# Patient Record
Sex: FEMALE | Race: WHITE | Employment: OTHER | URBAN - METROPOLITAN AREA
[De-identification: names, ages, dates, MRNs, and addresses within clinical notes are randomized per-mention and may not be internally consistent; named-entity substitution may affect disease eponyms.]

---

## 2018-03-12 ENCOUNTER — TELEPHONE (OUTPATIENT)
Dept: BARIATRICS/WEIGHT MGMT | Age: 48
End: 2018-03-12

## 2018-03-12 NOTE — TELEPHONE ENCOUNTER
Called patient to see if labs were done for appt with Dr Brandy Torres on 3/16/18 for vit d check. Explained that we would reschedule appt if she could not get them done by Thurs 3/15/18.

## 2018-03-13 ENCOUNTER — HOSPITAL ENCOUNTER (OUTPATIENT)
Age: 48
Discharge: HOME | End: 2018-03-13
Payer: MEDICARE

## 2018-03-13 ENCOUNTER — INITIAL CONSULT (OUTPATIENT)
Dept: BARIATRICS/WEIGHT MGMT | Age: 48
End: 2018-03-13

## 2018-03-13 ENCOUNTER — TELEPHONE (OUTPATIENT)
Dept: BARIATRICS/WEIGHT MGMT | Age: 48
End: 2018-03-13

## 2018-03-13 VITALS — WEIGHT: 293 LBS | BODY MASS INDEX: 48.82 KG/M2 | HEIGHT: 65 IN

## 2018-03-13 DIAGNOSIS — Z71.3 DIETARY COUNSELING: Primary | ICD-10-CM

## 2018-03-13 LAB — VITAMIN D 25-HYDROXY: 18 NG/ML (ref 30–100)

## 2018-03-13 PROCEDURE — 82306 VITAMIN D 25 HYDROXY: CPT

## 2018-03-13 PROCEDURE — 36415 COLL VENOUS BLD VENIPUNCTURE: CPT

## 2018-03-13 PROCEDURE — 99999 PR OFFICE/OUTPT VISIT,PROCEDURE ONLY: CPT | Performed by: SURGERY

## 2018-03-13 NOTE — TELEPHONE ENCOUNTER
Last Dietary Appointment Notes: 3/13/18    Caitlyn VILLALPANDO [address]: Medicare    Surgery Requested by Patient: As of 3/13/18 - RYGB Sx    Date: 2 Hour Nutrition Class: Wed. March 28th 2018 10:00 - 12:00    Migue / Miguel reviewed the following with the patient:    Tati Charles / Miguel at the [facility] reviewed with the patient that She has not completed the following in order to proceed with bariatric surgery:   -Pap Smear - Pt had it completed and will call and have the results sent - Completed 2/26/18 - Dr. Feliberto Mcgill / Delta Iraheta at the [facility] reviewed that She is not at Her pre-surgery goal weight of 302 lbs. Patient currently weighs 303.6 lbs and must return to the [facility] for a weight check on 3/28/18 before the patient can be scheduled for surgery. This has been reviewed with the patient and the patient is in agreement. Migue / Miguel reviewed with the patient that She must purchase a 3 month supply of supplements before Her surgery or at the time of Her H&P appointment and the patient states She is going to purchase the 3 month supply of supplements on H&P. Patient is aware that failure to purchase the supplements at this appointment will cancel the patients surgery date. Patient states at this time She does not see a Counselor and or Psychologist and or Psychiatrist at this time. Patient states She is currently seeing the following person Dr. Nico Gutierrez for the  following amount of times since 1995 for the following reasons Bi-Polar. Patient states She is  taking the following psychological medication Seroquel, Prozac . Prescribed by: Dr. Solomon Corcoran since 1995. Patient states at this time from the time of Her initial consult here at the [facility] there has been no changes in Her medical history. Patient is aware failure to disclose information can lead to Her surgery being cancelled. Patient received a copy of this at the time of Her final dietary consult.

## 2018-03-16 ENCOUNTER — OFFICE VISIT (OUTPATIENT)
Dept: BARIATRICS/WEIGHT MGMT | Age: 48
End: 2018-03-16
Payer: MEDICARE

## 2018-03-16 VITALS — WEIGHT: 293 LBS | TEMPERATURE: 98 F | BODY MASS INDEX: 48.82 KG/M2 | HEIGHT: 65 IN | RESPIRATION RATE: 18 BRPM

## 2018-03-16 DIAGNOSIS — E55.9 VITAMIN D DEFICIENCY: Primary | ICD-10-CM

## 2018-03-16 PROCEDURE — G8427 DOCREV CUR MEDS BY ELIG CLIN: HCPCS | Performed by: SURGERY

## 2018-03-16 PROCEDURE — G8482 FLU IMMUNIZE ORDER/ADMIN: HCPCS | Performed by: SURGERY

## 2018-03-16 PROCEDURE — G8417 CALC BMI ABV UP PARAM F/U: HCPCS | Performed by: SURGERY

## 2018-03-16 PROCEDURE — 1036F TOBACCO NON-USER: CPT | Performed by: SURGERY

## 2018-03-16 PROCEDURE — 99213 OFFICE O/P EST LOW 20 MIN: CPT | Performed by: SURGERY

## 2018-03-16 PROCEDURE — 99211 OFF/OP EST MAY X REQ PHY/QHP: CPT

## 2018-03-16 NOTE — PROGRESS NOTES
Bariatric Follow Up Note  [facility] or Ha Sky MD, [place]    Patient's Name/Date of Birth: Etta Counter / 1970    Date: March 16, 2018     Surgeon: Tish Gitelman, MD    Chief Complaint: Vitamin Deficiency, GERD    Patient Active Problem List   Diagnosis    Bilateral primary osteoarthritis of knee    Bipolar 1 disorder ([facility] [address]    Osteoarthritis    Obesity    Gastroesophageal reflux disease with esophagitis       Subjective: No new complaints, taking vit replacement daily. Doing well counting calories and protein    Objective:  Temp 98 °F (36.7 °C) (Temporal)   Resp 18   Ht 5' 5\" (1.651 m)   Wt (!) 304 lb (137.9 kg)   LMP 02/12/2018   BMI 50.59 kg/m²   Labs:  No results for input(s): WBC, HGB, HCT in the last 72 hours. Invalid input(s): PLR  Lab Results   Component Value Date    CREATININE 0.8 12/21/2017    BUN 11 12/21/2017     12/21/2017    K 4.9 12/21/2017     12/21/2017    CO2 29 12/21/2017     No results for input(s): LIPASE, AMYLASE in the last 72 hours.     Vit D 18 from 12    Review of Systems -  General ROS: negative for - chills, fatigue or malaise  ENT ROS: negative for - hearing change, nasal congestion or nasal discharge  Allergy and Immunology ROS: negative for - hives, itchy/watery eyes or nasal congestion  Hematological and Lymphatic ROS: negative for - blood clots, blood transfusions, bruising or fatigue  Endocrine ROS: negative for - malaise/lethargy, mood swings, palpitations or polydipsia/polyuria  Respiratory ROS: negative for - sputum changes, stridor, tachypnea or wheezing  Cardiovascular ROS: negative for - irregular heartbeat, loss of consciousness, murmur or orthopnea  Gastrointestinal ROS: negative for - constipation, diarrhea, gas/bloating, heartburn or hematemesis  Genito-Urinary ROS: negative for -  genital discharge, genital ulcers or hematuria  Musculoskeletal ROS: negative for - gait disturbance, muscle pain or muscular

## 2018-03-16 NOTE — PATIENT INSTRUCTIONS
What is the next step to proceed with weight loss surgery? Please be aware that any co-pays or deductibles may be requested prior to testing and / or procedures. You will need to schedule a psychological evaluation for weight loss surgery. Patients will be required to complete all psychological testing as required by the psychologist. Patients must follow all of the psychologist's recommendations before weight loss surgery can be scheduled. The evaluation must be done a standard way for weight loss surgery. We strongly recommend that you contact one of our preferred providers listed below to arrange this:    Dr. Debbi Maynard, PhD [address] , [address]        [phone]      Dr. Jluis Georges, PhD   [facility]. [place], [facility] [email or web address]         [phone]    You will also need to plan on attending a 2 hour nutrition class at the [facility] prior to your surgery. We will schedule this for you when we schedule your surgery. Please remember to have your labs drawn prior to your scheduled appointment to review the results of your testing. Please remember, that while we will submit your case to insurance for surgery authorization, it is your responsibility to know if your plan covers weight loss surgery and keep up-to-date with changes to your insurance coverage. We will do everything possible to help you get approved for weight loss surgery, but cannot guarantee an approval.     Please note that you will not be submitted to your insurance company until all   pre-operative testing requirements are met.

## 2018-03-26 ENCOUNTER — TELEPHONE (OUTPATIENT)
Dept: BARIATRICS/WEIGHT MGMT | Age: 48
End: 2018-03-26

## 2018-03-27 ENCOUNTER — TELEPHONE (OUTPATIENT)
Dept: BARIATRICS/WEIGHT MGMT | Age: 48
End: 2018-03-27

## 2018-03-27 NOTE — LETTER
Medical Clearance/Medical Necessity for Georgette-en-Y Gastric Bypass    Name: Daniel Whitfield                                     YOB: 1970    I have been caring for the above patient for _____ years. He/she suffers from the following medical conditions:  __________________________________________________________________________________________________________________________________________________________________________________________________________________    The above medical conditions are either caused by or worsened by the patients morbid obesity. Yes_________ No__________    The above medical conditions are currently stable:      Yes_________ No__________    The following medical conditions are difficult to manage/require multiple medications to achieve control due to the patients weight: ______________________________________________________________________  ______________________________________________________________________                    The above patient has participated in the following medical weight loss efforts without long-term weight reduction:  ____________________________________________________________________________________________________________________________________________    I am in support of my patient undergoing a weight loss surgical procedure with [facility] and the patient understands the risks and benefits of weight loss surgery. The patient has reasonable expectations and I believe the patient will be compliant with all post-surgical requirements. I understand the program is comprehensive with dedicated and specially trained staff.  In the event that  my patient is over the age of 61, I would like to state that the patients physiological age and co-morbid conditions result in a positive risk to benefit ratio.        ________  In my medical opinion, there are no medical contraindications to

## 2018-03-28 ENCOUNTER — INITIAL CONSULT (OUTPATIENT)
Dept: BARIATRICS/WEIGHT MGMT | Age: 48
End: 2018-03-28
Payer: MEDICARE

## 2018-03-28 DIAGNOSIS — Z71.3 DIETARY COUNSELING: Primary | ICD-10-CM

## 2018-03-28 PROCEDURE — 99999 PR OFFICE/OUTPT VISIT,PROCEDURE ONLY: CPT | Performed by: SURGERY

## 2018-04-07 ENCOUNTER — HOSPITAL ENCOUNTER (EMERGENCY)
Age: 48
Discharge: HOME | End: 2018-04-07
Attending: EMERGENCY MEDICINE
Payer: MEDICARE

## 2018-04-07 ENCOUNTER — HOSPITAL ENCOUNTER (EMERGENCY)
Age: 48
Discharge: OTHER INSTITUTION | End: 2018-04-07
Payer: MEDICARE

## 2018-04-07 ENCOUNTER — APPOINTMENT (OUTPATIENT)
Dept: CT IMAGING | Age: 48
End: 2018-04-07
Payer: MEDICARE

## 2018-04-07 VITALS
TEMPERATURE: 98.1 F | BODY MASS INDEX: 48.82 KG/M2 | OXYGEN SATURATION: 96 % | HEIGHT: 65 IN | WEIGHT: 293 LBS | DIASTOLIC BLOOD PRESSURE: 64 MMHG | SYSTOLIC BLOOD PRESSURE: 156 MMHG | HEART RATE: 67 BPM | RESPIRATION RATE: 16 BRPM

## 2018-04-07 VITALS
HEART RATE: 83 BPM | OXYGEN SATURATION: 97 % | RESPIRATION RATE: 18 BRPM | DIASTOLIC BLOOD PRESSURE: 69 MMHG | TEMPERATURE: 98.6 F | BODY MASS INDEX: 49.09 KG/M2 | WEIGHT: 293 LBS | SYSTOLIC BLOOD PRESSURE: 132 MMHG

## 2018-04-07 DIAGNOSIS — K57.32 DIVERTICULITIS OF COLON: ICD-10-CM

## 2018-04-07 DIAGNOSIS — R10.32 ABDOMINAL PAIN, LEFT LOWER QUADRANT: Primary | ICD-10-CM

## 2018-04-07 DIAGNOSIS — R10.32 LEFT LOWER QUADRANT PAIN: Primary | ICD-10-CM

## 2018-04-07 LAB
ALBUMIN SERPL-MCNC: 3.8 G/DL (ref 3.5–5.2)
ALP BLD-CCNC: 64 U/L (ref 35–104)
ALT SERPL-CCNC: 29 U/L (ref 0–32)
ANION GAP SERPL CALCULATED.3IONS-SCNC: 14 MMOL/L (ref 7–16)
AST SERPL-CCNC: 30 U/L (ref 0–31)
BASOPHILS ABSOLUTE: 0.05 E9/L (ref 0–0.2)
BASOPHILS RELATIVE PERCENT: 0.5 % (ref 0–2)
BILIRUB SERPL-MCNC: 0.5 MG/DL (ref 0–1.2)
BILIRUBIN URINE: NEGATIVE
BLOOD, URINE: NEGATIVE
BUN BLDV-MCNC: 8 MG/DL (ref 6–20)
CALCIUM SERPL-MCNC: 9 MG/DL (ref 8.6–10.2)
CHLORIDE BLD-SCNC: 100 MMOL/L (ref 98–107)
CHP ED QC CHECK: NORMAL
CLARITY: CLEAR
CO2: 26 MMOL/L (ref 22–29)
COLOR: YELLOW
CREAT SERPL-MCNC: 0.7 MG/DL (ref 0.5–1)
EOSINOPHILS ABSOLUTE: 0.11 E9/L (ref 0.05–0.5)
EOSINOPHILS RELATIVE PERCENT: 1 % (ref 0–6)
GFR AFRICAN AMERICAN: >60
GFR NON-AFRICAN AMERICAN: >60 ML/MIN/1.73
GLUCOSE BLD-MCNC: 107 MG/DL
GLUCOSE BLD-MCNC: 112 MG/DL (ref 74–109)
GLUCOSE URINE: NEGATIVE MG/DL
HCG(URINE) PREGNANCY TEST: NEGATIVE
HCT VFR BLD CALC: 40.2 % (ref 34–48)
HEMOGLOBIN: 13 G/DL (ref 11.5–15.5)
IMMATURE GRANULOCYTES #: 0.09 E9/L
IMMATURE GRANULOCYTES %: 0.8 % (ref 0–5)
KETONES, URINE: NEGATIVE MG/DL
LACTIC ACID: 0.8 MMOL/L (ref 0.5–2.2)
LEUKOCYTE ESTERASE, URINE: NEGATIVE
LIPASE: 13 U/L (ref 13–60)
LYMPHOCYTES ABSOLUTE: 1.96 E9/L (ref 1.5–4)
LYMPHOCYTES RELATIVE PERCENT: 18.2 % (ref 20–42)
MCH RBC QN AUTO: 27.8 PG (ref 26–35)
MCHC RBC AUTO-ENTMCNC: 32.3 % (ref 32–34.5)
MCV RBC AUTO: 86.1 FL (ref 80–99.9)
MONOCYTES ABSOLUTE: 0.86 E9/L (ref 0.1–0.95)
MONOCYTES RELATIVE PERCENT: 8 % (ref 2–12)
NEUTROPHILS ABSOLUTE: 7.72 E9/L (ref 1.8–7.3)
NEUTROPHILS RELATIVE PERCENT: 71.5 % (ref 43–80)
NITRITE, URINE: NEGATIVE
PDW BLD-RTO: 13 FL (ref 11.5–15)
PH UA: 7.5 (ref 5–9)
PLATELET # BLD: 291 E9/L (ref 130–450)
PMV BLD AUTO: 10.9 FL (ref 7–12)
POC ANION GAP: 13
POC BUN: 7
POC CHLORIDE: 102
POC CO2: 29
POC CREATININE: 0.8
POC POTASSIUM: NORMAL
POC SODIUM: 139
POTASSIUM SERPL-SCNC: 4.6 MMOL/L (ref 3.5–5)
PROTEIN UA: NEGATIVE MG/DL
RBC # BLD: 4.67 E12/L (ref 3.5–5.5)
SODIUM BLD-SCNC: 140 MMOL/L (ref 132–146)
SPECIFIC GRAVITY UA: 1.02 (ref 1–1.03)
TOTAL PROTEIN: 7.7 G/DL (ref 6.4–8.3)
UROBILINOGEN, URINE: 0.2 E.U./DL
WBC # BLD: 10.8 E9/L (ref 4.5–11.5)

## 2018-04-07 PROCEDURE — 81025 URINE PREGNANCY TEST: CPT

## 2018-04-07 PROCEDURE — 6360000002 HC RX W HCPCS: Performed by: EMERGENCY MEDICINE

## 2018-04-07 PROCEDURE — 99284 EMERGENCY DEPT VISIT MOD MDM: CPT

## 2018-04-07 PROCEDURE — 36415 COLL VENOUS BLD VENIPUNCTURE: CPT

## 2018-04-07 PROCEDURE — 83605 ASSAY OF LACTIC ACID: CPT

## 2018-04-07 PROCEDURE — 83690 ASSAY OF LIPASE: CPT

## 2018-04-07 PROCEDURE — 96374 THER/PROPH/DIAG INJ IV PUSH: CPT

## 2018-04-07 PROCEDURE — 81003 URINALYSIS AUTO W/O SCOPE: CPT

## 2018-04-07 PROCEDURE — 99212 OFFICE O/P EST SF 10 MIN: CPT

## 2018-04-07 PROCEDURE — 85025 COMPLETE CBC W/AUTO DIFF WBC: CPT

## 2018-04-07 PROCEDURE — 74176 CT ABD & PELVIS W/O CONTRAST: CPT

## 2018-04-07 PROCEDURE — 80053 COMPREHEN METABOLIC PANEL: CPT

## 2018-04-07 RX ORDER — METRONIDAZOLE 500 MG/1
500 TABLET ORAL 2 TIMES DAILY
Qty: 20 TABLET | Refills: 0 | Status: SHIPPED | OUTPATIENT
Start: 2018-04-07 | End: 2018-04-07 | Stop reason: ALTCHOICE

## 2018-04-07 RX ORDER — KETOROLAC TROMETHAMINE 30 MG/ML
30 INJECTION, SOLUTION INTRAMUSCULAR; INTRAVENOUS ONCE
Status: COMPLETED | OUTPATIENT
Start: 2018-04-07 | End: 2018-04-07

## 2018-04-07 RX ORDER — CEFDINIR 300 MG/1
300 CAPSULE ORAL 2 TIMES DAILY
Qty: 20 CAPSULE | Refills: 0 | Status: SHIPPED | OUTPATIENT
Start: 2018-04-07 | End: 2018-04-17

## 2018-04-07 RX ORDER — CIPROFLOXACIN 500 MG/1
500 TABLET, FILM COATED ORAL 2 TIMES DAILY
Qty: 20 TABLET | Refills: 0 | Status: SHIPPED | OUTPATIENT
Start: 2018-04-07 | End: 2018-04-07 | Stop reason: ALTCHOICE

## 2018-04-07 RX ORDER — ONDANSETRON 4 MG/1
4 TABLET, FILM COATED ORAL EVERY 8 HOURS PRN
Qty: 12 TABLET | Refills: 0 | Status: SHIPPED | OUTPATIENT
Start: 2018-04-07 | End: 2018-04-12

## 2018-04-07 RX ADMIN — KETOROLAC TROMETHAMINE 30 MG: 30 INJECTION, SOLUTION INTRAMUSCULAR at 11:28

## 2018-04-07 ASSESSMENT — ENCOUNTER SYMPTOMS
EYE REDNESS: 0
ABDOMINAL PAIN: 1
VOMITING: 0
SHORTNESS OF BREATH: 0
NAUSEA: 0

## 2018-04-07 ASSESSMENT — PAIN DESCRIPTION - DESCRIPTORS
DESCRIPTORS: ACHING;CRAMPING;DISCOMFORT
DESCRIPTORS: CRAMPING

## 2018-04-07 ASSESSMENT — PAIN DESCRIPTION - PROGRESSION: CLINICAL_PROGRESSION: GRADUALLY WORSENING

## 2018-04-07 ASSESSMENT — PAIN DESCRIPTION - FREQUENCY: FREQUENCY: CONTINUOUS

## 2018-04-07 ASSESSMENT — PAIN DESCRIPTION - PAIN TYPE
TYPE: ACUTE PAIN
TYPE: ACUTE PAIN

## 2018-04-07 ASSESSMENT — PAIN DESCRIPTION - ONSET: ONSET: GRADUAL

## 2018-04-07 ASSESSMENT — PAIN DESCRIPTION - ORIENTATION
ORIENTATION: LEFT
ORIENTATION: LEFT

## 2018-04-07 ASSESSMENT — PAIN DESCRIPTION - LOCATION
LOCATION: ABDOMEN
LOCATION: ABDOMEN;BACK

## 2018-04-07 ASSESSMENT — PAIN SCALES - GENERAL
PAINLEVEL_OUTOF10: 4
PAINLEVEL_OUTOF10: 5
PAINLEVEL_OUTOF10: 4

## 2018-04-09 ENCOUNTER — TELEPHONE (OUTPATIENT)
Dept: BARIATRICS/WEIGHT MGMT | Age: 48
End: 2018-04-09

## 2018-04-13 ENCOUNTER — OFFICE VISIT (OUTPATIENT)
Dept: FAMILY MEDICINE CLINIC | Age: 48
End: 2018-04-13
Payer: MEDICARE

## 2018-04-13 VITALS
OXYGEN SATURATION: 97 % | SYSTOLIC BLOOD PRESSURE: 120 MMHG | HEIGHT: 65 IN | BODY MASS INDEX: 48.82 KG/M2 | DIASTOLIC BLOOD PRESSURE: 70 MMHG | HEART RATE: 62 BPM | WEIGHT: 293 LBS

## 2018-04-13 PROCEDURE — 1036F TOBACCO NON-USER: CPT | Performed by: FAMILY MEDICINE

## 2018-04-13 PROCEDURE — G8417 CALC BMI ABV UP PARAM F/U: HCPCS | Performed by: FAMILY MEDICINE

## 2018-04-13 PROCEDURE — 99214 OFFICE O/P EST MOD 30 MIN: CPT | Performed by: FAMILY MEDICINE

## 2018-04-13 PROCEDURE — G8427 DOCREV CUR MEDS BY ELIG CLIN: HCPCS | Performed by: FAMILY MEDICINE

## 2018-04-13 ASSESSMENT — ENCOUNTER SYMPTOMS
BLURRED VISION: 0
SHORTNESS OF BREATH: 0
BACK PAIN: 0
VOMITING: 0
SPUTUM PRODUCTION: 0
NAUSEA: 0
ORTHOPNEA: 0
CONSTIPATION: 0
SORE THROAT: 0
WHEEZING: 0
DOUBLE VISION: 0
HEARTBURN: 0
COUGH: 0
BLOOD IN STOOL: 0
ABDOMINAL PAIN: 0
DIARRHEA: 0

## 2018-04-19 VITALS — BODY MASS INDEX: 49.51 KG/M2 | WEIGHT: 293 LBS

## 2018-04-20 ENCOUNTER — INITIAL CONSULT (OUTPATIENT)
Dept: BARIATRICS/WEIGHT MGMT | Age: 48
End: 2018-04-20
Payer: MEDICARE

## 2018-04-20 ENCOUNTER — OFFICE VISIT (OUTPATIENT)
Dept: BARIATRICS/WEIGHT MGMT | Age: 48
End: 2018-04-20
Payer: MEDICARE

## 2018-04-20 VITALS
HEART RATE: 74 BPM | TEMPERATURE: 97.4 F | DIASTOLIC BLOOD PRESSURE: 70 MMHG | SYSTOLIC BLOOD PRESSURE: 130 MMHG | HEIGHT: 65 IN | BODY MASS INDEX: 48.82 KG/M2 | RESPIRATION RATE: 20 BRPM | WEIGHT: 293 LBS

## 2018-04-20 VITALS — BODY MASS INDEX: 48.82 KG/M2 | HEIGHT: 65 IN | WEIGHT: 293 LBS

## 2018-04-20 DIAGNOSIS — K21.9 GASTROESOPHAGEAL REFLUX DISEASE WITHOUT ESOPHAGITIS: ICD-10-CM

## 2018-04-20 DIAGNOSIS — Z98.890 PONV (POSTOPERATIVE NAUSEA AND VOMITING): Primary | ICD-10-CM

## 2018-04-20 DIAGNOSIS — G89.18 POST-OP PAIN: ICD-10-CM

## 2018-04-20 DIAGNOSIS — K91.2 MALNUTRITION FOLLOWING GASTROINTESTINAL SURGERY: ICD-10-CM

## 2018-04-20 DIAGNOSIS — Z71.3 DIETARY COUNSELING: Primary | ICD-10-CM

## 2018-04-20 DIAGNOSIS — R11.2 PONV (POSTOPERATIVE NAUSEA AND VOMITING): Primary | ICD-10-CM

## 2018-04-20 PROCEDURE — 99213 OFFICE O/P EST LOW 20 MIN: CPT

## 2018-04-20 PROCEDURE — 1036F TOBACCO NON-USER: CPT | Performed by: SURGERY

## 2018-04-20 PROCEDURE — G8427 DOCREV CUR MEDS BY ELIG CLIN: HCPCS | Performed by: SURGERY

## 2018-04-20 PROCEDURE — 99214 OFFICE O/P EST MOD 30 MIN: CPT | Performed by: SURGERY

## 2018-04-20 PROCEDURE — G8417 CALC BMI ABV UP PARAM F/U: HCPCS | Performed by: SURGERY

## 2018-04-20 PROCEDURE — 99999 PR OFFICE/OUTPT VISIT,PROCEDURE ONLY: CPT | Performed by: SURGERY

## 2018-04-20 RX ORDER — ONDANSETRON 4 MG/1
4 TABLET, ORALLY DISINTEGRATING ORAL EVERY 8 HOURS PRN
Qty: 15 TABLET | Refills: 0 | Status: ON HOLD | OUTPATIENT
Start: 2018-04-20 | End: 2018-10-21 | Stop reason: ALTCHOICE

## 2018-04-20 RX ORDER — OMEPRAZOLE 20 MG/1
20 CAPSULE, DELAYED RELEASE ORAL DAILY
Qty: 30 CAPSULE | Refills: 12 | Status: CANCELLED | OUTPATIENT
Start: 2018-04-20 | End: 2019-04-20

## 2018-04-23 ENCOUNTER — HOSPITAL ENCOUNTER (OUTPATIENT)
Dept: PREADMISSION TESTING | Age: 48
Discharge: HOME | End: 2018-04-23
Payer: MEDICARE

## 2018-04-23 VITALS
BODY MASS INDEX: 48.6 KG/M2 | RESPIRATION RATE: 18 BRPM | HEART RATE: 58 BPM | SYSTOLIC BLOOD PRESSURE: 137 MMHG | DIASTOLIC BLOOD PRESSURE: 84 MMHG | HEIGHT: 65 IN | WEIGHT: 291.7 LBS | TEMPERATURE: 98.2 F

## 2018-04-23 DIAGNOSIS — K91.2 MALNUTRITION FOLLOWING GASTROINTESTINAL SURGERY: ICD-10-CM

## 2018-04-23 LAB
ALBUMIN SERPL-MCNC: 4 G/DL (ref 3.5–5.2)
ALP BLD-CCNC: 48 U/L (ref 35–104)
ALT SERPL-CCNC: 23 U/L (ref 0–32)
ANION GAP SERPL CALCULATED.3IONS-SCNC: 11 MMOL/L (ref 7–16)
AST SERPL-CCNC: 22 U/L (ref 0–31)
BILIRUB SERPL-MCNC: 0.4 MG/DL (ref 0–1.2)
BUN BLDV-MCNC: 13 MG/DL (ref 6–20)
CALCIUM SERPL-MCNC: 9.3 MG/DL (ref 8.6–10.2)
CHLORIDE BLD-SCNC: 99 MMOL/L (ref 98–107)
CO2: 28 MMOL/L (ref 22–29)
CREAT SERPL-MCNC: 0.7 MG/DL (ref 0.5–1)
GFR AFRICAN AMERICAN: >60
GFR NON-AFRICAN AMERICAN: >60 ML/MIN/1.73
GLUCOSE BLD-MCNC: 95 MG/DL (ref 74–109)
HCT VFR BLD CALC: 39.9 % (ref 34–48)
HEMOGLOBIN: 13.1 G/DL (ref 11.5–15.5)
MCH RBC QN AUTO: 27.9 PG (ref 26–35)
MCHC RBC AUTO-ENTMCNC: 32.8 % (ref 32–34.5)
MCV RBC AUTO: 85.1 FL (ref 80–99.9)
PDW BLD-RTO: 13.4 FL (ref 11.5–15)
PLATELET # BLD: 359 E9/L (ref 130–450)
PMV BLD AUTO: 11 FL (ref 7–12)
POTASSIUM SERPL-SCNC: 4.1 MMOL/L (ref 3.5–5)
RBC # BLD: 4.69 E12/L (ref 3.5–5.5)
SODIUM BLD-SCNC: 138 MMOL/L (ref 132–146)
TOTAL PROTEIN: 7.6 G/DL (ref 6.4–8.3)
WBC # BLD: 6.6 E9/L (ref 4.5–11.5)

## 2018-04-23 PROCEDURE — 80053 COMPREHEN METABOLIC PANEL: CPT

## 2018-04-23 PROCEDURE — 36415 COLL VENOUS BLD VENIPUNCTURE: CPT

## 2018-04-23 PROCEDURE — 85027 COMPLETE CBC AUTOMATED: CPT

## 2018-04-27 ENCOUNTER — ANESTHESIA EVENT (OUTPATIENT)
Dept: OPERATING ROOM | Age: 48
DRG: 621 | End: 2018-04-27
Payer: MEDICARE

## 2018-04-30 ENCOUNTER — ANESTHESIA (OUTPATIENT)
Dept: OPERATING ROOM | Age: 48
DRG: 621 | End: 2018-04-30
Payer: MEDICARE

## 2018-04-30 ENCOUNTER — HOSPITAL ENCOUNTER (INPATIENT)
Age: 48
LOS: 1 days | Discharge: HOME | DRG: 621 | End: 2018-05-01
Attending: SURGERY | Admitting: SURGERY
Payer: MEDICARE

## 2018-04-30 VITALS
DIASTOLIC BLOOD PRESSURE: 66 MMHG | SYSTOLIC BLOOD PRESSURE: 77 MMHG | OXYGEN SATURATION: 94 % | RESPIRATION RATE: 2 BRPM | TEMPERATURE: 96.6 F

## 2018-04-30 PROBLEM — Z98.84 S/P BARIATRIC SURGERY: Status: ACTIVE | Noted: 2018-04-30

## 2018-04-30 PROBLEM — I10 HYPERTENSION: Status: ACTIVE | Noted: 2018-04-30

## 2018-04-30 LAB — HCG(URINE) PREGNANCY TEST: NEGATIVE

## 2018-04-30 PROCEDURE — 7100000000 HC PACU RECOVERY - FIRST 15 MIN: Performed by: SURGERY

## 2018-04-30 PROCEDURE — 2060000000 HC ICU INTERMEDIATE R&B

## 2018-04-30 PROCEDURE — 2500000003 HC RX 250 WO HCPCS

## 2018-04-30 PROCEDURE — 0D164ZA BYPASS STOMACH TO JEJUNUM, PERCUTANEOUS ENDOSCOPIC APPROACH: ICD-10-PCS | Performed by: SURGERY

## 2018-04-30 PROCEDURE — 6370000000 HC RX 637 (ALT 250 FOR IP): Performed by: FAMILY MEDICINE

## 2018-04-30 PROCEDURE — 6360000002 HC RX W HCPCS: Performed by: ANESTHESIOLOGY

## 2018-04-30 PROCEDURE — 2780000010 HC IMPLANT OTHER: Performed by: SURGERY

## 2018-04-30 PROCEDURE — 2500000003 HC RX 250 WO HCPCS: Performed by: SURGERY

## 2018-04-30 PROCEDURE — 6360000002 HC RX W HCPCS: Performed by: SURGERY

## 2018-04-30 PROCEDURE — 43644 LAP GASTRIC BYPASS/ROUX-EN-Y: CPT | Performed by: SURGERY

## 2018-04-30 PROCEDURE — C9113 INJ PANTOPRAZOLE SODIUM, VIA: HCPCS | Performed by: SURGERY

## 2018-04-30 PROCEDURE — 2580000003 HC RX 258: Performed by: SURGERY

## 2018-04-30 PROCEDURE — 3600000005 HC SURGERY LEVEL 5 BASE: Performed by: SURGERY

## 2018-04-30 PROCEDURE — 88307 TISSUE EXAM BY PATHOLOGIST: CPT

## 2018-04-30 PROCEDURE — 2720000010 HC SURG SUPPLY STERILE: Performed by: SURGERY

## 2018-04-30 PROCEDURE — 2580000003 HC RX 258

## 2018-04-30 PROCEDURE — 7100000001 HC PACU RECOVERY - ADDTL 15 MIN: Performed by: SURGERY

## 2018-04-30 PROCEDURE — 3700000000 HC ANESTHESIA ATTENDED CARE: Performed by: SURGERY

## 2018-04-30 PROCEDURE — 0DJ08ZZ INSPECTION OF UPPER INTESTINAL TRACT, VIA NATURAL OR ARTIFICIAL OPENING ENDOSCOPIC: ICD-10-PCS | Performed by: SURGERY

## 2018-04-30 PROCEDURE — 6370000000 HC RX 637 (ALT 250 FOR IP): Performed by: SURGERY

## 2018-04-30 PROCEDURE — 81025 URINE PREGNANCY TEST: CPT

## 2018-04-30 PROCEDURE — C1760 CLOSURE DEV, VASC: HCPCS | Performed by: SURGERY

## 2018-04-30 PROCEDURE — 6360000002 HC RX W HCPCS

## 2018-04-30 PROCEDURE — 3600000015 HC SURGERY LEVEL 5 ADDTL 15MIN: Performed by: SURGERY

## 2018-04-30 PROCEDURE — 3700000001 HC ADD 15 MINUTES (ANESTHESIA): Performed by: SURGERY

## 2018-04-30 DEVICE — RELOAD STPL L60MM H1-2.6MM MESENTERY THN TISS WHT 6 ROW: Type: IMPLANTABLE DEVICE | Site: ILEUM | Status: FUNCTIONAL

## 2018-04-30 DEVICE — RELOAD STPL L60MM H1.5-3.6MM REG TISS BLU GRIPPING SURF B: Type: IMPLANTABLE DEVICE | Site: ILEUM | Status: FUNCTIONAL

## 2018-04-30 RX ORDER — NEOSTIGMINE METHYLSULFATE 1 MG/ML
INJECTION, SOLUTION INTRAVENOUS PRN
Status: DISCONTINUED | OUTPATIENT
Start: 2018-04-30 | End: 2018-04-30 | Stop reason: SDUPTHER

## 2018-04-30 RX ORDER — ROCURONIUM BROMIDE 10 MG/ML
INJECTION, SOLUTION INTRAVENOUS PRN
Status: DISCONTINUED | OUTPATIENT
Start: 2018-04-30 | End: 2018-04-30 | Stop reason: SDUPTHER

## 2018-04-30 RX ORDER — PROMETHAZINE HYDROCHLORIDE 25 MG/1
25 SUPPOSITORY RECTAL EVERY 6 HOURS PRN
Status: DISCONTINUED | OUTPATIENT
Start: 2018-04-30 | End: 2018-05-01 | Stop reason: HOSPADM

## 2018-04-30 RX ORDER — BUPIVACAINE HYDROCHLORIDE AND EPINEPHRINE 2.5; 5 MG/ML; UG/ML
INJECTION, SOLUTION EPIDURAL; INFILTRATION; INTRACAUDAL; PERINEURAL PRN
Status: DISCONTINUED | OUTPATIENT
Start: 2018-04-30 | End: 2018-04-30 | Stop reason: HOSPADM

## 2018-04-30 RX ORDER — MORPHINE SULFATE 2 MG/ML
2 INJECTION, SOLUTION INTRAMUSCULAR; INTRAVENOUS
Status: DISCONTINUED | OUTPATIENT
Start: 2018-04-30 | End: 2018-05-01 | Stop reason: HOSPADM

## 2018-04-30 RX ORDER — SODIUM CHLORIDE 9 MG/ML
INJECTION, SOLUTION INTRAVENOUS CONTINUOUS
Status: DISCONTINUED | OUTPATIENT
Start: 2018-04-30 | End: 2018-04-30

## 2018-04-30 RX ORDER — LITHIUM CARBONATE 300 MG/1
300 CAPSULE ORAL 2 TIMES DAILY WITH MEALS
Status: DISCONTINUED | OUTPATIENT
Start: 2018-04-30 | End: 2018-05-01 | Stop reason: HOSPADM

## 2018-04-30 RX ORDER — ONDANSETRON 2 MG/ML
INJECTION INTRAMUSCULAR; INTRAVENOUS PRN
Status: DISCONTINUED | OUTPATIENT
Start: 2018-04-30 | End: 2018-04-30 | Stop reason: SDUPTHER

## 2018-04-30 RX ORDER — SODIUM CHLORIDE 9 MG/ML
INJECTION, SOLUTION INTRAVENOUS CONTINUOUS PRN
Status: DISCONTINUED | OUTPATIENT
Start: 2018-04-30 | End: 2018-04-30 | Stop reason: SDUPTHER

## 2018-04-30 RX ORDER — MEPERIDINE HYDROCHLORIDE 25 MG/ML
12.5 INJECTION INTRAMUSCULAR; INTRAVENOUS; SUBCUTANEOUS EVERY 5 MIN PRN
Status: COMPLETED | OUTPATIENT
Start: 2018-04-30 | End: 2018-04-30

## 2018-04-30 RX ORDER — SODIUM CHLORIDE, SODIUM LACTATE, POTASSIUM CHLORIDE, CALCIUM CHLORIDE 600; 310; 30; 20 MG/100ML; MG/100ML; MG/100ML; MG/100ML
INJECTION, SOLUTION INTRAVENOUS CONTINUOUS
Status: DISCONTINUED | OUTPATIENT
Start: 2018-04-30 | End: 2018-04-30

## 2018-04-30 RX ORDER — ACETAMINOPHEN 160 MG/5ML
650 SUSPENSION, ORAL (FINAL DOSE FORM) ORAL EVERY 4 HOURS PRN
Status: DISCONTINUED | OUTPATIENT
Start: 2018-04-30 | End: 2018-05-01 | Stop reason: HOSPADM

## 2018-04-30 RX ORDER — PROPOFOL 10 MG/ML
INJECTION, EMULSION INTRAVENOUS PRN
Status: DISCONTINUED | OUTPATIENT
Start: 2018-04-30 | End: 2018-04-30 | Stop reason: SDUPTHER

## 2018-04-30 RX ORDER — GLYCOPYRROLATE 1 MG/5 ML
SYRINGE (ML) INTRAVENOUS PRN
Status: DISCONTINUED | OUTPATIENT
Start: 2018-04-30 | End: 2018-04-30 | Stop reason: SDUPTHER

## 2018-04-30 RX ORDER — FLUOXETINE 10 MG/1
30 CAPSULE ORAL DAILY
Status: DISCONTINUED | OUTPATIENT
Start: 2018-04-30 | End: 2018-05-01 | Stop reason: HOSPADM

## 2018-04-30 RX ORDER — MORPHINE SULFATE 4 MG/ML
4 INJECTION, SOLUTION INTRAMUSCULAR; INTRAVENOUS
Status: DISCONTINUED | OUTPATIENT
Start: 2018-04-30 | End: 2018-05-01 | Stop reason: HOSPADM

## 2018-04-30 RX ORDER — SCOLOPAMINE TRANSDERMAL SYSTEM 1 MG/1
1 PATCH, EXTENDED RELEASE TRANSDERMAL
Status: DISCONTINUED | OUTPATIENT
Start: 2018-04-30 | End: 2018-05-01 | Stop reason: HOSPADM

## 2018-04-30 RX ORDER — QUETIAPINE FUMARATE 100 MG/1
100 TABLET, FILM COATED ORAL NIGHTLY
Status: DISCONTINUED | OUTPATIENT
Start: 2018-04-30 | End: 2018-05-01 | Stop reason: HOSPADM

## 2018-04-30 RX ORDER — MEPERIDINE HYDROCHLORIDE 25 MG/ML
INJECTION INTRAMUSCULAR; INTRAVENOUS; SUBCUTANEOUS
Status: DISPENSED
Start: 2018-04-30 | End: 2018-04-30

## 2018-04-30 RX ORDER — ONDANSETRON 2 MG/ML
4 INJECTION INTRAMUSCULAR; INTRAVENOUS EVERY 6 HOURS PRN
Status: DISCONTINUED | OUTPATIENT
Start: 2018-04-30 | End: 2018-05-01 | Stop reason: HOSPADM

## 2018-04-30 RX ORDER — LIDOCAINE HYDROCHLORIDE 20 MG/ML
INJECTION, SOLUTION INFILTRATION; PERINEURAL PRN
Status: DISCONTINUED | OUTPATIENT
Start: 2018-04-30 | End: 2018-04-30 | Stop reason: SDUPTHER

## 2018-04-30 RX ORDER — FENTANYL CITRATE 50 UG/ML
INJECTION, SOLUTION INTRAMUSCULAR; INTRAVENOUS PRN
Status: DISCONTINUED | OUTPATIENT
Start: 2018-04-30 | End: 2018-04-30 | Stop reason: SDUPTHER

## 2018-04-30 RX ORDER — SODIUM CHLORIDE 0.9 % (FLUSH) 0.9 %
10 SYRINGE (ML) INJECTION EVERY 12 HOURS SCHEDULED
Status: DISCONTINUED | OUTPATIENT
Start: 2018-04-30 | End: 2018-05-01 | Stop reason: HOSPADM

## 2018-04-30 RX ORDER — SODIUM CHLORIDE 0.9 % (FLUSH) 0.9 %
10 SYRINGE (ML) INJECTION PRN
Status: DISCONTINUED | OUTPATIENT
Start: 2018-04-30 | End: 2018-04-30

## 2018-04-30 RX ORDER — 0.9 % SODIUM CHLORIDE 0.9 %
10 VIAL (ML) INJECTION DAILY
Status: DISCONTINUED | OUTPATIENT
Start: 2018-04-30 | End: 2018-05-01 | Stop reason: HOSPADM

## 2018-04-30 RX ORDER — KETOROLAC TROMETHAMINE 15 MG/ML
15 INJECTION, SOLUTION INTRAMUSCULAR; INTRAVENOUS EVERY 6 HOURS
Status: DISCONTINUED | OUTPATIENT
Start: 2018-04-30 | End: 2018-05-01 | Stop reason: HOSPADM

## 2018-04-30 RX ORDER — PANTOPRAZOLE SODIUM 40 MG/10ML
40 INJECTION, POWDER, LYOPHILIZED, FOR SOLUTION INTRAVENOUS DAILY
Status: DISCONTINUED | OUTPATIENT
Start: 2018-04-30 | End: 2018-05-01 | Stop reason: HOSPADM

## 2018-04-30 RX ORDER — SODIUM CHLORIDE 0.9 % (FLUSH) 0.9 %
10 SYRINGE (ML) INJECTION PRN
Status: DISCONTINUED | OUTPATIENT
Start: 2018-04-30 | End: 2018-05-01 | Stop reason: HOSPADM

## 2018-04-30 RX ORDER — DEXAMETHASONE SODIUM PHOSPHATE 10 MG/ML
INJECTION, SOLUTION INTRAMUSCULAR; INTRAVENOUS PRN
Status: DISCONTINUED | OUTPATIENT
Start: 2018-04-30 | End: 2018-04-30 | Stop reason: SDUPTHER

## 2018-04-30 RX ORDER — SODIUM CHLORIDE 0.9 % (FLUSH) 0.9 %
10 SYRINGE (ML) INJECTION EVERY 12 HOURS SCHEDULED
Status: DISCONTINUED | OUTPATIENT
Start: 2018-04-30 | End: 2018-04-30

## 2018-04-30 RX ORDER — KETOROLAC TROMETHAMINE 30 MG/ML
INJECTION, SOLUTION INTRAMUSCULAR; INTRAVENOUS PRN
Status: DISCONTINUED | OUTPATIENT
Start: 2018-04-30 | End: 2018-04-30 | Stop reason: SDUPTHER

## 2018-04-30 RX ORDER — ONDANSETRON 2 MG/ML
4 INJECTION INTRAMUSCULAR; INTRAVENOUS ONCE
Status: COMPLETED | OUTPATIENT
Start: 2018-04-30 | End: 2018-04-30

## 2018-04-30 RX ORDER — MIDAZOLAM HYDROCHLORIDE 1 MG/ML
INJECTION INTRAMUSCULAR; INTRAVENOUS PRN
Status: DISCONTINUED | OUTPATIENT
Start: 2018-04-30 | End: 2018-04-30 | Stop reason: SDUPTHER

## 2018-04-30 RX ADMIN — LIDOCAINE HYDROCHLORIDE 100 MG: 20 INJECTION, SOLUTION INFILTRATION; PERINEURAL at 08:01

## 2018-04-30 RX ADMIN — Medication 0.4 MG: at 09:06

## 2018-04-30 RX ADMIN — PROPOFOL 200 MG: 10 INJECTION, EMULSION INTRAVENOUS at 08:01

## 2018-04-30 RX ADMIN — PHENYLEPHRINE HYDROCHLORIDE 100 MCG: 10 INJECTION INTRAMUSCULAR; INTRAVENOUS; SUBCUTANEOUS at 08:49

## 2018-04-30 RX ADMIN — FENTANYL CITRATE 100 MCG: 50 INJECTION, SOLUTION INTRAMUSCULAR; INTRAVENOUS at 08:01

## 2018-04-30 RX ADMIN — CEFAZOLIN SODIUM 3 G: 10 INJECTION, POWDER, FOR SOLUTION INTRAVENOUS at 15:17

## 2018-04-30 RX ADMIN — MEPERIDINE HYDROCHLORIDE 12.5 MG: 25 INJECTION INTRAMUSCULAR; INTRAVENOUS; SUBCUTANEOUS at 09:55

## 2018-04-30 RX ADMIN — PHENYLEPHRINE HYDROCHLORIDE 100 MCG: 10 INJECTION INTRAMUSCULAR; INTRAVENOUS; SUBCUTANEOUS at 08:23

## 2018-04-30 RX ADMIN — ONDANSETRON 4 MG: 2 INJECTION INTRAMUSCULAR; INTRAVENOUS at 23:48

## 2018-04-30 RX ADMIN — Medication 10 ML: at 12:25

## 2018-04-30 RX ADMIN — QUETIAPINE FUMARATE 100 MG: 100 TABLET ORAL at 20:25

## 2018-04-30 RX ADMIN — FENTANYL CITRATE 50 MCG: 50 INJECTION, SOLUTION INTRAMUSCULAR; INTRAVENOUS at 09:00

## 2018-04-30 RX ADMIN — KETOROLAC TROMETHAMINE 30 MG: 30 INJECTION, SOLUTION INTRAMUSCULAR at 09:02

## 2018-04-30 RX ADMIN — MEPERIDINE HYDROCHLORIDE 12.5 MG: 25 INJECTION INTRAMUSCULAR; INTRAVENOUS; SUBCUTANEOUS at 09:40

## 2018-04-30 RX ADMIN — MEPERIDINE HYDROCHLORIDE 12.5 MG: 25 INJECTION INTRAMUSCULAR; INTRAVENOUS; SUBCUTANEOUS at 09:50

## 2018-04-30 RX ADMIN — CEFAZOLIN SODIUM 3 G: 2 SOLUTION INTRAVENOUS at 07:56

## 2018-04-30 RX ADMIN — POTASSIUM CHLORIDE: 149 INJECTION, SOLUTION, CONCENTRATE INTRAVENOUS at 12:25

## 2018-04-30 RX ADMIN — POTASSIUM CHLORIDE: 149 INJECTION, SOLUTION, CONCENTRATE INTRAVENOUS at 23:49

## 2018-04-30 RX ADMIN — ONDANSETRON 4 MG: 2 INJECTION, SOLUTION INTRAMUSCULAR; INTRAVENOUS at 08:59

## 2018-04-30 RX ADMIN — ROCURONIUM BROMIDE 40 MG: 10 INJECTION, SOLUTION INTRAVENOUS at 08:02

## 2018-04-30 RX ADMIN — PANTOPRAZOLE SODIUM 40 MG: 40 INJECTION, POWDER, FOR SOLUTION INTRAVENOUS at 12:25

## 2018-04-30 RX ADMIN — HYDROMORPHONE HYDROCHLORIDE 0.5 MG: 1 INJECTION, SOLUTION INTRAMUSCULAR; INTRAVENOUS; SUBCUTANEOUS at 10:00

## 2018-04-30 RX ADMIN — KETOROLAC TROMETHAMINE 15 MG: 15 INJECTION, SOLUTION INTRAMUSCULAR; INTRAVENOUS at 15:17

## 2018-04-30 RX ADMIN — ONDANSETRON 4 MG: 2 INJECTION INTRAMUSCULAR; INTRAVENOUS at 06:51

## 2018-04-30 RX ADMIN — PHENYLEPHRINE HYDROCHLORIDE 100 MCG: 10 INJECTION INTRAMUSCULAR; INTRAVENOUS; SUBCUTANEOUS at 08:15

## 2018-04-30 RX ADMIN — MIDAZOLAM HYDROCHLORIDE 2 MG: 1 INJECTION INTRAMUSCULAR; INTRAVENOUS at 07:56

## 2018-04-30 RX ADMIN — SODIUM CHLORIDE: 9 INJECTION, SOLUTION INTRAVENOUS at 07:56

## 2018-04-30 RX ADMIN — ENOXAPARIN SODIUM 40 MG: 40 INJECTION SUBCUTANEOUS at 17:56

## 2018-04-30 RX ADMIN — ROCURONIUM BROMIDE 10 MG: 10 INJECTION, SOLUTION INTRAVENOUS at 08:29

## 2018-04-30 RX ADMIN — MEPERIDINE HYDROCHLORIDE 12.5 MG: 25 INJECTION INTRAMUSCULAR; INTRAVENOUS; SUBCUTANEOUS at 09:45

## 2018-04-30 RX ADMIN — DEXAMETHASONE SODIUM PHOSPHATE 10 MG: 10 INJECTION, SOLUTION INTRAMUSCULAR; INTRAVENOUS at 08:01

## 2018-04-30 RX ADMIN — KETOROLAC TROMETHAMINE 15 MG: 15 INJECTION, SOLUTION INTRAMUSCULAR; INTRAVENOUS at 20:24

## 2018-04-30 RX ADMIN — Medication 3 MG: at 09:06

## 2018-04-30 RX ADMIN — Medication 10 ML: at 20:27

## 2018-04-30 RX ADMIN — CEFAZOLIN SODIUM 3 G: 10 INJECTION, POWDER, FOR SOLUTION INTRAVENOUS at 23:48

## 2018-04-30 RX ADMIN — SODIUM CHLORIDE: 9 INJECTION, SOLUTION INTRAVENOUS at 06:49

## 2018-04-30 ASSESSMENT — PULMONARY FUNCTION TESTS
PIF_VALUE: 27
PIF_VALUE: 28
PIF_VALUE: 1
PIF_VALUE: 26
PIF_VALUE: 33
PIF_VALUE: 11
PIF_VALUE: 3
PIF_VALUE: 21
PIF_VALUE: 39
PIF_VALUE: 29
PIF_VALUE: 23
PIF_VALUE: 2
PIF_VALUE: 31
PIF_VALUE: 31
PIF_VALUE: 32
PIF_VALUE: 3
PIF_VALUE: 31
PIF_VALUE: 32
PIF_VALUE: 30
PIF_VALUE: 1
PIF_VALUE: 30
PIF_VALUE: 30
PIF_VALUE: 32
PIF_VALUE: 30
PIF_VALUE: 32
PIF_VALUE: 0
PIF_VALUE: 31
PIF_VALUE: 25
PIF_VALUE: 31
PIF_VALUE: 32
PIF_VALUE: 26
PIF_VALUE: 32
PIF_VALUE: 25
PIF_VALUE: 23
PIF_VALUE: 26
PIF_VALUE: 31
PIF_VALUE: 31
PIF_VALUE: 29
PIF_VALUE: 32
PIF_VALUE: 32
PIF_VALUE: 33
PIF_VALUE: 29
PIF_VALUE: 30
PIF_VALUE: 23
PIF_VALUE: 35
PIF_VALUE: 32
PIF_VALUE: 27
PIF_VALUE: 1
PIF_VALUE: 31
PIF_VALUE: 29
PIF_VALUE: 30
PIF_VALUE: 0
PIF_VALUE: 2
PIF_VALUE: 31
PIF_VALUE: 28
PIF_VALUE: 32
PIF_VALUE: 32
PIF_VALUE: 30
PIF_VALUE: 4
PIF_VALUE: 38
PIF_VALUE: 2
PIF_VALUE: 30
PIF_VALUE: 3
PIF_VALUE: 21
PIF_VALUE: 33
PIF_VALUE: 25
PIF_VALUE: 26
PIF_VALUE: 29
PIF_VALUE: 21
PIF_VALUE: 2
PIF_VALUE: 30
PIF_VALUE: 32
PIF_VALUE: 27
PIF_VALUE: 0
PIF_VALUE: 26

## 2018-04-30 ASSESSMENT — PAIN SCALES - GENERAL
PAINLEVEL_OUTOF10: 0
PAINLEVEL_OUTOF10: 3
PAINLEVEL_OUTOF10: 5
PAINLEVEL_OUTOF10: 0
PAINLEVEL_OUTOF10: 0
PAINLEVEL_OUTOF10: 1
PAINLEVEL_OUTOF10: 5

## 2018-04-30 ASSESSMENT — PAIN DESCRIPTION - LOCATION
LOCATION: ABDOMEN
LOCATION: ABDOMEN

## 2018-04-30 ASSESSMENT — PAIN DESCRIPTION - DESCRIPTORS: DESCRIPTORS: CRAMPING;DISCOMFORT;DULL

## 2018-04-30 ASSESSMENT — PAIN - FUNCTIONAL ASSESSMENT: PAIN_FUNCTIONAL_ASSESSMENT: 0-10

## 2018-04-30 ASSESSMENT — PAIN DESCRIPTION - PAIN TYPE
TYPE: ACUTE PAIN;SURGICAL PAIN
TYPE: ACUTE PAIN;SURGICAL PAIN

## 2018-04-30 ASSESSMENT — PAIN DESCRIPTION - FREQUENCY: FREQUENCY: CONTINUOUS

## 2018-04-30 ASSESSMENT — PAIN DESCRIPTION - PROGRESSION: CLINICAL_PROGRESSION: GRADUALLY IMPROVING

## 2018-04-30 ASSESSMENT — PAIN DESCRIPTION - ORIENTATION
ORIENTATION: MID
ORIENTATION: MID

## 2018-04-30 ASSESSMENT — PAIN DESCRIPTION - ONSET: ONSET: ON-GOING

## 2018-05-01 VITALS
SYSTOLIC BLOOD PRESSURE: 99 MMHG | HEIGHT: 65 IN | DIASTOLIC BLOOD PRESSURE: 53 MMHG | TEMPERATURE: 97.9 F | RESPIRATION RATE: 16 BRPM | OXYGEN SATURATION: 97 % | WEIGHT: 287.6 LBS | HEART RATE: 58 BPM | BODY MASS INDEX: 47.92 KG/M2

## 2018-05-01 LAB
ANION GAP SERPL CALCULATED.3IONS-SCNC: 11 MMOL/L (ref 7–16)
BASOPHILS ABSOLUTE: 0.01 E9/L (ref 0–0.2)
BASOPHILS RELATIVE PERCENT: 0.1 % (ref 0–2)
BUN BLDV-MCNC: 12 MG/DL (ref 6–20)
CALCIUM SERPL-MCNC: 8.8 MG/DL (ref 8.6–10.2)
CHLORIDE BLD-SCNC: 104 MMOL/L (ref 98–107)
CO2: 25 MMOL/L (ref 22–29)
CREAT SERPL-MCNC: 0.8 MG/DL (ref 0.5–1)
EOSINOPHILS ABSOLUTE: 0 E9/L (ref 0.05–0.5)
EOSINOPHILS RELATIVE PERCENT: 0 % (ref 0–6)
GFR AFRICAN AMERICAN: >60
GFR NON-AFRICAN AMERICAN: >60 ML/MIN/1.73
GLUCOSE BLD-MCNC: 125 MG/DL (ref 74–109)
HCT VFR BLD CALC: 34.2 % (ref 34–48)
HEMOGLOBIN: 11.1 G/DL (ref 11.5–15.5)
IMMATURE GRANULOCYTES #: 0.05 E9/L
IMMATURE GRANULOCYTES %: 0.6 % (ref 0–5)
LYMPHOCYTES ABSOLUTE: 1.39 E9/L (ref 1.5–4)
LYMPHOCYTES RELATIVE PERCENT: 15.4 % (ref 20–42)
MCH RBC QN AUTO: 27.9 PG (ref 26–35)
MCHC RBC AUTO-ENTMCNC: 32.5 % (ref 32–34.5)
MCV RBC AUTO: 85.9 FL (ref 80–99.9)
MONOCYTES ABSOLUTE: 0.79 E9/L (ref 0.1–0.95)
MONOCYTES RELATIVE PERCENT: 8.7 % (ref 2–12)
NEUTROPHILS ABSOLUTE: 6.81 E9/L (ref 1.8–7.3)
NEUTROPHILS RELATIVE PERCENT: 75.2 % (ref 43–80)
PDW BLD-RTO: 13.3 FL (ref 11.5–15)
PLATELET # BLD: 259 E9/L (ref 130–450)
PMV BLD AUTO: 11.1 FL (ref 7–12)
POTASSIUM REFLEX MAGNESIUM: 4.2 MMOL/L (ref 3.5–5)
RBC # BLD: 3.98 E12/L (ref 3.5–5.5)
SODIUM BLD-SCNC: 140 MMOL/L (ref 132–146)
WBC # BLD: 9.1 E9/L (ref 4.5–11.5)

## 2018-05-01 PROCEDURE — 6360000002 HC RX W HCPCS: Performed by: SURGERY

## 2018-05-01 PROCEDURE — C9113 INJ PANTOPRAZOLE SODIUM, VIA: HCPCS | Performed by: SURGERY

## 2018-05-01 PROCEDURE — 6370000000 HC RX 637 (ALT 250 FOR IP): Performed by: FAMILY MEDICINE

## 2018-05-01 PROCEDURE — 94150 VITAL CAPACITY TEST: CPT

## 2018-05-01 PROCEDURE — 36415 COLL VENOUS BLD VENIPUNCTURE: CPT

## 2018-05-01 PROCEDURE — 85025 COMPLETE CBC W/AUTO DIFF WBC: CPT

## 2018-05-01 PROCEDURE — 80048 BASIC METABOLIC PNL TOTAL CA: CPT

## 2018-05-01 PROCEDURE — 2580000003 HC RX 258: Performed by: SURGERY

## 2018-05-01 RX ADMIN — KETOROLAC TROMETHAMINE 15 MG: 15 INJECTION, SOLUTION INTRAMUSCULAR; INTRAVENOUS at 02:37

## 2018-05-01 RX ADMIN — FLUOXETINE HYDROCHLORIDE 30 MG: 10 CAPSULE ORAL at 08:13

## 2018-05-01 RX ADMIN — PANTOPRAZOLE SODIUM 40 MG: 40 INJECTION, POWDER, FOR SOLUTION INTRAVENOUS at 08:13

## 2018-05-01 RX ADMIN — Medication 10 ML: at 08:13

## 2018-05-01 RX ADMIN — LITHIUM CARBONATE 300 MG: 300 CAPSULE, GELATIN COATED ORAL at 08:13

## 2018-05-01 RX ADMIN — KETOROLAC TROMETHAMINE 15 MG: 15 INJECTION, SOLUTION INTRAMUSCULAR; INTRAVENOUS at 08:13

## 2018-05-01 RX ADMIN — ENOXAPARIN SODIUM 40 MG: 40 INJECTION SUBCUTANEOUS at 08:13

## 2018-05-01 ASSESSMENT — PAIN SCALES - GENERAL
PAINLEVEL_OUTOF10: 0

## 2018-05-02 ENCOUNTER — CARE COORDINATION (OUTPATIENT)
Dept: CASE MANAGEMENT | Age: 48
End: 2018-05-02

## 2018-05-02 DIAGNOSIS — Z98.84 S/P BARIATRIC SURGERY: Primary | ICD-10-CM

## 2018-05-02 PROCEDURE — 1111F DSCHRG MED/CURRENT MED MERGE: CPT | Performed by: FAMILY MEDICINE

## 2018-05-08 ENCOUNTER — HOSPITAL ENCOUNTER (OUTPATIENT)
Age: 48
Discharge: HOME | End: 2018-05-08
Payer: MEDICARE

## 2018-05-08 ENCOUNTER — OFFICE VISIT (OUTPATIENT)
Dept: FAMILY MEDICINE CLINIC | Age: 48
End: 2018-05-08
Payer: MEDICARE

## 2018-05-08 VITALS
SYSTOLIC BLOOD PRESSURE: 124 MMHG | RESPIRATION RATE: 18 BRPM | BODY MASS INDEX: 45.82 KG/M2 | HEART RATE: 95 BPM | HEIGHT: 65 IN | TEMPERATURE: 98.3 F | DIASTOLIC BLOOD PRESSURE: 80 MMHG | OXYGEN SATURATION: 97 % | WEIGHT: 275 LBS

## 2018-05-08 LAB
ALBUMIN SERPL-MCNC: 4 G/DL (ref 3.5–5.2)
ALP BLD-CCNC: 49 U/L (ref 35–104)
ALT SERPL-CCNC: 33 U/L (ref 0–32)
ANION GAP SERPL CALCULATED.3IONS-SCNC: 18 MMOL/L (ref 7–16)
AST SERPL-CCNC: 27 U/L (ref 0–31)
BILIRUB SERPL-MCNC: 0.6 MG/DL (ref 0–1.2)
BUN BLDV-MCNC: 17 MG/DL (ref 6–20)
CALCIUM SERPL-MCNC: 9.7 MG/DL (ref 8.6–10.2)
CHLORIDE BLD-SCNC: 98 MMOL/L (ref 98–107)
CO2: 23 MMOL/L (ref 22–29)
CREAT SERPL-MCNC: 0.8 MG/DL (ref 0.5–1)
GFR AFRICAN AMERICAN: >60
GFR NON-AFRICAN AMERICAN: >60 ML/MIN/1.73
GLUCOSE BLD-MCNC: 89 MG/DL (ref 74–109)
HCT VFR BLD CALC: 42 % (ref 34–48)
HEMOGLOBIN: 13.8 G/DL (ref 11.5–15.5)
LITHIUM DOSE AMOUNT: NORMAL
LITHIUM LEVEL: 0.5 MMOL/L (ref 0.5–1.5)
MCH RBC QN AUTO: 27.9 PG (ref 26–35)
MCHC RBC AUTO-ENTMCNC: 32.9 % (ref 32–34.5)
MCV RBC AUTO: 84.8 FL (ref 80–99.9)
PDW BLD-RTO: 13.2 FL (ref 11.5–15)
PLATELET # BLD: 328 E9/L (ref 130–450)
PMV BLD AUTO: 10.7 FL (ref 7–12)
POTASSIUM SERPL-SCNC: 3.8 MMOL/L (ref 3.5–5)
RBC # BLD: 4.95 E12/L (ref 3.5–5.5)
SODIUM BLD-SCNC: 139 MMOL/L (ref 132–146)
TOTAL PROTEIN: 8.2 G/DL (ref 6.4–8.3)
TSH SERPL DL<=0.05 MIU/L-ACNC: 1.64 UIU/ML (ref 0.27–4.2)
WBC # BLD: 8.6 E9/L (ref 4.5–11.5)

## 2018-05-08 PROCEDURE — 36415 COLL VENOUS BLD VENIPUNCTURE: CPT

## 2018-05-08 PROCEDURE — 99496 TRANSJ CARE MGMT HIGH F2F 7D: CPT | Performed by: FAMILY MEDICINE

## 2018-05-08 PROCEDURE — 1111F DSCHRG MED/CURRENT MED MERGE: CPT | Performed by: FAMILY MEDICINE

## 2018-05-08 PROCEDURE — 84443 ASSAY THYROID STIM HORMONE: CPT

## 2018-05-08 PROCEDURE — 80053 COMPREHEN METABOLIC PANEL: CPT

## 2018-05-08 PROCEDURE — 80178 ASSAY OF LITHIUM: CPT

## 2018-05-08 PROCEDURE — 85027 COMPLETE CBC AUTOMATED: CPT

## 2018-05-08 RX ORDER — M-VIT,TX,IRON,MINS/CALC/FOLIC 27MG-0.4MG
1 TABLET ORAL 2 TIMES DAILY
Status: ON HOLD | COMMUNITY
End: 2021-09-10 | Stop reason: ALTCHOICE

## 2018-05-08 RX ORDER — GLUCOSAMINE/D3/BOSWELLIA SERRA 1500MG-400
1 TABLET ORAL DAILY
COMMUNITY
End: 2021-03-12 | Stop reason: ALTCHOICE

## 2018-05-08 ASSESSMENT — ENCOUNTER SYMPTOMS
RESPIRATORY NEGATIVE: 1
GASTROINTESTINAL NEGATIVE: 1

## 2018-05-11 ENCOUNTER — OFFICE VISIT (OUTPATIENT)
Dept: BARIATRICS/WEIGHT MGMT | Age: 48
End: 2018-05-11
Payer: MEDICARE

## 2018-05-11 ENCOUNTER — CARE COORDINATION (OUTPATIENT)
Dept: CASE MANAGEMENT | Age: 48
End: 2018-05-11

## 2018-05-11 ENCOUNTER — INITIAL CONSULT (OUTPATIENT)
Dept: BARIATRICS/WEIGHT MGMT | Age: 48
End: 2018-05-11
Payer: MEDICARE

## 2018-05-11 VITALS
WEIGHT: 273 LBS | HEIGHT: 65 IN | DIASTOLIC BLOOD PRESSURE: 88 MMHG | TEMPERATURE: 97.7 F | BODY MASS INDEX: 45.48 KG/M2 | SYSTOLIC BLOOD PRESSURE: 139 MMHG | HEART RATE: 68 BPM

## 2018-05-11 VITALS — HEIGHT: 65 IN | WEIGHT: 273 LBS | BODY MASS INDEX: 45.48 KG/M2

## 2018-05-11 DIAGNOSIS — K91.2 POSTSURGICAL NONABSORPTION: Primary | ICD-10-CM

## 2018-05-11 DIAGNOSIS — Z71.3 DIETARY COUNSELING: Primary | ICD-10-CM

## 2018-05-11 PROCEDURE — 99024 POSTOP FOLLOW-UP VISIT: CPT | Performed by: SURGERY

## 2018-05-11 PROCEDURE — 99212 OFFICE O/P EST SF 10 MIN: CPT

## 2018-05-11 PROCEDURE — 99999 PR OFFICE/OUTPT VISIT,PROCEDURE ONLY: CPT | Performed by: SURGERY

## 2018-06-04 ENCOUNTER — TELEPHONE (OUTPATIENT)
Dept: BARIATRICS/WEIGHT MGMT | Age: 48
End: 2018-06-04

## 2018-06-07 ENCOUNTER — HOSPITAL ENCOUNTER (OUTPATIENT)
Age: 48
Discharge: HOME | End: 2018-06-07
Payer: MEDICARE

## 2018-06-07 DIAGNOSIS — K91.2 POSTSURGICAL NONABSORPTION: ICD-10-CM

## 2018-06-07 LAB
ALBUMIN SERPL-MCNC: 3.9 G/DL (ref 3.5–5.2)
ALP BLD-CCNC: 54 U/L (ref 35–104)
ALT SERPL-CCNC: 47 U/L (ref 0–32)
ANION GAP SERPL CALCULATED.3IONS-SCNC: 14 MMOL/L (ref 7–16)
AST SERPL-CCNC: 39 U/L (ref 0–31)
BILIRUB SERPL-MCNC: 0.5 MG/DL (ref 0–1.2)
BUN BLDV-MCNC: 10 MG/DL (ref 6–20)
CALCIUM SERPL-MCNC: 9.3 MG/DL (ref 8.6–10.2)
CHLORIDE BLD-SCNC: 101 MMOL/L (ref 98–107)
CHOLESTEROL, TOTAL: 173 MG/DL (ref 0–199)
CO2: 27 MMOL/L (ref 22–29)
CREAT SERPL-MCNC: 0.7 MG/DL (ref 0.5–1)
FERRITIN: 420 NG/ML
FOLATE: >20 NG/ML (ref 4.8–24.2)
GFR AFRICAN AMERICAN: >60
GFR NON-AFRICAN AMERICAN: >60 ML/MIN/1.73
GLUCOSE BLD-MCNC: 91 MG/DL (ref 74–109)
HCT VFR BLD CALC: 39.9 % (ref 34–48)
HEMOGLOBIN: 12.8 G/DL (ref 11.5–15.5)
MCH RBC QN AUTO: 27.4 PG (ref 26–35)
MCHC RBC AUTO-ENTMCNC: 32.1 % (ref 32–34.5)
MCV RBC AUTO: 85.4 FL (ref 80–99.9)
PDW BLD-RTO: 14 FL (ref 11.5–15)
PLATELET # BLD: 275 E9/L (ref 130–450)
PMV BLD AUTO: 11.5 FL (ref 7–12)
POTASSIUM SERPL-SCNC: 3.5 MMOL/L (ref 3.5–5)
PREALBUMIN: 14 MG/DL (ref 20–40)
RBC # BLD: 4.67 E12/L (ref 3.5–5.5)
SODIUM BLD-SCNC: 142 MMOL/L (ref 132–146)
TOTAL PROTEIN: 7.5 G/DL (ref 6.4–8.3)
TRIGL SERPL-MCNC: 88 MG/DL (ref 0–149)
VITAMIN B-12: 584 PG/ML (ref 211–946)
VITAMIN D 25-HYDROXY: 28 NG/ML (ref 30–100)
WBC # BLD: 4.6 E9/L (ref 4.5–11.5)

## 2018-06-07 PROCEDURE — 84425 ASSAY OF VITAMIN B-1: CPT

## 2018-06-07 PROCEDURE — 82728 ASSAY OF FERRITIN: CPT

## 2018-06-07 PROCEDURE — 85027 COMPLETE CBC AUTOMATED: CPT

## 2018-06-07 PROCEDURE — 84478 ASSAY OF TRIGLYCERIDES: CPT

## 2018-06-07 PROCEDURE — 82465 ASSAY BLD/SERUM CHOLESTEROL: CPT

## 2018-06-07 PROCEDURE — 82746 ASSAY OF FOLIC ACID SERUM: CPT

## 2018-06-07 PROCEDURE — 84630 ASSAY OF ZINC: CPT

## 2018-06-07 PROCEDURE — 36415 COLL VENOUS BLD VENIPUNCTURE: CPT

## 2018-06-07 PROCEDURE — 80053 COMPREHEN METABOLIC PANEL: CPT

## 2018-06-07 PROCEDURE — 82306 VITAMIN D 25 HYDROXY: CPT

## 2018-06-07 PROCEDURE — 82607 VITAMIN B-12: CPT

## 2018-06-07 PROCEDURE — 84134 ASSAY OF PREALBUMIN: CPT

## 2018-06-09 LAB — ZINC: 83 UG/DL (ref 60–120)

## 2018-06-13 LAB — VITAMIN B1 WHOLE BLOOD: 77 NMOL/L (ref 70–180)

## 2018-07-13 ENCOUNTER — INITIAL CONSULT (OUTPATIENT)
Dept: BARIATRICS/WEIGHT MGMT | Age: 48
End: 2018-07-13
Payer: MEDICARE

## 2018-07-13 ENCOUNTER — OFFICE VISIT (OUTPATIENT)
Dept: BARIATRICS/WEIGHT MGMT | Age: 48
End: 2018-07-13
Payer: MEDICARE

## 2018-07-13 VITALS
BODY MASS INDEX: 40.82 KG/M2 | WEIGHT: 245 LBS | RESPIRATION RATE: 20 BRPM | HEIGHT: 65 IN | DIASTOLIC BLOOD PRESSURE: 70 MMHG | TEMPERATURE: 98 F | SYSTOLIC BLOOD PRESSURE: 128 MMHG | HEART RATE: 70 BPM

## 2018-07-13 VITALS — HEIGHT: 65 IN | BODY MASS INDEX: 40.82 KG/M2 | WEIGHT: 245 LBS

## 2018-07-13 DIAGNOSIS — Z71.3 DIETARY COUNSELING: Primary | ICD-10-CM

## 2018-07-13 DIAGNOSIS — K91.2 MALNUTRITION FOLLOWING GASTROINTESTINAL SURGERY: Primary | ICD-10-CM

## 2018-07-13 PROCEDURE — 99024 POSTOP FOLLOW-UP VISIT: CPT | Performed by: SURGERY

## 2018-07-13 PROCEDURE — 99212 OFFICE O/P EST SF 10 MIN: CPT

## 2018-07-13 PROCEDURE — 99999 PR OFFICE/OUTPT VISIT,PROCEDURE ONLY: CPT | Performed by: SURGERY

## 2018-07-13 NOTE — PROGRESS NOTES
Graham Padilla MD   FLUoxetine (PROZAC) 20 MG capsule Take 1 capsule by mouth daily 2/2/17  Yes Graham Padilla MD   ondansetron (ZOFRAN ODT) 4 MG disintegrating tablet Take 1 tablet by mouth every 8 hours as needed for Nausea or Vomiting 4/20/18   Sandra Chand MD          Physical exam:   /70 (Site: Left Arm, Position: Sitting, Cuff Size: Medium Adult)   Pulse 70   Temp 98 °F (36.7 °C) (Temporal)   Resp 20   Ht 5' 5\" (1.651 m)   Wt 245 lb (111.1 kg)   LMP 07/08/2018   BMI 40.77 kg/m²   General appearance: alert, appears stated age and cooperative  Head: Normocephalic, without obvious abnormality, atraumatic  Neck: no adenopathy, supple, symmetrical, trachea midline and thyroid not enlarged, symmetric, no tenderness/mass/nodules  Lungs: clear to auscultation bilaterally  Heart: regular rate and rhythm  Abdomen: soft, non-tender; bowel sounds normal; no masses,  no organomegaly  Extremities: extremities normal, atraumatic, no cyanosis or edema    Assessment: She is 3 months post Laparoscopic Georgette-en- Y Gastric Bypass. Plan:  DOign well. Continue eating a soft, high protein, low calorie diet. Eat small portions very slowly and chew well before swallowing. Drink plenty of water,  maintain adequate variety and balance. Try to exercise more, at least 30 minutes per day, 7 days per week. Follow up in 3 months and contact me if questions arise in the meantime. Start using fiber therapy such as Metamucil twice a day to prevent constipation. Bowels must move every day.       Physician Signature: Electronically signed by Dr. Sandra Chand MD

## 2018-07-13 NOTE — PROGRESS NOTES
values, prevent complications, and tolerance to diet and texture of foods. Pt is able to identify proper food choices and needed changes and is able to explain proper food preparation. RD / LD reviewed compliance with assigned diet stages, volume of food and drink consumed, timing of meals, amount of protein and energy intake, daily vitamin and mineral supplementation, identify food cravings and any adverse reactions associated with intake of food and drink. RD / LD uses behavioral tools such as goal setting, MI, and self-monitoring, to reinforce the anatomic and physiologic effects of the surgery, so that the described behaviors become more of a habit not simply a reaction to the altered anatomy. Care Plan:   · Rd/Ld Addressed Food Records or 24-Hour Recall  · Rd / Ld encouraged patient to exercise at least 30 minutes daily  · Rd / Ld instructed patient on how to increase oral protein intake within the diet. Pt. can verbalize he / she will need to consume 60 - 80 grams. · Rd / Ld instructed the patient on how to increase the use of protein supplements within the diet. · Pt. was instructed on how to increase water intake. Patient will need to consume 48 - 64 oz. of just plain water in addition to 30 oz. of non-caloric beverages. · Handouts given to patient  · RD / LD encouraged oral intake    · RD / LD encouraged pt. to keep food records.       · Pt. is able to verbalize diet concepts      No - Constipation Handout Given and Reviewed    No - High Fiber Handout Given and Reviewed      No - Ulcer Handout Given and Reviewed          No - Pt. was instructed to continue current MNT   No - Pt. diet was advanced to the following stage ( See above)   Yes - Supplements: initiated (See list below  - Pt. given instruction)     No - Supplemental foods:______________________  No - Pt. was placed on a altered meal schedule    Good - Dietary Compliance

## 2018-08-14 ENCOUNTER — OFFICE VISIT (OUTPATIENT)
Dept: FAMILY MEDICINE CLINIC | Age: 48
End: 2018-08-14
Payer: MEDICARE

## 2018-08-14 VITALS — WEIGHT: 232 LBS | BODY MASS INDEX: 38.65 KG/M2 | HEIGHT: 65 IN

## 2018-08-14 DIAGNOSIS — Z00.00 ROUTINE GENERAL MEDICAL EXAMINATION AT A HEALTH CARE FACILITY: Primary | ICD-10-CM

## 2018-08-14 DIAGNOSIS — R73.03 PREDIABETES: ICD-10-CM

## 2018-08-14 DIAGNOSIS — Z13.1 SCREENING FOR DIABETES MELLITUS (DM): ICD-10-CM

## 2018-08-14 LAB — HBA1C MFR BLD: 5.2 %

## 2018-08-14 PROCEDURE — 83036 HEMOGLOBIN GLYCOSYLATED A1C: CPT | Performed by: FAMILY MEDICINE

## 2018-08-14 PROCEDURE — G0439 PPPS, SUBSEQ VISIT: HCPCS | Performed by: FAMILY MEDICINE

## 2018-08-14 ASSESSMENT — LIFESTYLE VARIABLES: HOW OFTEN DO YOU HAVE A DRINK CONTAINING ALCOHOL: 0

## 2018-08-14 ASSESSMENT — ANXIETY QUESTIONNAIRES: GAD7 TOTAL SCORE: 2

## 2018-08-14 ASSESSMENT — PATIENT HEALTH QUESTIONNAIRE - PHQ9
SUM OF ALL RESPONSES TO PHQ QUESTIONS 1-9: 2
SUM OF ALL RESPONSES TO PHQ QUESTIONS 1-9: 2

## 2018-08-14 NOTE — PROGRESS NOTES
Stress  Do you get the social and emotional support that you need?: Yes  Do you have a Living Will?: Yes  General Health Risk Interventions:  · Stress: regular exercise recommended- 3-5 times per week, 30-45 minutes per session, relaxation techniques discussed, already under the care of Psychiatry/Counseling    Health Habits/Nutrition:  Health Habits/Nutrition  Do you exercise for at least 20 minutes 2-3 times per week?: Yes  Have you lost any weight without trying in the past 3 months?: No  Do you eat fewer than 2 meals per day?: No  Have you seen a dentist within the past year?: (!) No  Body mass index is 38.61 kg/m². Health Habits/Nutrition Interventions:  · None indicated    Safety:  Safety  Do you have working smoke detectors?: Yes  Have all throw rugs been removed or fastened?: Yes  Do you have non-slip mats in all bathtubs?: (!) No  Do all of your stairways have a railing or banister?: Yes  Are your doorways, halls and stairs free of clutter?: Yes  Do you always fasten your seatbelt when you are in a car?: Yes  Safety Interventions:  · None indicated    Personalized Preventive Plan   Current Health Maintenance Status  Immunization History   Administered Date(s) Administered    Influenza, Intradermal, Quadrivalent, Preservative Free 12/19/2017        Health Maintenance   Topic Date Due    DTaP/Tdap/Td vaccine (1 - Tdap) 02/11/1988    A1C test (Diabetic or Prediabetic)  10/20/2012    Cervical cancer screen  10/10/2016    Flu vaccine (1) 09/01/2018    Breast cancer screen  10/27/2018    Lipid screen  06/07/2023    HIV screen  Completed     Results for POC orders placed in visit on 08/14/18   POCT glycosylated hemoglobin (Hb A1C)   Result Value Ref Range    Hemoglobin A1C 5.2 %       Recommendations for Preventive Services Due: see orders and patient instructions/AVS.  .   Recommended screening schedule for the next 5-10 years is provided to the patient in written form: see Patient

## 2018-08-14 NOTE — PATIENT INSTRUCTIONS
Patient Education            Current as of: December 7, 2017  Content Version: 11.7  © 1589-7191 [employer]. Care instructions adapted under license by [employer] [facility] ([facility]). If you have questions about a medical condition or this instruction, always ask your healthcare professional. [address] any warranty or liability for your use of this information. Patient Education            Current as of: December 7, 2017  Content Version: 11.7  © 4618-8677 [employer]. Care instructions adapted under license by [facility] ([facility]). If you have questions about a medical condition or this instruction, always ask your healthcare professional. [address] any warranty or liability for your use of this information. Patient Education            Current as of: October 6, 2017  Content Version: 11.7  © 0250-1649 [employer]. Care instructions adapted under license by [facility] ([facility]). If you have questions about a medical condition or this instruction, always ask your healthcare professional. [address] any warranty or liability for your use of this information. Patient Education            Current as of: December 7, 2017  Content Version: 11.7  © 5163-6270 [employer]. Care instructions adapted under license by [employer] ([facility]). If you have questions about a medical condition or this instruction, always ask your healthcare professional. [address] any warranty or liability for your use of this information. Patient Education            Current as of: December 7, 2017  Content Version: 11.7  © 3080-5182 [employer]. Care instructions adapted under license by [employer] [facility] ([facility]).  If you have questions about a medical condition or this instruction, always ask your healthcare professional. [address] any warranty or liability for your use of this review. Other Preventive Recommendations:    · A preventive eye exam performed by an eye specialist is recommended every 1-2 years to screen for glaucoma; cataracts, macular degeneration, and other eye disorders. · A preventive dental visit is recommended every 6 months. · Try to get at least 150 minutes of exercise per week or 10,000 steps per day on a pedometer . · Order or download the FREE \"Exercise & Physical Activity: Your Everyday Guide\" from [employer]. Call 5-880.119.1915 or search [employer] online. · You need 6227-9053 mg of calcium and 4362-6572 IU of vitamin D per day. It is possible to meet your calcium requirement with diet alone, but a vitamin D supplement is usually necessary to meet this goal.  · When exposed to the sun, use a sunscreen that protects against both UVA and UVB radiation with an SPF of 30 or greater. Reapply every 2 to 3 hours or after sweating, drying off with a towel, or swimming. · Always wear a seat belt when traveling in a car. Always wear a helmet when riding a bicycle or motorcycle.   · Multiple handouts and videos on stress management provided

## 2018-10-17 ENCOUNTER — HOSPITAL ENCOUNTER (OUTPATIENT)
Age: 48
Discharge: HOME | End: 2018-10-17
Payer: MEDICARE

## 2018-10-17 DIAGNOSIS — K91.2 MALNUTRITION FOLLOWING GASTROINTESTINAL SURGERY: ICD-10-CM

## 2018-10-17 LAB
ALBUMIN SERPL-MCNC: 3.3 G/DL (ref 3.5–5.2)
ALP BLD-CCNC: 60 U/L (ref 35–104)
ALT SERPL-CCNC: 7 U/L (ref 0–32)
ANION GAP SERPL CALCULATED.3IONS-SCNC: 10 MMOL/L (ref 7–16)
AST SERPL-CCNC: 15 U/L (ref 0–31)
BILIRUB SERPL-MCNC: 0.6 MG/DL (ref 0–1.2)
BUN BLDV-MCNC: 9 MG/DL (ref 6–20)
CALCIUM SERPL-MCNC: 8.8 MG/DL (ref 8.6–10.2)
CHLORIDE BLD-SCNC: 103 MMOL/L (ref 98–107)
CHOLESTEROL, TOTAL: 191 MG/DL (ref 0–199)
CO2: 31 MMOL/L (ref 22–29)
CREAT SERPL-MCNC: 0.7 MG/DL (ref 0.5–1)
FERRITIN: 352 NG/ML
FOLATE: 10.3 NG/ML (ref 4.8–24.2)
GFR AFRICAN AMERICAN: >60
GFR NON-AFRICAN AMERICAN: >60 ML/MIN/1.73
GLUCOSE BLD-MCNC: 117 MG/DL (ref 74–109)
HCT VFR BLD CALC: 37.3 % (ref 34–48)
HEMOGLOBIN: 11.9 G/DL (ref 11.5–15.5)
MCH RBC QN AUTO: 27.5 PG (ref 26–35)
MCHC RBC AUTO-ENTMCNC: 31.9 % (ref 32–34.5)
MCV RBC AUTO: 86.3 FL (ref 80–99.9)
PDW BLD-RTO: 13.2 FL (ref 11.5–15)
PLATELET # BLD: 295 E9/L (ref 130–450)
PMV BLD AUTO: 11.1 FL (ref 7–12)
POTASSIUM SERPL-SCNC: 3.5 MMOL/L (ref 3.5–5)
PREALBUMIN: 12 MG/DL (ref 20–40)
RBC # BLD: 4.32 E12/L (ref 3.5–5.5)
SODIUM BLD-SCNC: 144 MMOL/L (ref 132–146)
TOTAL PROTEIN: 6.7 G/DL (ref 6.4–8.3)
TRIGL SERPL-MCNC: 70 MG/DL (ref 0–149)
VITAMIN B-12: 303 PG/ML (ref 211–946)
VITAMIN D 25-HYDROXY: 27 NG/ML (ref 30–100)
WBC # BLD: 5.3 E9/L (ref 4.5–11.5)

## 2018-10-17 PROCEDURE — 82728 ASSAY OF FERRITIN: CPT

## 2018-10-17 PROCEDURE — 82746 ASSAY OF FOLIC ACID SERUM: CPT

## 2018-10-17 PROCEDURE — 82306 VITAMIN D 25 HYDROXY: CPT

## 2018-10-17 PROCEDURE — 36415 COLL VENOUS BLD VENIPUNCTURE: CPT

## 2018-10-17 PROCEDURE — 84134 ASSAY OF PREALBUMIN: CPT

## 2018-10-17 PROCEDURE — 80053 COMPREHEN METABOLIC PANEL: CPT

## 2018-10-17 PROCEDURE — 82465 ASSAY BLD/SERUM CHOLESTEROL: CPT

## 2018-10-17 PROCEDURE — 85027 COMPLETE CBC AUTOMATED: CPT

## 2018-10-17 PROCEDURE — 84425 ASSAY OF VITAMIN B-1: CPT

## 2018-10-17 PROCEDURE — 84478 ASSAY OF TRIGLYCERIDES: CPT

## 2018-10-17 PROCEDURE — 84630 ASSAY OF ZINC: CPT

## 2018-10-17 PROCEDURE — 82607 VITAMIN B-12: CPT

## 2018-10-20 LAB — ZINC: 62 UG/DL (ref 60–120)

## 2018-10-21 ENCOUNTER — APPOINTMENT (OUTPATIENT)
Dept: CT IMAGING | Age: 48
DRG: 382 | End: 2018-10-21
Payer: MEDICARE

## 2018-10-21 ENCOUNTER — HOSPITAL ENCOUNTER (INPATIENT)
Age: 48
LOS: 1 days | Discharge: HOME | DRG: 382 | End: 2018-10-23
Attending: EMERGENCY MEDICINE | Admitting: SURGERY
Payer: MEDICARE

## 2018-10-21 DIAGNOSIS — R11.2 NON-INTRACTABLE VOMITING WITH NAUSEA, UNSPECIFIED VOMITING TYPE: ICD-10-CM

## 2018-10-21 DIAGNOSIS — R10.13 ABDOMINAL PAIN, EPIGASTRIC: Primary | ICD-10-CM

## 2018-10-21 PROBLEM — R10.9 INTRACTABLE ABDOMINAL PAIN: Status: ACTIVE | Noted: 2018-10-21

## 2018-10-21 LAB
ALBUMIN SERPL-MCNC: 3.5 G/DL (ref 3.5–5.2)
ALP BLD-CCNC: 58 U/L (ref 35–104)
ALT SERPL-CCNC: 7 U/L (ref 0–32)
ANION GAP SERPL CALCULATED.3IONS-SCNC: 11 MMOL/L (ref 7–16)
AST SERPL-CCNC: 10 U/L (ref 0–31)
BACTERIA: ABNORMAL /HPF
BASOPHILS ABSOLUTE: 0.04 E9/L (ref 0–0.2)
BASOPHILS RELATIVE PERCENT: 0.5 % (ref 0–2)
BILIRUB SERPL-MCNC: 0.7 MG/DL (ref 0–1.2)
BILIRUBIN URINE: ABNORMAL
BLOOD, URINE: NEGATIVE
BUN BLDV-MCNC: 7 MG/DL (ref 6–20)
CALCIUM SERPL-MCNC: 9.1 MG/DL (ref 8.6–10.2)
CHLORIDE BLD-SCNC: 101 MMOL/L (ref 98–107)
CHP ED QC CHECK: NORMAL
CLARITY: ABNORMAL
CO2: 27 MMOL/L (ref 22–29)
COLOR: ABNORMAL
CREAT SERPL-MCNC: 0.6 MG/DL (ref 0.5–1)
CRYSTALS, UA: ABNORMAL
EOSINOPHILS ABSOLUTE: 0.04 E9/L (ref 0.05–0.5)
EOSINOPHILS RELATIVE PERCENT: 0.5 % (ref 0–6)
EPITHELIAL CELLS, UA: ABNORMAL /HPF
GFR AFRICAN AMERICAN: >60
GFR NON-AFRICAN AMERICAN: >60 ML/MIN/1.73
GLUCOSE BLD-MCNC: 104 MG/DL (ref 74–109)
GLUCOSE URINE: NEGATIVE MG/DL
HCT VFR BLD CALC: 37.9 % (ref 34–48)
HEMOGLOBIN: 12.2 G/DL (ref 11.5–15.5)
IMMATURE GRANULOCYTES #: 0.03 E9/L
IMMATURE GRANULOCYTES %: 0.4 % (ref 0–5)
KETONES, URINE: >=80 MG/DL
LACTIC ACID: 0.8 MMOL/L (ref 0.5–2.2)
LEUKOCYTE ESTERASE, URINE: NEGATIVE
LIPASE: 12 U/L (ref 13–60)
LITHIUM DOSE AMOUNT: ABNORMAL
LITHIUM LEVEL: <0.1 MMOL/L (ref 0.5–1.5)
LYMPHOCYTES ABSOLUTE: 1.69 E9/L (ref 1.5–4)
LYMPHOCYTES RELATIVE PERCENT: 20.9 % (ref 20–42)
MCH RBC QN AUTO: 27.5 PG (ref 26–35)
MCHC RBC AUTO-ENTMCNC: 32.2 % (ref 32–34.5)
MCV RBC AUTO: 85.6 FL (ref 80–99.9)
MONOCYTES ABSOLUTE: 0.79 E9/L (ref 0.1–0.95)
MONOCYTES RELATIVE PERCENT: 9.8 % (ref 2–12)
NEUTROPHILS ABSOLUTE: 5.5 E9/L (ref 1.8–7.3)
NEUTROPHILS RELATIVE PERCENT: 67.9 % (ref 43–80)
NITRITE, URINE: NEGATIVE
PDW BLD-RTO: 13.1 FL (ref 11.5–15)
PH UA: 5.5 (ref 5–9)
PLATELET # BLD: 335 E9/L (ref 130–450)
PMV BLD AUTO: 11.1 FL (ref 7–12)
POTASSIUM SERPL-SCNC: 3.3 MMOL/L (ref 3.5–5)
PREGNANCY TEST URINE, POC: NEGATIVE
PROTEIN UA: NEGATIVE MG/DL
RBC # BLD: 4.43 E12/L (ref 3.5–5.5)
RBC UA: ABNORMAL /HPF (ref 0–2)
SODIUM BLD-SCNC: 139 MMOL/L (ref 132–146)
SPECIFIC GRAVITY UA: >=1.03 (ref 1–1.03)
TOTAL PROTEIN: 7.2 G/DL (ref 6.4–8.3)
UROBILINOGEN, URINE: 1 E.U./DL
VITAMIN B1 WHOLE BLOOD: 62 NMOL/L (ref 70–180)
WBC # BLD: 8.1 E9/L (ref 4.5–11.5)
WBC UA: ABNORMAL /HPF (ref 0–5)

## 2018-10-21 PROCEDURE — 2580000003 HC RX 258: Performed by: NURSE PRACTITIONER

## 2018-10-21 PROCEDURE — 96366 THER/PROPH/DIAG IV INF ADDON: CPT

## 2018-10-21 PROCEDURE — 6360000004 HC RX CONTRAST MEDICATION: Performed by: EMERGENCY MEDICINE

## 2018-10-21 PROCEDURE — 94150 VITAL CAPACITY TEST: CPT

## 2018-10-21 PROCEDURE — 81001 URINALYSIS AUTO W/SCOPE: CPT

## 2018-10-21 PROCEDURE — G0378 HOSPITAL OBSERVATION PER HR: HCPCS

## 2018-10-21 PROCEDURE — 96375 TX/PRO/DX INJ NEW DRUG ADDON: CPT

## 2018-10-21 PROCEDURE — 99222 1ST HOSP IP/OBS MODERATE 55: CPT | Performed by: SURGERY

## 2018-10-21 PROCEDURE — S0028 INJECTION, FAMOTIDINE, 20 MG: HCPCS | Performed by: NURSE PRACTITIONER

## 2018-10-21 PROCEDURE — 99285 EMERGENCY DEPT VISIT HI MDM: CPT

## 2018-10-21 PROCEDURE — 2580000003 HC RX 258: Performed by: SURGERY

## 2018-10-21 PROCEDURE — 6370000000 HC RX 637 (ALT 250 FOR IP): Performed by: SURGERY

## 2018-10-21 PROCEDURE — 2500000003 HC RX 250 WO HCPCS: Performed by: SURGERY

## 2018-10-21 PROCEDURE — 85025 COMPLETE CBC W/AUTO DIFF WBC: CPT

## 2018-10-21 PROCEDURE — 6360000002 HC RX W HCPCS: Performed by: SURGERY

## 2018-10-21 PROCEDURE — 6360000002 HC RX W HCPCS: Performed by: NURSE PRACTITIONER

## 2018-10-21 PROCEDURE — 74177 CT ABD & PELVIS W/CONTRAST: CPT

## 2018-10-21 PROCEDURE — 96365 THER/PROPH/DIAG IV INF INIT: CPT

## 2018-10-21 PROCEDURE — 96367 TX/PROPH/DG ADDL SEQ IV INF: CPT

## 2018-10-21 PROCEDURE — C9113 INJ PANTOPRAZOLE SODIUM, VIA: HCPCS | Performed by: SURGERY

## 2018-10-21 PROCEDURE — 80178 ASSAY OF LITHIUM: CPT

## 2018-10-21 PROCEDURE — 96374 THER/PROPH/DIAG INJ IV PUSH: CPT

## 2018-10-21 PROCEDURE — 80053 COMPREHEN METABOLIC PANEL: CPT

## 2018-10-21 PROCEDURE — 83690 ASSAY OF LIPASE: CPT

## 2018-10-21 PROCEDURE — 36415 COLL VENOUS BLD VENIPUNCTURE: CPT

## 2018-10-21 PROCEDURE — 96376 TX/PRO/DX INJ SAME DRUG ADON: CPT

## 2018-10-21 PROCEDURE — 2500000003 HC RX 250 WO HCPCS: Performed by: NURSE PRACTITIONER

## 2018-10-21 PROCEDURE — 83605 ASSAY OF LACTIC ACID: CPT

## 2018-10-21 RX ORDER — 0.9 % SODIUM CHLORIDE 0.9 %
1000 INTRAVENOUS SOLUTION INTRAVENOUS ONCE
Status: COMPLETED | OUTPATIENT
Start: 2018-10-21 | End: 2018-10-21

## 2018-10-21 RX ORDER — FLUOXETINE 10 MG/1
10 CAPSULE ORAL DAILY
Status: DISCONTINUED | OUTPATIENT
Start: 2018-10-21 | End: 2018-10-23 | Stop reason: HOSPADM

## 2018-10-21 RX ORDER — LITHIUM CARBONATE 300 MG/1
900 CAPSULE ORAL NIGHTLY
Status: DISCONTINUED | OUTPATIENT
Start: 2018-10-21 | End: 2018-10-23 | Stop reason: HOSPADM

## 2018-10-21 RX ORDER — QUETIAPINE FUMARATE 100 MG/1
100 TABLET, FILM COATED ORAL NIGHTLY
Status: DISCONTINUED | OUTPATIENT
Start: 2018-10-21 | End: 2018-10-23 | Stop reason: HOSPADM

## 2018-10-21 RX ORDER — ACETAMINOPHEN 325 MG/1
650 TABLET ORAL EVERY 4 HOURS PRN
Status: DISCONTINUED | OUTPATIENT
Start: 2018-10-21 | End: 2018-10-23 | Stop reason: HOSPADM

## 2018-10-21 RX ORDER — SODIUM CHLORIDE 0.9 % (FLUSH) 0.9 %
10 SYRINGE (ML) INJECTION EVERY 12 HOURS SCHEDULED
Status: DISCONTINUED | OUTPATIENT
Start: 2018-10-21 | End: 2018-10-21 | Stop reason: SDUPTHER

## 2018-10-21 RX ORDER — ACETAMINOPHEN 325 MG/1
650 TABLET ORAL EVERY 4 HOURS PRN
Status: DISCONTINUED | OUTPATIENT
Start: 2018-10-21 | End: 2018-10-21 | Stop reason: SDUPTHER

## 2018-10-21 RX ORDER — CIPROFLOXACIN 2 MG/ML
400 INJECTION, SOLUTION INTRAVENOUS EVERY 12 HOURS
Status: DISCONTINUED | OUTPATIENT
Start: 2018-10-21 | End: 2018-10-23 | Stop reason: HOSPADM

## 2018-10-21 RX ORDER — ONDANSETRON 2 MG/ML
4 INJECTION INTRAMUSCULAR; INTRAVENOUS EVERY 6 HOURS PRN
Status: DISCONTINUED | OUTPATIENT
Start: 2018-10-21 | End: 2018-10-23 | Stop reason: HOSPADM

## 2018-10-21 RX ORDER — FLUOXETINE HYDROCHLORIDE 20 MG/1
20 CAPSULE ORAL DAILY
Status: DISCONTINUED | OUTPATIENT
Start: 2018-10-21 | End: 2018-10-23 | Stop reason: HOSPADM

## 2018-10-21 RX ORDER — SODIUM CHLORIDE 0.9 % (FLUSH) 0.9 %
10 SYRINGE (ML) INJECTION PRN
Status: DISCONTINUED | OUTPATIENT
Start: 2018-10-21 | End: 2018-10-21 | Stop reason: SDUPTHER

## 2018-10-21 RX ORDER — ONDANSETRON 2 MG/ML
4 INJECTION INTRAMUSCULAR; INTRAVENOUS ONCE
Status: COMPLETED | OUTPATIENT
Start: 2018-10-21 | End: 2018-10-21

## 2018-10-21 RX ORDER — SODIUM CHLORIDE 0.9 % (FLUSH) 0.9 %
10 SYRINGE (ML) INJECTION PRN
Status: DISCONTINUED | OUTPATIENT
Start: 2018-10-21 | End: 2018-10-23 | Stop reason: HOSPADM

## 2018-10-21 RX ORDER — LITHIUM CARBONATE 300 MG/1
300 CAPSULE ORAL EVERY MORNING
Status: DISCONTINUED | OUTPATIENT
Start: 2018-10-22 | End: 2018-10-23 | Stop reason: HOSPADM

## 2018-10-21 RX ORDER — MORPHINE SULFATE 4 MG/ML
4 INJECTION, SOLUTION INTRAMUSCULAR; INTRAVENOUS
Status: DISCONTINUED | OUTPATIENT
Start: 2018-10-21 | End: 2018-10-23 | Stop reason: HOSPADM

## 2018-10-21 RX ORDER — SODIUM CHLORIDE 0.9 % (FLUSH) 0.9 %
10 SYRINGE (ML) INJECTION EVERY 12 HOURS SCHEDULED
Status: DISCONTINUED | OUTPATIENT
Start: 2018-10-21 | End: 2018-10-23 | Stop reason: HOSPADM

## 2018-10-21 RX ORDER — SODIUM CHLORIDE, SODIUM LACTATE, POTASSIUM CHLORIDE, CALCIUM CHLORIDE 600; 310; 30; 20 MG/100ML; MG/100ML; MG/100ML; MG/100ML
INJECTION, SOLUTION INTRAVENOUS CONTINUOUS
Status: DISCONTINUED | OUTPATIENT
Start: 2018-10-21 | End: 2018-10-23 | Stop reason: HOSPADM

## 2018-10-21 RX ORDER — MORPHINE SULFATE 2 MG/ML
2 INJECTION, SOLUTION INTRAMUSCULAR; INTRAVENOUS
Status: DISCONTINUED | OUTPATIENT
Start: 2018-10-21 | End: 2018-10-23 | Stop reason: HOSPADM

## 2018-10-21 RX ADMIN — FAMOTIDINE 20 MG: 10 INJECTION, SOLUTION INTRAVENOUS at 01:39

## 2018-10-21 RX ADMIN — ONDANSETRON 4 MG: 2 INJECTION INTRAMUSCULAR; INTRAVENOUS at 01:39

## 2018-10-21 RX ADMIN — SODIUM CHLORIDE 8 MG/HR: 9 INJECTION, SOLUTION INTRAVENOUS at 09:45

## 2018-10-21 RX ADMIN — FLUOXETINE HYDROCHLORIDE 10 MG: 10 CAPSULE ORAL at 17:43

## 2018-10-21 RX ADMIN — ONDANSETRON 4 MG: 2 INJECTION INTRAMUSCULAR; INTRAVENOUS at 20:56

## 2018-10-21 RX ADMIN — IOPAMIDOL 80 ML: 755 INJECTION, SOLUTION INTRAVENOUS at 03:41

## 2018-10-21 RX ADMIN — CIPROFLOXACIN 400 MG: 2 INJECTION, SOLUTION INTRAVENOUS at 20:15

## 2018-10-21 RX ADMIN — METRONIDAZOLE 500 MG: 500 INJECTION, SOLUTION INTRAVENOUS at 23:24

## 2018-10-21 RX ADMIN — SODIUM CHLORIDE 1000 ML: 9 INJECTION, SOLUTION INTRAVENOUS at 01:38

## 2018-10-21 RX ADMIN — IOHEXOL 50 ML: 240 INJECTION, SOLUTION INTRATHECAL; INTRAVASCULAR; INTRAVENOUS; ORAL at 03:41

## 2018-10-21 RX ADMIN — LITHIUM CARBONATE 900 MG: 300 CAPSULE, GELATIN COATED ORAL at 20:15

## 2018-10-21 RX ADMIN — SODIUM CHLORIDE 8 MG/HR: 9 INJECTION, SOLUTION INTRAVENOUS at 20:56

## 2018-10-21 RX ADMIN — QUETIAPINE FUMARATE 100 MG: 100 TABLET ORAL at 20:18

## 2018-10-21 RX ADMIN — SODIUM CHLORIDE, POTASSIUM CHLORIDE, SODIUM LACTATE AND CALCIUM CHLORIDE: 600; 310; 30; 20 INJECTION, SOLUTION INTRAVENOUS at 08:23

## 2018-10-21 RX ADMIN — Medication 10 ML: at 23:24

## 2018-10-21 RX ADMIN — FLUOXETINE HYDROCHLORIDE 20 MG: 10 CAPSULE ORAL at 17:43

## 2018-10-21 RX ADMIN — CIPROFLOXACIN 400 MG: 2 INJECTION, SOLUTION INTRAVENOUS at 08:24

## 2018-10-21 RX ADMIN — METRONIDAZOLE 500 MG: 500 INJECTION, SOLUTION INTRAVENOUS at 09:43

## 2018-10-21 RX ADMIN — METRONIDAZOLE 500 MG: 500 INJECTION, SOLUTION INTRAVENOUS at 16:26

## 2018-10-21 ASSESSMENT — PAIN SCALES - GENERAL
PAINLEVEL_OUTOF10: 5
PAINLEVEL_OUTOF10: 0
PAINLEVEL_OUTOF10: 0

## 2018-10-21 ASSESSMENT — PAIN DESCRIPTION - PAIN TYPE: TYPE: ACUTE PAIN

## 2018-10-21 ASSESSMENT — PAIN DESCRIPTION - ORIENTATION: ORIENTATION: LEFT;LOWER;MID

## 2018-10-21 ASSESSMENT — PAIN DESCRIPTION - LOCATION: LOCATION: ABDOMEN

## 2018-10-21 NOTE — ED PROVIDER NOTES
and paternal grandmother; Parkinsonism in her father. Allergies: Patient has no known allergies. Physical Exam            ED Triage Vitals [10/21/18 0055]   BP Temp Temp src Pulse Resp SpO2 Height Weight   92/61 98.7 °F (37.1 °C) -- 89 16 97 % 5' 5\" (1.651 m) 210 lb (95.3 kg)      Oxygen Saturation Interpretation: Normal.    · General Appearance/Constitutional:  Alert, development consistent with age  · HEENT:  NC/NT. PERRLA. Airway patent. · Neck:  Supple. No lymphadenopathy. · Respiratory:  No retractions. Lungs Clear to auscultation and breath sounds equal.  · CV:  Regular rate and rhythm. · GI:  General Appearance: scars- Multiple healed laparoscopic. Bowel sounds: normal bowel sounds. Distension:  None. Tenderness: mild tenderness is present In the entire abdomen worse in the epigastric region. Liver: non-tender. Spleen:  non-palpable and non-tender. Pulsatile Mass: absent. Hernia:  no inguinal or femoral hernias noted. · Back: CVA Tenderness: No.  · Integument:  Normal turgor. Warm, dry, without visible rash, unless noted elsewhere. · Lymphatics: No edema, cap.refill <3sec. · Neurological:  Orientation age-appropriate. Motor functions intact. Lab / Imaging Results   (All laboratory and radiology results have been personally reviewed by myself)  Labs:  Results for orders placed or performed during the hospital encounter of 10/21/18   Lithium Level   Result Value Ref Range    Lithium Dose Amount Unknown      Imaging: All Radiology results interpreted by Radiologist unless otherwise noted. CT ABDOMEN PELVIS W IV CONTRAST Additional Contrast? Oral    (Results Pending)     CT abdomen/pelvis with IV and oral contrast (Kain Altamirano read): Edema associated with postoperative changes of the stomach and proximal small bowel, suggesting gastroenteritis in this area.  No free intraperitoneal air or drainable fluid collection are

## 2018-10-21 NOTE — PLAN OF CARE
Problem: Activity:  Goal: Risk for activity intolerance will decrease  Risk for activity intolerance will decrease   Outcome: Met This Shift      Problem:  Bowel/Gastric:  Goal: Bowel function will improve  Bowel function will improve   Outcome: Met This Shift    Goal: Occurrences of nausea will decrease  Occurrences of nausea will decrease   Outcome: Met This Shift      Problem: Fluid Volume:  Goal: Maintenance of adequate hydration will improve  Maintenance of adequate hydration will improve   Outcome: Met This Shift

## 2018-10-21 NOTE — PLAN OF CARE
Problem: Activity:  Goal: Risk for activity intolerance will decrease  Risk for activity intolerance will decrease  Outcome: Ongoing      Problem:  Bowel/Gastric:  Goal: Bowel function will improve  Bowel function will improve  Outcome: Ongoing

## 2018-10-22 ENCOUNTER — ANESTHESIA EVENT (OUTPATIENT)
Dept: ENDOSCOPY | Age: 48
DRG: 382 | End: 2018-10-22
Payer: MEDICARE

## 2018-10-22 ENCOUNTER — ANESTHESIA (OUTPATIENT)
Dept: ENDOSCOPY | Age: 48
DRG: 382 | End: 2018-10-22
Payer: MEDICARE

## 2018-10-22 VITALS
SYSTOLIC BLOOD PRESSURE: 126 MMHG | OXYGEN SATURATION: 95 % | TEMPERATURE: 98.8 F | DIASTOLIC BLOOD PRESSURE: 70 MMHG | RESPIRATION RATE: 20 BRPM

## 2018-10-22 PROBLEM — R10.13 EPIGASTRIC PAIN: Status: ACTIVE | Noted: 2018-10-22

## 2018-10-22 PROBLEM — K28.9 MARGINAL ULCER: Status: ACTIVE | Noted: 2018-10-22

## 2018-10-22 LAB
ANION GAP SERPL CALCULATED.3IONS-SCNC: 11 MMOL/L (ref 7–16)
BUN BLDV-MCNC: 5 MG/DL (ref 6–20)
CALCIUM SERPL-MCNC: 8.2 MG/DL (ref 8.6–10.2)
CHLORIDE BLD-SCNC: 106 MMOL/L (ref 98–107)
CO2: 25 MMOL/L (ref 22–29)
CREAT SERPL-MCNC: 0.6 MG/DL (ref 0.5–1)
GFR AFRICAN AMERICAN: >60
GFR NON-AFRICAN AMERICAN: >60 ML/MIN/1.73
GLUCOSE BLD-MCNC: 77 MG/DL (ref 74–109)
HCT VFR BLD CALC: 32.7 % (ref 34–48)
HEMOGLOBIN: 10.3 G/DL (ref 11.5–15.5)
MAGNESIUM: 2.1 MG/DL (ref 1.6–2.6)
MCH RBC QN AUTO: 27 PG (ref 26–35)
MCHC RBC AUTO-ENTMCNC: 31.5 % (ref 32–34.5)
MCV RBC AUTO: 85.8 FL (ref 80–99.9)
PDW BLD-RTO: 13.2 FL (ref 11.5–15)
PLATELET # BLD: 202 E9/L (ref 130–450)
PMV BLD AUTO: 12 FL (ref 7–12)
POTASSIUM REFLEX MAGNESIUM: 3.5 MMOL/L (ref 3.5–5)
RBC # BLD: 3.81 E12/L (ref 3.5–5.5)
SODIUM BLD-SCNC: 142 MMOL/L (ref 132–146)
WBC # BLD: 6.1 E9/L (ref 4.5–11.5)

## 2018-10-22 PROCEDURE — 2580000003 HC RX 258: Performed by: NURSE ANESTHETIST, CERTIFIED REGISTERED

## 2018-10-22 PROCEDURE — 2580000003 HC RX 258: Performed by: SURGERY

## 2018-10-22 PROCEDURE — 83735 ASSAY OF MAGNESIUM: CPT

## 2018-10-22 PROCEDURE — 1200000000 HC SEMI PRIVATE

## 2018-10-22 PROCEDURE — 2500000003 HC RX 250 WO HCPCS: Performed by: SURGERY

## 2018-10-22 PROCEDURE — 0DB68ZX EXCISION OF STOMACH, VIA NATURAL OR ARTIFICIAL OPENING ENDOSCOPIC, DIAGNOSTIC: ICD-10-PCS | Performed by: SURGERY

## 2018-10-22 PROCEDURE — C9113 INJ PANTOPRAZOLE SODIUM, VIA: HCPCS | Performed by: SURGERY

## 2018-10-22 PROCEDURE — 3700000000 HC ANESTHESIA ATTENDED CARE: Performed by: SURGERY

## 2018-10-22 PROCEDURE — 6360000002 HC RX W HCPCS: Performed by: SURGERY

## 2018-10-22 PROCEDURE — 96366 THER/PROPH/DIAG IV INF ADDON: CPT

## 2018-10-22 PROCEDURE — 36415 COLL VENOUS BLD VENIPUNCTURE: CPT

## 2018-10-22 PROCEDURE — 7100000011 HC PHASE II RECOVERY - ADDTL 15 MIN: Performed by: SURGERY

## 2018-10-22 PROCEDURE — 3700000001 HC ADD 15 MINUTES (ANESTHESIA): Performed by: SURGERY

## 2018-10-22 PROCEDURE — 88342 IMHCHEM/IMCYTCHM 1ST ANTB: CPT

## 2018-10-22 PROCEDURE — 88305 TISSUE EXAM BY PATHOLOGIST: CPT

## 2018-10-22 PROCEDURE — 2500000003 HC RX 250 WO HCPCS: Performed by: NURSE ANESTHETIST, CERTIFIED REGISTERED

## 2018-10-22 PROCEDURE — 96376 TX/PRO/DX INJ SAME DRUG ADON: CPT

## 2018-10-22 PROCEDURE — 80048 BASIC METABOLIC PNL TOTAL CA: CPT

## 2018-10-22 PROCEDURE — 2709999900 HC NON-CHARGEABLE SUPPLY: Performed by: SURGERY

## 2018-10-22 PROCEDURE — 7100000010 HC PHASE II RECOVERY - FIRST 15 MIN: Performed by: SURGERY

## 2018-10-22 PROCEDURE — 6370000000 HC RX 637 (ALT 250 FOR IP): Performed by: SURGERY

## 2018-10-22 PROCEDURE — 3609012400 HC EGD TRANSORAL BIOPSY SINGLE/MULTIPLE: Performed by: SURGERY

## 2018-10-22 PROCEDURE — 85027 COMPLETE CBC AUTOMATED: CPT

## 2018-10-22 PROCEDURE — 43239 EGD BIOPSY SINGLE/MULTIPLE: CPT | Performed by: SURGERY

## 2018-10-22 PROCEDURE — 6360000002 HC RX W HCPCS: Performed by: NURSE ANESTHETIST, CERTIFIED REGISTERED

## 2018-10-22 RX ORDER — SODIUM CHLORIDE, SODIUM LACTATE, POTASSIUM CHLORIDE, CALCIUM CHLORIDE 600; 310; 30; 20 MG/100ML; MG/100ML; MG/100ML; MG/100ML
INJECTION, SOLUTION INTRAVENOUS CONTINUOUS PRN
Status: DISCONTINUED | OUTPATIENT
Start: 2018-10-22 | End: 2018-10-22 | Stop reason: SDUPTHER

## 2018-10-22 RX ORDER — MIDAZOLAM HYDROCHLORIDE 1 MG/ML
INJECTION INTRAMUSCULAR; INTRAVENOUS PRN
Status: DISCONTINUED | OUTPATIENT
Start: 2018-10-22 | End: 2018-10-22 | Stop reason: SDUPTHER

## 2018-10-22 RX ORDER — PROPOFOL 10 MG/ML
INJECTION, EMULSION INTRAVENOUS PRN
Status: DISCONTINUED | OUTPATIENT
Start: 2018-10-22 | End: 2018-10-22 | Stop reason: SDUPTHER

## 2018-10-22 RX ORDER — LIDOCAINE HYDROCHLORIDE 20 MG/ML
INJECTION, SOLUTION INFILTRATION; PERINEURAL PRN
Status: DISCONTINUED | OUTPATIENT
Start: 2018-10-22 | End: 2018-10-22 | Stop reason: SDUPTHER

## 2018-10-22 RX ADMIN — SODIUM CHLORIDE, POTASSIUM CHLORIDE, SODIUM LACTATE AND CALCIUM CHLORIDE: 600; 310; 30; 20 INJECTION, SOLUTION INTRAVENOUS at 13:23

## 2018-10-22 RX ADMIN — METRONIDAZOLE 500 MG: 500 INJECTION, SOLUTION INTRAVENOUS at 16:19

## 2018-10-22 RX ADMIN — FLUOXETINE HYDROCHLORIDE 10 MG: 10 CAPSULE ORAL at 12:36

## 2018-10-22 RX ADMIN — Medication 10 ML: at 09:00

## 2018-10-22 RX ADMIN — FLUOXETINE HYDROCHLORIDE 10 MG: 10 CAPSULE ORAL at 12:35

## 2018-10-22 RX ADMIN — METRONIDAZOLE 500 MG: 500 INJECTION, SOLUTION INTRAVENOUS at 23:49

## 2018-10-22 RX ADMIN — CIPROFLOXACIN 400 MG: 2 INJECTION, SOLUTION INTRAVENOUS at 20:12

## 2018-10-22 RX ADMIN — SODIUM CHLORIDE, POTASSIUM CHLORIDE, SODIUM LACTATE AND CALCIUM CHLORIDE: 600; 310; 30; 20 INJECTION, SOLUTION INTRAVENOUS at 09:50

## 2018-10-22 RX ADMIN — PROPOFOL 30 MG: 10 INJECTION, EMULSION INTRAVENOUS at 10:20

## 2018-10-22 RX ADMIN — MIDAZOLAM 2 MG: 1 INJECTION INTRAMUSCULAR; INTRAVENOUS at 09:50

## 2018-10-22 RX ADMIN — PROPOFOL 100 MG: 10 INJECTION, EMULSION INTRAVENOUS at 10:19

## 2018-10-22 RX ADMIN — LITHIUM CARBONATE 300 MG: 300 CAPSULE, GELATIN COATED ORAL at 12:37

## 2018-10-22 RX ADMIN — QUETIAPINE FUMARATE 100 MG: 100 TABLET ORAL at 20:12

## 2018-10-22 RX ADMIN — LIDOCAINE HYDROCHLORIDE 20 MG: 20 INJECTION, SOLUTION INFILTRATION; PERINEURAL at 10:19

## 2018-10-22 RX ADMIN — SODIUM CHLORIDE 8 MG/HR: 9 INJECTION, SOLUTION INTRAVENOUS at 07:40

## 2018-10-22 RX ADMIN — SODIUM CHLORIDE 8 MG/HR: 9 INJECTION, SOLUTION INTRAVENOUS at 19:38

## 2018-10-22 RX ADMIN — METRONIDAZOLE 500 MG: 500 INJECTION, SOLUTION INTRAVENOUS at 11:00

## 2018-10-22 RX ADMIN — CIPROFLOXACIN 400 MG: 2 INJECTION, SOLUTION INTRAVENOUS at 08:30

## 2018-10-22 RX ADMIN — LITHIUM CARBONATE 900 MG: 300 CAPSULE, GELATIN COATED ORAL at 20:12

## 2018-10-22 ASSESSMENT — PAIN SCALES - GENERAL
PAINLEVEL_OUTOF10: 0

## 2018-10-22 NOTE — PLAN OF CARE
Problem: Activity:  Goal: Risk for activity intolerance will decrease  Risk for activity intolerance will decrease   Outcome: Met This Shift      Problem:  Bowel/Gastric:  Goal: Bowel function will improve  Bowel function will improve   Outcome: Met This Shift

## 2018-10-22 NOTE — PLAN OF CARE
Problem: Activity:  Goal: Risk for activity intolerance will decrease  Risk for activity intolerance will decrease   Outcome: Met This Shift      Problem:  Bowel/Gastric:  Goal: Bowel function will improve  Bowel function will improve   Outcome: Met This Shift    Goal: Diagnostic test results will improve  Diagnostic test results will improve   Outcome: Met This Shift    Goal: Occurrences of nausea will decrease  Occurrences of nausea will decrease   Outcome: Met This Shift    Goal: Occurrences of vomiting will decrease  Occurrences of vomiting will decrease   Outcome: Met This Shift      Problem: Fluid Volume:  Goal: Maintenance of adequate hydration will improve  Maintenance of adequate hydration will improve   Outcome: Met This Shift      Problem: Health Behavior:  Goal: Ability to state signs and symptoms to report to health care provider will improve  Ability to state signs and symptoms to report to health care provider will improve   Outcome: Met This Shift      Problem: Physical Regulation:  Goal: Complications related to the disease process, condition or treatment will be avoided or minimized  Complications related to the disease process, condition or treatment will be avoided or minimized   Outcome: Met This Shift    Goal: Ability to maintain clinical measurements within normal limits will improve  Ability to maintain clinical measurements within normal limits will improve   Outcome: Met This Shift

## 2018-10-22 NOTE — ANESTHESIA PRE PROCEDURE
K21.0    Hypertension I10    S/P bariatric surgery Z98.84    Intractable abdominal pain R10.9       Past Medical History:        Diagnosis Date    Bipolar 1 disorder ([address]     Depression     Obesity     Osteoarthritis     bilateral knees       Past Surgical History:        Procedure Laterality Date    CHOLECYSTECTOMY      ENDOSCOPY, COLON, DIAGNOSTIC      GALLBLADDER SURGERY  1994    TN LAP GASTRIC BYPASS/KIERAN-EN-Y N/A 4/30/2018    GASTRIC BYPASS KIERAN-EN-Y LAPAROSCOPIC performed by Dante Peacock MD at [address] History:    Social History   Substance Use Topics    Smoking status: Never Smoker    Smokeless tobacco: Never Used    Alcohol use No                                Counseling given: Not Answered      Vital Signs (Current):   Vitals:    10/21/18 0815 10/21/18 1847 10/22/18 0000 10/22/18 0845   BP: 122/71 (!) 108/56  110/68   Pulse: 64 62  60   Resp: 16 16  16   Temp: 98.3 °F (36.8 °C) 98.2 °F (36.8 °C)  98.1 °F (36.7 °C)   TempSrc: Oral Oral  Oral   SpO2: 95% 96% 94% 98%   Weight:       Height:                                                  BP Readings from Last 3 Encounters:   10/22/18 110/68   10/22/18 122/73   07/13/18 128/70       NPO Status:                                                                                 BMI:   Wt Readings from Last 3 Encounters:   10/21/18 209 lb 8 oz (95 kg)   08/14/18 232 lb (105.2 kg)   07/13/18 245 lb (111.1 kg)     Body mass index is 34.86 kg/m².     CBC:   Lab Results   Component Value Date    WBC 6.1 10/22/2018    RBC 3.81 10/22/2018    HGB 10.3 10/22/2018    HCT 32.7 10/22/2018    MCV 85.8 10/22/2018    RDW 13.2 10/22/2018     10/22/2018       CMP:   Lab Results   Component Value Date     10/22/2018    K 3.5 10/22/2018     10/22/2018    CO2 25 10/22/2018    BUN 5 10/22/2018    CREATININE 0.6 10/22/2018    GFRAA >60 10/22/2018    LABGLOM >60 10/22/2018    GLUCOSE 77 10/22/2018    GLUCOSE 95 10/20/2011    PROT 7.2

## 2018-10-23 VITALS
HEART RATE: 62 BPM | BODY MASS INDEX: 34.91 KG/M2 | SYSTOLIC BLOOD PRESSURE: 109 MMHG | OXYGEN SATURATION: 97 % | HEIGHT: 65 IN | DIASTOLIC BLOOD PRESSURE: 60 MMHG | RESPIRATION RATE: 16 BRPM | TEMPERATURE: 98 F | WEIGHT: 209.5 LBS

## 2018-10-23 PROCEDURE — 6360000002 HC RX W HCPCS: Performed by: SURGERY

## 2018-10-23 PROCEDURE — 2580000003 HC RX 258: Performed by: SURGERY

## 2018-10-23 PROCEDURE — 6370000000 HC RX 637 (ALT 250 FOR IP): Performed by: SURGERY

## 2018-10-23 PROCEDURE — 99239 HOSP IP/OBS DSCHRG MGMT >30: CPT | Performed by: SURGERY

## 2018-10-23 PROCEDURE — 2500000003 HC RX 250 WO HCPCS: Performed by: SURGERY

## 2018-10-23 PROCEDURE — C9113 INJ PANTOPRAZOLE SODIUM, VIA: HCPCS | Performed by: SURGERY

## 2018-10-23 RX ORDER — SUCRALFATE 1 G/1
0.5 TABLET ORAL 4 TIMES DAILY
Qty: 120 TABLET | Refills: 3 | Status: ON HOLD | OUTPATIENT
Start: 2018-10-23 | End: 2019-02-21

## 2018-10-23 RX ORDER — OMEPRAZOLE 20 MG/1
20 CAPSULE, DELAYED RELEASE ORAL 2 TIMES DAILY
Qty: 60 CAPSULE | Refills: 3 | Status: SHIPPED | OUTPATIENT
Start: 2018-10-23 | End: 2019-06-05 | Stop reason: DRUGHIGH

## 2018-10-23 RX ADMIN — ENOXAPARIN SODIUM 40 MG: 40 INJECTION SUBCUTANEOUS at 08:50

## 2018-10-23 RX ADMIN — FLUOXETINE HYDROCHLORIDE 10 MG: 10 CAPSULE ORAL at 08:11

## 2018-10-23 RX ADMIN — LITHIUM CARBONATE 300 MG: 300 CAPSULE, GELATIN COATED ORAL at 08:11

## 2018-10-23 RX ADMIN — FLUOXETINE HYDROCHLORIDE 20 MG: 10 CAPSULE ORAL at 08:11

## 2018-10-23 RX ADMIN — METRONIDAZOLE 500 MG: 500 INJECTION, SOLUTION INTRAVENOUS at 09:25

## 2018-10-23 RX ADMIN — CIPROFLOXACIN 400 MG: 2 INJECTION, SOLUTION INTRAVENOUS at 07:55

## 2018-10-23 RX ADMIN — SODIUM CHLORIDE 8 MG/HR: 9 INJECTION, SOLUTION INTRAVENOUS at 10:49

## 2018-10-23 NOTE — PLAN OF CARE
Problem: Activity:  Goal: Risk for activity intolerance will decrease  Risk for activity intolerance will decrease   Outcome: Ongoing      Problem:  Bowel/Gastric:  Goal: Bowel function will improve  Bowel function will improve   Outcome: Ongoing    Goal: Diagnostic test results will improve  Diagnostic test results will improve   Outcome: Ongoing    Goal: Occurrences of nausea will decrease  Occurrences of nausea will decrease   Outcome: Ongoing    Goal: Occurrences of vomiting will decrease  Occurrences of vomiting will decrease   Outcome: Ongoing

## 2018-10-23 NOTE — DISCHARGE SUMMARY
Physician Discharge Summary     Concord Heading  [id number]    Admit date: 10/21/2018    Discharge date and time: No discharge date for patient encounter. Admitting Physician: Bridger Gottlieb MD     Admission Diagnoses: Intractable abdominal pain [R10.9]  Intractable abdominal pain [R10.9]  Epigastric pain [R10.13]    Hospital Course: Had uncomplicated marginal ulcer found on EGD and uncomplicated post operative course and was discharged tolerating a liquid diet, having good bowel function, ambulating independently and with adequate analgesia. Discharge Exam: /60   Pulse 62   Temp 98 °F (36.7 °C) (Oral)   Resp 16   Ht 5' 5\" (1.651 m)   Wt 209 lb 8 oz (95 kg)   LMP 09/12/2018 (Approximate)   SpO2 97%   BMI 34.86 kg/m²     General appearance: alert, appears stated age and cooperative  Head: Normocephalic, without obvious abnormality, atraumatic  Neck: no adenopathy, no carotid bruit, no JVD, supple, symmetrical, trachea midline and thyroid not enlarged, symmetric, no tenderness/mass/nodules  Lungs: clear to auscultation bilaterally  Heart: regular rate and rhythm, S1, S2 normal, no murmur, click, rub or gallop  Abdomen: soft, non-tender; bowel sounds normal; no masses,  no organomegaly and incisions clean and dry  Extremities: extremities normal, atraumatic, no cyanosis or edema      Disposition: home    Patient Instructions: Activity: activity as tolerated  Diet: bariatric diet      Follow-up with Dr. Martha Navarrete.     Signed:  Bridger Gottlieb  10/23/2018  11:50 AM

## 2018-10-24 ENCOUNTER — CARE COORDINATION (OUTPATIENT)
Dept: CASE MANAGEMENT | Age: 48
End: 2018-10-24

## 2018-10-24 DIAGNOSIS — K28.9 MARGINAL ULCER: Primary | ICD-10-CM

## 2018-10-26 ENCOUNTER — INITIAL CONSULT (OUTPATIENT)
Dept: BARIATRICS/WEIGHT MGMT | Age: 48
End: 2018-10-26
Payer: MEDICARE

## 2018-10-26 ENCOUNTER — OFFICE VISIT (OUTPATIENT)
Dept: BARIATRICS/WEIGHT MGMT | Age: 48
End: 2018-10-26
Payer: MEDICARE

## 2018-10-26 VITALS
DIASTOLIC BLOOD PRESSURE: 77 MMHG | HEIGHT: 65 IN | WEIGHT: 204 LBS | TEMPERATURE: 97.2 F | RESPIRATION RATE: 20 BRPM | BODY MASS INDEX: 33.99 KG/M2 | SYSTOLIC BLOOD PRESSURE: 131 MMHG | HEART RATE: 64 BPM

## 2018-10-26 VITALS — BODY MASS INDEX: 33.99 KG/M2 | HEIGHT: 65 IN | WEIGHT: 204 LBS

## 2018-10-26 DIAGNOSIS — M89.9 DISORDER OF BONE: ICD-10-CM

## 2018-10-26 DIAGNOSIS — E55.9 VITAMIN D DEFICIENCY: Primary | ICD-10-CM

## 2018-10-26 DIAGNOSIS — E51.9 THIAMINE DEFICIENCY: ICD-10-CM

## 2018-10-26 DIAGNOSIS — Z71.3 DIETARY COUNSELING: Primary | ICD-10-CM

## 2018-10-26 DIAGNOSIS — K28.9 MARGINAL ULCER: ICD-10-CM

## 2018-10-26 DIAGNOSIS — Z13.820 SCREENING FOR OSTEOPOROSIS: ICD-10-CM

## 2018-10-26 DIAGNOSIS — K91.2 MALNUTRITION FOLLOWING GASTROINTESTINAL SURGERY: Primary | ICD-10-CM

## 2018-10-26 PROCEDURE — 99211 OFF/OP EST MAY X REQ PHY/QHP: CPT

## 2018-10-26 PROCEDURE — 99212 OFFICE O/P EST SF 10 MIN: CPT

## 2018-10-26 PROCEDURE — G8428 CUR MEDS NOT DOCUMENT: HCPCS | Performed by: INTERNAL MEDICINE

## 2018-10-26 PROCEDURE — 1111F DSCHRG MED/CURRENT MED MERGE: CPT | Performed by: INTERNAL MEDICINE

## 2018-10-26 PROCEDURE — 1036F TOBACCO NON-USER: CPT | Performed by: INTERNAL MEDICINE

## 2018-10-26 PROCEDURE — G8484 FLU IMMUNIZE NO ADMIN: HCPCS | Performed by: INTERNAL MEDICINE

## 2018-10-26 PROCEDURE — 99213 OFFICE O/P EST LOW 20 MIN: CPT | Performed by: SURGERY

## 2018-10-26 PROCEDURE — 1111F DSCHRG MED/CURRENT MED MERGE: CPT | Performed by: SURGERY

## 2018-10-26 PROCEDURE — G8417 CALC BMI ABV UP PARAM F/U: HCPCS | Performed by: INTERNAL MEDICINE

## 2018-10-26 PROCEDURE — G8427 DOCREV CUR MEDS BY ELIG CLIN: HCPCS | Performed by: SURGERY

## 2018-10-26 PROCEDURE — 1036F TOBACCO NON-USER: CPT | Performed by: SURGERY

## 2018-10-26 PROCEDURE — G8484 FLU IMMUNIZE NO ADMIN: HCPCS | Performed by: SURGERY

## 2018-10-26 PROCEDURE — 99999 PR OFFICE/OUTPT VISIT,PROCEDURE ONLY: CPT | Performed by: SURGERY

## 2018-10-26 PROCEDURE — G8417 CALC BMI ABV UP PARAM F/U: HCPCS | Performed by: SURGERY

## 2018-10-26 PROCEDURE — 99201 PR OFFICE OUTPATIENT NEW 10 MINUTES: CPT | Performed by: INTERNAL MEDICINE

## 2018-10-26 RX ORDER — LANOLIN ALCOHOL/MO/W.PET/CERES
100 CREAM (GRAM) TOPICAL DAILY
Qty: 30 TABLET | Refills: 3 | Status: SHIPPED | OUTPATIENT
Start: 2018-10-26 | End: 2019-04-15

## 2018-10-26 RX ORDER — CHOLECALCIFEROL (VITAMIN D3) 1250 MCG
CAPSULE ORAL
Qty: 12 CAPSULE | Refills: 0 | Status: SHIPPED | OUTPATIENT
Start: 2018-10-26 | End: 2018-12-07 | Stop reason: ALTCHOICE

## 2018-10-26 NOTE — PROGRESS NOTES
Medical Nutrition Therapy (MNT) Assessment     Pt. Name: Krys Shah   Date:10/26/2018  F/U Appt: Sx Type - 6 Month RYGB F/U Appt   Food Records Kept: No  24Hour Recall Completed at Office:No    Ht 5' 5\" (1.651 m)   Wt 204 lb (92.5 kg)   LMP 09/12/2018 (Approximate)   BMI 33.95 kg/m²  Height: 5' 5\" (1.651 m) Weight: 204 lb (92.5 kg)   IBW:ideal body weight   141 lbs % EBWL: 58%     Wt Readings from Last 3 Encounters:   10/26/18 204 lb (92.5 kg)   10/26/18 204 lb (92.5 kg)   10/21/18 209 lb 8 oz (95 kg)       Pt does openly admit to vomiting and not getting in protein nad water. Pt sttaes I ent back to alll my old habits nad did not care. Protein supplements: Pt. is currently using the following protein supplement Nectar and consuming the following grams of protein - 62 grams from her supplement plus protein foods. . Rd / Ld reviewed with patient based on 1.0 gram per kg of IBW patient needs 64 - 74 grams of protein total daily. Pt has a ulcer pt can use 1.2 grams per kg if IBW which would bring the protein requirements daily to 77 - 87 grams until the ulcer heals. Subjective: 1. Have you been consuming at least 48oz. of water everyday? Yes   2. If  you are not able to consume 48oz.  of water how much are you consuming and why? 1.5 bottles - 16.9 oz                  Current MNT:       Bariatric soft diet introducing raw fruit, raw vegetables, rice, protein bars, multivitamin, calcium, protein supplements     MNT Advanced to:     Bariatric soft diet introducing raw fruit, raw vegetables, rice, protein bars, multivitamin, calcium, protein supplements      Allergies and Food Allergies and Food Intolerances: None    Nutritional Data  No - Poor appetite more than 5 days     No - NPO or clear liquid more than 3 days     No - Problem Chewing      No - Problem Swallowing      No - Problem Mouth Pain      No - Problem Denture  No - Missing Teeth         No - Pressure Sore    No - Open how to increase oral protein intake within the diet. Pt. can verbalize he / she will need to consume 60 - 80 grams. · Rd / Ld instructed the patient on how to increase the use of protein supplements within the diet. · Pt. was instructed on how to increase water intake. Patient will need to consume 48 - 64 oz. of just plain water in addition to 30 oz. of non-caloric beverages. · Handouts given to patient  · RD / LD encouraged oral intake    · RD / LD encouraged pt. to keep food records.       · Pt. is able to verbalize diet concepts      No - Constipation Handout Given and Reviewed    No - High Fiber Handout Given and Reviewed      Yes - Ulcer Handout Given and Reviewed          Yes - Pt. was instructed to continue current MNT   No - Pt. diet was advanced to the following stage ( See above)   Yes - Supplements: initiated (See list below  - Pt. given instruction)     No - Supplemental foods:______________________  No - Pt. was placed on a altered meal schedule    Fair - Dietary Compliance

## 2018-10-26 NOTE — PROGRESS NOTES
Pt here for 6 month LRYGB Labs on chart. To see Darshana Piles. Water intake 40oz. Vitamins and proteins are good. Bowels moving good. Pt came in today stating she was dizzy. She thinks it is because of the med they put her on in the hosp. She was put on Sucralfate. Pt states she states everything is spinning and is very dizzy. Pt states she was never like that until she started taking that.
tablet TAKE 1 TO 2 TABLETS BY MOUTH EVERY evening. 2/2/17  Yes Lucero Hoff MD   FLUoxetine (PROZAC) 10 MG capsule Take 1 capsule by mouth daily 2/2/17  Yes Lucero Hoff MD   lithium 300 MG capsule Take 1 capsule by mouth 3 times daily (with meals)  Patient taking differently: Take 300 mg by mouth 2 times daily PT TAKES 1 IN am AND 3  hs 2/2/17  Yes Lucero Hoff MD   FLUoxetine (PROZAC) 20 MG capsule Take 1 capsule by mouth daily 2/2/17  Yes Lucero Hoff MD           Physical exam:   /77 (Site: Left Lower Arm, Position: Sitting, Cuff Size: Medium Adult)   Pulse 64   Temp 97.2 °F (36.2 °C) (Temporal)   Resp 20   Ht 5' 5\" (1.651 m)   Wt 204 lb (92.5 kg)   LMP 09/12/2018 (Approximate)   BMI 33.95 kg/m²   General appearance: alert, appears stated age and cooperative  Head: Normocephalic, without obvious abnormality, atraumatic  Neck: no adenopathy, supple, symmetrical, trachea midline and thyroid not enlarged, symmetric, no tenderness/mass/nodules  Lungs: clear to auscultation bilaterally  Heart: regular rate and rhythm  Abdomen: soft, non-tender; bowel sounds normal; no masses,  no organomegaly  Extremities: extremities normal, atraumatic, no cyanosis or edema    Assessment:  6 months post Laparoscopic Georgette-en- Y Gastric Bypass. She does not complain of GERD,  does not have sleep apnea,  does not have diabetes,  does not have hypertension off medical treatment. Cholesterol and triglycerides are not evaluated. Plan:  Doing well after EGD with large marginal ulcer. Cont Carafate and PPI for 3 months. Continue to eat small portions very slowly and chew well before swallowing. High protein, low calorie diet. Drink plenty of water. Try to exercise more, maintain adequate variety and balance. Always call the clinic if you have any medical problems. Stop the Omeprazole or other acid reducing medicine if possible.  If ulcer pain or acid reflux symptoms start,

## 2018-11-16 ENCOUNTER — HOSPITAL ENCOUNTER (EMERGENCY)
Age: 48
Discharge: HOME | End: 2018-11-16
Payer: MEDICARE

## 2018-11-16 ENCOUNTER — TELEPHONE (OUTPATIENT)
Dept: BARIATRICS/WEIGHT MGMT | Age: 48
End: 2018-11-16

## 2018-11-16 VITALS
HEIGHT: 65 IN | OXYGEN SATURATION: 97 % | DIASTOLIC BLOOD PRESSURE: 67 MMHG | WEIGHT: 198 LBS | HEART RATE: 61 BPM | RESPIRATION RATE: 16 BRPM | BODY MASS INDEX: 32.99 KG/M2 | TEMPERATURE: 98.1 F | SYSTOLIC BLOOD PRESSURE: 117 MMHG

## 2018-11-16 DIAGNOSIS — K59.1 FUNCTIONAL DIARRHEA: ICD-10-CM

## 2018-11-16 DIAGNOSIS — R11.2 NON-INTRACTABLE VOMITING WITH NAUSEA, UNSPECIFIED VOMITING TYPE: Primary | ICD-10-CM

## 2018-11-16 LAB
ALBUMIN SERPL-MCNC: 3.6 G/DL (ref 3.5–5.2)
ALP BLD-CCNC: 59 U/L (ref 35–104)
ALT SERPL-CCNC: 10 U/L (ref 0–32)
ANION GAP SERPL CALCULATED.3IONS-SCNC: 12 MMOL/L (ref 7–16)
AST SERPL-CCNC: 15 U/L (ref 0–31)
BACTERIA: ABNORMAL /HPF
BASOPHILS ABSOLUTE: 0.04 E9/L (ref 0–0.2)
BASOPHILS RELATIVE PERCENT: 0.7 % (ref 0–2)
BILIRUB SERPL-MCNC: 0.5 MG/DL (ref 0–1.2)
BILIRUBIN URINE: NEGATIVE
BLOOD, URINE: NEGATIVE
BUN BLDV-MCNC: 6 MG/DL (ref 6–20)
CALCIUM SERPL-MCNC: 9.3 MG/DL (ref 8.6–10.2)
CASTS: ABNORMAL /LPF
CHLORIDE BLD-SCNC: 100 MMOL/L (ref 98–107)
CLARITY: ABNORMAL
CO2: 31 MMOL/L (ref 22–29)
COLOR: ABNORMAL
CREAT SERPL-MCNC: 0.6 MG/DL (ref 0.5–1)
EOSINOPHILS ABSOLUTE: 0.03 E9/L (ref 0.05–0.5)
EOSINOPHILS RELATIVE PERCENT: 0.5 % (ref 0–6)
EPITHELIAL CELLS, UA: ABNORMAL /HPF
GFR AFRICAN AMERICAN: >60
GFR NON-AFRICAN AMERICAN: >60 ML/MIN/1.73
GLUCOSE BLD-MCNC: 110 MG/DL (ref 74–99)
GLUCOSE URINE: NEGATIVE MG/DL
HCT VFR BLD CALC: 38.1 % (ref 34–48)
HEMOGLOBIN: 12.5 G/DL (ref 11.5–15.5)
IMMATURE GRANULOCYTES #: 0.02 E9/L
IMMATURE GRANULOCYTES %: 0.3 % (ref 0–5)
KETONES, URINE: 15 MG/DL
LEUKOCYTE ESTERASE, URINE: NEGATIVE
LIPASE: 10 U/L (ref 13–60)
LYMPHOCYTES ABSOLUTE: 1.28 E9/L (ref 1.5–4)
LYMPHOCYTES RELATIVE PERCENT: 21.2 % (ref 20–42)
MCH RBC QN AUTO: 27.7 PG (ref 26–35)
MCHC RBC AUTO-ENTMCNC: 32.8 % (ref 32–34.5)
MCV RBC AUTO: 84.5 FL (ref 80–99.9)
MONOCYTES ABSOLUTE: 0.43 E9/L (ref 0.1–0.95)
MONOCYTES RELATIVE PERCENT: 7.1 % (ref 2–12)
NEUTROPHILS ABSOLUTE: 4.23 E9/L (ref 1.8–7.3)
NEUTROPHILS RELATIVE PERCENT: 70.2 % (ref 43–80)
NITRITE, URINE: NEGATIVE
PDW BLD-RTO: 13.5 FL (ref 11.5–15)
PH UA: 5.5 (ref 5–9)
PLATELET # BLD: 318 E9/L (ref 130–450)
PMV BLD AUTO: 11.5 FL (ref 7–12)
POTASSIUM SERPL-SCNC: 3.6 MMOL/L (ref 3.5–5)
PREGNANCY TEST URINE, POC: NEGATIVE
PROTEIN UA: ABNORMAL MG/DL
RBC # BLD: 4.51 E12/L (ref 3.5–5.5)
RBC UA: ABNORMAL /HPF (ref 0–2)
SODIUM BLD-SCNC: 143 MMOL/L (ref 132–146)
SPECIFIC GRAVITY UA: 1.02 (ref 1–1.03)
TOTAL PROTEIN: 7.5 G/DL (ref 6.4–8.3)
UROBILINOGEN, URINE: 0.2 E.U./DL
WBC # BLD: 6 E9/L (ref 4.5–11.5)
WBC UA: ABNORMAL /HPF (ref 0–5)

## 2018-11-16 PROCEDURE — 2580000003 HC RX 258: Performed by: NURSE PRACTITIONER

## 2018-11-16 PROCEDURE — 85025 COMPLETE CBC W/AUTO DIFF WBC: CPT

## 2018-11-16 PROCEDURE — 6370000000 HC RX 637 (ALT 250 FOR IP): Performed by: NURSE PRACTITIONER

## 2018-11-16 PROCEDURE — 36415 COLL VENOUS BLD VENIPUNCTURE: CPT

## 2018-11-16 PROCEDURE — 6360000002 HC RX W HCPCS: Performed by: NURSE PRACTITIONER

## 2018-11-16 PROCEDURE — 99284 EMERGENCY DEPT VISIT MOD MDM: CPT

## 2018-11-16 PROCEDURE — 80053 COMPREHEN METABOLIC PANEL: CPT

## 2018-11-16 PROCEDURE — 83690 ASSAY OF LIPASE: CPT

## 2018-11-16 PROCEDURE — 96374 THER/PROPH/DIAG INJ IV PUSH: CPT

## 2018-11-16 PROCEDURE — 96375 TX/PRO/DX INJ NEW DRUG ADDON: CPT

## 2018-11-16 PROCEDURE — 81001 URINALYSIS AUTO W/SCOPE: CPT

## 2018-11-16 PROCEDURE — C9113 INJ PANTOPRAZOLE SODIUM, VIA: HCPCS | Performed by: NURSE PRACTITIONER

## 2018-11-16 RX ORDER — 0.9 % SODIUM CHLORIDE 0.9 %
1000 INTRAVENOUS SOLUTION INTRAVENOUS ONCE
Status: COMPLETED | OUTPATIENT
Start: 2018-11-16 | End: 2018-11-16

## 2018-11-16 RX ORDER — PROMETHAZINE HYDROCHLORIDE 25 MG/1
25 SUPPOSITORY RECTAL EVERY 4 HOURS PRN
Qty: 10 SUPPOSITORY | Refills: 0 | Status: SHIPPED | OUTPATIENT
Start: 2018-11-16 | End: 2018-11-23

## 2018-11-16 RX ORDER — METOCLOPRAMIDE HYDROCHLORIDE 5 MG/ML
10 INJECTION INTRAMUSCULAR; INTRAVENOUS ONCE
Status: COMPLETED | OUTPATIENT
Start: 2018-11-16 | End: 2018-11-16

## 2018-11-16 RX ORDER — PANTOPRAZOLE SODIUM 40 MG/10ML
40 INJECTION, POWDER, LYOPHILIZED, FOR SOLUTION INTRAVENOUS ONCE
Status: COMPLETED | OUTPATIENT
Start: 2018-11-16 | End: 2018-11-16

## 2018-11-16 RX ORDER — ONDANSETRON 2 MG/ML
4 INJECTION INTRAMUSCULAR; INTRAVENOUS ONCE
Status: COMPLETED | OUTPATIENT
Start: 2018-11-16 | End: 2018-11-16

## 2018-11-16 RX ORDER — METOCLOPRAMIDE 10 MG/1
10 TABLET ORAL 4 TIMES DAILY
Qty: 40 TABLET | Refills: 0 | Status: SHIPPED | OUTPATIENT
Start: 2018-11-16 | End: 2018-12-07 | Stop reason: ALTCHOICE

## 2018-11-16 RX ORDER — KETOROLAC TROMETHAMINE 30 MG/ML
30 INJECTION, SOLUTION INTRAMUSCULAR; INTRAVENOUS ONCE
Status: DISCONTINUED | OUTPATIENT
Start: 2018-11-16 | End: 2018-11-16

## 2018-11-16 RX ORDER — SUCRALFATE 1 G/1
1 TABLET ORAL EVERY 6 HOURS SCHEDULED
Status: DISCONTINUED | OUTPATIENT
Start: 2018-11-16 | End: 2018-11-16 | Stop reason: HOSPADM

## 2018-11-16 RX ORDER — SUCRALFATE 1 G/1
1 TABLET ORAL 4 TIMES DAILY
Qty: 40 TABLET | Refills: 3 | Status: SHIPPED | OUTPATIENT
Start: 2018-11-16 | End: 2018-12-07 | Stop reason: ALTCHOICE

## 2018-11-16 RX ADMIN — METOCLOPRAMIDE 10 MG: 5 INJECTION, SOLUTION INTRAMUSCULAR; INTRAVENOUS at 15:01

## 2018-11-16 RX ADMIN — PANTOPRAZOLE SODIUM 40 MG: 40 INJECTION, POWDER, FOR SOLUTION INTRAVENOUS at 15:00

## 2018-11-16 RX ADMIN — SODIUM CHLORIDE 1000 ML: 9 INJECTION, SOLUTION INTRAVENOUS at 15:00

## 2018-11-16 RX ADMIN — SUCRALFATE 1 G: 1 TABLET ORAL at 15:00

## 2018-11-16 RX ADMIN — ONDANSETRON 4 MG: 2 INJECTION INTRAMUSCULAR; INTRAVENOUS at 15:00

## 2018-11-16 ASSESSMENT — PAIN SCALES - GENERAL: PAINLEVEL_OUTOF10: 5

## 2018-11-16 ASSESSMENT — PAIN DESCRIPTION - PAIN TYPE: TYPE: ACUTE PAIN

## 2018-11-16 ASSESSMENT — PAIN DESCRIPTION - DESCRIPTORS: DESCRIPTORS: DISCOMFORT

## 2018-11-16 ASSESSMENT — PAIN DESCRIPTION - FREQUENCY: FREQUENCY: CONTINUOUS

## 2018-11-16 ASSESSMENT — PAIN DESCRIPTION - ORIENTATION: ORIENTATION: UPPER

## 2018-11-16 ASSESSMENT — PAIN DESCRIPTION - LOCATION: LOCATION: ABDOMEN

## 2018-11-16 NOTE — TELEPHONE ENCOUNTER
Patient called in complaining of still having reflux. She threw up today a large amount of acid. She is being complaint with meds. We reviewed her diet and she is not eating the wrong food. She will try to constantly keep something in her stomach. She has some Zofran left and will take one. She will recall if needed.

## 2018-11-16 NOTE — ED NOTES
Pt is alert color is good and skin is warm and dry resp non labored medicated per order     Starla Simmons RN  11/16/18 0813

## 2018-11-16 NOTE — ED PROVIDER NOTES
318 130 - 450 E9/L    MPV 11.5 7.0 - 12.0 fL    Neutrophils % 70.2 43.0 - 80.0 %    Immature Granulocytes % 0.3 0.0 - 5.0 %    Lymphocytes % 21.2 20.0 - 42.0 %    Monocytes % 7.1 2.0 - 12.0 %    Eosinophils % 0.5 0.0 - 6.0 %    Basophils % 0.7 0.0 - 2.0 %    Neutrophils # 4.23 1.80 - 7.30 E9/L    Immature Granulocytes # 0.02 E9/L    Lymphocytes # 1.28 (L) 1.50 - 4.00 E9/L    Monocytes # 0.43 0.10 - 0.95 E9/L    Eosinophils # 0.03 (L) 0.05 - 0.50 E9/L    Basophils # 0.04 0.00 - 0.20 E9/L   Comprehensive Metabolic Panel   Result Value Ref Range    Sodium 143 132 - 146 mmol/L    Potassium 3.6 3.5 - 5.0 mmol/L    Chloride 100 98 - 107 mmol/L    CO2 31 (H) 22 - 29 mmol/L    Anion Gap 12 7 - 16 mmol/L    Glucose 110 (H) 74 - 99 mg/dL    BUN 6 6 - 20 mg/dL    CREATININE 0.6 0.5 - 1.0 mg/dL    GFR Non-African American >60 >=60 mL/min/1.73    GFR African American >60     Calcium 9.3 8.6 - 10.2 mg/dL    Total Protein 7.5 6.4 - 8.3 g/dL    Alb 3.6 3.5 - 5.2 g/dL    Total Bilirubin 0.5 0.0 - 1.2 mg/dL    Alkaline Phosphatase 59 35 - 104 U/L    ALT 10 0 - 32 U/L    AST 15 0 - 31 U/L   Lipase   Result Value Ref Range    Lipase 10 (L) 13 - 60 U/L   Urinalysis   Result Value Ref Range    Color, UA DKYELLOW Straw/Yellow    Clarity, UA SLCLOUDY Clear    Glucose, Ur Negative Negative mg/dL    Bilirubin Urine Negative Negative    Ketones, Urine 15 (A) Negative mg/dL    Specific Gravity, UA 1.025 1.005 - 1.030    Blood, Urine Negative Negative    pH, UA 5.5 5.0 - 9.0    Protein, UA TRACE Negative mg/dL    Urobilinogen, Urine 0.2 <2.0 E.U./dL    Nitrite, Urine Negative Negative    Leukocyte Esterase, Urine Negative Negative   Microscopic Urinalysis   Result Value Ref Range    Casts RARE /LPF    WBC, UA 1-3 0 - 5 /HPF    RBC, UA 0-1 0 - 2 /HPF    Epi Cells MODERATE /HPF    Bacteria, UA MODERATE (A) /HPF   POC Pregnancy Urine Qual   Result Value Ref Range    Preg Test, Ur negative      Imaging:   All Radiology results interpreted by Radiologist unless otherwise noted. No orders to display     ED Course / Medical Decision Making     Medications   sucralfate (CARAFATE) tablet 1 g (1 g Oral Given 11/16/18 1500)   0.9 % sodium chloride bolus (1,000 mLs Intravenous New Bag 11/16/18 1500)   ondansetron (ZOFRAN) injection 4 mg (4 mg Intravenous Given 11/16/18 1500)   metoclopramide (REGLAN) injection 10 mg (10 mg Intravenous Given 11/16/18 1501)   pantoprazole (PROTONIX) injection 40 mg (40 mg Intravenous Given 11/16/18 1500)        Re-examination:  11/16/18       Time: 3pm    Patients symptoms are improving. Consults:   None    Procedures:   none    MDM:   Patient was treated for her abdominal pain, nausea and vomiting. Labs were unremarkable. Imaging was not repeated since patient was seen in the ER for the same problem less than a month ago. Patient to follow up with her PCP. Counseling: The emergency provider has spoken with the patient and discussed todays results, in addition to providing specific details for the plan of care and counseling regarding the diagnosis and prognosis. Questions are answered at this time and they are agreeable with the plan. Assessment      1. Non-intractable vomiting with nausea, unspecified vomiting type    2. Functional diarrhea      Plan   Discharge to home  Patient condition is stable    New Medications     New Prescriptions    METOCLOPRAMIDE (REGLAN) 10 MG TABLET    Take 1 tablet by mouth 4 times daily for 10 days    PROMETHAZINE (PROMETHEGAN) 25 MG SUPPOSITORY    Place 1 suppository rectally every 4 hours as needed for Nausea    SUCRALFATE (CARAFATE) 1 GM TABLET    Take 1 tablet by mouth 4 times daily for 10 days     Electronically signed by MICH Rutledge CNP   DD: 11/16/18  **This report was transcribed using voice recognition software. Every effort was made to ensure accuracy; however, inadvertent computerized transcription errors may be present.   END OF ED PROVIDER NOTE      Ronald Alonso

## 2018-11-29 ENCOUNTER — HOSPITAL ENCOUNTER (EMERGENCY)
Age: 48
Discharge: HOME | End: 2018-11-29
Attending: EMERGENCY MEDICINE
Payer: MEDICARE

## 2018-11-29 ENCOUNTER — APPOINTMENT (OUTPATIENT)
Dept: CT IMAGING | Age: 48
End: 2018-11-29
Payer: MEDICARE

## 2018-11-29 VITALS
BODY MASS INDEX: 32.99 KG/M2 | WEIGHT: 198 LBS | RESPIRATION RATE: 16 BRPM | HEART RATE: 64 BPM | HEIGHT: 65 IN | DIASTOLIC BLOOD PRESSURE: 80 MMHG | SYSTOLIC BLOOD PRESSURE: 145 MMHG | TEMPERATURE: 98.2 F | OXYGEN SATURATION: 96 %

## 2018-11-29 DIAGNOSIS — R11.2 NON-INTRACTABLE VOMITING WITH NAUSEA, UNSPECIFIED VOMITING TYPE: ICD-10-CM

## 2018-11-29 DIAGNOSIS — R10.13 ABDOMINAL PAIN, EPIGASTRIC: Primary | ICD-10-CM

## 2018-11-29 LAB
ALBUMIN SERPL-MCNC: 3.4 G/DL (ref 3.5–5.2)
ALP BLD-CCNC: 56 U/L (ref 35–104)
ALT SERPL-CCNC: 11 U/L (ref 0–32)
ANION GAP SERPL CALCULATED.3IONS-SCNC: 12 MMOL/L (ref 7–16)
AST SERPL-CCNC: 14 U/L (ref 0–31)
BACTERIA: ABNORMAL /HPF
BILIRUB SERPL-MCNC: 0.5 MG/DL (ref 0–1.2)
BILIRUBIN URINE: NEGATIVE
BLOOD, URINE: NEGATIVE
BUN BLDV-MCNC: 10 MG/DL (ref 6–20)
CALCIUM SERPL-MCNC: 9 MG/DL (ref 8.6–10.2)
CHLORIDE BLD-SCNC: 102 MMOL/L (ref 98–107)
CLARITY: ABNORMAL
CO2: 27 MMOL/L (ref 22–29)
CO2: 28 MMOL/L (ref 22–29)
COLOR: YELLOW
CREAT SERPL-MCNC: 0.6 MG/DL (ref 0.5–1)
CRYSTALS, UA: ABNORMAL
EPITHELIAL CELLS, UA: ABNORMAL /HPF
GFR AFRICAN AMERICAN: >60
GFR AFRICAN AMERICAN: >60
GFR NON-AFRICAN AMERICAN: >60 ML/MIN/1.73
GFR NON-AFRICAN AMERICAN: >60 ML/MIN/1.73
GLUCOSE BLD-MCNC: 103 MG/DL (ref 74–99)
GLUCOSE BLD-MCNC: 103 MG/DL (ref 74–99)
GLUCOSE URINE: NEGATIVE MG/DL
HCT VFR BLD CALC: 38.4 % (ref 34–48)
HEMOGLOBIN: 12.1 G/DL (ref 11.5–15.5)
KETONES, URINE: 40 MG/DL
LACTIC ACID: 0.8 MMOL/L (ref 0.5–2.2)
LEUKOCYTE ESTERASE, URINE: NEGATIVE
LIPASE: 9 U/L (ref 13–60)
MCH RBC QN AUTO: 27.3 PG (ref 26–35)
MCHC RBC AUTO-ENTMCNC: 31.5 % (ref 32–34.5)
MCV RBC AUTO: 86.5 FL (ref 80–99.9)
MUCUS: PRESENT
NITRITE, URINE: NEGATIVE
PDW BLD-RTO: 13.8 FL (ref 11.5–15)
PH UA: 5.5 (ref 5–9)
PLATELET # BLD: 364 E9/L (ref 130–450)
PMV BLD AUTO: 11 FL (ref 7–12)
POC ANION GAP: 15 MMOL/L (ref 7–16)
POC BUN: 9 MG/DL (ref 8–23)
POC CHLORIDE: 99 MMOL/L (ref 100–108)
POC CREATININE: 0.6 MG/DL (ref 0.5–1)
POC POTASSIUM: 3.1 MMOL/L (ref 3.5–5)
POC SODIUM: 142 MMOL/L (ref 132–146)
POTASSIUM SERPL-SCNC: 3.5 MMOL/L (ref 3.5–5)
PROTEIN UA: NEGATIVE MG/DL
RBC # BLD: 4.44 E12/L (ref 3.5–5.5)
RBC UA: ABNORMAL /HPF (ref 0–2)
SODIUM BLD-SCNC: 141 MMOL/L (ref 132–146)
SPECIFIC GRAVITY UA: >=1.03 (ref 1–1.03)
TOTAL PROTEIN: 7.2 G/DL (ref 6.4–8.3)
UROBILINOGEN, URINE: 0.2 E.U./DL
WBC # BLD: 7.3 E9/L (ref 4.5–11.5)
WBC UA: ABNORMAL /HPF (ref 0–5)

## 2018-11-29 PROCEDURE — 36415 COLL VENOUS BLD VENIPUNCTURE: CPT

## 2018-11-29 PROCEDURE — 6360000004 HC RX CONTRAST MEDICATION: Performed by: RADIOLOGY

## 2018-11-29 PROCEDURE — 96372 THER/PROPH/DIAG INJ SC/IM: CPT

## 2018-11-29 PROCEDURE — 99284 EMERGENCY DEPT VISIT MOD MDM: CPT

## 2018-11-29 PROCEDURE — 80053 COMPREHEN METABOLIC PANEL: CPT

## 2018-11-29 PROCEDURE — 82565 ASSAY OF CREATININE: CPT

## 2018-11-29 PROCEDURE — 83605 ASSAY OF LACTIC ACID: CPT

## 2018-11-29 PROCEDURE — 96374 THER/PROPH/DIAG INJ IV PUSH: CPT

## 2018-11-29 PROCEDURE — 85027 COMPLETE CBC AUTOMATED: CPT

## 2018-11-29 PROCEDURE — 82947 ASSAY GLUCOSE BLOOD QUANT: CPT

## 2018-11-29 PROCEDURE — 84520 ASSAY OF UREA NITROGEN: CPT

## 2018-11-29 PROCEDURE — 80051 ELECTROLYTE PANEL: CPT

## 2018-11-29 PROCEDURE — 87088 URINE BACTERIA CULTURE: CPT

## 2018-11-29 PROCEDURE — 2580000003 HC RX 258: Performed by: EMERGENCY MEDICINE

## 2018-11-29 PROCEDURE — 96375 TX/PRO/DX INJ NEW DRUG ADDON: CPT

## 2018-11-29 PROCEDURE — 83690 ASSAY OF LIPASE: CPT

## 2018-11-29 PROCEDURE — 74177 CT ABD & PELVIS W/CONTRAST: CPT

## 2018-11-29 PROCEDURE — 81001 URINALYSIS AUTO W/SCOPE: CPT

## 2018-11-29 PROCEDURE — 6360000002 HC RX W HCPCS: Performed by: EMERGENCY MEDICINE

## 2018-11-29 RX ORDER — PROMETHAZINE HYDROCHLORIDE 25 MG/ML
25 INJECTION, SOLUTION INTRAMUSCULAR; INTRAVENOUS ONCE
Status: COMPLETED | OUTPATIENT
Start: 2018-11-29 | End: 2018-11-29

## 2018-11-29 RX ORDER — 0.9 % SODIUM CHLORIDE 0.9 %
1000 INTRAVENOUS SOLUTION INTRAVENOUS ONCE
Status: COMPLETED | OUTPATIENT
Start: 2018-11-29 | End: 2018-11-29

## 2018-11-29 RX ORDER — METOCLOPRAMIDE HYDROCHLORIDE 5 MG/ML
10 INJECTION INTRAMUSCULAR; INTRAVENOUS ONCE
Status: COMPLETED | OUTPATIENT
Start: 2018-11-29 | End: 2018-11-29

## 2018-11-29 RX ORDER — MORPHINE SULFATE 10 MG/ML
5 INJECTION, SOLUTION INTRAMUSCULAR; INTRAVENOUS ONCE
Status: COMPLETED | OUTPATIENT
Start: 2018-11-29 | End: 2018-11-29

## 2018-11-29 RX ADMIN — METOCLOPRAMIDE 10 MG: 5 INJECTION, SOLUTION INTRAMUSCULAR; INTRAVENOUS at 19:30

## 2018-11-29 RX ADMIN — IOHEXOL 50 ML: 240 INJECTION, SOLUTION INTRATHECAL; INTRAVASCULAR; INTRAVENOUS; ORAL at 21:42

## 2018-11-29 RX ADMIN — PROMETHAZINE HYDROCHLORIDE 25 MG: 25 INJECTION INTRAMUSCULAR; INTRAVENOUS at 19:30

## 2018-11-29 RX ADMIN — SODIUM CHLORIDE 1000 ML: 9 INJECTION, SOLUTION INTRAVENOUS at 19:35

## 2018-11-29 RX ADMIN — MORPHINE SULFATE 5 MG: 10 INJECTION INTRAVENOUS at 19:30

## 2018-11-29 RX ADMIN — IOPAMIDOL 80 ML: 755 INJECTION, SOLUTION INTRAVENOUS at 21:42

## 2018-11-29 ASSESSMENT — ENCOUNTER SYMPTOMS
VOMITING: 1
NAUSEA: 1
ABDOMINAL PAIN: 1

## 2018-11-29 ASSESSMENT — PAIN DESCRIPTION - ORIENTATION: ORIENTATION: MID;UPPER

## 2018-11-29 ASSESSMENT — PAIN DESCRIPTION - LOCATION: LOCATION: ABDOMEN

## 2018-11-29 ASSESSMENT — PAIN SCALES - GENERAL: PAINLEVEL_OUTOF10: 5

## 2018-11-29 ASSESSMENT — PAIN DESCRIPTION - DESCRIPTORS: DESCRIPTORS: BURNING

## 2018-11-30 NOTE — ED PROVIDER NOTES
53-year-old female presenting with abdominal discomfort for last couple of days. She had gastric bypass surgery earlier this year. She has not been able to tolerate any oral medication today because of the emesis. She does complain of epigastric discomfort. Patient has lost over 130 pounds so far according to her. She is awake alert and oriented ×4 nontoxic-appearing. Review of Systems   Constitutional: Negative for fever. Gastrointestinal: Positive for abdominal pain, nausea and vomiting. All other systems reviewed and are negative. Physical Exam   Constitutional: She is oriented to person, place, and time. She appears well-developed and well-nourished. No distress. HENT:   Head: Normocephalic and atraumatic. Eyes: Pupils are equal, round, and reactive to light. Conjunctivae are normal.   Neck: Normal range of motion. No thyromegaly present. Cardiovascular: Normal rate and regular rhythm. Pulmonary/Chest: Effort normal and breath sounds normal. No respiratory distress. Abdominal: Soft. She exhibits no distension. There is tenderness in the epigastric area. There is no rigidity, no rebound and no guarding. Musculoskeletal: Normal range of motion. She exhibits no edema or tenderness. Neurological: She is alert and oriented to person, place, and time. No cranial nerve deficit. Coordination normal.   Skin: Skin is warm and dry. No erythema. Psychiatric: She has a normal mood and affect. Procedures    MDM        2100. I have assumed care of this patient from the departing physician, Dr. Landy Patricia. I have discussed the initial history and exam, diagnostics and treatment plan. I have introduced myself to the patient and answered all questions to this point. Spoke with Dr. Merari Terrazas (Surgery). Discussed case. They state that the patient is okay to discharge home and will see this patient in follow-up. Time: 2255. Re-evaluation.   Patients symptoms are improving  Repeat mL/min/1.73    GFR African American >60     Calcium 9.0 8.6 - 10.2 mg/dL    Total Protein 7.2 6.4 - 8.3 g/dL    Alb 3.4 (L) 3.5 - 5.2 g/dL    Total Bilirubin 0.5 0.0 - 1.2 mg/dL    Alkaline Phosphatase 56 35 - 104 U/L    ALT 11 0 - 32 U/L    AST 14 0 - 31 U/L   Urinalysis   Result Value Ref Range    Color, UA Yellow Straw/Yellow    Clarity, UA SLCLOUDY Clear    Glucose, Ur Negative Negative mg/dL    Bilirubin Urine Negative Negative    Ketones, Urine 40 (A) Negative mg/dL    Specific Gravity, UA >=1.030 1.005 - 1.030    Blood, Urine Negative Negative    pH, UA 5.5 5.0 - 9.0    Protein, UA Negative Negative mg/dL    Urobilinogen, Urine 0.2 <2.0 E.U./dL    Nitrite, Urine Negative Negative    Leukocyte Esterase, Urine Negative Negative   Lactic Acid, Plasma   Result Value Ref Range    Lactic Acid 0.8 0.5 - 2.2 mmol/L   Lipase   Result Value Ref Range    Lipase 9 (L) 13 - 60 U/L   Microscopic Urinalysis   Result Value Ref Range    Mucus, UA Present     WBC, UA 0-1 0 - 5 /HPF    RBC, UA NONE 0 - 2 /HPF    Epi Cells MANY /HPF    Bacteria, UA MANY (A) /HPF    Crystals Many    POCT Venous   Result Value Ref Range    POC Sodium 142 132 - 146 mmol/L    POC Potassium 3.1 (L) 3.5 - 5.0 mmol/L    POC Chloride 99 (L) 100 - 108 mmol/L    CO2 28 22 - 29 mmol/L    POC Anion Gap 15 7 - 16 mmol/L    POC Glucose 103 (H) 74 - 99 mg/dl    POC BUN 9 8 - 23 mg/dL    POC Creatinine 0.6 0.5 - 1.0 mg/dL    GFR Non-African American >60 >=60 mL/min/1.73    GFR  >60        Radiology:  CT ABDOMEN PELVIS W IV CONTRAST Additional Contrast? Oral   Final Result   Decreased inflammatory changes surrounding the   gastrojejunostomy of the gastric bypass. Oral contrast identified   within the bypassed portion the stomach, suggesting a leak of through   the gastric staple line.           ------------------------- NURSING NOTES AND VITALS REVIEWED ---------------------------  Date / Time Roomed:  11/29/2018  6:34 PM  ED Bed Assignment:

## 2018-12-01 ENCOUNTER — APPOINTMENT (OUTPATIENT)
Dept: GENERAL RADIOLOGY | Age: 48
End: 2018-12-01
Payer: MEDICARE

## 2018-12-01 ENCOUNTER — HOSPITAL ENCOUNTER (OUTPATIENT)
Age: 48
Setting detail: OBSERVATION
Discharge: HOME | End: 2018-12-03
Attending: EMERGENCY MEDICINE | Admitting: SURGERY
Payer: MEDICARE

## 2018-12-01 ENCOUNTER — APPOINTMENT (OUTPATIENT)
Dept: CT IMAGING | Age: 48
End: 2018-12-01
Payer: MEDICARE

## 2018-12-01 DIAGNOSIS — Z98.84 H/O GASTRIC BYPASS: ICD-10-CM

## 2018-12-01 DIAGNOSIS — R10.9 INTRACTABLE ABDOMINAL PAIN: Primary | ICD-10-CM

## 2018-12-01 DIAGNOSIS — E87.6 HYPOKALEMIA: ICD-10-CM

## 2018-12-01 DIAGNOSIS — R11.2 NON-INTRACTABLE VOMITING WITH NAUSEA, UNSPECIFIED VOMITING TYPE: ICD-10-CM

## 2018-12-01 LAB
ALBUMIN SERPL-MCNC: 3.5 G/DL (ref 3.5–5.2)
ALP BLD-CCNC: 102 U/L (ref 35–104)
ALT SERPL-CCNC: 27 U/L (ref 0–32)
ANION GAP SERPL CALCULATED.3IONS-SCNC: 16 MMOL/L (ref 7–16)
AST SERPL-CCNC: 27 U/L (ref 0–31)
BASOPHILS ABSOLUTE: 0.02 E9/L (ref 0–0.2)
BASOPHILS RELATIVE PERCENT: 0.3 % (ref 0–2)
BILIRUB SERPL-MCNC: 0.5 MG/DL (ref 0–1.2)
BUN BLDV-MCNC: 8 MG/DL (ref 6–20)
CALCIUM SERPL-MCNC: 9 MG/DL (ref 8.6–10.2)
CHLORIDE BLD-SCNC: 99 MMOL/L (ref 98–107)
CO2: 24 MMOL/L (ref 22–29)
CREAT SERPL-MCNC: 0.6 MG/DL (ref 0.5–1)
EKG ATRIAL RATE: 59 BPM
EKG P AXIS: 47 DEGREES
EKG P-R INTERVAL: 150 MS
EKG Q-T INTERVAL: 442 MS
EKG QRS DURATION: 84 MS
EKG QTC CALCULATION (BAZETT): 437 MS
EKG R AXIS: 27 DEGREES
EKG T AXIS: 12 DEGREES
EKG VENTRICULAR RATE: 59 BPM
EOSINOPHILS ABSOLUTE: 0.01 E9/L (ref 0.05–0.5)
EOSINOPHILS RELATIVE PERCENT: 0.2 % (ref 0–6)
GFR AFRICAN AMERICAN: >60
GFR NON-AFRICAN AMERICAN: >60 ML/MIN/1.73
GLUCOSE BLD-MCNC: 111 MG/DL (ref 74–99)
HCT VFR BLD CALC: 37.7 % (ref 34–48)
HEMOGLOBIN: 12.2 G/DL (ref 11.5–15.5)
IMMATURE GRANULOCYTES #: 0.01 E9/L
IMMATURE GRANULOCYTES %: 0.2 % (ref 0–5)
LACTIC ACID: 1 MMOL/L (ref 0.5–2.2)
LIPASE: 10 U/L (ref 13–60)
LYMPHOCYTES ABSOLUTE: 1.02 E9/L (ref 1.5–4)
LYMPHOCYTES RELATIVE PERCENT: 15.7 % (ref 20–42)
MCH RBC QN AUTO: 27.4 PG (ref 26–35)
MCHC RBC AUTO-ENTMCNC: 32.4 % (ref 32–34.5)
MCV RBC AUTO: 84.5 FL (ref 80–99.9)
MONOCYTES ABSOLUTE: 0.48 E9/L (ref 0.1–0.95)
MONOCYTES RELATIVE PERCENT: 7.4 % (ref 2–12)
NEUTROPHILS ABSOLUTE: 4.94 E9/L (ref 1.8–7.3)
NEUTROPHILS RELATIVE PERCENT: 76.2 % (ref 43–80)
PDW BLD-RTO: 13.7 FL (ref 11.5–15)
PLATELET # BLD: 336 E9/L (ref 130–450)
PMV BLD AUTO: 10.8 FL (ref 7–12)
POTASSIUM SERPL-SCNC: 3.2 MMOL/L (ref 3.5–5)
RBC # BLD: 4.46 E12/L (ref 3.5–5.5)
SODIUM BLD-SCNC: 139 MMOL/L (ref 132–146)
TOTAL PROTEIN: 7.2 G/DL (ref 6.4–8.3)
TROPONIN: <0.01 NG/ML (ref 0–0.03)
WBC # BLD: 6.5 E9/L (ref 4.5–11.5)

## 2018-12-01 PROCEDURE — 80053 COMPREHEN METABOLIC PANEL: CPT

## 2018-12-01 PROCEDURE — 96375 TX/PRO/DX INJ NEW DRUG ADDON: CPT

## 2018-12-01 PROCEDURE — 84484 ASSAY OF TROPONIN QUANT: CPT

## 2018-12-01 PROCEDURE — 2580000003 HC RX 258: Performed by: INTERNAL MEDICINE

## 2018-12-01 PROCEDURE — 96368 THER/DIAG CONCURRENT INF: CPT

## 2018-12-01 PROCEDURE — 93005 ELECTROCARDIOGRAM TRACING: CPT | Performed by: EMERGENCY MEDICINE

## 2018-12-01 PROCEDURE — 74177 CT ABD & PELVIS W/CONTRAST: CPT

## 2018-12-01 PROCEDURE — C9113 INJ PANTOPRAZOLE SODIUM, VIA: HCPCS | Performed by: SURGERY

## 2018-12-01 PROCEDURE — 2580000003 HC RX 258: Performed by: SURGERY

## 2018-12-01 PROCEDURE — 96374 THER/PROPH/DIAG INJ IV PUSH: CPT

## 2018-12-01 PROCEDURE — G0378 HOSPITAL OBSERVATION PER HR: HCPCS

## 2018-12-01 PROCEDURE — 83605 ASSAY OF LACTIC ACID: CPT

## 2018-12-01 PROCEDURE — 2580000003 HC RX 258

## 2018-12-01 PROCEDURE — 85025 COMPLETE CBC W/AUTO DIFF WBC: CPT

## 2018-12-01 PROCEDURE — 96366 THER/PROPH/DIAG IV INF ADDON: CPT

## 2018-12-01 PROCEDURE — 2500000003 HC RX 250 WO HCPCS: Performed by: INTERNAL MEDICINE

## 2018-12-01 PROCEDURE — 99285 EMERGENCY DEPT VISIT HI MDM: CPT

## 2018-12-01 PROCEDURE — 2580000003 HC RX 258: Performed by: EMERGENCY MEDICINE

## 2018-12-01 PROCEDURE — C9113 INJ PANTOPRAZOLE SODIUM, VIA: HCPCS | Performed by: EMERGENCY MEDICINE

## 2018-12-01 PROCEDURE — 6360000002 HC RX W HCPCS: Performed by: SURGERY

## 2018-12-01 PROCEDURE — 83690 ASSAY OF LIPASE: CPT

## 2018-12-01 PROCEDURE — 6360000002 HC RX W HCPCS: Performed by: INTERNAL MEDICINE

## 2018-12-01 PROCEDURE — 96372 THER/PROPH/DIAG INJ SC/IM: CPT

## 2018-12-01 PROCEDURE — 94150 VITAL CAPACITY TEST: CPT

## 2018-12-01 PROCEDURE — 6360000002 HC RX W HCPCS: Performed by: EMERGENCY MEDICINE

## 2018-12-01 PROCEDURE — S0028 INJECTION, FAMOTIDINE, 20 MG: HCPCS | Performed by: EMERGENCY MEDICINE

## 2018-12-01 PROCEDURE — 36415 COLL VENOUS BLD VENIPUNCTURE: CPT

## 2018-12-01 PROCEDURE — 71045 X-RAY EXAM CHEST 1 VIEW: CPT

## 2018-12-01 PROCEDURE — 96365 THER/PROPH/DIAG IV INF INIT: CPT

## 2018-12-01 PROCEDURE — 6360000004 HC RX CONTRAST MEDICATION: Performed by: RADIOLOGY

## 2018-12-01 PROCEDURE — 96376 TX/PRO/DX INJ SAME DRUG ADON: CPT

## 2018-12-01 PROCEDURE — 6370000000 HC RX 637 (ALT 250 FOR IP): Performed by: SURGERY

## 2018-12-01 PROCEDURE — 2500000003 HC RX 250 WO HCPCS: Performed by: EMERGENCY MEDICINE

## 2018-12-01 PROCEDURE — 96367 TX/PROPH/DG ADDL SEQ IV INF: CPT

## 2018-12-01 RX ORDER — ONDANSETRON 2 MG/ML
4 INJECTION INTRAMUSCULAR; INTRAVENOUS EVERY 6 HOURS PRN
Status: DISCONTINUED | OUTPATIENT
Start: 2018-12-01 | End: 2018-12-03 | Stop reason: HOSPADM

## 2018-12-01 RX ORDER — DEXTROSE MONOHYDRATE 25 G/50ML
12.5 INJECTION, SOLUTION INTRAVENOUS PRN
Status: DISCONTINUED | OUTPATIENT
Start: 2018-12-01 | End: 2018-12-03 | Stop reason: HOSPADM

## 2018-12-01 RX ORDER — ONDANSETRON 2 MG/ML
4 INJECTION INTRAMUSCULAR; INTRAVENOUS ONCE
Status: COMPLETED | OUTPATIENT
Start: 2018-12-01 | End: 2018-12-01

## 2018-12-01 RX ORDER — POTASSIUM CHLORIDE 7.45 MG/ML
10 INJECTION INTRAVENOUS
Status: DISCONTINUED | OUTPATIENT
Start: 2018-12-01 | End: 2018-12-01

## 2018-12-01 RX ORDER — SODIUM CHLORIDE, SODIUM LACTATE, POTASSIUM CHLORIDE, CALCIUM CHLORIDE 600; 310; 30; 20 MG/100ML; MG/100ML; MG/100ML; MG/100ML
INJECTION, SOLUTION INTRAVENOUS CONTINUOUS
Status: DISCONTINUED | OUTPATIENT
Start: 2018-12-01 | End: 2018-12-03 | Stop reason: HOSPADM

## 2018-12-01 RX ORDER — 0.9 % SODIUM CHLORIDE 0.9 %
10 VIAL (ML) INJECTION DAILY
Status: DISCONTINUED | OUTPATIENT
Start: 2018-12-01 | End: 2018-12-03 | Stop reason: HOSPADM

## 2018-12-01 RX ORDER — LITHIUM CARBONATE 300 MG
900 TABLET ORAL NIGHTLY
COMMUNITY
End: 2019-04-15

## 2018-12-01 RX ORDER — MORPHINE SULFATE 2 MG/ML
2 INJECTION, SOLUTION INTRAMUSCULAR; INTRAVENOUS
Status: DISCONTINUED | OUTPATIENT
Start: 2018-12-01 | End: 2018-12-01

## 2018-12-01 RX ORDER — ACETAMINOPHEN 325 MG/1
650 TABLET ORAL EVERY 4 HOURS PRN
Status: DISCONTINUED | OUTPATIENT
Start: 2018-12-01 | End: 2018-12-03 | Stop reason: HOSPADM

## 2018-12-01 RX ORDER — CIPROFLOXACIN 2 MG/ML
400 INJECTION, SOLUTION INTRAVENOUS EVERY 12 HOURS
Status: DISCONTINUED | OUTPATIENT
Start: 2018-12-01 | End: 2018-12-03 | Stop reason: HOSPADM

## 2018-12-01 RX ORDER — MORPHINE SULFATE 4 MG/ML
4 INJECTION, SOLUTION INTRAMUSCULAR; INTRAVENOUS
Status: DISCONTINUED | OUTPATIENT
Start: 2018-12-01 | End: 2018-12-01

## 2018-12-01 RX ORDER — PANTOPRAZOLE SODIUM 40 MG/10ML
40 INJECTION, POWDER, LYOPHILIZED, FOR SOLUTION INTRAVENOUS ONCE
Status: COMPLETED | OUTPATIENT
Start: 2018-12-01 | End: 2018-12-01

## 2018-12-01 RX ORDER — SODIUM CHLORIDE 0.9 % (FLUSH) 0.9 %
10 SYRINGE (ML) INJECTION EVERY 12 HOURS SCHEDULED
Status: DISCONTINUED | OUTPATIENT
Start: 2018-12-01 | End: 2018-12-03 | Stop reason: HOSPADM

## 2018-12-01 RX ORDER — NICOTINE POLACRILEX 4 MG
15 LOZENGE BUCCAL PRN
Status: DISCONTINUED | OUTPATIENT
Start: 2018-12-01 | End: 2018-12-03 | Stop reason: HOSPADM

## 2018-12-01 RX ORDER — MORPHINE SULFATE 4 MG/ML
4 INJECTION, SOLUTION INTRAMUSCULAR; INTRAVENOUS ONCE
Status: COMPLETED | OUTPATIENT
Start: 2018-12-01 | End: 2018-12-01

## 2018-12-01 RX ORDER — POTASSIUM CHLORIDE 7.45 MG/ML
10 INJECTION INTRAVENOUS
Status: COMPLETED | OUTPATIENT
Start: 2018-12-01 | End: 2018-12-02

## 2018-12-01 RX ORDER — 0.9 % SODIUM CHLORIDE 0.9 %
1000 INTRAVENOUS SOLUTION INTRAVENOUS ONCE
Status: COMPLETED | OUTPATIENT
Start: 2018-12-01 | End: 2018-12-01

## 2018-12-01 RX ORDER — SODIUM CHLORIDE 0.9 % (FLUSH) 0.9 %
10 SYRINGE (ML) INJECTION PRN
Status: DISCONTINUED | OUTPATIENT
Start: 2018-12-01 | End: 2018-12-03 | Stop reason: HOSPADM

## 2018-12-01 RX ORDER — MORPHINE SULFATE 2 MG/ML
2 INJECTION, SOLUTION INTRAMUSCULAR; INTRAVENOUS EVERY 4 HOURS PRN
Status: DISCONTINUED | OUTPATIENT
Start: 2018-12-01 | End: 2018-12-03 | Stop reason: HOSPADM

## 2018-12-01 RX ORDER — SUCRALFATE 1 G/1
0.5 TABLET ORAL
Status: DISCONTINUED | OUTPATIENT
Start: 2018-12-01 | End: 2018-12-03 | Stop reason: HOSPADM

## 2018-12-01 RX ORDER — PANTOPRAZOLE SODIUM 40 MG/10ML
40 INJECTION, POWDER, LYOPHILIZED, FOR SOLUTION INTRAVENOUS DAILY
Status: DISCONTINUED | OUTPATIENT
Start: 2018-12-01 | End: 2018-12-03 | Stop reason: HOSPADM

## 2018-12-01 RX ORDER — DEXTROSE MONOHYDRATE 50 MG/ML
100 INJECTION, SOLUTION INTRAVENOUS PRN
Status: DISCONTINUED | OUTPATIENT
Start: 2018-12-01 | End: 2018-12-03 | Stop reason: HOSPADM

## 2018-12-01 RX ORDER — SODIUM CHLORIDE 0.9 % (FLUSH) 0.9 %
SYRINGE (ML) INJECTION
Status: COMPLETED
Start: 2018-12-01 | End: 2018-12-01

## 2018-12-01 RX ORDER — SENNA PLUS 8.6 MG/1
1 TABLET ORAL NIGHTLY
Status: DISCONTINUED | OUTPATIENT
Start: 2018-12-01 | End: 2018-12-03 | Stop reason: HOSPADM

## 2018-12-01 RX ORDER — MORPHINE SULFATE 4 MG/ML
4 INJECTION, SOLUTION INTRAMUSCULAR; INTRAVENOUS EVERY 4 HOURS PRN
Status: DISCONTINUED | OUTPATIENT
Start: 2018-12-01 | End: 2018-12-03 | Stop reason: HOSPADM

## 2018-12-01 RX ORDER — DOCUSATE SODIUM 100 MG/1
100 CAPSULE, LIQUID FILLED ORAL DAILY
Status: DISCONTINUED | OUTPATIENT
Start: 2018-12-01 | End: 2018-12-03 | Stop reason: HOSPADM

## 2018-12-01 RX ADMIN — PANTOPRAZOLE SODIUM 40 MG: 40 INJECTION, POWDER, FOR SOLUTION INTRAVENOUS at 10:58

## 2018-12-01 RX ADMIN — THIAMINE HYDROCHLORIDE 100 MG: 100 INJECTION, SOLUTION INTRAMUSCULAR; INTRAVENOUS at 21:02

## 2018-12-01 RX ADMIN — PANTOPRAZOLE SODIUM 40 MG: 40 INJECTION, POWDER, FOR SOLUTION INTRAVENOUS at 15:52

## 2018-12-01 RX ADMIN — METRONIDAZOLE 500 MG: 500 INJECTION, SOLUTION INTRAVENOUS at 17:24

## 2018-12-01 RX ADMIN — POTASSIUM CHLORIDE 10 MEQ: 7.46 INJECTION, SOLUTION INTRAVENOUS at 21:34

## 2018-12-01 RX ADMIN — Medication 10 ML: at 15:56

## 2018-12-01 RX ADMIN — SENNA 8.6 MG: 8.6 TABLET, COATED ORAL at 21:03

## 2018-12-01 RX ADMIN — SUCRALFATE 0.5 G: 1 TABLET ORAL at 15:52

## 2018-12-01 RX ADMIN — ENOXAPARIN SODIUM 40 MG: 40 INJECTION SUBCUTANEOUS at 15:52

## 2018-12-01 RX ADMIN — MAGESIUM CITRATE 296 ML: 1.75 LIQUID ORAL at 15:52

## 2018-12-01 RX ADMIN — SUCRALFATE 0.5 G: 1 TABLET ORAL at 18:41

## 2018-12-01 RX ADMIN — MORPHINE SULFATE 4 MG: 4 INJECTION INTRAVENOUS at 10:28

## 2018-12-01 RX ADMIN — Medication 10 ML: at 10:28

## 2018-12-01 RX ADMIN — SODIUM CHLORIDE 1000 ML: 9 INJECTION, SOLUTION INTRAVENOUS at 10:28

## 2018-12-01 RX ADMIN — ONDANSETRON 4 MG: 2 INJECTION INTRAMUSCULAR; INTRAVENOUS at 10:58

## 2018-12-01 RX ADMIN — POTASSIUM CHLORIDE 10 MEQ: 7.46 INJECTION, SOLUTION INTRAVENOUS at 23:30

## 2018-12-01 RX ADMIN — SODIUM CHLORIDE, POTASSIUM CHLORIDE, SODIUM LACTATE AND CALCIUM CHLORIDE: 600; 310; 30; 20 INJECTION, SOLUTION INTRAVENOUS at 16:08

## 2018-12-01 RX ADMIN — FAMOTIDINE 20 MG: 10 INJECTION INTRAVENOUS at 10:28

## 2018-12-01 RX ADMIN — DOCUSATE SODIUM 100 MG: 100 CAPSULE, LIQUID FILLED ORAL at 15:51

## 2018-12-01 RX ADMIN — IOHEXOL 50 ML: 240 INJECTION, SOLUTION INTRATHECAL; INTRAVASCULAR; INTRAVENOUS; ORAL at 12:50

## 2018-12-01 RX ADMIN — SUCRALFATE 0.5 G: 1 TABLET ORAL at 21:03

## 2018-12-01 RX ADMIN — IOPAMIDOL 80 ML: 755 INJECTION, SOLUTION INTRAVENOUS at 12:50

## 2018-12-01 RX ADMIN — CIPROFLOXACIN 400 MG: 2 INJECTION, SOLUTION INTRAVENOUS at 18:43

## 2018-12-01 RX ADMIN — POTASSIUM CHLORIDE 10 MEQ: 7.46 INJECTION, SOLUTION INTRAVENOUS at 22:31

## 2018-12-01 ASSESSMENT — PAIN DESCRIPTION - FREQUENCY: FREQUENCY: CONTINUOUS

## 2018-12-01 ASSESSMENT — ENCOUNTER SYMPTOMS
RHINORRHEA: 0
DIARRHEA: 0
SINUS PAIN: 0
COUGH: 0
SORE THROAT: 0
PHOTOPHOBIA: 0
NAUSEA: 1
SINUS PRESSURE: 0
CONSTIPATION: 0
SHORTNESS OF BREATH: 0
BACK PAIN: 0
WHEEZING: 0
VOMITING: 1
ABDOMINAL PAIN: 1

## 2018-12-01 ASSESSMENT — PAIN DESCRIPTION - LOCATION
LOCATION: ABDOMEN
LOCATION: ABDOMEN

## 2018-12-01 ASSESSMENT — PAIN DESCRIPTION - ORIENTATION: ORIENTATION: MID;LEFT

## 2018-12-01 ASSESSMENT — PAIN SCALES - GENERAL
PAINLEVEL_OUTOF10: 10
PAINLEVEL_OUTOF10: 0
PAINLEVEL_OUTOF10: 10
PAINLEVEL_OUTOF10: 0
PAINLEVEL_OUTOF10: 2
PAINLEVEL_OUTOF10: 0

## 2018-12-01 ASSESSMENT — PAIN DESCRIPTION - ONSET: ONSET: ON-GOING

## 2018-12-01 ASSESSMENT — PAIN DESCRIPTION - PAIN TYPE
TYPE: ACUTE PAIN
TYPE: ACUTE PAIN

## 2018-12-01 ASSESSMENT — PAIN DESCRIPTION - DESCRIPTORS: DESCRIPTORS: ACHING;DULL;SORE

## 2018-12-01 NOTE — ED PROVIDER NOTES
grandmother, mother, and paternal grandmother; Parkinsonism in her father. The patients home medications have been reviewed. Allergies: Patient has no known allergies.     -------------------------------------------------- RESULTS -------------------------------------------------    LABS:  Results for orders placed or performed during the hospital encounter of 12/01/18   CBC Auto Differential   Result Value Ref Range    WBC 6.5 4.5 - 11.5 E9/L    RBC 4.46 3.50 - 5.50 E12/L    Hemoglobin 12.2 11.5 - 15.5 g/dL    Hematocrit 37.7 34.0 - 48.0 %    MCV 84.5 80.0 - 99.9 fL    MCH 27.4 26.0 - 35.0 pg    MCHC 32.4 32.0 - 34.5 %    RDW 13.7 11.5 - 15.0 fL    Platelets 838 599 - 779 E9/L    MPV 10.8 7.0 - 12.0 fL    Neutrophils % 76.2 43.0 - 80.0 %    Immature Granulocytes % 0.2 0.0 - 5.0 %    Lymphocytes % 15.7 (L) 20.0 - 42.0 %    Monocytes % 7.4 2.0 - 12.0 %    Eosinophils % 0.2 0.0 - 6.0 %    Basophils % 0.3 0.0 - 2.0 %    Neutrophils # 4.94 1.80 - 7.30 E9/L    Immature Granulocytes # 0.01 E9/L    Lymphocytes # 1.02 (L) 1.50 - 4.00 E9/L    Monocytes # 0.48 0.10 - 0.95 E9/L    Eosinophils # 0.01 (L) 0.05 - 0.50 E9/L    Basophils # 0.02 0.00 - 0.20 E9/L   Comprehensive Metabolic Panel   Result Value Ref Range    Sodium 139 132 - 146 mmol/L    Potassium 3.2 (L) 3.5 - 5.0 mmol/L    Chloride 99 98 - 107 mmol/L    CO2 24 22 - 29 mmol/L    Anion Gap 16 7 - 16 mmol/L    Glucose 111 (H) 74 - 99 mg/dL    BUN 8 6 - 20 mg/dL    CREATININE 0.6 0.5 - 1.0 mg/dL    GFR Non-African American >60 >=60 mL/min/1.73    GFR African American >60     Calcium 9.0 8.6 - 10.2 mg/dL    Total Protein 7.2 6.4 - 8.3 g/dL    Alb 3.5 3.5 - 5.2 g/dL    Total Bilirubin 0.5 0.0 - 1.2 mg/dL    Alkaline Phosphatase 102 35 - 104 U/L    ALT 27 0 - 32 U/L    AST 27 0 - 31 U/L   Lactic Acid, Plasma   Result Value Ref Range    Lactic Acid 1.0 0.5 - 2.2 mmol/L   Lipase   Result Value Ref Range    Lipase 10 (L) 13 - 60 U/L   Troponin   Result Value Ref Range

## 2018-12-01 NOTE — ED NOTES
Floor called and report given.  Patient to be transported to room 324 by charge nurse     Nolberto Haas RN  12/01/18 6628

## 2018-12-01 NOTE — CONSULTS
Daylin Marquis MD   metoclopramide (REGLAN) 10 MG tablet Take 1 tablet by mouth 4 times daily for 10 days 11/16/18 11/26/18  MICH Marie CNP   sucralfate (CARAFATE) 1 GM tablet Take 1 tablet by mouth 4 times daily for 10 days 11/16/18 11/26/18  MICH Marie CNP       Allergies:  Patient has no known allergies. Social Hx:  Social History     Social History    Marital status:      Spouse name: N/A    Number of children: N/A    Years of education: N/A     Occupational History    Not on file. Social History Main Topics    Smoking status: Never Smoker    Smokeless tobacco: Never Used    Alcohol use No    Drug use: No    Sexual activity: No      Comment:  since 2000. Sleeps in separate bedrooms     Other Topics Concern    Not on file     Social History Narrative    No narrative on file       Vitals:  /69   Pulse 65   Temp 98.3 °F (36.8 °C) (Oral)   Resp 16   Ht 5' 5\" (1.651 m)   Wt 199 lb 11.2 oz (90.6 kg)   LMP 10/30/2018   SpO2 98%   BMI 33.23 kg/m²     Physical Exam:  General: Awake, alert, oriented to person, place, time, and purpose, appears stated age, cooperative, no acute distress   Head: Normocephalic, atraumatic  Eyes: Conjunctivae/corneas clear, Sclera non icteric  Mouth: Mucous membranes moist  Neck: No JVD, no adenopathy,  neck is supple, trachea is midline  Back: ROM normal, No CVA tenderness. Lungs: Clear to auscultation bilaterally. No retractions or use of accessory muscles. No vocal fremitus. No rhonchi, crackle or rale.   Heart: Regular rate and regular rhythm, no murmur, normal S1, S2  Abdomen: Soft, mid/upper abdominal tenderness  Extremities: right  lower extremity edema, extremities atraumatic, no cyanosis, no clubbing, 2+ pedal pulses palpated  Skin: Normal color, normal texture, normal turgor, no rashes, no lesions  Neurologic: 5/5 muscle strength throughout, Normal muscle tone throughout,  EOMI, face symmetric, equal facial

## 2018-12-02 LAB
ALBUMIN SERPL-MCNC: 3.2 G/DL (ref 3.5–5.2)
ALP BLD-CCNC: 123 U/L (ref 35–104)
ALT SERPL-CCNC: 24 U/L (ref 0–32)
ANION GAP SERPL CALCULATED.3IONS-SCNC: 13 MMOL/L (ref 7–16)
AST SERPL-CCNC: 26 U/L (ref 0–31)
BASOPHILS ABSOLUTE: 0.04 E9/L (ref 0–0.2)
BASOPHILS RELATIVE PERCENT: 0.7 % (ref 0–2)
BILIRUB SERPL-MCNC: 0.4 MG/DL (ref 0–1.2)
BILIRUBIN DIRECT: <0.2 MG/DL (ref 0–0.3)
BILIRUBIN, INDIRECT: ABNORMAL MG/DL (ref 0–1)
BUN BLDV-MCNC: 6 MG/DL (ref 6–20)
CALCIUM SERPL-MCNC: 8.6 MG/DL (ref 8.6–10.2)
CHLORIDE BLD-SCNC: 100 MMOL/L (ref 98–107)
CO2: 28 MMOL/L (ref 22–29)
CREAT SERPL-MCNC: 0.6 MG/DL (ref 0.5–1)
EOSINOPHILS ABSOLUTE: 0.04 E9/L (ref 0.05–0.5)
EOSINOPHILS RELATIVE PERCENT: 0.7 % (ref 0–6)
GFR AFRICAN AMERICAN: >60
GFR NON-AFRICAN AMERICAN: >60 ML/MIN/1.73
GLUCOSE BLD-MCNC: 97 MG/DL (ref 74–99)
HCT VFR BLD CALC: 33.9 % (ref 34–48)
HEMOGLOBIN: 10.9 G/DL (ref 11.5–15.5)
IMMATURE GRANULOCYTES #: 0.01 E9/L
IMMATURE GRANULOCYTES %: 0.2 % (ref 0–5)
INR BLD: 1.2
LYMPHOCYTES ABSOLUTE: 1.4 E9/L (ref 1.5–4)
LYMPHOCYTES RELATIVE PERCENT: 25 % (ref 20–42)
MAGNESIUM: 2.3 MG/DL (ref 1.6–2.6)
MCH RBC QN AUTO: 27.3 PG (ref 26–35)
MCHC RBC AUTO-ENTMCNC: 32.2 % (ref 32–34.5)
MCV RBC AUTO: 84.8 FL (ref 80–99.9)
MONOCYTES ABSOLUTE: 0.58 E9/L (ref 0.1–0.95)
MONOCYTES RELATIVE PERCENT: 10.4 % (ref 2–12)
NEUTROPHILS ABSOLUTE: 3.53 E9/L (ref 1.8–7.3)
NEUTROPHILS RELATIVE PERCENT: 63 % (ref 43–80)
PDW BLD-RTO: 13.9 FL (ref 11.5–15)
PHOSPHORUS: 3.3 MG/DL (ref 2.5–4.5)
PLATELET # BLD: 280 E9/L (ref 130–450)
PMV BLD AUTO: 11.1 FL (ref 7–12)
POTASSIUM SERPL-SCNC: 3.5 MMOL/L (ref 3.5–5)
PROTHROMBIN TIME: 13.4 SEC (ref 9.3–12.4)
RBC # BLD: 4 E12/L (ref 3.5–5.5)
SODIUM BLD-SCNC: 141 MMOL/L (ref 132–146)
TOTAL PROTEIN: 6.5 G/DL (ref 6.4–8.3)
TSH SERPL DL<=0.05 MIU/L-ACNC: 2.25 UIU/ML (ref 0.27–4.2)
URINE CULTURE, ROUTINE: NORMAL
WBC # BLD: 5.6 E9/L (ref 4.5–11.5)

## 2018-12-02 PROCEDURE — G0378 HOSPITAL OBSERVATION PER HR: HCPCS

## 2018-12-02 PROCEDURE — 6370000000 HC RX 637 (ALT 250 FOR IP): Performed by: SURGERY

## 2018-12-02 PROCEDURE — 2580000003 HC RX 258: Performed by: SURGERY

## 2018-12-02 PROCEDURE — 85025 COMPLETE CBC W/AUTO DIFF WBC: CPT

## 2018-12-02 PROCEDURE — 6360000002 HC RX W HCPCS: Performed by: INTERNAL MEDICINE

## 2018-12-02 PROCEDURE — 80048 BASIC METABOLIC PNL TOTAL CA: CPT

## 2018-12-02 PROCEDURE — 84100 ASSAY OF PHOSPHORUS: CPT

## 2018-12-02 PROCEDURE — 2500000003 HC RX 250 WO HCPCS: Performed by: INTERNAL MEDICINE

## 2018-12-02 PROCEDURE — 96376 TX/PRO/DX INJ SAME DRUG ADON: CPT

## 2018-12-02 PROCEDURE — 36415 COLL VENOUS BLD VENIPUNCTURE: CPT

## 2018-12-02 PROCEDURE — 99219 PR INITIAL OBSERVATION CARE/DAY 50 MINUTES: CPT | Performed by: SURGERY

## 2018-12-02 PROCEDURE — 83735 ASSAY OF MAGNESIUM: CPT

## 2018-12-02 PROCEDURE — C9113 INJ PANTOPRAZOLE SODIUM, VIA: HCPCS | Performed by: SURGERY

## 2018-12-02 PROCEDURE — 80076 HEPATIC FUNCTION PANEL: CPT

## 2018-12-02 PROCEDURE — 85610 PROTHROMBIN TIME: CPT

## 2018-12-02 PROCEDURE — 6360000002 HC RX W HCPCS: Performed by: SURGERY

## 2018-12-02 PROCEDURE — 6370000000 HC RX 637 (ALT 250 FOR IP): Performed by: INTERNAL MEDICINE

## 2018-12-02 PROCEDURE — 96372 THER/PROPH/DIAG INJ SC/IM: CPT

## 2018-12-02 PROCEDURE — 96366 THER/PROPH/DIAG IV INF ADDON: CPT

## 2018-12-02 PROCEDURE — 84443 ASSAY THYROID STIM HORMONE: CPT

## 2018-12-02 RX ORDER — LITHIUM CARBONATE 300 MG/1
900 CAPSULE ORAL NIGHTLY
Status: DISCONTINUED | OUTPATIENT
Start: 2018-12-02 | End: 2018-12-03 | Stop reason: HOSPADM

## 2018-12-02 RX ORDER — FLUOXETINE HYDROCHLORIDE 20 MG/1
20 CAPSULE ORAL DAILY
Status: DISCONTINUED | OUTPATIENT
Start: 2018-12-02 | End: 2018-12-02

## 2018-12-02 RX ORDER — QUETIAPINE FUMARATE 100 MG/1
100 TABLET, FILM COATED ORAL NIGHTLY
Status: DISCONTINUED | OUTPATIENT
Start: 2018-12-02 | End: 2018-12-03 | Stop reason: HOSPADM

## 2018-12-02 RX ORDER — FLUOXETINE 10 MG/1
10 CAPSULE ORAL DAILY
Status: DISCONTINUED | OUTPATIENT
Start: 2018-12-02 | End: 2018-12-02

## 2018-12-02 RX ORDER — THIAMINE MONONITRATE (VIT B1) 100 MG
100 TABLET ORAL DAILY
Status: DISCONTINUED | OUTPATIENT
Start: 2018-12-02 | End: 2018-12-03 | Stop reason: HOSPADM

## 2018-12-02 RX ORDER — LITHIUM CARBONATE 300 MG/1
300 CAPSULE ORAL EVERY MORNING
Status: DISCONTINUED | OUTPATIENT
Start: 2018-12-03 | End: 2018-12-03 | Stop reason: HOSPADM

## 2018-12-02 RX ADMIN — LITHIUM CARBONATE 900 MG: 300 CAPSULE, GELATIN COATED ORAL at 21:15

## 2018-12-02 RX ADMIN — FLUOXETINE HYDROCHLORIDE 30 MG: 10 CAPSULE ORAL at 21:15

## 2018-12-02 RX ADMIN — METRONIDAZOLE 500 MG: 500 INJECTION, SOLUTION INTRAVENOUS at 17:59

## 2018-12-02 RX ADMIN — SUCRALFATE 0.5 G: 1 TABLET ORAL at 19:14

## 2018-12-02 RX ADMIN — CIPROFLOXACIN 400 MG: 2 INJECTION, SOLUTION INTRAVENOUS at 16:08

## 2018-12-02 RX ADMIN — ONDANSETRON 4 MG: 2 INJECTION INTRAMUSCULAR; INTRAVENOUS at 02:59

## 2018-12-02 RX ADMIN — PANTOPRAZOLE SODIUM 40 MG: 40 INJECTION, POWDER, FOR SOLUTION INTRAVENOUS at 07:46

## 2018-12-02 RX ADMIN — ENOXAPARIN SODIUM 40 MG: 40 INJECTION SUBCUTANEOUS at 11:20

## 2018-12-02 RX ADMIN — SODIUM CHLORIDE, POTASSIUM CHLORIDE, SODIUM LACTATE AND CALCIUM CHLORIDE: 600; 310; 30; 20 INJECTION, SOLUTION INTRAVENOUS at 21:19

## 2018-12-02 RX ADMIN — METRONIDAZOLE 500 MG: 500 INJECTION, SOLUTION INTRAVENOUS at 09:06

## 2018-12-02 RX ADMIN — MORPHINE SULFATE 2 MG: 2 INJECTION, SOLUTION INTRAMUSCULAR; INTRAVENOUS at 01:30

## 2018-12-02 RX ADMIN — QUETIAPINE FUMARATE 100 MG: 100 TABLET ORAL at 21:15

## 2018-12-02 RX ADMIN — POTASSIUM CHLORIDE 10 MEQ: 7.46 INJECTION, SOLUTION INTRAVENOUS at 00:42

## 2018-12-02 RX ADMIN — SUCRALFATE 0.5 G: 1 TABLET ORAL at 16:07

## 2018-12-02 RX ADMIN — SUCRALFATE 0.5 G: 1 TABLET ORAL at 11:20

## 2018-12-02 RX ADMIN — CIPROFLOXACIN 400 MG: 2 INJECTION, SOLUTION INTRAVENOUS at 05:00

## 2018-12-02 RX ADMIN — SUCRALFATE 0.5 G: 1 TABLET ORAL at 23:46

## 2018-12-02 RX ADMIN — SUCRALFATE 0.5 G: 1 TABLET ORAL at 21:15

## 2018-12-02 RX ADMIN — Medication 100 MG: at 19:14

## 2018-12-02 RX ADMIN — SUCRALFATE 0.5 G: 1 TABLET ORAL at 00:56

## 2018-12-02 RX ADMIN — METRONIDAZOLE 500 MG: 500 INJECTION, SOLUTION INTRAVENOUS at 01:31

## 2018-12-02 RX ADMIN — DOCUSATE SODIUM 100 MG: 100 CAPSULE, LIQUID FILLED ORAL at 11:20

## 2018-12-02 ASSESSMENT — PAIN SCALES - GENERAL
PAINLEVEL_OUTOF10: 0
PAINLEVEL_OUTOF10: 6
PAINLEVEL_OUTOF10: 0

## 2018-12-02 NOTE — H&P
General Surgery History and Physical  [facility]    Patient's Name/Date of Birth: Anibal Peterson / 1970    Date: December 2, 2018     Surgeon: Shalonda Serrano M.D.    PCP: Lucero Hoff MD     Chief Complaint: abd pain, nausea, vomiting    HPI:   Anibal Peterson is a 51 y.o. female who presents for evaluation of abdominal pain, nausea, vomiting. Timing is intermittetn, radiation to midline, alleviated by none and started Thursday and severity is 7/10. Known hx of marginal ulcer on EGD done in October. States doing well until Thursday when she could not tolerate diet and was vomiting. Seen in ER Thursday, CT done and labs normal. Discharged and returned last evening with same complaints. Feels better today after mag citrate and BM. No pain today. No fevers.  Labs remain normal.     Patient Active Problem List   Diagnosis    Bilateral primary osteoarthritis of knee    [facility]ipolar 1 disorder ([facility] [address]    Osteoarthritis    Obesity    Gastroesophageal reflux disease with esophagitis    Hypertension    S/P bariatric surgery    Intractable abdominal pain    Marginal ulcer    Epigastric pain    Vitamin D deficiency    Thiamine deficiency    Non-intractable vomiting with nausea    Functional diarrhea    Abdominal pain       Past Medical History:   Diagnosis Date    Bipolar 1 disorder ([address]     Depression     Obesity     Osteoarthritis     bilateral knees    Thiamine deficiency     Vitamin D deficiency        Past Surgical History:   Procedure Laterality Date    CHOLECYSTECTOMY      ENDOSCOPY, COLON, DIAGNOSTIC      GALLBLADDER SURGERY  1994    LA LAP GASTRIC BYPASS/KIERAN-EN-Y N/A 4/30/2018    GASTRIC BYPASS KIERAN-EN-Y LAPAROSCOPIC performed by Shalonda Serrano MD at [facility] 10/22/2018    EGD BIOPSY performed by Macario Hernandez MD at [facility] ENDOSCOPY       No Known Allergies    The patient has a family history that is negative for LMP 10/30/2018   SpO2 98%   BMI 33.23 kg/m²   General appearance:  NAD, appears stated age  Head: NCAT, PERRLA, EOMI, red conjunctiva  Neck: supple, no masses, trachea midline  Lungs: Equal chest rise bilateral, no retractions, no wheezing  Heart: Reg rate  Abdomen: soft, nontender, nondistended  Skin; warm and dry, no cyanosis  Gu: no cva tenderness  Extremities: atraumatic, no focal motor deficits, no open wounds  Psych: No tremor, visual hallucinations      Radiology: I reviewed relevant abdominal imaging from this admission and that available in the EMR including CT abd/pel from yesterday. My assessment is no signs of leak or contrast extravasation    Assessment:  Anibal Peterson is a 51 y.o. female with marginal ulcer, constipation  Patient Active Problem List   Diagnosis    Bilateral primary osteoarthritis of knee    Bipolar 1 disorder ([facility] [address]    Osteoarthritis    Obesity    Gastroesophageal reflux disease with esophagitis    Hypertension    S/P bariatric surgery    Intractable abdominal pain    Marginal ulcer    Epigastric pain    Vitamin D deficiency    Thiamine deficiency    Non-intractable vomiting with nausea    Functional diarrhea    Abdominal pain         Plan: Bowel regimen  Cont PPI, carafate  May repeat EGD tomorrow pending progress  I have low suspicion for leak clinically.  Her CT does not show free air or extravasation of contrast        Shalonda Serrano MD  11:08 AM  12/2/2018

## 2018-12-02 NOTE — PROGRESS NOTES
Internal Medicine Progress Note    Denise Guajardo. Mauricio Nieves., & [address] [facility] [address] Mauricio Nieves., F.A.C.O.I. Masoud Mccabe D.O., F.A.C.O.I. Primary Care Physician: Cheyanne Limon MD   Admitting Physician:  Marguerite Smith MD  Admission date and time: 12/1/2018  9:50 AM    Room:  [address] [place] [address]  Admitting diagnosis: Abdominal pain [R10.9]    Patient Name: Valeri Monday  MRN: [id number]    Date of Service: 12/2/2018     Subjective:    Jeannette Choe is a 51 y. o.  female who was seen and examined today,12/2/2018, at the bedside. Admits to mid epigastric pain. States it has lessoned since admission. Positive for a bowel movement. No family present during my examination. I reviewed the patient's past medical, surgical history and medication. I reviewed the  course of events since last visit. Review of System:  Constitutional:   Negative for fever and chills. HEENT:   Negative for ear pain, sore throat, rhinorrhea and sinus pressure. Negative for eye pain, discharge and redness. Psch:   Denies depression or anxiety. Cardiovascular:   Denies any chest pain, irregular heartbeats, or palpitations. Respiratory:   Denies shortness of breath, coughing, sputum production, hemoptysis, or wheezing. Gastrointestinal:   Denies nausea, vomiting, diarrhea, or constipation. Mid upper  Epigastric abdominal pain. Extremities:   Denies any lower extremity swelling or edema. Neurology:    Denies any headache or focal neurological deficits. No weakness or paresthesia. Derm:   Denies any rashes, ulcers, or excoriations. Denies bruising. Genitourinary:    Denies any urgency, frequency, hematuria. Voiding without difficulty.   Musculoskeletal:  Negative for myalgias, back pain and arthralgias      Allergy:  No Known Allergies     Medication:  Scheduled Meds:   sodium chloride flush  10 mL Intravenous 2 times per day    pantoprazole  40 mg Intravenous Daily    And    Abdomen Pelvis W Iv Contrast Additional Contrast? Oral    Result Date: 12/1/2018  Reading location: 200 INDICATION: Pain, history of gastric bypass FINDINGS: Axial CT images through the abdomen and pelvis after intravenous injection 80 cc Isovue-370. Enteric contrast with incomplete bowel opacification. Comparison with study 2 days prior and 10/21/2018. Automated dose control. Findings are similar to prior with mild inflammatory stranding at the gastrojejunal jejunal anastomosis, contrast in the excluded portion of the stomach, distended rectum with stool and mild perirectal fat stranding. No acute finding is identified. 1. No urinary or bowel obstruction. 2. Mild inflammatory stranding at the gastrojejunal anastomosis. 3. Contrast within the excluded portion of the stomach consistent with leak through the gastric staple line. 4. Rectal fecal retention with mild adjacent inflammatory stranding in the fat. Xr Chest Portable    Result Date: 12/1/2018  Reading location: 200 Indication: Chest pain. Comparison: Chest radiograph from 7/18/2016. Technique: Portable upright chest radiograph was obtained. Findings: The cardiomediastinal silhouette is stable in size and contours. Bilateral lungs and costophrenic angles are clear. There is no evidence of pneumothorax. No acute cardiopulmonary disease. Physical Exam:  No intake/output data recorded. Intake/Output Summary (Last 24 hours) at 12/02/18 1616  Last data filed at 12/02/18 1238   Gross per 24 hour   Intake              490 ml   Output             1750 ml   Net            -1260 ml   I/O last 3 completed shifts: In: 56 [P.O.:240; I.V.:250]  Out: 1750 [Urine:1750]  Patient Vitals for the past 96 hrs (Last 3 readings):   Weight   12/01/18 1448 199 lb 11.2 oz (90.6 kg)   12/01/18 1002 198 lb (89.8 kg)       Vital Signs:   Blood pressure 122/82, pulse 66, temperature 98 °F (36.7 °C), temperature source Oral, resp.  rate 16, height 5' 5\" (1.651 m), weight

## 2018-12-03 VITALS
OXYGEN SATURATION: 97 % | BODY MASS INDEX: 33.27 KG/M2 | RESPIRATION RATE: 16 BRPM | SYSTOLIC BLOOD PRESSURE: 150 MMHG | HEIGHT: 65 IN | HEART RATE: 51 BPM | TEMPERATURE: 98.2 F | DIASTOLIC BLOOD PRESSURE: 72 MMHG | WEIGHT: 199.7 LBS

## 2018-12-03 LAB
ALBUMIN SERPL-MCNC: 2.7 G/DL (ref 3.5–5.2)
ALP BLD-CCNC: 86 U/L (ref 35–104)
ALT SERPL-CCNC: 16 U/L (ref 0–32)
ANION GAP SERPL CALCULATED.3IONS-SCNC: 9 MMOL/L (ref 7–16)
AST SERPL-CCNC: 14 U/L (ref 0–31)
BASOPHILS ABSOLUTE: 0.05 E9/L (ref 0–0.2)
BASOPHILS RELATIVE PERCENT: 1.1 % (ref 0–2)
BILIRUB SERPL-MCNC: 0.3 MG/DL (ref 0–1.2)
BUN BLDV-MCNC: 5 MG/DL (ref 6–20)
CALCIUM SERPL-MCNC: 8.2 MG/DL (ref 8.6–10.2)
CHLORIDE BLD-SCNC: 100 MMOL/L (ref 98–107)
CO2: 30 MMOL/L (ref 22–29)
CREAT SERPL-MCNC: 0.6 MG/DL (ref 0.5–1)
EOSINOPHILS ABSOLUTE: 0.13 E9/L (ref 0.05–0.5)
EOSINOPHILS RELATIVE PERCENT: 2.8 % (ref 0–6)
GFR AFRICAN AMERICAN: >60
GFR NON-AFRICAN AMERICAN: >60 ML/MIN/1.73
GLUCOSE BLD-MCNC: 78 MG/DL (ref 74–99)
HCT VFR BLD CALC: 33.3 % (ref 34–48)
HEMOGLOBIN: 10.5 G/DL (ref 11.5–15.5)
IMMATURE GRANULOCYTES #: 0.01 E9/L
IMMATURE GRANULOCYTES %: 0.2 % (ref 0–5)
LYMPHOCYTES ABSOLUTE: 2.11 E9/L (ref 1.5–4)
LYMPHOCYTES RELATIVE PERCENT: 45.7 % (ref 20–42)
MCH RBC QN AUTO: 27.3 PG (ref 26–35)
MCHC RBC AUTO-ENTMCNC: 31.5 % (ref 32–34.5)
MCV RBC AUTO: 86.7 FL (ref 80–99.9)
MONOCYTES ABSOLUTE: 0.52 E9/L (ref 0.1–0.95)
MONOCYTES RELATIVE PERCENT: 11.3 % (ref 2–12)
NEUTROPHILS ABSOLUTE: 1.8 E9/L (ref 1.8–7.3)
NEUTROPHILS RELATIVE PERCENT: 38.9 % (ref 43–80)
PDW BLD-RTO: 13.9 FL (ref 11.5–15)
PLATELET # BLD: 209 E9/L (ref 130–450)
PMV BLD AUTO: 11.7 FL (ref 7–12)
POTASSIUM SERPL-SCNC: 3.9 MMOL/L (ref 3.5–5)
RBC # BLD: 3.84 E12/L (ref 3.5–5.5)
SODIUM BLD-SCNC: 139 MMOL/L (ref 132–146)
TOTAL PROTEIN: 5.6 G/DL (ref 6.4–8.3)
WBC # BLD: 4.6 E9/L (ref 4.5–11.5)

## 2018-12-03 PROCEDURE — 94150 VITAL CAPACITY TEST: CPT

## 2018-12-03 PROCEDURE — 85025 COMPLETE CBC W/AUTO DIFF WBC: CPT

## 2018-12-03 PROCEDURE — 6360000002 HC RX W HCPCS: Performed by: INTERNAL MEDICINE

## 2018-12-03 PROCEDURE — 6360000002 HC RX W HCPCS: Performed by: SURGERY

## 2018-12-03 PROCEDURE — 2500000003 HC RX 250 WO HCPCS: Performed by: INTERNAL MEDICINE

## 2018-12-03 PROCEDURE — 96366 THER/PROPH/DIAG IV INF ADDON: CPT

## 2018-12-03 PROCEDURE — 2580000003 HC RX 258: Performed by: SURGERY

## 2018-12-03 PROCEDURE — C9113 INJ PANTOPRAZOLE SODIUM, VIA: HCPCS | Performed by: SURGERY

## 2018-12-03 PROCEDURE — G0378 HOSPITAL OBSERVATION PER HR: HCPCS

## 2018-12-03 PROCEDURE — 99232 SBSQ HOSP IP/OBS MODERATE 35: CPT | Performed by: SURGERY

## 2018-12-03 PROCEDURE — 80053 COMPREHEN METABOLIC PANEL: CPT

## 2018-12-03 PROCEDURE — 6370000000 HC RX 637 (ALT 250 FOR IP): Performed by: SURGERY

## 2018-12-03 PROCEDURE — 36415 COLL VENOUS BLD VENIPUNCTURE: CPT

## 2018-12-03 RX ADMIN — Medication 10 ML: at 09:25

## 2018-12-03 RX ADMIN — METRONIDAZOLE 500 MG: 500 INJECTION, SOLUTION INTRAVENOUS at 00:57

## 2018-12-03 RX ADMIN — METRONIDAZOLE 500 MG: 500 INJECTION, SOLUTION INTRAVENOUS at 09:22

## 2018-12-03 RX ADMIN — PANTOPRAZOLE SODIUM 40 MG: 40 INJECTION, POWDER, FOR SOLUTION INTRAVENOUS at 09:22

## 2018-12-03 RX ADMIN — SODIUM CHLORIDE, POTASSIUM CHLORIDE, SODIUM LACTATE AND CALCIUM CHLORIDE: 600; 310; 30; 20 INJECTION, SOLUTION INTRAVENOUS at 05:13

## 2018-12-03 RX ADMIN — CIPROFLOXACIN 400 MG: 2 INJECTION, SOLUTION INTRAVENOUS at 05:11

## 2018-12-03 RX ADMIN — SUCRALFATE 0.5 G: 1 TABLET ORAL at 16:14

## 2018-12-03 ASSESSMENT — PAIN SCALES - GENERAL
PAINLEVEL_OUTOF10: 0

## 2018-12-03 NOTE — PROGRESS NOTES
The patient believes she has entirely returned to her baseline. Her abdominal pain has entirely resolved. She is now having bowel movements and admits to complete resolution of her presenting constipation. Possible considerations for EGD evaluation today. Otherwise, I anticipate advancing the patient's diet with possible discharge home later this afternoon. Continue current therapy. See orders for further plan of care.     Ken Chan D.O.  8:37 AM  12/3/2018

## 2018-12-07 ENCOUNTER — APPOINTMENT (OUTPATIENT)
Dept: GENERAL RADIOLOGY | Age: 48
End: 2018-12-07
Payer: MEDICARE

## 2018-12-07 ENCOUNTER — HOSPITAL ENCOUNTER (EMERGENCY)
Age: 48
Discharge: HOME | End: 2018-12-07
Attending: EMERGENCY MEDICINE
Payer: MEDICARE

## 2018-12-07 ENCOUNTER — APPOINTMENT (OUTPATIENT)
Dept: CT IMAGING | Age: 48
End: 2018-12-07
Payer: MEDICARE

## 2018-12-07 VITALS
HEIGHT: 65 IN | WEIGHT: 198.13 LBS | OXYGEN SATURATION: 98 % | RESPIRATION RATE: 16 BRPM | DIASTOLIC BLOOD PRESSURE: 62 MMHG | HEART RATE: 69 BPM | TEMPERATURE: 98.4 F | SYSTOLIC BLOOD PRESSURE: 102 MMHG | BODY MASS INDEX: 33.01 KG/M2

## 2018-12-07 DIAGNOSIS — R10.13 EPIGASTRIC PAIN: ICD-10-CM

## 2018-12-07 DIAGNOSIS — R11.2 NON-INTRACTABLE VOMITING WITH NAUSEA, UNSPECIFIED VOMITING TYPE: Primary | ICD-10-CM

## 2018-12-07 LAB
ALBUMIN SERPL-MCNC: 3.2 G/DL (ref 3.5–5.2)
ALP BLD-CCNC: 71 U/L (ref 35–104)
ALT SERPL-CCNC: 14 U/L (ref 0–32)
ANION GAP SERPL CALCULATED.3IONS-SCNC: 11 MMOL/L (ref 7–16)
AST SERPL-CCNC: 19 U/L (ref 0–31)
BACTERIA: ABNORMAL /HPF
BASOPHILS ABSOLUTE: 0.03 E9/L (ref 0–0.2)
BASOPHILS RELATIVE PERCENT: 0.5 % (ref 0–2)
BILIRUB SERPL-MCNC: 0.4 MG/DL (ref 0–1.2)
BILIRUBIN URINE: NEGATIVE
BLOOD, URINE: NEGATIVE
BUN BLDV-MCNC: 5 MG/DL (ref 6–20)
CALCIUM SERPL-MCNC: 8.5 MG/DL (ref 8.6–10.2)
CHLORIDE BLD-SCNC: 99 MMOL/L (ref 98–107)
CHP ED QC CHECK: NORMAL
CLARITY: CLEAR
CO2: 26 MMOL/L (ref 22–29)
CO2: 28 MMOL/L (ref 22–29)
COLOR: ABNORMAL
CREAT SERPL-MCNC: 0.6 MG/DL (ref 0.5–1)
EOSINOPHILS ABSOLUTE: 0.02 E9/L (ref 0.05–0.5)
EOSINOPHILS RELATIVE PERCENT: 0.3 % (ref 0–6)
EPITHELIAL CELLS, UA: ABNORMAL /HPF
GFR AFRICAN AMERICAN: >60
GFR AFRICAN AMERICAN: >60
GFR NON-AFRICAN AMERICAN: >60 ML/MIN/1.73
GFR NON-AFRICAN AMERICAN: >60 ML/MIN/1.73
GLUCOSE BLD-MCNC: 105 MG/DL (ref 74–99)
GLUCOSE BLD-MCNC: 97 MG/DL
GLUCOSE BLD-MCNC: 97 MG/DL (ref 74–99)
GLUCOSE URINE: NEGATIVE MG/DL
HCT VFR BLD CALC: 38.8 % (ref 34–48)
HEMOGLOBIN: 12.4 G/DL (ref 11.5–15.5)
IMMATURE GRANULOCYTES #: 0.03 E9/L
IMMATURE GRANULOCYTES %: 0.5 % (ref 0–5)
KETONES, URINE: 15 MG/DL
LACTIC ACID: 0.9 MMOL/L (ref 0.5–2.2)
LEUKOCYTE ESTERASE, URINE: NEGATIVE
LIPASE: 13 U/L (ref 13–60)
LYMPHOCYTES ABSOLUTE: 1.09 E9/L (ref 1.5–4)
LYMPHOCYTES RELATIVE PERCENT: 17 % (ref 20–42)
MCH RBC QN AUTO: 27.6 PG (ref 26–35)
MCHC RBC AUTO-ENTMCNC: 32 % (ref 32–34.5)
MCV RBC AUTO: 86.4 FL (ref 80–99.9)
MONOCYTES ABSOLUTE: 0.44 E9/L (ref 0.1–0.95)
MONOCYTES RELATIVE PERCENT: 6.8 % (ref 2–12)
NEUTROPHILS ABSOLUTE: 4.82 E9/L (ref 1.8–7.3)
NEUTROPHILS RELATIVE PERCENT: 74.9 % (ref 43–80)
NITRITE, URINE: NEGATIVE
PDW BLD-RTO: 14.3 FL (ref 11.5–15)
PH UA: 7.5 (ref 5–9)
PLATELET # BLD: 320 E9/L (ref 130–450)
PMV BLD AUTO: 11 FL (ref 7–12)
POC ANION GAP: 13 MMOL/L (ref 7–16)
POC BUN: 3
POC BUN: 3 MG/DL (ref 8–23)
POC CHLORIDE: 99
POC CHLORIDE: 99 MMOL/L (ref 100–108)
POC CO2: 26
POC CREATININE: 0.5
POC CREATININE: 0.5 MG/DL (ref 0.5–1)
POC POTASSIUM: 3.5 MMOL/L (ref 3.5–5)
POC POTASSIUM: NORMAL
POC SODIUM: 138
POC SODIUM: 138 MMOL/L (ref 132–146)
POTASSIUM SERPL-SCNC: 3.8 MMOL/L (ref 3.5–5)
PROTEIN UA: NEGATIVE MG/DL
RBC # BLD: 4.49 E12/L (ref 3.5–5.5)
RBC UA: ABNORMAL /HPF (ref 0–2)
SODIUM BLD-SCNC: 138 MMOL/L (ref 132–146)
SPECIFIC GRAVITY UA: 1.01 (ref 1–1.03)
TOTAL PROTEIN: 6.4 G/DL (ref 6.4–8.3)
UROBILINOGEN, URINE: 0.2 E.U./DL
WBC # BLD: 6.4 E9/L (ref 4.5–11.5)
WBC UA: ABNORMAL /HPF (ref 0–5)
YEAST: ABNORMAL

## 2018-12-07 PROCEDURE — 99284 EMERGENCY DEPT VISIT MOD MDM: CPT

## 2018-12-07 PROCEDURE — 80053 COMPREHEN METABOLIC PANEL: CPT

## 2018-12-07 PROCEDURE — 71045 X-RAY EXAM CHEST 1 VIEW: CPT

## 2018-12-07 PROCEDURE — 84520 ASSAY OF UREA NITROGEN: CPT

## 2018-12-07 PROCEDURE — 85025 COMPLETE CBC W/AUTO DIFF WBC: CPT

## 2018-12-07 PROCEDURE — 83605 ASSAY OF LACTIC ACID: CPT

## 2018-12-07 PROCEDURE — 82565 ASSAY OF CREATININE: CPT

## 2018-12-07 PROCEDURE — 6360000004 HC RX CONTRAST MEDICATION: Performed by: RADIOLOGY

## 2018-12-07 PROCEDURE — 83690 ASSAY OF LIPASE: CPT

## 2018-12-07 PROCEDURE — 6360000002 HC RX W HCPCS: Performed by: STUDENT IN AN ORGANIZED HEALTH CARE EDUCATION/TRAINING PROGRAM

## 2018-12-07 PROCEDURE — 2500000003 HC RX 250 WO HCPCS: Performed by: STUDENT IN AN ORGANIZED HEALTH CARE EDUCATION/TRAINING PROGRAM

## 2018-12-07 PROCEDURE — 74177 CT ABD & PELVIS W/CONTRAST: CPT

## 2018-12-07 PROCEDURE — 36415 COLL VENOUS BLD VENIPUNCTURE: CPT

## 2018-12-07 PROCEDURE — 82947 ASSAY GLUCOSE BLOOD QUANT: CPT

## 2018-12-07 PROCEDURE — 81001 URINALYSIS AUTO W/SCOPE: CPT

## 2018-12-07 PROCEDURE — 96375 TX/PRO/DX INJ NEW DRUG ADDON: CPT

## 2018-12-07 PROCEDURE — 96374 THER/PROPH/DIAG INJ IV PUSH: CPT

## 2018-12-07 PROCEDURE — 2580000003 HC RX 258: Performed by: STUDENT IN AN ORGANIZED HEALTH CARE EDUCATION/TRAINING PROGRAM

## 2018-12-07 PROCEDURE — 80051 ELECTROLYTE PANEL: CPT

## 2018-12-07 PROCEDURE — S0028 INJECTION, FAMOTIDINE, 20 MG: HCPCS | Performed by: STUDENT IN AN ORGANIZED HEALTH CARE EDUCATION/TRAINING PROGRAM

## 2018-12-07 RX ORDER — ONDANSETRON 4 MG/1
4 TABLET, ORALLY DISINTEGRATING ORAL EVERY 8 HOURS PRN
Qty: 10 TABLET | Refills: 0 | Status: SHIPPED | OUTPATIENT
Start: 2018-12-07 | End: 2018-12-28

## 2018-12-07 RX ORDER — QUETIAPINE FUMARATE 100 MG/1
100 TABLET, FILM COATED ORAL NIGHTLY
COMMUNITY
End: 2019-04-15

## 2018-12-07 RX ORDER — ERGOCALCIFEROL 1.25 MG/1
50000 CAPSULE ORAL WEEKLY
COMMUNITY
End: 2019-04-15

## 2018-12-07 RX ORDER — MORPHINE SULFATE 10 MG/ML
6 INJECTION, SOLUTION INTRAMUSCULAR; INTRAVENOUS ONCE
Status: COMPLETED | OUTPATIENT
Start: 2018-12-07 | End: 2018-12-07

## 2018-12-07 RX ORDER — ONDANSETRON 2 MG/ML
4 INJECTION INTRAMUSCULAR; INTRAVENOUS ONCE
Status: COMPLETED | OUTPATIENT
Start: 2018-12-07 | End: 2018-12-07

## 2018-12-07 RX ORDER — LITHIUM CARBONATE 300 MG/1
300 CAPSULE ORAL DAILY
COMMUNITY
End: 2019-04-15

## 2018-12-07 RX ORDER — 0.9 % SODIUM CHLORIDE 0.9 %
1000 INTRAVENOUS SOLUTION INTRAVENOUS ONCE
Status: COMPLETED | OUTPATIENT
Start: 2018-12-07 | End: 2018-12-07

## 2018-12-07 RX ADMIN — ONDANSETRON 4 MG: 2 INJECTION INTRAMUSCULAR; INTRAVENOUS at 07:55

## 2018-12-07 RX ADMIN — SODIUM CHLORIDE 1000 ML: 9 INJECTION, SOLUTION INTRAVENOUS at 07:53

## 2018-12-07 RX ADMIN — FAMOTIDINE 20 MG: 10 INJECTION, SOLUTION INTRAVENOUS at 07:56

## 2018-12-07 RX ADMIN — MORPHINE SULFATE 6 MG: 10 INJECTION INTRAVENOUS at 07:55

## 2018-12-07 RX ADMIN — IOPAMIDOL 80 ML: 755 INJECTION, SOLUTION INTRAVENOUS at 08:58

## 2018-12-07 ASSESSMENT — PAIN DESCRIPTION - LOCATION: LOCATION: ABDOMEN

## 2018-12-07 ASSESSMENT — PAIN SCALES - GENERAL
PAINLEVEL_OUTOF10: 8
PAINLEVEL_OUTOF10: 7

## 2018-12-07 ASSESSMENT — ENCOUNTER SYMPTOMS
ABDOMINAL DISTENTION: 0
COUGH: 0
SHORTNESS OF BREATH: 0
BACK PAIN: 0
ABDOMINAL PAIN: 1
EYE REDNESS: 0
EYE PAIN: 0
VOMITING: 1
SORE THROAT: 0
SINUS PRESSURE: 0
DIARRHEA: 0
WHEEZING: 0
NAUSEA: 1
EYE DISCHARGE: 0

## 2018-12-07 ASSESSMENT — PAIN DESCRIPTION - DESCRIPTORS: DESCRIPTORS: ACHING;CRAMPING;DISCOMFORT

## 2018-12-07 ASSESSMENT — PAIN DESCRIPTION - PAIN TYPE: TYPE: ACUTE PAIN

## 2018-12-07 NOTE — ED PROVIDER NOTES
The patient is a 54-year-old female presents emergency Department I would've her abdominal pain, nausea, and vomiting. The patient states that the pain began yesterday evening. The pain began after she suffered a mechanical fall landing on her abdomen. The patient states that money was blowing around a parking lot and she was attempting to  the money. In doing so she tripped and fell forward striking her abdomen on the pavement. The patient states that after this event she had epigastric and left upper quadrant pain that has been nonradiating. She describes the pain as sharp, stabbing sensation. She has had white, frothy emesis since then without any gross blood or coffee grounds. There is no diarrhea. She describes her bowel movements as normal since then. She denies any fevers or chills. There is no chest pain or shortness of breath. She denies striking her head or any loss of consciousness. She does admit to pain in her bilateral upper extremities due to attempting to brace the fall, but denies any gross deformity or restricted range of motion in the large joints of the upper extremities. There is no pelvic pain. The patient has a past medical history of gastric ulcers and is status post gastric bypass surgery. She was admitted to this hospital 6 days ago for intractable epigastric pain, but was discharged without further intervention. She does take omeprazole for her ulcers. The history is provided by the patient. Review of Systems   Constitutional: Negative for chills and fever. HENT: Negative for ear pain, sinus pressure and sore throat. Eyes: Negative for pain, discharge and redness. Respiratory: Negative for cough, shortness of breath and wheezing. Cardiovascular: Negative for chest pain. Gastrointestinal: Positive for abdominal pain, nausea and vomiting. Negative for abdominal distention and diarrhea. Genitourinary: Negative for dysuria and frequency.    Musculoskeletal:

## 2018-12-08 ENCOUNTER — HOSPITAL ENCOUNTER (EMERGENCY)
Age: 48
Discharge: HOME | End: 2018-12-08
Payer: MEDICARE

## 2018-12-08 VITALS
HEIGHT: 65 IN | SYSTOLIC BLOOD PRESSURE: 167 MMHG | WEIGHT: 195 LBS | BODY MASS INDEX: 32.49 KG/M2 | TEMPERATURE: 98.6 F | OXYGEN SATURATION: 97 % | DIASTOLIC BLOOD PRESSURE: 83 MMHG | HEART RATE: 69 BPM | RESPIRATION RATE: 14 BRPM

## 2018-12-08 DIAGNOSIS — R11.2 NON-INTRACTABLE VOMITING WITH NAUSEA, UNSPECIFIED VOMITING TYPE: Primary | ICD-10-CM

## 2018-12-08 PROCEDURE — 99283 EMERGENCY DEPT VISIT LOW MDM: CPT

## 2018-12-08 PROCEDURE — 6360000002 HC RX W HCPCS: Performed by: NURSE PRACTITIONER

## 2018-12-08 PROCEDURE — 96372 THER/PROPH/DIAG INJ SC/IM: CPT

## 2018-12-08 RX ORDER — PROMETHAZINE HYDROCHLORIDE 25 MG/1
25 SUPPOSITORY RECTAL EVERY 4 HOURS PRN
Qty: 10 SUPPOSITORY | Refills: 0 | Status: SHIPPED | OUTPATIENT
Start: 2018-12-08 | End: 2018-12-15

## 2018-12-08 RX ORDER — PROMETHAZINE HYDROCHLORIDE 25 MG/ML
25 INJECTION, SOLUTION INTRAMUSCULAR; INTRAVENOUS ONCE
Status: COMPLETED | OUTPATIENT
Start: 2018-12-08 | End: 2018-12-08

## 2018-12-08 RX ADMIN — PROMETHAZINE HYDROCHLORIDE 25 MG: 25 INJECTION INTRAMUSCULAR; INTRAVENOUS at 14:56

## 2018-12-08 ASSESSMENT — PAIN SCALES - GENERAL: PAINLEVEL_OUTOF10: 8

## 2018-12-08 ASSESSMENT — PAIN DESCRIPTION - PAIN TYPE: TYPE: ACUTE PAIN

## 2018-12-08 ASSESSMENT — PAIN DESCRIPTION - LOCATION: LOCATION: ABDOMEN

## 2018-12-08 NOTE — ED PROVIDER NOTES
[facility] [place]    HPI:  12/8/18,   Time: 3:06 PM         Rangel Coppola is a 51 y.o. female presenting to the ED for nausea and vomiting, beginning chronic in nature. The complaint has been intermittent, mild in severity, and worsened by nothing. Complaining of persistent nausea with multiple episodes of vomiting which is been chronic for the last several months. His upcoming appointment to see general surgery in January. She was seen in this department yesterday for the same complaint. She had full workup including CAT scan and lab work done which was all normal. She denies any fever. Denies any abdominal pain but just states she's had nausea and can't keep anything down. Noted her vital signs are normal.    ROS:   Pertinent positives and negatives are stated within HPI, all other systems reviewed and are negative.  --------------------------------------------- PAST HISTORY ---------------------------------------------  Past Medical History:  has a past medical history of Bipolar 1 disorder ([facility] [address] Depression; Obesity; Osteoarthritis; Thiamine deficiency; and Vitamin D deficiency. Past Surgical History:  has a past surgical history that includes Gallbladder surgery (1994); Cholecystectomy; Endoscopy, colon, diagnostic; pr lap gastric bypass/luciano-en-y (N/A, 4/30/2018); and Upper gastrointestinal endoscopy (N/A, 10/22/2018). Social History:  reports that she has never smoked. She has never used smokeless tobacco. She reports that she does not drink alcohol or use drugs. Family History: family history includes Breast Cancer in her mother; Cancer in her maternal grandmother; Heart Disease in her father, maternal grandmother, mother, and paternal grandmother; Parkinsonism in her father. The patients home medications have been reviewed. Allergies: Patient has no known allergies.     -------------------------------------------------- RESULTS -------------------------------------------------  All plan of care and counseling regarding the diagnosis and prognosis. Questions are answered at this time and they are agreeable with the plan.      --------------------------------- IMPRESSION AND DISPOSITION ---------------------------------    IMPRESSION  1.  Non-intractable vomiting with nausea, unspecified vomiting type        DISPOSITION  Disposition: Discharge to home  Patient condition is good                 Gustavo Guillermo, MICH - CNP  12/08/18 1501

## 2018-12-11 ENCOUNTER — OFFICE VISIT (OUTPATIENT)
Dept: FAMILY MEDICINE CLINIC | Age: 48
End: 2018-12-11
Payer: MEDICARE

## 2018-12-11 VITALS
SYSTOLIC BLOOD PRESSURE: 116 MMHG | HEIGHT: 65 IN | OXYGEN SATURATION: 98 % | DIASTOLIC BLOOD PRESSURE: 70 MMHG | BODY MASS INDEX: 30.66 KG/M2 | HEART RATE: 90 BPM | WEIGHT: 184 LBS | TEMPERATURE: 98.4 F

## 2018-12-11 DIAGNOSIS — Z23 FLU VACCINE NEED: ICD-10-CM

## 2018-12-11 DIAGNOSIS — K59.00 CONSTIPATION, UNSPECIFIED CONSTIPATION TYPE: ICD-10-CM

## 2018-12-11 DIAGNOSIS — R11.2 INTRACTABLE VOMITING WITH NAUSEA, UNSPECIFIED VOMITING TYPE: Primary | ICD-10-CM

## 2018-12-11 PROCEDURE — G8482 FLU IMMUNIZE ORDER/ADMIN: HCPCS | Performed by: NURSE PRACTITIONER

## 2018-12-11 PROCEDURE — 90686 IIV4 VACC NO PRSV 0.5 ML IM: CPT | Performed by: NURSE PRACTITIONER

## 2018-12-11 PROCEDURE — 1036F TOBACCO NON-USER: CPT | Performed by: NURSE PRACTITIONER

## 2018-12-11 PROCEDURE — G8417 CALC BMI ABV UP PARAM F/U: HCPCS | Performed by: NURSE PRACTITIONER

## 2018-12-11 PROCEDURE — G0008 ADMIN INFLUENZA VIRUS VAC: HCPCS | Performed by: NURSE PRACTITIONER

## 2018-12-11 PROCEDURE — 99213 OFFICE O/P EST LOW 20 MIN: CPT | Performed by: NURSE PRACTITIONER

## 2018-12-11 PROCEDURE — G8427 DOCREV CUR MEDS BY ELIG CLIN: HCPCS | Performed by: NURSE PRACTITIONER

## 2018-12-11 ASSESSMENT — ENCOUNTER SYMPTOMS
SHORTNESS OF BREATH: 0
WHEEZING: 0
COUGH: 0
VOMITING: 0
NAUSEA: 0
CONSTIPATION: 0
DIARRHEA: 0

## 2018-12-28 ENCOUNTER — HOSPITAL ENCOUNTER (EMERGENCY)
Age: 48
Discharge: HOME | End: 2018-12-28
Attending: EMERGENCY MEDICINE
Payer: MEDICARE

## 2018-12-28 ENCOUNTER — TELEPHONE (OUTPATIENT)
Dept: FAMILY MEDICINE CLINIC | Age: 48
End: 2018-12-28

## 2018-12-28 VITALS
HEART RATE: 77 BPM | SYSTOLIC BLOOD PRESSURE: 135 MMHG | OXYGEN SATURATION: 97 % | HEIGHT: 65 IN | WEIGHT: 176 LBS | TEMPERATURE: 98.3 F | DIASTOLIC BLOOD PRESSURE: 77 MMHG | BODY MASS INDEX: 29.32 KG/M2 | RESPIRATION RATE: 16 BRPM

## 2018-12-28 DIAGNOSIS — R11.2 NON-INTRACTABLE VOMITING WITH NAUSEA, UNSPECIFIED VOMITING TYPE: Primary | ICD-10-CM

## 2018-12-28 DIAGNOSIS — R10.13 EPIGASTRIC PAIN: ICD-10-CM

## 2018-12-28 LAB
ALBUMIN SERPL-MCNC: 3.4 G/DL (ref 3.5–5.2)
ALP BLD-CCNC: 64 U/L (ref 35–104)
ALT SERPL-CCNC: 6 U/L (ref 0–32)
ANION GAP SERPL CALCULATED.3IONS-SCNC: 12 MMOL/L (ref 7–16)
AST SERPL-CCNC: 13 U/L (ref 0–31)
BASOPHILS ABSOLUTE: 0.05 E9/L (ref 0–0.2)
BASOPHILS RELATIVE PERCENT: 0.7 % (ref 0–2)
BILIRUB SERPL-MCNC: 0.5 MG/DL (ref 0–1.2)
BUN BLDV-MCNC: 7 MG/DL (ref 6–20)
CALCIUM SERPL-MCNC: 9.4 MG/DL (ref 8.6–10.2)
CHLORIDE BLD-SCNC: 99 MMOL/L (ref 98–107)
CO2: 30 MMOL/L (ref 22–29)
CREAT SERPL-MCNC: 0.6 MG/DL (ref 0.5–1)
EOSINOPHILS ABSOLUTE: 0.08 E9/L (ref 0.05–0.5)
EOSINOPHILS RELATIVE PERCENT: 1.1 % (ref 0–6)
GFR AFRICAN AMERICAN: >60
GFR NON-AFRICAN AMERICAN: >60 ML/MIN/1.73
GLUCOSE BLD-MCNC: 92 MG/DL (ref 74–99)
HCT VFR BLD CALC: 39.8 % (ref 34–48)
HEMOGLOBIN: 12.7 G/DL (ref 11.5–15.5)
IMMATURE GRANULOCYTES #: 0.02 E9/L
IMMATURE GRANULOCYTES %: 0.3 % (ref 0–5)
LACTIC ACID: 1.1 MMOL/L (ref 0.5–2.2)
LIPASE: 10 U/L (ref 13–60)
LYMPHOCYTES ABSOLUTE: 1.87 E9/L (ref 1.5–4)
LYMPHOCYTES RELATIVE PERCENT: 26.3 % (ref 20–42)
MCH RBC QN AUTO: 27.2 PG (ref 26–35)
MCHC RBC AUTO-ENTMCNC: 31.9 % (ref 32–34.5)
MCV RBC AUTO: 85.2 FL (ref 80–99.9)
MONOCYTES ABSOLUTE: 0.77 E9/L (ref 0.1–0.95)
MONOCYTES RELATIVE PERCENT: 10.8 % (ref 2–12)
NEUTROPHILS ABSOLUTE: 4.32 E9/L (ref 1.8–7.3)
NEUTROPHILS RELATIVE PERCENT: 60.8 % (ref 43–80)
PDW BLD-RTO: 13.1 FL (ref 11.5–15)
PLATELET # BLD: 392 E9/L (ref 130–450)
PMV BLD AUTO: 10.7 FL (ref 7–12)
POTASSIUM SERPL-SCNC: 3.2 MMOL/L (ref 3.5–5)
RBC # BLD: 4.67 E12/L (ref 3.5–5.5)
SODIUM BLD-SCNC: 141 MMOL/L (ref 132–146)
TOTAL PROTEIN: 7.9 G/DL (ref 6.4–8.3)
WBC # BLD: 7.1 E9/L (ref 4.5–11.5)

## 2018-12-28 PROCEDURE — 83605 ASSAY OF LACTIC ACID: CPT

## 2018-12-28 PROCEDURE — 96372 THER/PROPH/DIAG INJ SC/IM: CPT

## 2018-12-28 PROCEDURE — 85025 COMPLETE CBC W/AUTO DIFF WBC: CPT

## 2018-12-28 PROCEDURE — 6370000000 HC RX 637 (ALT 250 FOR IP): Performed by: EMERGENCY MEDICINE

## 2018-12-28 PROCEDURE — 6360000002 HC RX W HCPCS: Performed by: EMERGENCY MEDICINE

## 2018-12-28 PROCEDURE — 83690 ASSAY OF LIPASE: CPT

## 2018-12-28 PROCEDURE — 2580000003 HC RX 258: Performed by: EMERGENCY MEDICINE

## 2018-12-28 PROCEDURE — 80053 COMPREHEN METABOLIC PANEL: CPT

## 2018-12-28 PROCEDURE — 99284 EMERGENCY DEPT VISIT MOD MDM: CPT

## 2018-12-28 PROCEDURE — 36415 COLL VENOUS BLD VENIPUNCTURE: CPT

## 2018-12-28 RX ORDER — PROMETHAZINE HYDROCHLORIDE 25 MG/ML
25 INJECTION, SOLUTION INTRAMUSCULAR; INTRAVENOUS ONCE
Status: COMPLETED | OUTPATIENT
Start: 2018-12-28 | End: 2018-12-28

## 2018-12-28 RX ORDER — 0.9 % SODIUM CHLORIDE 0.9 %
1000 INTRAVENOUS SOLUTION INTRAVENOUS ONCE
Status: COMPLETED | OUTPATIENT
Start: 2018-12-28 | End: 2018-12-28

## 2018-12-28 RX ADMIN — SODIUM CHLORIDE 1000 ML: 9 INJECTION, SOLUTION INTRAVENOUS at 09:49

## 2018-12-28 RX ADMIN — LIDOCAINE HYDROCHLORIDE: 20 SOLUTION ORAL; TOPICAL at 09:50

## 2018-12-28 RX ADMIN — PROMETHAZINE HYDROCHLORIDE 25 MG: 25 INJECTION INTRAMUSCULAR; INTRAVENOUS at 09:52

## 2018-12-28 ASSESSMENT — ENCOUNTER SYMPTOMS
DIARRHEA: 0
ABDOMINAL PAIN: 1
NAUSEA: 1
SHORTNESS OF BREATH: 0
VOMITING: 1
BACK PAIN: 1
BLOOD IN STOOL: 0
ABDOMINAL DISTENTION: 0

## 2018-12-28 ASSESSMENT — PAIN DESCRIPTION - DESCRIPTORS: DESCRIPTORS: ACHING;SHARP;RADIATING

## 2018-12-28 ASSESSMENT — PAIN DESCRIPTION - ORIENTATION: ORIENTATION: RIGHT;LEFT;MID

## 2018-12-28 ASSESSMENT — PAIN DESCRIPTION - PAIN TYPE: TYPE: ACUTE PAIN

## 2018-12-28 ASSESSMENT — PAIN SCALES - GENERAL: PAINLEVEL_OUTOF10: 7

## 2018-12-28 ASSESSMENT — PAIN DESCRIPTION - LOCATION: LOCATION: ABDOMEN

## 2018-12-28 NOTE — TELEPHONE ENCOUNTER
Pt presented to the [facility] at approximately 8:30 am for evaluation of abdominal pain/ suspected peptic ulcer. I pulled patient aside in a private hallway to quickly assess her. She states she was diagnosed with a peptic ulcer in the ED in October and was subsequently admitted for two days. She has been following with Dr. Chuckie Li for this and continues to take Prilosec, Carafate, and prn Zofran. She states her pain has been increasing over the past 24 hours. Pt describes the pain as located over her epigastric region and radiating to her back. Pt advised that she needs a comprehensive STAT workup that is unable to be performed in an urgent care setting. Concern for bowel perforation vs AAA vs refractory PUD. Pt advised to go straight to the ED for further evaluation and management. Pt agreed with this care plan and agreed to go immediately. She is in mild to moderate pain in the office but feels comfortable driving herself by private vehicle. Pt left our office in stable condition. Further disposition to follow. All questions answered.

## 2019-01-25 ENCOUNTER — OFFICE VISIT (OUTPATIENT)
Dept: BARIATRICS/WEIGHT MGMT | Age: 49
End: 2019-01-25
Payer: MEDICARE

## 2019-01-25 VITALS
DIASTOLIC BLOOD PRESSURE: 89 MMHG | RESPIRATION RATE: 20 BRPM | SYSTOLIC BLOOD PRESSURE: 125 MMHG | HEART RATE: 80 BPM | TEMPERATURE: 97 F | HEIGHT: 65 IN | BODY MASS INDEX: 27.49 KG/M2 | WEIGHT: 165 LBS

## 2019-01-25 DIAGNOSIS — K28.9 MARGINAL ULCER: Primary | ICD-10-CM

## 2019-01-25 PROCEDURE — G8427 DOCREV CUR MEDS BY ELIG CLIN: HCPCS | Performed by: SURGERY

## 2019-01-25 PROCEDURE — G8482 FLU IMMUNIZE ORDER/ADMIN: HCPCS | Performed by: SURGERY

## 2019-01-25 PROCEDURE — 99213 OFFICE O/P EST LOW 20 MIN: CPT | Performed by: SURGERY

## 2019-01-25 PROCEDURE — 99212 OFFICE O/P EST SF 10 MIN: CPT

## 2019-01-25 PROCEDURE — 1036F TOBACCO NON-USER: CPT | Performed by: SURGERY

## 2019-01-25 PROCEDURE — G8417 CALC BMI ABV UP PARAM F/U: HCPCS | Performed by: SURGERY

## 2019-01-25 RX ORDER — PROMETHAZINE HYDROCHLORIDE 25 MG/1
25 SUPPOSITORY RECTAL PRN
Refills: 0 | COMMUNITY
Start: 2018-12-28 | End: 2019-04-15

## 2019-02-21 ENCOUNTER — ANESTHESIA EVENT (OUTPATIENT)
Dept: ENDOSCOPY | Age: 49
End: 2019-02-21
Payer: MEDICARE

## 2019-02-21 ENCOUNTER — HOSPITAL ENCOUNTER (OUTPATIENT)
Age: 49
Setting detail: OUTPATIENT SURGERY
Discharge: HOME | End: 2019-02-21
Attending: SURGERY | Admitting: SURGERY
Payer: MEDICARE

## 2019-02-21 ENCOUNTER — ANESTHESIA (OUTPATIENT)
Dept: ENDOSCOPY | Age: 49
End: 2019-02-21
Payer: MEDICARE

## 2019-02-21 VITALS
HEART RATE: 77 BPM | SYSTOLIC BLOOD PRESSURE: 131 MMHG | TEMPERATURE: 96.6 F | WEIGHT: 163 LBS | BODY MASS INDEX: 27.16 KG/M2 | OXYGEN SATURATION: 97 % | RESPIRATION RATE: 16 BRPM | DIASTOLIC BLOOD PRESSURE: 67 MMHG | HEIGHT: 65 IN

## 2019-02-21 VITALS
SYSTOLIC BLOOD PRESSURE: 93 MMHG | RESPIRATION RATE: 14 BRPM | OXYGEN SATURATION: 100 % | DIASTOLIC BLOOD PRESSURE: 58 MMHG

## 2019-02-21 LAB — HCG(URINE) PREGNANCY TEST: NEGATIVE

## 2019-02-21 PROCEDURE — 43235 EGD DIAGNOSTIC BRUSH WASH: CPT | Performed by: SURGERY

## 2019-02-21 PROCEDURE — 7100000011 HC PHASE II RECOVERY - ADDTL 15 MIN: Performed by: SURGERY

## 2019-02-21 PROCEDURE — 2709999900 HC NON-CHARGEABLE SUPPLY: Performed by: SURGERY

## 2019-02-21 PROCEDURE — 2580000003 HC RX 258: Performed by: SURGERY

## 2019-02-21 PROCEDURE — 6360000002 HC RX W HCPCS: Performed by: NURSE ANESTHETIST, CERTIFIED REGISTERED

## 2019-02-21 PROCEDURE — 3700000000 HC ANESTHESIA ATTENDED CARE: Performed by: SURGERY

## 2019-02-21 PROCEDURE — 3609017100 HC EGD: Performed by: SURGERY

## 2019-02-21 PROCEDURE — 7100000010 HC PHASE II RECOVERY - FIRST 15 MIN: Performed by: SURGERY

## 2019-02-21 PROCEDURE — 81025 URINE PREGNANCY TEST: CPT

## 2019-02-21 PROCEDURE — 3700000001 HC ADD 15 MINUTES (ANESTHESIA): Performed by: SURGERY

## 2019-02-21 RX ORDER — PROPOFOL 10 MG/ML
INJECTION, EMULSION INTRAVENOUS PRN
Status: DISCONTINUED | OUTPATIENT
Start: 2019-02-21 | End: 2019-02-21 | Stop reason: SDUPTHER

## 2019-02-21 RX ORDER — SODIUM CHLORIDE 0.9 % (FLUSH) 0.9 %
10 SYRINGE (ML) INJECTION PRN
Status: DISCONTINUED | OUTPATIENT
Start: 2019-02-21 | End: 2019-02-21 | Stop reason: HOSPADM

## 2019-02-21 RX ORDER — SODIUM CHLORIDE 9 MG/ML
INJECTION, SOLUTION INTRAVENOUS CONTINUOUS
Status: DISCONTINUED | OUTPATIENT
Start: 2019-02-21 | End: 2019-02-21 | Stop reason: HOSPADM

## 2019-02-21 RX ORDER — SODIUM CHLORIDE 0.9 % (FLUSH) 0.9 %
10 SYRINGE (ML) INJECTION EVERY 12 HOURS SCHEDULED
Status: DISCONTINUED | OUTPATIENT
Start: 2019-02-21 | End: 2019-02-21 | Stop reason: HOSPADM

## 2019-02-21 RX ORDER — SUCRALFATE 1 G/1
0.5 TABLET ORAL
Qty: 120 TABLET | Refills: 3 | Status: ON HOLD | OUTPATIENT
Start: 2019-02-21 | End: 2019-06-03

## 2019-02-21 RX ADMIN — PROPOFOL 280 MG: 10 INJECTION, EMULSION INTRAVENOUS at 13:46

## 2019-02-21 RX ADMIN — SODIUM CHLORIDE: 9 INJECTION, SOLUTION INTRAVENOUS at 13:32

## 2019-02-21 ASSESSMENT — PAIN SCALES - GENERAL
PAINLEVEL_OUTOF10: 0
PAINLEVEL_OUTOF10: 0

## 2019-02-21 ASSESSMENT — PAIN - FUNCTIONAL ASSESSMENT: PAIN_FUNCTIONAL_ASSESSMENT: 0-10

## 2019-03-22 ENCOUNTER — HOSPITAL ENCOUNTER (OUTPATIENT)
Age: 49
Discharge: HOME | End: 2019-03-22
Payer: MEDICARE

## 2019-03-22 LAB — VITAMIN D 25-HYDROXY: 30 NG/ML (ref 30–100)

## 2019-03-22 PROCEDURE — 84425 ASSAY OF VITAMIN B-1: CPT

## 2019-03-22 PROCEDURE — 36415 COLL VENOUS BLD VENIPUNCTURE: CPT

## 2019-03-22 PROCEDURE — 82306 VITAMIN D 25 HYDROXY: CPT

## 2019-03-25 ENCOUNTER — HOSPITAL ENCOUNTER (OUTPATIENT)
Dept: GENERAL RADIOLOGY | Age: 49
Discharge: HOME | End: 2019-03-27
Payer: MEDICARE

## 2019-03-25 DIAGNOSIS — Z12.31 ENCOUNTER FOR SCREENING MAMMOGRAM FOR MALIGNANT NEOPLASM OF BREAST: ICD-10-CM

## 2019-03-25 DIAGNOSIS — M89.9 DISORDER OF BONE: ICD-10-CM

## 2019-03-25 DIAGNOSIS — Z13.820 SCREENING FOR OSTEOPOROSIS: ICD-10-CM

## 2019-03-25 LAB — VITAMIN B1 WHOLE BLOOD: 134 NMOL/L (ref 70–180)

## 2019-03-25 PROCEDURE — 77063 BREAST TOMOSYNTHESIS BI: CPT

## 2019-04-11 ENCOUNTER — HOSPITAL ENCOUNTER (OUTPATIENT)
Dept: MAMMOGRAPHY | Age: 49
Discharge: HOME | End: 2019-04-13
Payer: MEDICARE

## 2019-04-11 PROCEDURE — 77080 DXA BONE DENSITY AXIAL: CPT

## 2019-04-12 ENCOUNTER — OFFICE VISIT (OUTPATIENT)
Dept: BARIATRICS/WEIGHT MGMT | Age: 49
End: 2019-04-12
Payer: MEDICARE

## 2019-04-12 DIAGNOSIS — E51.9 THIAMINE DEFICIENCY: ICD-10-CM

## 2019-04-12 DIAGNOSIS — E55.9 VITAMIN D DEFICIENCY: Primary | ICD-10-CM

## 2019-04-12 PROCEDURE — 1036F TOBACCO NON-USER: CPT | Performed by: INTERNAL MEDICINE

## 2019-04-12 PROCEDURE — G8428 CUR MEDS NOT DOCUMENT: HCPCS | Performed by: INTERNAL MEDICINE

## 2019-04-12 PROCEDURE — 3017F COLORECTAL CA SCREEN DOC REV: CPT | Performed by: INTERNAL MEDICINE

## 2019-04-12 PROCEDURE — G8417 CALC BMI ABV UP PARAM F/U: HCPCS | Performed by: INTERNAL MEDICINE

## 2019-04-12 PROCEDURE — 99211 OFF/OP EST MAY X REQ PHY/QHP: CPT

## 2019-04-12 PROCEDURE — 99213 OFFICE O/P EST LOW 20 MIN: CPT | Performed by: INTERNAL MEDICINE

## 2019-04-12 RX ORDER — CHOLECALCIFEROL (VITAMIN D3) 1250 MCG
CAPSULE ORAL
Qty: 12 CAPSULE | Refills: 0 | Status: SHIPPED | OUTPATIENT
Start: 2019-04-12 | End: 2019-10-25 | Stop reason: ALTCHOICE

## 2019-04-12 NOTE — PATIENT INSTRUCTIONS
Cont taking Vitamin D3 50,000 IU, one capsule once each week for 12 weeks. Cont to take the Vitamin D3 5,000 IU one capsule daily  Stop taking Thiamine  Cont to take all other bariatric surgery supplements  Return to see Dr. Brendon Pandey in 13 weeks  Repeat Vit D 25OH level and a thiamine level 10 days prior to appointment with Dr. Gonzalez Livers not have the above lab drawn on the same day as you take one of the vitamin D capsules.

## 2019-04-15 ENCOUNTER — OFFICE VISIT (OUTPATIENT)
Dept: FAMILY MEDICINE CLINIC | Age: 49
End: 2019-04-15
Payer: MEDICARE

## 2019-04-15 VITALS
RESPIRATION RATE: 16 BRPM | WEIGHT: 155 LBS | HEIGHT: 65 IN | TEMPERATURE: 97.9 F | BODY MASS INDEX: 25.83 KG/M2 | HEART RATE: 86 BPM | OXYGEN SATURATION: 98 % | SYSTOLIC BLOOD PRESSURE: 110 MMHG | DIASTOLIC BLOOD PRESSURE: 74 MMHG

## 2019-04-15 DIAGNOSIS — Z98.84 S/P BARIATRIC SURGERY: ICD-10-CM

## 2019-04-15 DIAGNOSIS — M17.0 BILATERAL PRIMARY OSTEOARTHRITIS OF KNEE: Primary | ICD-10-CM

## 2019-04-15 DIAGNOSIS — F31.9 BIPOLAR 1 DISORDER (HCC): ICD-10-CM

## 2019-04-15 PROBLEM — R10.13 EPIGASTRIC PAIN: Status: RESOLVED | Noted: 2018-10-22 | Resolved: 2019-04-15

## 2019-04-15 PROBLEM — R10.9 ABDOMINAL PAIN: Status: RESOLVED | Noted: 2018-12-01 | Resolved: 2019-04-15

## 2019-04-15 PROBLEM — R11.2 NON-INTRACTABLE VOMITING WITH NAUSEA: Status: RESOLVED | Noted: 2018-11-16 | Resolved: 2019-04-15

## 2019-04-15 PROBLEM — K59.1 FUNCTIONAL DIARRHEA: Status: RESOLVED | Noted: 2018-11-16 | Resolved: 2019-04-15

## 2019-04-15 PROCEDURE — G8427 DOCREV CUR MEDS BY ELIG CLIN: HCPCS | Performed by: FAMILY MEDICINE

## 2019-04-15 PROCEDURE — 99213 OFFICE O/P EST LOW 20 MIN: CPT | Performed by: FAMILY MEDICINE

## 2019-04-15 PROCEDURE — 3017F COLORECTAL CA SCREEN DOC REV: CPT | Performed by: FAMILY MEDICINE

## 2019-04-15 PROCEDURE — 1036F TOBACCO NON-USER: CPT | Performed by: FAMILY MEDICINE

## 2019-04-15 PROCEDURE — G8417 CALC BMI ABV UP PARAM F/U: HCPCS | Performed by: FAMILY MEDICINE

## 2019-04-15 RX ORDER — LITHIUM CARBONATE 300 MG/1
300 CAPSULE ORAL DAILY
Qty: 30 CAPSULE | Refills: 5 | Status: ON HOLD
Start: 2019-04-15 | End: 2021-09-10 | Stop reason: ALTCHOICE

## 2019-04-15 RX ORDER — FLUOXETINE HYDROCHLORIDE 20 MG/1
20 CAPSULE ORAL DAILY
Qty: 30 CAPSULE | Refills: 5 | Status: ON HOLD
Start: 2019-04-15 | End: 2021-09-10 | Stop reason: ALTCHOICE

## 2019-04-15 RX ORDER — QUETIAPINE FUMARATE 100 MG/1
100 TABLET, FILM COATED ORAL NIGHTLY
Qty: 30 TABLET | Refills: 5
Start: 2019-04-15 | End: 2019-10-25

## 2019-04-15 ASSESSMENT — ENCOUNTER SYMPTOMS
DIARRHEA: 0
SHORTNESS OF BREATH: 0
SORE THROAT: 0
BLOOD IN STOOL: 0
BACK PAIN: 0
COUGH: 0
WHEEZING: 0
ABDOMINAL PAIN: 1
VOMITING: 0
NAUSEA: 0
CONSTIPATION: 0

## 2019-04-15 NOTE — PROGRESS NOTES
kg)     Physical Exam   Constitutional: She is oriented to person, place, and time. She appears well-developed and well-nourished. No distress. HENT:   Head: Normocephalic and atraumatic. Right Ear: External ear normal.   Left Ear: External ear normal.   Mouth/Throat: Oropharynx is clear and moist. No oropharyngeal exudate. Eyes: Pupils are equal, round, and reactive to light. Conjunctivae and EOM are normal. Right eye exhibits no discharge. No scleral icterus. Neck: Normal range of motion. Neck supple. No thyromegaly present. Cardiovascular: Normal rate, regular rhythm, normal heart sounds and intact distal pulses. No murmur heard. Pulmonary/Chest: Effort normal. No stridor. No respiratory distress. She has no wheezes. She has no rales. She exhibits no tenderness. Abdominal: Soft. Bowel sounds are normal. She exhibits no distension and no mass. There is no tenderness. There is no guarding. Musculoskeletal: Normal range of motion. She exhibits no edema. Right knee: Tenderness found. Left knee: Tenderness found. Lymphadenopathy:     She has no cervical adenopathy. Neurological: She is alert and oriented to person, place, and time. Skin: Skin is warm and dry. No rash noted. She is not diaphoretic. No erythema. No pallor. Psychiatric: She has a normal mood and affect.  Her behavior is normal. Thought content normal.       Labs:  Lab Results   Component Value Date     12/28/2018    K 3.2 12/28/2018    K 3.5 10/22/2018    CL 99 12/28/2018    CO2 30 12/28/2018    BUN 7 12/28/2018    CREATININE 0.6 12/28/2018    PROT 7.9 12/28/2018    LABALBU 3.4 12/28/2018    LABALBU 4.1 10/20/2011    CALCIUM 9.4 12/28/2018    GFRAA >60 12/28/2018    LABGLOM >60 12/28/2018    GLUCOSE 92 12/28/2018    GLUCOSE 95 10/20/2011    AST 13 12/28/2018    ALT 6 12/28/2018    ALKPHOS 64 12/28/2018    BILITOT 0.5 12/28/2018    TSH 2.250 12/02/2018    CHOL 191 10/17/2018    TRIG 70 10/17/2018    HDL 60 02/02/2017    LDLCALC 122 02/02/2017    LABA1C 5.2 08/14/2018        Lab Results   Component Value Date    CHOL 191 10/17/2018    CHOL 173 06/07/2018    CHOL 214 (H) 12/21/2017     Lab Results   Component Value Date    TRIG 70 10/17/2018    TRIG 88 06/07/2018    TRIG 102 12/21/2017     Lab Results   Component Value Date    HDL 60 02/02/2017    HDL 55 04/14/2015    HDL 51.0 10/20/2011     Lab Results   Component Value Date    LDLCALC 122 (H) 02/02/2017    LDLCALC 142 (H) 04/14/2015    LDLCALC 150 (H) 10/20/2011       Lab Results   Component Value Date    LABA1C 5.2 08/14/2018    LABA1C 6.2 (H) 10/20/2011     Lab Results   Component Value Date    LDLCALC 122 (H) 02/02/2017    CREATININE 0.6 12/28/2018       Quality & Risk Score Accuracy    Visit Dx:  F31.9 - Bipolar 1 disorder ([facility] [address]  Assessment and plan:  Stable based upon symptoms and exam. Continue current treatment plan and follow up at least yearly. Last edited 04/15/19 16:04 EDT by Renee Gonzalez MD             Assessment / Plan:      Radha Morris was seen today for referral - general.    Diagnoses and all orders for this visit:    Bilateral primary osteoarthritis of knee:  Needs referral to Ortho for possible knee replacements  -     [address], Jill Crandall DO, Orthopaedics and Sports Medicine, [facility]    S/P bariatric surgery    Bipolar 1 disorder [facility]):  Stable and well controlled   -     QUEtiapine (SEROQUEL) 100 MG tablet; Take 1 tablet by mouth nightly  -     lithium 300 MG capsule; Take 1 capsule by mouth daily  -     FLUoxetine (PROZAC) 20 MG capsule; Take 1 capsule by mouth daily        Call or go to ED immediately if symptoms worsen or persist.    Follow Up:  Return for Keep scheduled appointment(s). or sooner if necessary. Educational materials and/or home exercises printedfor patient's review and were included in patient instructions on his/her AfterVisit Summary and given to patient at the end of visit.       Counseled regarding above diagnosis,including possible risks and complications,  especially if left uncontrolled. Counseled regarding the possible side effects, risks, benefits and alternativesto treatment; patient and/or guardian verbalizes understanding, agrees, feels comfortablewith and wishes to proceed with above treatment plan. Advised patient tocall with any new medication issues, and read all Rx info from pharmacy to assureaware of all possible risks and side effects of medication before taking. Reviewed age and gender appropriate health screening exams and vaccinations. Advisedpatient regarding importance of keeping up with recommended health maintenance andto schedule as soon as possible if overdue, as this is important in assessing forundiagnosed pathology, especially cancer, as well as protecting against potentiallyharmful/life threatening disease. Patient and/or guardian verbalizes understandingand agrees with above counseling, assessment and plan. All questions answered.     Lavon Riedel, MD

## 2019-04-17 ENCOUNTER — HOSPITAL ENCOUNTER (OUTPATIENT)
Age: 49
Discharge: HOME | End: 2019-04-17
Payer: MEDICARE

## 2019-04-17 DIAGNOSIS — K91.2 MALNUTRITION FOLLOWING GASTROINTESTINAL SURGERY: ICD-10-CM

## 2019-04-17 DIAGNOSIS — E51.9 THIAMINE DEFICIENCY: ICD-10-CM

## 2019-04-17 DIAGNOSIS — E55.9 VITAMIN D DEFICIENCY: ICD-10-CM

## 2019-04-17 LAB
ALBUMIN SERPL-MCNC: 3.6 G/DL (ref 3.5–5.2)
ALP BLD-CCNC: 66 U/L (ref 35–104)
ALT SERPL-CCNC: <5 U/L (ref 0–32)
ANION GAP SERPL CALCULATED.3IONS-SCNC: 7 MMOL/L (ref 7–16)
AST SERPL-CCNC: 12 U/L (ref 0–31)
BILIRUB SERPL-MCNC: 0.3 MG/DL (ref 0–1.2)
BUN BLDV-MCNC: 15 MG/DL (ref 6–20)
CALCIUM SERPL-MCNC: 9.5 MG/DL (ref 8.6–10.2)
CHLORIDE BLD-SCNC: 101 MMOL/L (ref 98–107)
CHOLESTEROL, TOTAL: 200 MG/DL (ref 0–199)
CO2: 33 MMOL/L (ref 22–29)
CREAT SERPL-MCNC: 0.8 MG/DL (ref 0.5–1)
FERRITIN: 331 NG/ML
FOLATE: 11 NG/ML (ref 4.8–24.2)
GFR AFRICAN AMERICAN: >60
GFR NON-AFRICAN AMERICAN: >60 ML/MIN/1.73
GLUCOSE BLD-MCNC: 90 MG/DL (ref 74–99)
HCT VFR BLD CALC: 39.1 % (ref 34–48)
HEMOGLOBIN: 12 G/DL (ref 11.5–15.5)
LITHIUM DOSE AMOUNT: NORMAL
LITHIUM LEVEL: 0.9 MMOL/L (ref 0.5–1.5)
MCH RBC QN AUTO: 26.9 PG (ref 26–35)
MCHC RBC AUTO-ENTMCNC: 30.7 % (ref 32–34.5)
MCV RBC AUTO: 87.7 FL (ref 80–99.9)
PDW BLD-RTO: 14 FL (ref 11.5–15)
PLATELET # BLD: 286 E9/L (ref 130–450)
PMV BLD AUTO: 10.8 FL (ref 7–12)
POTASSIUM SERPL-SCNC: 4.2 MMOL/L (ref 3.5–5)
PREALBUMIN: 19 MG/DL (ref 20–40)
RBC # BLD: 4.46 E12/L (ref 3.5–5.5)
SODIUM BLD-SCNC: 141 MMOL/L (ref 132–146)
TOTAL PROTEIN: 6.9 G/DL (ref 6.4–8.3)
TRIGL SERPL-MCNC: 81 MG/DL (ref 0–149)
TSH SERPL DL<=0.05 MIU/L-ACNC: 1.29 UIU/ML (ref 0.27–4.2)
VITAMIN B-12: 284 PG/ML (ref 211–946)
VITAMIN D 25-HYDROXY: 52 NG/ML (ref 30–100)
WBC # BLD: 4.5 E9/L (ref 4.5–11.5)

## 2019-04-17 PROCEDURE — 82465 ASSAY BLD/SERUM CHOLESTEROL: CPT

## 2019-04-17 PROCEDURE — 84443 ASSAY THYROID STIM HORMONE: CPT

## 2019-04-17 PROCEDURE — 82746 ASSAY OF FOLIC ACID SERUM: CPT

## 2019-04-17 PROCEDURE — 82728 ASSAY OF FERRITIN: CPT

## 2019-04-17 PROCEDURE — 85027 COMPLETE CBC AUTOMATED: CPT

## 2019-04-17 PROCEDURE — 82607 VITAMIN B-12: CPT

## 2019-04-17 PROCEDURE — 36415 COLL VENOUS BLD VENIPUNCTURE: CPT

## 2019-04-17 PROCEDURE — 84134 ASSAY OF PREALBUMIN: CPT

## 2019-04-17 PROCEDURE — 82306 VITAMIN D 25 HYDROXY: CPT

## 2019-04-17 PROCEDURE — 84630 ASSAY OF ZINC: CPT

## 2019-04-17 PROCEDURE — 84478 ASSAY OF TRIGLYCERIDES: CPT

## 2019-04-17 PROCEDURE — 80178 ASSAY OF LITHIUM: CPT

## 2019-04-17 PROCEDURE — 80053 COMPREHEN METABOLIC PANEL: CPT

## 2019-04-17 PROCEDURE — 84425 ASSAY OF VITAMIN B-1: CPT

## 2019-04-20 LAB
VITAMIN B1 WHOLE BLOOD: 92 NMOL/L (ref 70–180)
ZINC: 62 UG/DL (ref 60–120)

## 2019-04-26 ENCOUNTER — OFFICE VISIT (OUTPATIENT)
Dept: BARIATRICS/WEIGHT MGMT | Age: 49
End: 2019-04-26
Payer: MEDICARE

## 2019-04-26 VITALS
TEMPERATURE: 97.7 F | HEART RATE: 67 BPM | BODY MASS INDEX: 27.42 KG/M2 | DIASTOLIC BLOOD PRESSURE: 83 MMHG | HEIGHT: 65 IN | RESPIRATION RATE: 20 BRPM | WEIGHT: 164.6 LBS | SYSTOLIC BLOOD PRESSURE: 145 MMHG

## 2019-04-26 DIAGNOSIS — K28.9 MARGINAL ULCER: ICD-10-CM

## 2019-04-26 DIAGNOSIS — K91.2 MALNUTRITION FOLLOWING GASTROINTESTINAL SURGERY: Primary | ICD-10-CM

## 2019-04-26 PROCEDURE — 99211 OFF/OP EST MAY X REQ PHY/QHP: CPT

## 2019-04-26 PROCEDURE — 3017F COLORECTAL CA SCREEN DOC REV: CPT | Performed by: SURGERY

## 2019-04-26 PROCEDURE — 1036F TOBACCO NON-USER: CPT | Performed by: SURGERY

## 2019-04-26 PROCEDURE — 99213 OFFICE O/P EST LOW 20 MIN: CPT | Performed by: SURGERY

## 2019-04-26 PROCEDURE — G8427 DOCREV CUR MEDS BY ELIG CLIN: HCPCS | Performed by: SURGERY

## 2019-04-26 PROCEDURE — G8417 CALC BMI ABV UP PARAM F/U: HCPCS | Performed by: SURGERY

## 2019-04-26 NOTE — PROGRESS NOTES
Victoriano Hart  4/26/2019  [address]     Georgette-en- Y Gastric Bypass  1 Year Post-Operative Follow-up     Victoriano Hart is a 47 y.o. female who is 1 years post Laparoscopic Georgette-en-Y Gastric Bypass surgery. Reports no issues. She is not having swallowing difficulty, is compliant most of the time with the multivitamins and calcium + Vit D. She is meeting fluid recommendations of at least 64 ounces per day and is meeting protein recommendations. She  is exercising: walking 30 minutes/day- 7 days/week. Weight=164 lb 9.6 oz (74.7 kg)  Today's weight represents a total weight loss of 139 pounds since surgery with 0 pounds regained since the lowest weight. Had large marginal ulcer in Feb. Still on PPi and carafate every 3hrs. NO nausea, no pain, no sticking. Prior to Admission medications    Medication Sig Start Date End Date Taking?  Authorizing Provider   QUEtiapine (SEROQUEL) 100 MG tablet Take 1 tablet by mouth nightly 4/15/19  Yes Philip Humphrey MD   lithium 300 MG capsule Take 1 capsule by mouth daily 4/15/19  Yes Philip Humphrey MD   Cholecalciferol (VITAMIN D3) 63480 units CAPS Take one capsule each week for 13 weeks 4/12/19  Yes Prashant Jackson MD   sucralfate (CARAFATE) 1 GM tablet Take 0.5 tablets by mouth every 3 hours 2/21/19  Yes Arleen Melendez MD   omeprazole (PRILOSEC) 20 MG delayed release capsule Take 1 capsule by mouth 2 times daily 10/23/18  Yes Lyndon Crump MD   Biotin 83278 MCG TABS Take 1 capsule by mouth daily    Yes Historical Provider, MD   Ferrous Sulfate (IRON) 28 MG TABS Take 1 tablet by mouth daily    Yes Historical Provider, MD   Multiple Vitamins-Minerals (THERAPEUTIC MULTIVITAMIN-MINERALS) tablet Take 1 tablet by mouth 2 times daily   Yes Historical Provider, MD   calcium-cholecalciferol 500-200 MG-UNIT per tablet Take 1 tablet by mouth daily 1/5/18  Yes Arleen Melendez MD   FLUoxetine (PROZAC) 10 MG capsule Take 1 capsule by mouth daily  Patient taking differently: Take 10 mg by mouth daily Take with Prozac 20 mg for total dose 30 mg daily 2/2/17  Yes Mik De Jesus MD   FLUoxetine (PROZAC) 20 MG capsule Take 1 capsule by mouth daily 4/15/19   Mik De Jesus MD        No Known Allergies        Past Medical History:   Diagnosis Date    Bipolar 1 disorder ([address]     Depression     Obesity     Osteoarthritis     bilateral knees    Thiamine deficiency     Vitamin D deficiency        Past Surgical History:   Procedure Laterality Date    CHOLECYSTECTOMY      ENDOSCOPY, COLON, DIAGNOSTIC      GALLBLADDER SURGERY  1994    MN LAP GASTRIC BYPASS/KIERAN-EN-Y N/A 4/30/2018    GASTRIC BYPASS KIERAN-EN-Y LAPAROSCOPIC performed by Dwayne Jules MD at [facility] 10/22/2018    EGD BIOPSY performed by Harjit Madrigal MD at [facility] N/A 2/21/2019    EGD ESOPHAGOGASTRODUODENOSCOPY performed by Dwayne Jules MD at [place] [address]       Current Outpatient Medications   Medication Sig Dispense Refill    QUEtiapine (SEROQUEL) 100 MG tablet Take 1 tablet by mouth nightly 30 tablet 5    lithium 300 MG capsule Take 1 capsule by mouth daily 30 capsule 5    Cholecalciferol (VITAMIN D3) 98998 units CAPS Take one capsule each week for 13 weeks 12 capsule 0    sucralfate (CARAFATE) 1 GM tablet Take 0.5 tablets by mouth every 3 hours 120 tablet 3    omeprazole (PRILOSEC) 20 MG delayed release capsule Take 1 capsule by mouth 2 times daily 60 capsule 3    Biotin 84430 MCG TABS Take 1 capsule by mouth daily       Ferrous Sulfate (IRON) 28 MG TABS Take 1 tablet by mouth daily       Multiple Vitamins-Minerals (THERAPEUTIC MULTIVITAMIN-MINERALS) tablet Take 1 tablet by mouth 2 times daily      calcium-cholecalciferol 500-200 MG-UNIT per tablet Take 1 tablet by mouth daily 60 tablet 5    FLUoxetine (PROZAC) 10 MG capsule Take 1 capsule by mouth daily (Patient taking differently: Take 10 mg by mouth daily Take with Prozac 20 mg for total dose 30 mg daily) 30 capsule 5    FLUoxetine (PROZAC) 20 MG capsule Take 1 capsule by mouth daily 30 capsule 5     No current facility-administered medications for this visit. No Known Allergies    Family History   Problem Relation Age of Onset    Breast Cancer Mother     Heart Disease Mother     Heart Disease Father     Parkinsonism Father     Heart Disease Maternal Grandmother     Cancer Maternal Grandmother     Heart Disease Paternal Grandmother        Social History     Socioeconomic History    Marital status:      Spouse name: Not on file    Number of children: Not on file    Years of education: Not on file    Highest education level: Not on file   Occupational History    Not on file   Social Needs    Financial resource strain: Not on file    Food insecurity:     Worry: Not on file     Inability: Not on file    Transportation needs:     Medical: Not on file     Non-medical: Not on file   Tobacco Use    Smoking status: Never Smoker    Smokeless tobacco: Never Used   Substance and Sexual Activity    Alcohol use: No     Alcohol/week: 0.0 oz    Drug use: No    Sexual activity: Never     Partners: Male     Comment:  since 2000.   Sleeps in separate bedrooms   Lifestyle    Physical activity:     Days per week: Not on file     Minutes per session: Not on file    Stress: Not on file   Relationships    Social connections:     Talks on phone: Not on file     Gets together: Not on file     Attends [employer] service: Not on file     Active member of club or organization: Not on file     Attends meetings of clubs or organizations: Not on file     Relationship status: Not on file    Intimate partner violence:     Fear of current or ex partner: Not on file     Emotionally abused: Not on file     Physically abused: Not on file     Forced sexual activity: Not on file   Other Topics Concern    Not on file   Social History Narrative    Not on file       A complete 10 system review was performed and are otherwise negative unless mentioned in the above HPI. Specific negatives are listed below but may not include all those reviewed. General ROS: negative obtundation, AMS  ENT ROS: negative rhinorrhea, epistaxis  Allergy and Immunology ROS: negative itchy/watery eyes or nasal congestion  Hematological and Lymphatic ROS: negative spontaneous bleeding or bruising  Endocrine ROS: negative  lethargy, mood swings, palpitations or polydipsia/polyuria  Respiratory ROS: negative sputum changes, stridor, tachypnea or wheezing  Cardiovascular ROS: negative for - loss of consciousness, murmur or orthopnea  Gastrointestinal ROS: negative for - hematochezia or hematemesis  Genito-Urinary ROS: negative for -  genital discharge or hematuria  Musculoskeletal ROS: negative for - focal weakness, gangrene  Psych/Neuro ROS: negative for - visual or auditory hallucinations, suicidal ideation        Physical exam:   BP (!) 145/83   Pulse 67   Temp 97.7 °F (36.5 °C)   Resp 20   Ht 5' 5\" (1.651 m)   Wt 164 lb 9.6 oz (74.7 kg)   BMI 27.39 kg/m²    General appearance: alert, appears stated age and cooperative  Head: Normocephalic, without obvious abnormality, atraumatic  Neck: no adenopathy, no carotid bruit, no JVD, supple, symmetrical, trachea midline and thyroid not enlarged, symmetric, no tenderness/mass/nodules  Lungs: clear to auscultation bilaterally  Heart: regular rate and rhythm  Abdomen: soft, non-tender; bowel sounds normal; no masses,  no organomegaly  Extremities: extremities normal, atraumatic, no cyanosis or edema    Assessment: Post Georgette-en- Y Gastric Bypass. She does not complain of GERD,  does not have sleep apnea,  does not have diabetes,  does not have hypertension off medical treatment. Cholesterol and triglycerides are abnormal  slight elevated total cholesterol.     Plan:  Cont PPI and carafate for another month, then wean off carafate and PPI daily for another 6 months. Continue to eat a high protein, low calorie diet, eat small portions very slowly and chew well before swallowing. Drink plenty of water and fluids. Make sure to use fiber to keep the bowels regular. Try to exercise 7 days per week, maintain adequate variety and balance. Always notify the clinic if you have any medical problems. Follow up in 6 months.       Physician Signature: Electronically signed by Dr. Jasiel Veras MD

## 2019-05-03 DIAGNOSIS — M17.0 BILATERAL PRIMARY OSTEOARTHRITIS OF KNEE: Primary | ICD-10-CM

## 2019-05-06 ENCOUNTER — OFFICE VISIT (OUTPATIENT)
Dept: ORTHOPEDIC SURGERY | Age: 49
End: 2019-05-06
Payer: MEDICARE

## 2019-05-06 VITALS — HEIGHT: 65 IN | WEIGHT: 153 LBS | BODY MASS INDEX: 25.49 KG/M2

## 2019-05-06 DIAGNOSIS — M17.11 PRIMARY OSTEOARTHRITIS OF RIGHT KNEE: Primary | ICD-10-CM

## 2019-05-06 DIAGNOSIS — M17.12 PRIMARY OSTEOARTHRITIS OF LEFT KNEE: ICD-10-CM

## 2019-05-06 PROCEDURE — 99214 OFFICE O/P EST MOD 30 MIN: CPT | Performed by: ORTHOPAEDIC SURGERY

## 2019-05-06 PROCEDURE — G8427 DOCREV CUR MEDS BY ELIG CLIN: HCPCS | Performed by: ORTHOPAEDIC SURGERY

## 2019-05-06 PROCEDURE — G8417 CALC BMI ABV UP PARAM F/U: HCPCS | Performed by: ORTHOPAEDIC SURGERY

## 2019-05-06 PROCEDURE — 1036F TOBACCO NON-USER: CPT | Performed by: ORTHOPAEDIC SURGERY

## 2019-05-06 PROCEDURE — 3017F COLORECTAL CA SCREEN DOC REV: CPT | Performed by: ORTHOPAEDIC SURGERY

## 2019-05-06 NOTE — PROGRESS NOTES
Father     Heart Disease Maternal Grandmother     Cancer Maternal Grandmother     Heart Disease Paternal Grandmother        Review of Systems:     Skin: (-) rash,(-) psoriasis,(-) eczema, (-)skin cancer. Musculoskeletal: (-) fractures,  (-) dislocations,(-) collagen vascular disease, (-) fibromyalgia, (-) multiple sclerosis, (-) muscular dystrophy, (-) RSD,(-) joint pain (-)swelling, (-) joint pain,swelling. Neurologic: (-) epilepsy, (-)seizures,(-) brain tumor,(-) TIA, (-)stroke, (-)headaches, (-)Parkinson disease,(-) memory loss, (-) LOC. Cardiovascular: (-) Chest pain, (-) swelling in legs/feet, (-) SOB, (-) cramping in legs/feet with walking. Constitutional:    _Ht 5' 5\" (1.651 m)   Wt 153 lb (69.4 kg)   BMI 25.46 kg/m²  Vital signs are stable. In general, patient is awake, alert and oriented X3, in no apparent distress. Examination of HENT reveals normocephalic, atraumatic. PERRLA/EOMI sclera are white. Conjunctivae are clear. TM's are intact. Pharynx is pink and moist.  Uvula and tongue are midline. Heart: Positive S1 and positive S2 with regular rate and rhythm. Lungs: Clear to auscultation bilaterally without rales, rhonchi or wheezes. Abdomen: soft, nontender. Positive bowel sounds. No organomegaly. No guarding or rigidity. The patient is alert and oriented x 3, appears to be stated age and in no distress. Ht 5' 5\" (1.651 m)   Wt 153 lb (69.4 kg)   BMI 25.46 kg/m²     Skin:  Upon inspection: the skin appears warm, dry and intact. There is not a previous scar over the affected area. There is not any cellulitis, lymphedema or cutaneous lesions noted in the lower extremities. Upon palpation there is no induration noted. Neurologic:  Gait: normal;  Motor exam of the lower extremities show ; quadriceps, hamstrings, foot dorsi and plantar flexors intact R.  5/5 and L. 5/5.  Deep tendon reflexes are 2/4 at the knees and 2/4 at the ankles with strong extensor hallicus longus knee replacement 6/3/19. We will obtain medical clearence from the PCP. I discussed with the patient the typical post operative course following their total knee arthroplasty. The patient does have family support for post op care. Patient lives in a home with multiple steps. Patient will require intense post op physical therapy requiring walker. They also have a history of:   Past Medical History:   Diagnosis Date    Bipolar 1 disorder ([facility] [address]     Depression     Obesity     Osteoarthritis     bilateral knees    Thiamine deficiency     Vitamin D deficiency      For this reason, it is medically necessary for them to be admitted as an inpatient following their total knee arthroplasty for optimal post operative results and safety.

## 2019-05-15 ENCOUNTER — OFFICE VISIT (OUTPATIENT)
Dept: FAMILY MEDICINE CLINIC | Age: 49
End: 2019-05-15
Payer: MEDICARE

## 2019-05-15 VITALS
WEIGHT: 151 LBS | SYSTOLIC BLOOD PRESSURE: 110 MMHG | HEART RATE: 73 BPM | BODY MASS INDEX: 25.16 KG/M2 | HEIGHT: 65 IN | RESPIRATION RATE: 16 BRPM | TEMPERATURE: 98.5 F | OXYGEN SATURATION: 97 % | DIASTOLIC BLOOD PRESSURE: 80 MMHG

## 2019-05-15 DIAGNOSIS — Z01.818 PREOPERATIVE EXAMINATION: Primary | ICD-10-CM

## 2019-05-15 DIAGNOSIS — M17.0 BILATERAL PRIMARY OSTEOARTHRITIS OF KNEE: ICD-10-CM

## 2019-05-15 PROBLEM — I10 HYPERTENSION: Status: RESOLVED | Noted: 2018-04-30 | Resolved: 2019-05-15

## 2019-05-15 PROBLEM — R10.9 INTRACTABLE ABDOMINAL PAIN: Status: RESOLVED | Noted: 2018-10-21 | Resolved: 2019-05-15

## 2019-05-15 PROCEDURE — G8427 DOCREV CUR MEDS BY ELIG CLIN: HCPCS | Performed by: FAMILY MEDICINE

## 2019-05-15 PROCEDURE — 1036F TOBACCO NON-USER: CPT | Performed by: FAMILY MEDICINE

## 2019-05-15 PROCEDURE — G8417 CALC BMI ABV UP PARAM F/U: HCPCS | Performed by: FAMILY MEDICINE

## 2019-05-15 PROCEDURE — 3017F COLORECTAL CA SCREEN DOC REV: CPT | Performed by: FAMILY MEDICINE

## 2019-05-15 PROCEDURE — 99213 OFFICE O/P EST LOW 20 MIN: CPT | Performed by: FAMILY MEDICINE

## 2019-05-15 ASSESSMENT — ENCOUNTER SYMPTOMS
BACK PAIN: 0
CONSTIPATION: 0
NAUSEA: 0
SHORTNESS OF BREATH: 0
SORE THROAT: 0
VOMITING: 0
WHEEZING: 0
COUGH: 0
ABDOMINAL PAIN: 0
DIARRHEA: 0
BLOOD IN STOOL: 0

## 2019-05-15 NOTE — PROGRESS NOTES
04/17/2019    CO2 33 04/17/2019    BUN 15 04/17/2019    CREATININE 0.8 04/17/2019    PROT 6.9 04/17/2019    LABALBU 3.6 04/17/2019    LABALBU 4.1 10/20/2011    CALCIUM 9.5 04/17/2019    GFRAA >60 04/17/2019    LABGLOM >60 04/17/2019    GLUCOSE 90 04/17/2019    GLUCOSE 95 10/20/2011    AST 12 04/17/2019    ALT <5 04/17/2019    ALKPHOS 66 04/17/2019    BILITOT 0.3 04/17/2019    TSH 1.290 04/17/2019    CHOL 200 04/17/2019    TRIG 81 04/17/2019    HDL 60 02/02/2017    LDLCALC 122 02/02/2017    LABA1C 5.2 08/14/2018        Lab Results   Component Value Date    CHOL 200 (H) 04/17/2019    CHOL 191 10/17/2018    CHOL 173 06/07/2018     Lab Results   Component Value Date    TRIG 81 04/17/2019    TRIG 70 10/17/2018    TRIG 88 06/07/2018     Lab Results   Component Value Date    HDL 60 02/02/2017    HDL 55 04/14/2015    HDL 51.0 10/20/2011     Lab Results   Component Value Date    LDLCALC 122 (H) 02/02/2017    LDLCALC 142 (H) 04/14/2015    LDLCALC 150 (H) 10/20/2011       Lab Results   Component Value Date    LABA1C 5.2 08/14/2018    LABA1C 6.2 (H) 10/20/2011     Lab Results   Component Value Date    LDLCALC 122 (H) 02/02/2017    CREATININE 0.8 04/17/2019           Assessment / Plan:      Sara Zamora was seen today for pre-op exam.    Diagnoses and all orders for this visit:    Preoperative examination:  Medically cleared for up coming procedure    Bilateral primary osteoarthritis of knee         Call or go to ED immediately if symptoms worsen or persist.    Follow Up:  No follow-ups on file. or sooner if necessary. Educational materials and/or home exercises printedfor patient's review and were included in patient instructions on his/her AfterVisit Summary and given to patient at the end of visit. Counseled regarding above diagnosis,including possible risks and complications,  especially if left uncontrolled.     Counseled regarding the possible side effects, risks, benefits and alternativesto treatment; patient and/or

## 2019-05-15 NOTE — Clinical Note
Attached is progress note 5/15/19 for preoperative clearance. She is medically clear for procedure at this time.

## 2019-05-16 ASSESSMENT — PROMIS GLOBAL HEALTH SCALE
IN GENERAL, WOULD YOU SAY YOUR QUALITY OF LIFE IS...[ON A SCALE OF 1 (POOR) TO 5 (EXCELLENT)]: 4
IN GENERAL, PLEASE RATE HOW WELL YOU CARRY OUT YOUR USUAL SOCIAL ACTIVITIES (INCLUDES ACTIVITIES AT HOME, AT WORK, AND IN YOUR COMMUNITY, AND RESPONSIBILITIES AS A PARENT, CHILD, SPOUSE, EMPLOYEE, FRIEND, ETC) [ON A SCALE OF 1 (POOR) TO 5 (EXCELLENT)]?: 4
HOW IS THE PROMIS V1.1 BEING ADMINISTERED?: 2
IN THE PAST 7 DAYS, HOW WOULD YOU RATE YOUR PAIN ON AVERAGE [ON A SCALE FROM 0 (NO PAIN) TO 10 (WORST IMAGINABLE PAIN)]?: 5
IN GENERAL, WOULD YOU SAY YOUR HEALTH IS...[ON A SCALE OF 1 (POOR) TO 5 (EXCELLENT)]: 4
IN THE PAST 7 DAYS, HOW OFTEN HAVE YOU BEEN BOTHERED BY EMOTIONAL PROBLEMS, SUCH AS FEELING ANXIOUS, DEPRESSED, OR IRRITABLE [ON A SCALE FROM 1 (NEVER) TO 5 (ALWAYS)]?: 3
SUM OF RESPONSES TO QUESTIONS 3, 6, 7, & 8: 16
SUM OF RESPONSES TO QUESTIONS 2, 4, 5, & 10: 13
IN GENERAL, HOW WOULD YOU RATE YOUR PHYSICAL HEALTH [ON A SCALE OF 1 (POOR) TO 5 (EXCELLENT)]?: 4
IN GENERAL, HOW WOULD YOU RATE YOUR SATISFACTION WITH YOUR SOCIAL ACTIVITIES AND RELATIONSHIPS [ON A SCALE OF 1 (POOR) TO 5 (EXCELLENT)]?: 3
IN THE PAST 7 DAYS, HOW WOULD YOU RATE YOUR FATIGUE ON AVERAGE [ON A SCALE FROM 1 (NONE) TO 5 (VERY SEVERE)]?: 4
IN GENERAL, HOW WOULD YOU RATE YOUR MENTAL HEALTH, INCLUDING YOUR MOOD AND YOUR ABILITY TO THINK [ON A SCALE OF 1 (POOR) TO 5 (EXCELLENT)]?: 3
TO WHAT EXTENT ARE YOU ABLE TO CARRY OUT YOUR EVERYDAY PHYSICAL ACTIVITIES SUCH AS WALKING, CLIMBING STAIRS, CARRYING GROCERIES, OR MOVING A CHAIR [ON A SCALE OF 1 (NOT AT ALL) TO 5 (COMPLETELY)]?: 3

## 2019-05-16 ASSESSMENT — KOOS JR
GOING UP OR DOWN STAIRS: 2
BENDING TO THE FLOOR TO PICK UP OBJECT: 2
RISING FROM SITTING: 1
HOW SEVERE IS YOUR KNEE STIFFNESS AFTER FIRST WAKING IN MORNING: 1
STRAIGHTENING KNEE FULLY: 2
STANDING UPRIGHT: 2
TWISING OR PIVOTING ON KNEE: 1

## 2019-05-17 ASSESSMENT — PROMIS GLOBAL HEALTH SCALE: WHO IS THE PERSON COMPLETING THE PROMIS V1.1 SURVEY?: 0

## 2019-05-20 PROBLEM — M17.11 PRIMARY LOCALIZED OSTEOARTHRITIS OF RIGHT KNEE: Status: ACTIVE | Noted: 2019-05-20

## 2019-05-22 ENCOUNTER — HOSPITAL ENCOUNTER (OUTPATIENT)
Dept: PREADMISSION TESTING | Age: 49
Discharge: HOME | End: 2019-05-22
Payer: MEDICARE

## 2019-05-22 ENCOUNTER — HOSPITAL ENCOUNTER (OUTPATIENT)
Dept: GENERAL RADIOLOGY | Age: 49
Discharge: HOME | End: 2019-05-24
Payer: MEDICARE

## 2019-05-22 ENCOUNTER — ANESTHESIA EVENT (OUTPATIENT)
Dept: OPERATING ROOM | Age: 49
End: 2019-05-22
Payer: MEDICARE

## 2019-05-22 VITALS
RESPIRATION RATE: 20 BRPM | OXYGEN SATURATION: 100 % | BODY MASS INDEX: 25.84 KG/M2 | HEART RATE: 61 BPM | WEIGHT: 155.1 LBS | HEIGHT: 65 IN | SYSTOLIC BLOOD PRESSURE: 100 MMHG | DIASTOLIC BLOOD PRESSURE: 80 MMHG | TEMPERATURE: 97.9 F

## 2019-05-22 DIAGNOSIS — Z01.818 PRE-OP TESTING: Primary | ICD-10-CM

## 2019-05-22 DIAGNOSIS — M17.11 PRIMARY LOCALIZED OSTEOARTHRITIS OF RIGHT KNEE: ICD-10-CM

## 2019-05-22 LAB
ABO/RH: NORMAL
ANION GAP SERPL CALCULATED.3IONS-SCNC: 7 MMOL/L (ref 7–16)
ANTIBODY SCREEN: NORMAL
APTT: 32.6 SEC (ref 24.5–35.1)
BACTERIA: ABNORMAL /HPF
BASOPHILS ABSOLUTE: 0.05 E9/L (ref 0–0.2)
BASOPHILS RELATIVE PERCENT: 1 % (ref 0–2)
BILIRUBIN URINE: NEGATIVE
BLOOD, URINE: NEGATIVE
BUN BLDV-MCNC: 13 MG/DL (ref 6–20)
CALCIUM SERPL-MCNC: 9.4 MG/DL (ref 8.6–10.2)
CHLORIDE BLD-SCNC: 101 MMOL/L (ref 98–107)
CLARITY: ABNORMAL
CO2: 33 MMOL/L (ref 22–29)
COLOR: YELLOW
CREAT SERPL-MCNC: 0.8 MG/DL (ref 0.5–1)
EKG ATRIAL RATE: 54 BPM
EKG P AXIS: 65 DEGREES
EKG P-R INTERVAL: 172 MS
EKG Q-T INTERVAL: 430 MS
EKG QRS DURATION: 86 MS
EKG QTC CALCULATION (BAZETT): 407 MS
EKG R AXIS: 28 DEGREES
EKG T AXIS: 53 DEGREES
EKG VENTRICULAR RATE: 54 BPM
EOSINOPHILS ABSOLUTE: 0.08 E9/L (ref 0.05–0.5)
EOSINOPHILS RELATIVE PERCENT: 1.6 % (ref 0–6)
GFR AFRICAN AMERICAN: >60
GFR NON-AFRICAN AMERICAN: >60 ML/MIN/1.73
GLUCOSE BLD-MCNC: 92 MG/DL (ref 74–99)
GLUCOSE URINE: NEGATIVE MG/DL
HCT VFR BLD CALC: 38.4 % (ref 34–48)
HEMOGLOBIN: 11.8 G/DL (ref 11.5–15.5)
IMMATURE GRANULOCYTES #: 0.02 E9/L
IMMATURE GRANULOCYTES %: 0.4 % (ref 0–5)
INR BLD: 1
KETONES, URINE: NEGATIVE MG/DL
LEUKOCYTE ESTERASE, URINE: ABNORMAL
LYMPHOCYTES ABSOLUTE: 2.25 E9/L (ref 1.5–4)
LYMPHOCYTES RELATIVE PERCENT: 45.1 % (ref 20–42)
MCH RBC QN AUTO: 27 PG (ref 26–35)
MCHC RBC AUTO-ENTMCNC: 30.7 % (ref 32–34.5)
MCV RBC AUTO: 87.9 FL (ref 80–99.9)
MONOCYTES ABSOLUTE: 0.3 E9/L (ref 0.1–0.95)
MONOCYTES RELATIVE PERCENT: 6 % (ref 2–12)
NEUTROPHILS ABSOLUTE: 2.29 E9/L (ref 1.8–7.3)
NEUTROPHILS RELATIVE PERCENT: 45.9 % (ref 43–80)
NITRITE, URINE: POSITIVE
PDW BLD-RTO: 12.8 FL (ref 11.5–15)
PH UA: 6.5 (ref 5–9)
PLATELET # BLD: 347 E9/L (ref 130–450)
PMV BLD AUTO: 10.1 FL (ref 7–12)
POTASSIUM SERPL-SCNC: 4.4 MMOL/L (ref 3.5–5)
PREALBUMIN: 16 MG/DL (ref 20–40)
PROTEIN UA: NEGATIVE MG/DL
PROTHROMBIN TIME: 11.3 SEC (ref 9.3–12.4)
RBC # BLD: 4.37 E12/L (ref 3.5–5.5)
RBC UA: ABNORMAL /HPF (ref 0–2)
SODIUM BLD-SCNC: 141 MMOL/L (ref 132–146)
SPECIFIC GRAVITY UA: <=1.005 (ref 1–1.03)
UROBILINOGEN, URINE: 0.2 E.U./DL
WBC # BLD: 5 E9/L (ref 4.5–11.5)
WBC UA: ABNORMAL /HPF (ref 0–5)

## 2019-05-22 PROCEDURE — 87088 URINE BACTERIA CULTURE: CPT

## 2019-05-22 PROCEDURE — 71046 X-RAY EXAM CHEST 2 VIEWS: CPT

## 2019-05-22 PROCEDURE — 80048 BASIC METABOLIC PNL TOTAL CA: CPT

## 2019-05-22 PROCEDURE — 85610 PROTHROMBIN TIME: CPT

## 2019-05-22 PROCEDURE — 86901 BLOOD TYPING SEROLOGIC RH(D): CPT

## 2019-05-22 PROCEDURE — 93005 ELECTROCARDIOGRAM TRACING: CPT | Performed by: NURSE PRACTITIONER

## 2019-05-22 PROCEDURE — 87186 SC STD MICRODIL/AGAR DIL: CPT

## 2019-05-22 PROCEDURE — 36415 COLL VENOUS BLD VENIPUNCTURE: CPT

## 2019-05-22 PROCEDURE — 87081 CULTURE SCREEN ONLY: CPT

## 2019-05-22 PROCEDURE — 85730 THROMBOPLASTIN TIME PARTIAL: CPT

## 2019-05-22 PROCEDURE — 81001 URINALYSIS AUTO W/SCOPE: CPT

## 2019-05-22 PROCEDURE — 86900 BLOOD TYPING SEROLOGIC ABO: CPT

## 2019-05-22 PROCEDURE — 85025 COMPLETE CBC W/AUTO DIFF WBC: CPT

## 2019-05-22 PROCEDURE — 84134 ASSAY OF PREALBUMIN: CPT

## 2019-05-22 PROCEDURE — 86850 RBC ANTIBODY SCREEN: CPT

## 2019-05-22 RX ORDER — MIDAZOLAM HYDROCHLORIDE 1 MG/ML
1 INJECTION INTRAMUSCULAR; INTRAVENOUS EVERY 5 MIN PRN
Status: CANCELLED | OUTPATIENT
Start: 2019-05-22

## 2019-05-22 RX ORDER — CELECOXIB 100 MG/1
200 CAPSULE ORAL ONCE
Status: CANCELLED | OUTPATIENT
Start: 2019-05-22 | End: 2019-05-22

## 2019-05-22 RX ORDER — OXYCODONE HCL 10 MG/1
10 TABLET, FILM COATED, EXTENDED RELEASE ORAL ONCE
Status: CANCELLED | OUTPATIENT
Start: 2019-05-22 | End: 2019-05-22

## 2019-05-22 RX ORDER — ACETAMINOPHEN 500 MG
1000 TABLET ORAL ONCE
Status: CANCELLED | OUTPATIENT
Start: 2019-05-22 | End: 2019-05-22

## 2019-05-22 RX ORDER — ROPIVACAINE HYDROCHLORIDE 5 MG/ML
30 INJECTION, SOLUTION EPIDURAL; INFILTRATION; PERINEURAL ONCE
Status: CANCELLED | OUTPATIENT
Start: 2019-05-22 | End: 2019-05-22

## 2019-05-22 RX ORDER — GABAPENTIN 300 MG/1
600 CAPSULE ORAL ONCE
Status: CANCELLED | OUTPATIENT
Start: 2019-05-22 | End: 2019-05-22

## 2019-05-22 RX ORDER — FENTANYL CITRATE 50 UG/ML
50 INJECTION, SOLUTION INTRAMUSCULAR; INTRAVENOUS EVERY 5 MIN PRN
Status: CANCELLED | OUTPATIENT
Start: 2019-05-22

## 2019-05-22 NOTE — PROGRESS NOTES
[address] [place]                                                                                                                     PRE OP INSTRUCTIONS FOR  Roc Nelson        Date: 5/22/2019    Date and time of surgery:   Arrival Time: you will receive a phone call night befire surgery between 5-7 pm giving you your arrival time    1. Do not eat or drink anything after 12 midnight prior to surgery. This includes no water, chewing gum, mints or ice chips. 2. Take the following pills with a small sip of water on the morning of Surgery:  Take your prozac and lithium morning of surgery with a sip of water    3. Diabetics may take evening dose of insulin but none after midnight. If you feel symptomatic or low blood sugar take 1-2 ounces of apple juice only. 4. Aspirin, Ibuprofen, Advil, Naproxen, Vitamin E and other Anti-inflammatory products should be stopped  before surgery  as directed by your physician. 5. Check with your Doctor regarding stopping Plavix, Coumadin, Lovenox, Fragmin or other blood thinners. 6. Do not smoke,use illicit drugs and do not drink any alcoholic beverages 24 hours prior to surgery. 7. You may brush your teeth and gargle the morning of surgery. DO NOT SWALLOW WATER    8. You MUST make arrangements for a responsible adult to take you home after your surgery. You will not be allowed to leave alone or drive yourself home. It is strongly suggested someone stay with you the first 24 hrs. Your surgery will be cancelled if you do not have a ride home. 9. A parent/legal guardian must accompany a child scheduled for surgery and plan to stay at the hospital until the child is discharged. Please do not bring other children with you. 10. Please wear simple, loose fitting clothing to the hospital.  Luke Nones not bring valuables (money, credit cards, checkbooks, etc.) Do not wear any makeup (including no eye makeup) or nail polish on your fingers or toes.     11. DO NOT wear any jewelry or piercings on day of surgery. All body piercing jewelry must be removed. 12. Shower the night before surgery with ___Antibacterial soap ___Hibiclens. 15. Remember to bring Blood Bank bracelet to the hospital on the day of surgery. 14. If you have a Living Will and Durable Power of  for Healthcare, please bring in a copy. 15. If appropriate bring crutches, inspirex, etc... 12. Notify your Surgeon if you develop any illness between now and surgery time, cough, cold, fever, sore throat, nausea, vomiting, etc.  Please notify your surgeon if you experience dizziness, shortness of breath or blurred vision between now & the time of your surgery. 17. If you have ___dentures, they will be removed before going to the OR; we will provide you a container. If you wear ___contact lenses or ___glasses, they will be removed; please bring a case for them. 18. To provide excellent care visitors will be limited to one in the room at any given time. 19. Please bring picture ID and insurance card. 20. Sleep apnea patients need to bring CPAP to hospital on day of surgery. 21. Visit our web site for additional information: [email or web address]    22. During flu season no children under the age of 15 are permitted in the hospital for the safety of all patients. 23. Other               Please call pre admission testing if you have any further questions.    [facility]     [address]

## 2019-05-22 NOTE — ANESTHESIA PRE PROCEDURE
Department of Anesthesiology  Preprocedure Note       Name:  Apoorva Sotomayor   Age:  47 y.o.  :  1970                                          MRN:  [id number]         Date:  2019      Surgeon: Kylah Rivero): Barbara Martines DO    Procedure: RIGHT KNEE TOTAL KNEE ARTHROPLASTY [facility] & NEPHEW Zuleyka Tarun) (Right )    Medications prior to admission:   Prior to Admission medications    Medication Sig Start Date End Date Taking?  Authorizing Provider   Cholecalciferol (VITAMIN D3) 5000 units TABS Take by mouth daily   Yes Historical Provider, MD   Docusate Sodium (COLACE PO) Take by mouth every other day    Historical Provider, MD   QUEtiapine (SEROQUEL) 100 MG tablet Take 1 tablet by mouth nightly 4/15/19   Sheree Ren MD   lithium 300 MG capsule Take 1 capsule by mouth daily 4/15/19   Sheree Ren MD   FLUoxetine (PROZAC) 20 MG capsule Take 1 capsule by mouth daily 4/15/19   Sheree Ren MD   Cholecalciferol (VITAMIN D3) 31194 units CAPS Take one capsule each week for 13 weeks 19   Curt Samaniego MD   sucralfate (CARAFATE) 1 GM tablet Take 0.5 tablets by mouth every 3 hours 19   Larisa Overton MD   omeprazole (PRILOSEC) 20 MG delayed release capsule Take 1 capsule by mouth 2 times daily 10/23/18   Michael Aceves MD   Biotin 50322 MCG TABS Take 1 capsule by mouth daily     Historical Provider, MD   Ferrous Sulfate (IRON) 28 MG TABS Take 1 tablet by mouth daily     Historical Provider, MD   Multiple Vitamins-Minerals (THERAPEUTIC MULTIVITAMIN-MINERALS) tablet Take 1 tablet by mouth 2 times daily    Historical Provider, MD   calcium-cholecalciferol 500-200 MG-UNIT per tablet Take 1 tablet by mouth daily 18   Larisa Overton MD   FLUoxetine (PROZAC) 10 MG capsule Take 1 capsule by mouth daily  Patient taking differently: Take 10 mg by mouth daily Take with Prozac 20 mg for total dose 30 mg daily 17   Sheree Ren MD       Current Vitamin D deficiency        Past Surgical History:        Procedure Laterality Date    CHOLECYSTECTOMY      ENDOSCOPY, COLON, DIAGNOSTIC      GALLBLADDER SURGERY  1994    [place] LAP GASTRIC BYPASS/KIERAN-EN-Y N/A 4/30/2018    GASTRIC BYPASS KIERAN-EN-Y LAPAROSCOPIC performed by Sonido York MD at [address] 10/22/2018    EGD BIOPSY performed by Lamar Hager MD at [facility] N/A 2/21/2019    EGD ESOPHAGOGASTRODUODENOSCOPY performed by Sonido York MD at [address] History:    Social History     Tobacco Use    Smoking status: Never Smoker    Smokeless tobacco: Never Used   Substance Use Topics    Alcohol use: No     Alcohol/week: 0.0 oz                                Counseling given: Not Answered      Vital Signs (Current):   Vitals:    05/21/19 1412 05/22/19 0806   BP:  100/80   Pulse:  61   Resp:  20   Temp:  97.9 °F (36.6 °C)   TempSrc:  Oral   SpO2:  100%   Weight: 151 lb (68.5 kg) 155 lb 1.6 oz (70.4 kg)   Height: 5' 5\" (1.651 m) 5' 5\" (1.651 m)                                              BP Readings from Last 3 Encounters:   05/22/19 100/80   05/15/19 110/80   04/26/19 (!) 145/83       NPO Status:                                                                                 BMI:   Wt Readings from Last 3 Encounters:   05/22/19 155 lb 1.6 oz (70.4 kg)   05/15/19 151 lb (68.5 kg)   05/06/19 153 lb (69.4 kg)     Body mass index is 25.81 kg/m².     CBC:   Lab Results   Component Value Date    WBC 4.5 04/17/2019    RBC 4.46 04/17/2019    HGB 12.0 04/17/2019    HCT 39.1 04/17/2019    MCV 87.7 04/17/2019    RDW 14.0 04/17/2019     04/17/2019       CMP:   Lab Results   Component Value Date     04/17/2019    K 4.2 04/17/2019    K 3.5 10/22/2018     04/17/2019    CO2 33 04/17/2019    BUN 15 04/17/2019    CREATININE 0.8 04/17/2019    GFRAA >60 04/17/2019    LABGLOM >60 04/17/2019    GLUCOSE 90 04/17/2019    GLUCOSE 95 10/20/2011    PROT 6.9 04/17/2019    CALCIUM 9.5 04/17/2019    BILITOT 0.3 04/17/2019    ALKPHOS 66 04/17/2019    AST 12 04/17/2019    ALT <5 04/17/2019       POC Tests: No results for input(s): POCGLU, POCNA, POCK, POCCL, POCBUN, POCHEMO, POCHCT in the last 72 hours. Coags:   Lab Results   Component Value Date    PROTIME 13.4 12/02/2018    INR 1.2 12/02/2018       HCG (If Applicable):   Lab Results   Component Value Date    PREGTESTUR NEGATIVE 02/21/2019        ABGs: No results found for: PHART, PO2ART, XQA9OXB, HHG9SCN, BEART, T9QRYSMB     Type & Screen (If Applicable):  No results found for: LABABO, [address]    Anesthesia Evaluation  Patient summary reviewed no history of anesthetic complications:   Airway: Mallampati: I  TM distance: >3 FB   Neck ROM: full  Mouth opening: > = 3 FB Dental: normal exam         Pulmonary:Negative Pulmonary ROS breath sounds clear to auscultation                             Cardiovascular:    (+) hypertension:,         Rhythm: regular  Rate: normal                    Neuro/Psych:   (+) psychiatric history (Bipolar 1 disorder):depression/anxiety             GI/Hepatic/Renal:            ROS comment: S/P bariatric surgery (approximately 170 pound weight loss)   . Endo/Other:    (+) : arthritis: OA., .                 Abdominal:         (-) obese     Vascular: negative vascular ROS. Anesthesia Plan      spinal     ASA 2     (Right adductor canal nerve block for post-op pain control  Spinal as first option)  Induction: intravenous. Anesthetic plan and risks discussed with patient. Plan discussed with CRNA.           PAT Chart Review:  Chart reviewed, patient interviewed and evaluated May 22, 2019 at 9:13 AM by Casa Otero MD.  Confirmation of above and final plan per Day of Surgery (DOS) anesthesiologist.      Casa Otero MD   5/22/2019

## 2019-05-23 DIAGNOSIS — N39.0 URINARY TRACT INFECTION WITHOUT HEMATURIA, SITE UNSPECIFIED: Primary | ICD-10-CM

## 2019-05-23 RX ORDER — SULFAMETHOXAZOLE AND TRIMETHOPRIM 800; 160 MG/1; MG/1
1 TABLET ORAL 2 TIMES DAILY
Qty: 14 TABLET | Refills: 0 | Status: SHIPPED | OUTPATIENT
Start: 2019-05-23 | End: 2019-05-30

## 2019-05-23 NOTE — PROGRESS NOTES
Abnormal urine culture  report faxed to dr Austin Gomez and to dr Mendel Shanks.   Both offices also notified of abnormal urine culture report

## 2019-05-24 LAB
MRSA CULTURE ONLY: NORMAL
ORGANISM: ABNORMAL
URINE CULTURE, ROUTINE: ABNORMAL
URINE CULTURE, ROUTINE: ABNORMAL

## 2019-05-24 RX ORDER — SODIUM CHLORIDE, SODIUM LACTATE, POTASSIUM CHLORIDE, CALCIUM CHLORIDE 600; 310; 30; 20 MG/100ML; MG/100ML; MG/100ML; MG/100ML
INJECTION, SOLUTION INTRAVENOUS CONTINUOUS
Status: CANCELLED | OUTPATIENT
Start: 2019-05-24

## 2019-05-24 NOTE — PROGRESS NOTES
Per Theresa Foster at Dr. Paramjit Gomez office, doctor has urine culture results in hand and will address.

## 2019-05-24 NOTE — FLOWSHEET NOTE
05/24/19 1350   Social/Functional History   Lives With Daughter   Type of [address] Two level;1/2 bath on main level;Bed/Bath upstairs   Bathroom Shower/Tub Walk-in shower   Bathroom Toilet Handicap height   Bathroom Equipment Shower chair   Home Equipment Rolling walker   Receives Help From Family   Met with pt in [facility], pt having surgery for a total knee arthroplasty on 6/3 , explained sw role for transition of care and reviewed rehab options and providers for Medicare, pt plans to stay at her mother's home (address- [address]) home is 2 story with a ramp entrance, pt receptive to [facility], list offered and notified Catherine Nixon liaison who will follow post-op for Malgorzata Segal orders and medical d/c, pt plans to borrow dme needed and will use hospital pharmacy. Electronically signed by GIO Ramsey on 5/24/2019 at 1:48 PM

## 2019-06-03 ENCOUNTER — APPOINTMENT (OUTPATIENT)
Dept: GENERAL RADIOLOGY | Age: 49
End: 2019-06-03
Attending: ORTHOPAEDIC SURGERY
Payer: MEDICARE

## 2019-06-03 ENCOUNTER — ANESTHESIA (OUTPATIENT)
Dept: OPERATING ROOM | Age: 49
End: 2019-06-03
Payer: MEDICARE

## 2019-06-03 ENCOUNTER — HOSPITAL ENCOUNTER (OUTPATIENT)
Age: 49
Discharge: HOME | End: 2019-06-04
Attending: ORTHOPAEDIC SURGERY | Admitting: ORTHOPAEDIC SURGERY
Payer: MEDICARE

## 2019-06-03 VITALS
DIASTOLIC BLOOD PRESSURE: 68 MMHG | TEMPERATURE: 96 F | OXYGEN SATURATION: 100 % | SYSTOLIC BLOOD PRESSURE: 114 MMHG | RESPIRATION RATE: 17 BRPM

## 2019-06-03 DIAGNOSIS — Z96.651 STATUS POST TOTAL RIGHT KNEE REPLACEMENT: ICD-10-CM

## 2019-06-03 DIAGNOSIS — M17.11 PRIMARY LOCALIZED OSTEOARTHRITIS OF RIGHT KNEE: Primary | ICD-10-CM

## 2019-06-03 LAB — HCG(URINE) PREGNANCY TEST: NEGATIVE

## 2019-06-03 PROCEDURE — 2500000003 HC RX 250 WO HCPCS: Performed by: ORTHOPAEDIC SURGERY

## 2019-06-03 PROCEDURE — 2580000003 HC RX 258: Performed by: ANESTHESIOLOGY

## 2019-06-03 PROCEDURE — 6360000002 HC RX W HCPCS: Performed by: ANESTHESIOLOGY

## 2019-06-03 PROCEDURE — 6370000000 HC RX 637 (ALT 250 FOR IP): Performed by: ANESTHESIOLOGY

## 2019-06-03 PROCEDURE — 96361 HYDRATE IV INFUSION ADD-ON: CPT

## 2019-06-03 PROCEDURE — 6370000000 HC RX 637 (ALT 250 FOR IP): Performed by: ORTHOPAEDIC SURGERY

## 2019-06-03 PROCEDURE — 81025 URINE PREGNANCY TEST: CPT

## 2019-06-03 PROCEDURE — 7100000000 HC PACU RECOVERY - FIRST 15 MIN: Performed by: ORTHOPAEDIC SURGERY

## 2019-06-03 PROCEDURE — C1776 JOINT DEVICE (IMPLANTABLE): HCPCS | Performed by: ORTHOPAEDIC SURGERY

## 2019-06-03 PROCEDURE — 3700000001 HC ADD 15 MINUTES (ANESTHESIA): Performed by: ORTHOPAEDIC SURGERY

## 2019-06-03 PROCEDURE — 3600000015 HC SURGERY LEVEL 5 ADDTL 15MIN: Performed by: ORTHOPAEDIC SURGERY

## 2019-06-03 PROCEDURE — 6360000002 HC RX W HCPCS: Performed by: NURSE ANESTHETIST, CERTIFIED REGISTERED

## 2019-06-03 PROCEDURE — 6360000002 HC RX W HCPCS

## 2019-06-03 PROCEDURE — 6360000002 HC RX W HCPCS: Performed by: NURSE PRACTITIONER

## 2019-06-03 PROCEDURE — 73562 X-RAY EXAM OF KNEE 3: CPT

## 2019-06-03 PROCEDURE — 97530 THERAPEUTIC ACTIVITIES: CPT | Performed by: PHYSICAL THERAPIST

## 2019-06-03 PROCEDURE — 2580000003 HC RX 258: Performed by: ORTHOPAEDIC SURGERY

## 2019-06-03 PROCEDURE — 2500000003 HC RX 250 WO HCPCS: Performed by: NURSE ANESTHETIST, CERTIFIED REGISTERED

## 2019-06-03 PROCEDURE — 6360000002 HC RX W HCPCS: Performed by: ORTHOPAEDIC SURGERY

## 2019-06-03 PROCEDURE — 27447 TOTAL KNEE ARTHROPLASTY: CPT | Performed by: ORTHOPAEDIC SURGERY

## 2019-06-03 PROCEDURE — 2720000010 HC SURG SUPPLY STERILE: Performed by: ORTHOPAEDIC SURGERY

## 2019-06-03 PROCEDURE — C1713 ANCHOR/SCREW BN/BN,TIS/BN: HCPCS | Performed by: ORTHOPAEDIC SURGERY

## 2019-06-03 PROCEDURE — 2709999900 HC NON-CHARGEABLE SUPPLY: Performed by: ORTHOPAEDIC SURGERY

## 2019-06-03 PROCEDURE — 2580000003 HC RX 258: Performed by: NURSE ANESTHETIST, CERTIFIED REGISTERED

## 2019-06-03 PROCEDURE — 97530 THERAPEUTIC ACTIVITIES: CPT

## 2019-06-03 PROCEDURE — 97165 OT EVAL LOW COMPLEX 30 MIN: CPT

## 2019-06-03 PROCEDURE — 7100000001 HC PACU RECOVERY - ADDTL 15 MIN: Performed by: ORTHOPAEDIC SURGERY

## 2019-06-03 PROCEDURE — 3600000005 HC SURGERY LEVEL 5 BASE: Performed by: ORTHOPAEDIC SURGERY

## 2019-06-03 PROCEDURE — 97161 PT EVAL LOW COMPLEX 20 MIN: CPT | Performed by: PHYSICAL THERAPIST

## 2019-06-03 PROCEDURE — 64447 NJX AA&/STRD FEMORAL NRV IMG: CPT | Performed by: ANESTHESIOLOGY

## 2019-06-03 PROCEDURE — 3700000000 HC ANESTHESIA ATTENDED CARE: Performed by: ORTHOPAEDIC SURGERY

## 2019-06-03 PROCEDURE — 96360 HYDRATION IV INFUSION INIT: CPT

## 2019-06-03 DEVICE — JOURNEY 7.5 ROUND RESURF PAT 26MM STANDARD
Type: IMPLANTABLE DEVICE | Site: KNEE | Status: FUNCTIONAL
Brand: JOURNEY

## 2019-06-03 DEVICE — COMPONENT TOT KNEE CAPPED ADV OXIN NP JOURNEY II: Type: IMPLANTABLE DEVICE | Status: FUNCTIONAL

## 2019-06-03 DEVICE — JOURNEY II BCS XLPE ARTICULAR                                    INSERT SIZE 3-4 RIGHT 10MM
Type: IMPLANTABLE DEVICE | Site: KNEE | Status: FUNCTIONAL
Brand: JOURNEY

## 2019-06-03 DEVICE — CEMENT BNE 20ML 40GM FULL DOSE PMMA W/O ANTIBIO M VISC: Type: IMPLANTABLE DEVICE | Site: KNEE | Status: FUNCTIONAL

## 2019-06-03 DEVICE — JOURNEY II BCS FEMORAL OXINIUM                                    RIGHT SIZE 4
Type: IMPLANTABLE DEVICE | Site: KNEE | Status: FUNCTIONAL
Brand: JOURNEY

## 2019-06-03 DEVICE — JOURNEY TIBIAL BASEPLATE NONPOROUS                                    RIGHT SIZE 3
Type: IMPLANTABLE DEVICE | Site: KNEE | Status: FUNCTIONAL
Brand: JOURNEY

## 2019-06-03 RX ORDER — DEXTROSE, SODIUM CHLORIDE, AND POTASSIUM CHLORIDE 5; .45; .15 G/100ML; G/100ML; G/100ML
INJECTION INTRAVENOUS CONTINUOUS
Status: DISCONTINUED | OUTPATIENT
Start: 2019-06-03 | End: 2019-06-04 | Stop reason: HOSPADM

## 2019-06-03 RX ORDER — GABAPENTIN 300 MG/1
600 CAPSULE ORAL ONCE
Status: COMPLETED | OUTPATIENT
Start: 2019-06-03 | End: 2019-06-03

## 2019-06-03 RX ORDER — FENTANYL CITRATE 50 UG/ML
INJECTION, SOLUTION INTRAMUSCULAR; INTRAVENOUS
Status: COMPLETED
Start: 2019-06-03 | End: 2019-06-03

## 2019-06-03 RX ORDER — GABAPENTIN 100 MG/1
100 CAPSULE ORAL EVERY 8 HOURS
Status: DISCONTINUED | OUTPATIENT
Start: 2019-06-03 | End: 2019-06-04 | Stop reason: HOSPADM

## 2019-06-03 RX ORDER — LIDOCAINE HYDROCHLORIDE 10 MG/ML
INJECTION, SOLUTION INFILTRATION; PERINEURAL PRN
Status: DISCONTINUED | OUTPATIENT
Start: 2019-06-03 | End: 2019-06-03 | Stop reason: SDUPTHER

## 2019-06-03 RX ORDER — FLUOXETINE HYDROCHLORIDE 20 MG/1
20 CAPSULE ORAL DAILY
Status: DISCONTINUED | OUTPATIENT
Start: 2019-06-03 | End: 2019-06-03

## 2019-06-03 RX ORDER — ACETAMINOPHEN 325 MG/1
650 TABLET ORAL EVERY 4 HOURS PRN
Status: DISCONTINUED | OUTPATIENT
Start: 2019-06-03 | End: 2019-06-04 | Stop reason: HOSPADM

## 2019-06-03 RX ORDER — CHLORHEXIDINE GLUCONATE 4 G/100ML
SOLUTION TOPICAL
Status: DISCONTINUED | OUTPATIENT
Start: 2019-06-03 | End: 2019-06-03 | Stop reason: HOSPADM

## 2019-06-03 RX ORDER — CELECOXIB 100 MG/1
200 CAPSULE ORAL EVERY 12 HOURS
Status: DISCONTINUED | OUTPATIENT
Start: 2019-06-03 | End: 2019-06-04 | Stop reason: HOSPADM

## 2019-06-03 RX ORDER — LITHIUM CARBONATE 300 MG/1
300 CAPSULE ORAL DAILY
Status: DISCONTINUED | OUTPATIENT
Start: 2019-06-03 | End: 2019-06-04 | Stop reason: HOSPADM

## 2019-06-03 RX ORDER — MIDAZOLAM HYDROCHLORIDE 1 MG/ML
INJECTION INTRAMUSCULAR; INTRAVENOUS
Status: COMPLETED
Start: 2019-06-03 | End: 2019-06-03

## 2019-06-03 RX ORDER — HYDROMORPHONE HYDROCHLORIDE 1 MG/ML
0.5 INJECTION, SOLUTION INTRAMUSCULAR; INTRAVENOUS; SUBCUTANEOUS EVERY 5 MIN PRN
Status: DISCONTINUED | OUTPATIENT
Start: 2019-06-03 | End: 2019-06-03 | Stop reason: HOSPADM

## 2019-06-03 RX ORDER — QUETIAPINE FUMARATE 100 MG/1
100 TABLET, FILM COATED ORAL NIGHTLY
Status: DISCONTINUED | OUTPATIENT
Start: 2019-06-03 | End: 2019-06-04 | Stop reason: HOSPADM

## 2019-06-03 RX ORDER — BUPIVACAINE HYDROCHLORIDE 2.5 MG/ML
INJECTION, SOLUTION EPIDURAL; INFILTRATION; INTRACAUDAL PRN
Status: DISCONTINUED | OUTPATIENT
Start: 2019-06-03 | End: 2019-06-03 | Stop reason: SDUPTHER

## 2019-06-03 RX ORDER — ROPIVACAINE HYDROCHLORIDE 5 MG/ML
INJECTION, SOLUTION EPIDURAL; INFILTRATION; PERINEURAL
Status: COMPLETED
Start: 2019-06-03 | End: 2019-06-03

## 2019-06-03 RX ORDER — M-VIT,TX,IRON,MINS/CALC/FOLIC 27MG-0.4MG
1 TABLET ORAL DAILY
Status: DISCONTINUED | OUTPATIENT
Start: 2019-06-03 | End: 2019-06-03 | Stop reason: SDUPTHER

## 2019-06-03 RX ORDER — SODIUM CHLORIDE, SODIUM LACTATE, POTASSIUM CHLORIDE, CALCIUM CHLORIDE 600; 310; 30; 20 MG/100ML; MG/100ML; MG/100ML; MG/100ML
INJECTION, SOLUTION INTRAVENOUS CONTINUOUS
Status: DISCONTINUED | OUTPATIENT
Start: 2019-06-03 | End: 2019-06-03

## 2019-06-03 RX ORDER — DOCUSATE SODIUM 100 MG/1
50 CAPSULE, LIQUID FILLED ORAL 2 TIMES DAILY PRN
Status: DISCONTINUED | OUTPATIENT
Start: 2019-06-03 | End: 2019-06-03 | Stop reason: SDUPTHER

## 2019-06-03 RX ORDER — DOCUSATE SODIUM 100 MG/1
100 CAPSULE, LIQUID FILLED ORAL 2 TIMES DAILY
Status: DISCONTINUED | OUTPATIENT
Start: 2019-06-03 | End: 2019-06-04 | Stop reason: HOSPADM

## 2019-06-03 RX ORDER — FLUOXETINE 10 MG/1
10 CAPSULE ORAL DAILY
Status: DISCONTINUED | OUTPATIENT
Start: 2019-06-03 | End: 2019-06-03

## 2019-06-03 RX ORDER — MIDAZOLAM HYDROCHLORIDE 1 MG/ML
INJECTION INTRAMUSCULAR; INTRAVENOUS PRN
Status: DISCONTINUED | OUTPATIENT
Start: 2019-06-03 | End: 2019-06-03 | Stop reason: SDUPTHER

## 2019-06-03 RX ORDER — SODIUM CHLORIDE 0.9 % (FLUSH) 0.9 %
10 SYRINGE (ML) INJECTION PRN
Status: DISCONTINUED | OUTPATIENT
Start: 2019-06-03 | End: 2019-06-03 | Stop reason: HOSPADM

## 2019-06-03 RX ORDER — PANTOPRAZOLE SODIUM 40 MG/1
40 TABLET, DELAYED RELEASE ORAL
Status: DISCONTINUED | OUTPATIENT
Start: 2019-06-03 | End: 2019-06-03

## 2019-06-03 RX ORDER — CELECOXIB 100 MG/1
200 CAPSULE ORAL ONCE
Status: COMPLETED | OUTPATIENT
Start: 2019-06-03 | End: 2019-06-03

## 2019-06-03 RX ORDER — GLUCOSAMINE/D3/BOSWELLIA SERRA 1500MG-400
1 TABLET ORAL DAILY
Status: DISCONTINUED | OUTPATIENT
Start: 2019-06-03 | End: 2019-06-03 | Stop reason: CLARIF

## 2019-06-03 RX ORDER — SUCRALFATE 1 G/1
0.5 TABLET ORAL
Status: DISCONTINUED | OUTPATIENT
Start: 2019-06-03 | End: 2019-06-03

## 2019-06-03 RX ORDER — MEPERIDINE HYDROCHLORIDE 25 MG/ML
12.5 INJECTION INTRAMUSCULAR; INTRAVENOUS; SUBCUTANEOUS EVERY 5 MIN PRN
Status: DISCONTINUED | OUTPATIENT
Start: 2019-06-03 | End: 2019-06-03 | Stop reason: HOSPADM

## 2019-06-03 RX ORDER — PHENYLEPHRINE HYDROCHLORIDE 10 MG/ML
INJECTION INTRAVENOUS PRN
Status: DISCONTINUED | OUTPATIENT
Start: 2019-06-03 | End: 2019-06-03 | Stop reason: SDUPTHER

## 2019-06-03 RX ORDER — ROPIVACAINE HYDROCHLORIDE 5 MG/ML
30 INJECTION, SOLUTION EPIDURAL; INFILTRATION; PERINEURAL ONCE
Status: COMPLETED | OUTPATIENT
Start: 2019-06-03 | End: 2019-06-03

## 2019-06-03 RX ORDER — SODIUM CHLORIDE 0.9 % (FLUSH) 0.9 %
10 SYRINGE (ML) INJECTION EVERY 12 HOURS SCHEDULED
Status: DISCONTINUED | OUTPATIENT
Start: 2019-06-03 | End: 2019-06-03 | Stop reason: HOSPADM

## 2019-06-03 RX ORDER — ACETAMINOPHEN 500 MG
1000 TABLET ORAL ONCE
Status: COMPLETED | OUTPATIENT
Start: 2019-06-03 | End: 2019-06-03

## 2019-06-03 RX ORDER — FENTANYL CITRATE 50 UG/ML
50 INJECTION, SOLUTION INTRAMUSCULAR; INTRAVENOUS EVERY 5 MIN PRN
Status: DISCONTINUED | OUTPATIENT
Start: 2019-06-03 | End: 2019-06-03 | Stop reason: HOSPADM

## 2019-06-03 RX ORDER — PROMETHAZINE HYDROCHLORIDE 25 MG/ML
25 INJECTION, SOLUTION INTRAMUSCULAR; INTRAVENOUS EVERY 6 HOURS PRN
Status: DISCONTINUED | OUTPATIENT
Start: 2019-06-03 | End: 2019-06-04 | Stop reason: HOSPADM

## 2019-06-03 RX ORDER — FENTANYL CITRATE 50 UG/ML
INJECTION, SOLUTION INTRAMUSCULAR; INTRAVENOUS PRN
Status: DISCONTINUED | OUTPATIENT
Start: 2019-06-03 | End: 2019-06-03 | Stop reason: SDUPTHER

## 2019-06-03 RX ORDER — ROPIVACAINE HYDROCHLORIDE 5 MG/ML
INJECTION, SOLUTION EPIDURAL; INFILTRATION; PERINEURAL
Status: COMPLETED | OUTPATIENT
Start: 2019-06-03 | End: 2019-06-03

## 2019-06-03 RX ORDER — M-VIT,TX,IRON,MINS/CALC/FOLIC 27MG-0.4MG
1 TABLET ORAL 2 TIMES DAILY
Status: DISCONTINUED | OUTPATIENT
Start: 2019-06-03 | End: 2019-06-04 | Stop reason: HOSPADM

## 2019-06-03 RX ORDER — SODIUM CHLORIDE, SODIUM LACTATE, POTASSIUM CHLORIDE, CALCIUM CHLORIDE 600; 310; 30; 20 MG/100ML; MG/100ML; MG/100ML; MG/100ML
INJECTION, SOLUTION INTRAVENOUS CONTINUOUS PRN
Status: DISCONTINUED | OUTPATIENT
Start: 2019-06-03 | End: 2019-06-03 | Stop reason: SDUPTHER

## 2019-06-03 RX ORDER — EPHEDRINE SULFATE/0.9% NACL/PF 50 MG/5 ML
SYRINGE (ML) INTRAVENOUS PRN
Status: DISCONTINUED | OUTPATIENT
Start: 2019-06-03 | End: 2019-06-03 | Stop reason: SDUPTHER

## 2019-06-03 RX ORDER — PANTOPRAZOLE SODIUM 40 MG/1
40 TABLET, DELAYED RELEASE ORAL
Status: DISCONTINUED | OUTPATIENT
Start: 2019-06-03 | End: 2019-06-04 | Stop reason: HOSPADM

## 2019-06-03 RX ORDER — MIDAZOLAM HYDROCHLORIDE 1 MG/ML
1 INJECTION INTRAMUSCULAR; INTRAVENOUS EVERY 5 MIN PRN
Status: COMPLETED | OUTPATIENT
Start: 2019-06-03 | End: 2019-06-03

## 2019-06-03 RX ORDER — VANCOMYCIN HYDROCHLORIDE 1 G/20ML
INJECTION, POWDER, LYOPHILIZED, FOR SOLUTION INTRAVENOUS PRN
Status: DISCONTINUED | OUTPATIENT
Start: 2019-06-03 | End: 2019-06-03 | Stop reason: ALTCHOICE

## 2019-06-03 RX ORDER — OXYCODONE HCL 10 MG/1
10 TABLET, FILM COATED, EXTENDED RELEASE ORAL ONCE
Status: COMPLETED | OUTPATIENT
Start: 2019-06-03 | End: 2019-06-03

## 2019-06-03 RX ORDER — MORPHINE SULFATE 4 MG/ML
4 INJECTION, SOLUTION INTRAMUSCULAR; INTRAVENOUS EVERY 4 HOURS PRN
Status: DISCONTINUED | OUTPATIENT
Start: 2019-06-03 | End: 2019-06-04 | Stop reason: HOSPADM

## 2019-06-03 RX ORDER — PROPOFOL 10 MG/ML
INJECTION, EMULSION INTRAVENOUS CONTINUOUS PRN
Status: DISCONTINUED | OUTPATIENT
Start: 2019-06-03 | End: 2019-06-03 | Stop reason: SDUPTHER

## 2019-06-03 RX ORDER — FERROUS SULFATE 325(65) MG
325 TABLET ORAL DAILY
Status: DISCONTINUED | OUTPATIENT
Start: 2019-06-03 | End: 2019-06-04 | Stop reason: HOSPADM

## 2019-06-03 RX ORDER — OXYCODONE AND ACETAMINOPHEN 10; 325 MG/1; MG/1
1 TABLET ORAL EVERY 4 HOURS PRN
Status: DISCONTINUED | OUTPATIENT
Start: 2019-06-03 | End: 2019-06-04 | Stop reason: HOSPADM

## 2019-06-03 RX ORDER — BISACODYL 10 MG
10 SUPPOSITORY, RECTAL RECTAL PRN
Status: DISCONTINUED | OUTPATIENT
Start: 2019-06-03 | End: 2019-06-04 | Stop reason: HOSPADM

## 2019-06-03 RX ADMIN — DOCUSATE SODIUM 100 MG: 100 CAPSULE, LIQUID FILLED ORAL at 20:06

## 2019-06-03 RX ADMIN — MIDAZOLAM 1 MG: 1 INJECTION INTRAMUSCULAR; INTRAVENOUS at 07:03

## 2019-06-03 RX ADMIN — SODIUM CHLORIDE, POTASSIUM CHLORIDE, SODIUM LACTATE AND CALCIUM CHLORIDE: 600; 310; 30; 20 INJECTION, SOLUTION INTRAVENOUS at 05:39

## 2019-06-03 RX ADMIN — ROPIVACAINE HYDROCHLORIDE 20 ML: 5 INJECTION, SOLUTION EPIDURAL; INFILTRATION; PERINEURAL at 06:32

## 2019-06-03 RX ADMIN — ROPIVACAINE HYDROCHLORIDE 40 ML: 5 INJECTION, SOLUTION EPIDURAL; INFILTRATION; PERINEURAL at 06:45

## 2019-06-03 RX ADMIN — MIDAZOLAM 1 MG: 1 INJECTION INTRAMUSCULAR; INTRAVENOUS at 06:30

## 2019-06-03 RX ADMIN — CELECOXIB 200 MG: 100 CAPSULE ORAL at 05:40

## 2019-06-03 RX ADMIN — FENTANYL CITRATE 50 MCG: 50 INJECTION, SOLUTION INTRAMUSCULAR; INTRAVENOUS at 06:30

## 2019-06-03 RX ADMIN — PROPOFOL 50 MCG/KG/MIN: 10 INJECTION, EMULSION INTRAVENOUS at 07:40

## 2019-06-03 RX ADMIN — PHENYLEPHRINE HYDROCHLORIDE 100 MCG: 10 INJECTION INTRAVENOUS at 08:59

## 2019-06-03 RX ADMIN — PROMETHAZINE HYDROCHLORIDE 25 MG: 25 INJECTION INTRAMUSCULAR; INTRAVENOUS at 11:23

## 2019-06-03 RX ADMIN — OXYCODONE HYDROCHLORIDE 10 MG: 10 TABLET, FILM COATED, EXTENDED RELEASE ORAL at 05:40

## 2019-06-03 RX ADMIN — PHENYLEPHRINE HYDROCHLORIDE 100 MCG: 10 INJECTION INTRAVENOUS at 07:54

## 2019-06-03 RX ADMIN — PHENYLEPHRINE HYDROCHLORIDE 100 MCG: 10 INJECTION INTRAVENOUS at 08:42

## 2019-06-03 RX ADMIN — MULTIPLE VITAMINS W/ MINERALS TAB 1 TABLET: TAB at 20:06

## 2019-06-03 RX ADMIN — FLUOXETINE HYDROCHLORIDE 30 MG: 10 CAPSULE ORAL at 16:20

## 2019-06-03 RX ADMIN — FENTANYL CITRATE 50 MCG: 50 INJECTION INTRAMUSCULAR; INTRAVENOUS at 06:30

## 2019-06-03 RX ADMIN — PHENYLEPHRINE HYDROCHLORIDE 100 MCG: 10 INJECTION INTRAVENOUS at 08:33

## 2019-06-03 RX ADMIN — Medication 2 G: at 10:25

## 2019-06-03 RX ADMIN — Medication 2 G: at 07:23

## 2019-06-03 RX ADMIN — QUETIAPINE FUMARATE 100 MG: 100 TABLET ORAL at 20:06

## 2019-06-03 RX ADMIN — PHENYLEPHRINE HYDROCHLORIDE 100 MCG: 10 INJECTION INTRAVENOUS at 08:02

## 2019-06-03 RX ADMIN — BUPIVACAINE HYDROCHLORIDE: 5 INJECTION, SOLUTION PERINEURAL at 09:07

## 2019-06-03 RX ADMIN — PHENYLEPHRINE HYDROCHLORIDE 100 MCG: 10 INJECTION INTRAVENOUS at 08:12

## 2019-06-03 RX ADMIN — MIDAZOLAM 1 MG: 1 INJECTION INTRAMUSCULAR; INTRAVENOUS at 06:32

## 2019-06-03 RX ADMIN — MIDAZOLAM 1 MG: 1 INJECTION INTRAMUSCULAR; INTRAVENOUS at 08:20

## 2019-06-03 RX ADMIN — PROPOFOL 100 MCG/KG/MIN: 10 INJECTION, EMULSION INTRAVENOUS at 07:19

## 2019-06-03 RX ADMIN — FENTANYL CITRATE 25 MCG: 50 INJECTION, SOLUTION INTRAMUSCULAR; INTRAVENOUS at 07:19

## 2019-06-03 RX ADMIN — GABAPENTIN 600 MG: 300 CAPSULE ORAL at 05:40

## 2019-06-03 RX ADMIN — ACETAMINOPHEN 1000 MG: 500 TABLET, FILM COATED ORAL at 05:40

## 2019-06-03 RX ADMIN — BUPIVACAINE HYDROCHLORIDE 2 ML: 2.5 INJECTION, SOLUTION EPIDURAL; INFILTRATION; INTRACAUDAL; PERINEURAL at 07:19

## 2019-06-03 RX ADMIN — Medication 5 MG: at 07:47

## 2019-06-03 RX ADMIN — PHENYLEPHRINE HYDROCHLORIDE 100 MCG: 10 INJECTION INTRAVENOUS at 08:27

## 2019-06-03 RX ADMIN — POTASSIUM CHLORIDE, DEXTROSE MONOHYDRATE AND SODIUM CHLORIDE: 150; 5; 450 INJECTION, SOLUTION INTRAVENOUS at 11:22

## 2019-06-03 RX ADMIN — PHENYLEPHRINE HYDROCHLORIDE 100 MCG: 10 INJECTION INTRAVENOUS at 08:47

## 2019-06-03 RX ADMIN — Medication 2 G: at 18:59

## 2019-06-03 RX ADMIN — SODIUM CHLORIDE, POTASSIUM CHLORIDE, SODIUM LACTATE AND CALCIUM CHLORIDE: 600; 310; 30; 20 INJECTION, SOLUTION INTRAVENOUS at 08:10

## 2019-06-03 RX ADMIN — FENTANYL CITRATE 50 MCG: 50 INJECTION, SOLUTION INTRAMUSCULAR; INTRAVENOUS at 08:18

## 2019-06-03 RX ADMIN — LIDOCAINE HYDROCHLORIDE 3 ML: 10 INJECTION, SOLUTION INFILTRATION; PERINEURAL at 07:19

## 2019-06-03 RX ADMIN — PHENYLEPHRINE HYDROCHLORIDE 100 MCG: 10 INJECTION INTRAVENOUS at 07:43

## 2019-06-03 RX ADMIN — LITHIUM CARBONATE 300 MG: 300 CAPSULE, GELATIN COATED ORAL at 16:20

## 2019-06-03 RX ADMIN — SODIUM CHLORIDE, POTASSIUM CHLORIDE, SODIUM LACTATE AND CALCIUM CHLORIDE: 600; 310; 30; 20 INJECTION, SOLUTION INTRAVENOUS at 07:03

## 2019-06-03 RX ADMIN — PROMETHAZINE HYDROCHLORIDE 25 MG: 25 INJECTION INTRAMUSCULAR; INTRAVENOUS at 20:06

## 2019-06-03 RX ADMIN — PHENYLEPHRINE HYDROCHLORIDE 100 MCG: 10 INJECTION INTRAVENOUS at 09:07

## 2019-06-03 RX ADMIN — SODIUM CHLORIDE, POTASSIUM CHLORIDE, SODIUM LACTATE AND CALCIUM CHLORIDE: 600; 310; 30; 20 INJECTION, SOLUTION INTRAVENOUS at 07:38

## 2019-06-03 RX ADMIN — OXYCODONE HYDROCHLORIDE AND ACETAMINOPHEN 1 TABLET: 10; 325 TABLET ORAL at 11:25

## 2019-06-03 RX ADMIN — Medication 5 MG: at 07:50

## 2019-06-03 ASSESSMENT — PULMONARY FUNCTION TESTS
PIF_VALUE: 1
PIF_VALUE: 0
PIF_VALUE: 1
PIF_VALUE: 0
PIF_VALUE: 1
PIF_VALUE: 0
PIF_VALUE: 1
PIF_VALUE: 0
PIF_VALUE: 0
PIF_VALUE: 1
PIF_VALUE: 1
PIF_VALUE: 0
PIF_VALUE: 1
PIF_VALUE: 0
PIF_VALUE: 1
PIF_VALUE: 0
PIF_VALUE: 1
PIF_VALUE: 0
PIF_VALUE: 1
PIF_VALUE: 0
PIF_VALUE: 1
PIF_VALUE: 1
PIF_VALUE: 0
PIF_VALUE: 1
PIF_VALUE: 0
PIF_VALUE: 1
PIF_VALUE: 0
PIF_VALUE: 1
PIF_VALUE: 1
PIF_VALUE: 0
PIF_VALUE: 1
PIF_VALUE: 0
PIF_VALUE: 1
PIF_VALUE: 1

## 2019-06-03 ASSESSMENT — PAIN SCALES - GENERAL
PAINLEVEL_OUTOF10: 4
PAINLEVEL_OUTOF10: 0
PAINLEVEL_OUTOF10: 0
PAINLEVEL_OUTOF10: 4
PAINLEVEL_OUTOF10: 0

## 2019-06-03 ASSESSMENT — PAIN DESCRIPTION - ORIENTATION: ORIENTATION: RIGHT

## 2019-06-03 ASSESSMENT — PAIN - FUNCTIONAL ASSESSMENT
PAIN_FUNCTIONAL_ASSESSMENT: 0-10
PAIN_FUNCTIONAL_ASSESSMENT: 0-10

## 2019-06-03 ASSESSMENT — PAIN DESCRIPTION - DESCRIPTORS
DESCRIPTORS: DISCOMFORT
DESCRIPTORS: ACHING;CONSTANT;DISCOMFORT

## 2019-06-03 ASSESSMENT — PAIN DESCRIPTION - PAIN TYPE: TYPE: SURGICAL PAIN

## 2019-06-03 ASSESSMENT — PAIN DESCRIPTION - LOCATION: LOCATION: KNEE

## 2019-06-03 NOTE — ANESTHESIA POSTPROCEDURE EVALUATION
Department of Anesthesiology  Postprocedure Note    Patient: Fred Pinto  MRN: [id number]  YOB: 1970  Date of evaluation: 6/3/2019  Time:  10:27 AM     Procedure Summary     Date:  06/03/19 Room / Location:  [facility] / [address]    Anesthesia Start:  0703 Anesthesia Stop:  3716    Procedure:  RIGHT KNEE TOTAL KNEE ARTHROPLASTY [facility] & NEPHEW Carvel Plater) (Right Knee) Diagnosis:  (RIGHT KNEE DJD)    Surgeon:  Silvia Jose DO Responsible Provider:  Brennon Trinidad MD    Anesthesia Type:  spinal ASA Status:  2          Anesthesia Type: spinal    Luis Phase I: Luis Score: 10    Luis Phase II:      Last vitals: Reviewed and per EMR flowsheets.        Anesthesia Post Evaluation    Patient location during evaluation: PACU  Patient participation: complete - patient participated  Level of consciousness: awake  Pain score: 0  Airway patency: patent  Nausea & Vomiting: no nausea  Complications: no  Cardiovascular status: blood pressure returned to baseline  Respiratory status: acceptable  Hydration status: euvolemic

## 2019-06-03 NOTE — ANESTHESIA PROCEDURE NOTES
Spinal Block    Patient location during procedure: OR  Start time: 6/3/2019 7:05 AM  End time: 6/3/2019 7:17 AM  Reason for block: post-op pain management and primary anesthetic  Staffing  Anesthesiologist: Lico Batista MD  Resident/CRNA: MICH Carrasco - CRNA  Other anesthesia staff: Saleem Naranjo RN  Performed: other anesthesia staff   Preanesthetic Checklist  Completed: patient identified, site marked, surgical consent, pre-op evaluation, timeout performed, IV checked, risks and benefits discussed, monitors and equipment checked, anesthesia consent given, oxygen available and patient being monitored  Spinal Block  Patient position: sitting  Prep: Betadine and site prepped and draped  Patient monitoring: continuous pulse ox and frequent blood pressure checks  Approach: midline  Location: L3/L4  Procedures: paresthesia technique  Provider prep: mask, sterile gown and sterile gloves  Local infiltration: lidocaine  Dose: 0.5  Agent: bupivacaine  Adjuvant: fentanyl (25 mcg fent)  Dose: 2  Dose: 2  Needle  Needle type: Pencan   Needle gauge: 25 G  Needle length: 3.5 in  Assessment  Sensory level: T4  Swirl obtained: Yes  CSF: clear  Attempts: 1  Hemodynamics: stable

## 2019-06-03 NOTE — CONSULTS
Department of Internal Medicine        HISTORY OF PRESENT ILLNESS:      The patient is a 47 y.o. female who presents with having an elective right knee arthroplasty. The patient is seen postop and has expected postop discomfort. The patient does complain still of lightheadedness and nausea postop which was relieved with medication. Case was discussed with patient's mother at the bedside. Past Medical History:    Past Medical History:   Diagnosis Date    Bipolar 1 disorder ([address]     Depression     Obesity     Osteoarthritis     bilateral knees    Thiamine deficiency     Vitamin D deficiency      Past Surgical History:    Past Surgical History:   Procedure Laterality Date    CHOLECYSTECTOMY      ENDOSCOPY, COLON, DIAGNOSTIC      GALLBLADDER SURGERY  1994    JOINT REPLACEMENT Right 06/03/2019    [place] LAP GASTRIC BYPASS/KIERAN-EN-Y N/A 4/30/2018    GASTRIC BYPASS KIERAN-EN-Y LAPAROSCOPIC performed by Frances Cornelius MD at [address] [place] [address] 10/22/2018    EGD BIOPSY performed by Kevin Kelly MD at [place] [address] N/A 2/21/2019    EGD ESOPHAGOGASTRODUODENOSCOPY performed by Frances Cornelius MD at [place] [address]       Medications Prior to Admission:    @  Prior to Admission medications    Medication Sig Start Date End Date Taking?  Authorizing Provider   Cholecalciferol (VITAMIN D3) 5000 units TABS Take by mouth daily   Yes Historical Provider, MD   Docusate Sodium (COLACE PO) Take by mouth every other day   Yes Historical Provider, MD   QUEtiapine (SEROQUEL) 100 MG tablet Take 1 tablet by mouth nightly 4/15/19  Yes Cheryl Sutton MD   lithium 300 MG capsule Take 1 capsule by mouth daily 4/15/19  Yes Cheryl Sutton MD   Cholecalciferol (VITAMIN D3) 15972 units CAPS Take one capsule each week for 13 weeks 4/12/19  Yes Petrona Angela MD   omeprazole (PRILOSEC) 20 MG delayed release capsule Take 1 capsule by mouth 2 times of club or organization: Not on file     Attends meetings of clubs or organizations: Not on file     Relationship status: Not on file    Intimate partner violence:     Fear of current or ex partner: Not on file     Emotionally abused: Not on file     Physically abused: Not on file     Forced sexual activity: Not on file   Other Topics Concern    Not on file   Social History Narrative    Not on file       Family History:   Family History   Problem Relation Age of Onset    Breast Cancer Mother     Heart Disease Mother     Heart Disease Father     Parkinsonism Father     Heart Disease Maternal Grandmother     Cancer Maternal Grandmother     Heart Disease Paternal Grandmother        REVIEW OF SYSTEMS:    Gen: Patient denies any lightheadedness or dizziness. No LOC or syncope. No fevers or chills. HEENT: No earache, sore throat or nasal congestion. Resp: Denies cough, hemoptysis or sputum production. Cardiac: Denies chest pain, SOB, diaphoresis or palpitations. GI: No nausea, vomiting, diarrhea or constipation. No melena or hematochezia. : No urinary complaints, dysuria, hematuria or frequency. MSK: No extremity weakness, paralysis or paresthesias. PHYSICAL EXAM:    Vitals:  /63   Pulse 90   Temp 98.1 °F (36.7 °C) (Oral)   Resp 16   Ht 5' 5\" (1.651 m)   Wt 150 lb 3.2 oz (68.1 kg)   LMP 10/01/2018   SpO2 99%   BMI 24.99 kg/m²     General:  This is a 47 y.o. yo female who is alert and oriented in NAD  HEENT:  Head is normocephalic and atraumatic, PERRLA, EOMI, mucus membranes moist with no pharyngeal erythema or exudate. Neck:  Supple with no carotid bruits, JVD or thyromegaly.   No cervical adenopathy  CV:  Regular rate and rhythm, no murmurs  Lungs:  Clear to auscultation bilaterally with no wheezes, rales or rhonchi  Abdomen:  Soft, nontender, nondistended, bowel sounds present  Extremities:  + Right knee postop edema, peripheral pulses intact bilaterally  Neuro: Cranial nerves II-XII grossly intact; motor and sensory function intact with no focal deficits  Skin:  No rashes, lesions or wounds      DATA:  CBC with Differential:    Lab Results   Component Value Date    WBC 5.0 05/22/2019    RBC 4.37 05/22/2019    HGB 11.8 05/22/2019    HCT 38.4 05/22/2019     05/22/2019    MCV 87.9 05/22/2019    MCH 27.0 05/22/2019    MCHC 30.7 05/22/2019    RDW 12.8 05/22/2019    LYMPHOPCT 45.1 05/22/2019    MONOPCT 6.0 05/22/2019    BASOPCT 1.0 05/22/2019    MONOSABS 0.30 05/22/2019    LYMPHSABS 2.25 05/22/2019    EOSABS 0.08 05/22/2019    BASOSABS 0.05 05/22/2019     CMP:    Lab Results   Component Value Date     05/22/2019    K 4.4 05/22/2019    K 3.5 10/22/2018     05/22/2019    CO2 33 05/22/2019    BUN 13 05/22/2019    CREATININE 0.8 05/22/2019    GFRAA >60 05/22/2019    LABGLOM >60 05/22/2019    GLUCOSE 92 05/22/2019    GLUCOSE 95 10/20/2011    PROT 6.9 04/17/2019    LABALBU 3.6 04/17/2019    LABALBU 4.1 10/20/2011    CALCIUM 9.4 05/22/2019    BILITOT 0.3 04/17/2019    ALKPHOS 66 04/17/2019    AST 12 04/17/2019    ALT <5 04/17/2019     Magnesium:    Lab Results   Component Value Date    MG 2.3 12/02/2018     Phosphorus:    Lab Results   Component Value Date    PHOS 3.3 12/02/2018     PT/INR:    Lab Results   Component Value Date    PROTIME 11.3 05/22/2019    INR 1.0 05/22/2019     Troponin:    Lab Results   Component Value Date    TROPONINI <0.01 12/01/2018     U/A:    Lab Results   Component Value Date    COLORU Yellow 05/22/2019    PROTEINU Negative 05/22/2019    PHUR 6.5 05/22/2019    LABCAST RARE 11/16/2018    WBCUA 10-20 05/22/2019    RBCUA NONE 05/22/2019    MUCUS Present 11/29/2018    YEAST FEW 12/07/2018    BACTERIA MODERATE 05/22/2019    CLARITYU SLCLOUDY 05/22/2019    SPECGRAV <=1.005 05/22/2019    LEUKOCYTESUR LARGE 05/22/2019    UROBILINOGEN 0.2 05/22/2019    BILIRUBINUR Negative 05/22/2019    BLOODU Negative 05/22/2019    GLUCOSEU Negative 05/22/2019 ABG:  No results found for: PH, PCO2, PO2, HCO3, BE, THGB, TCO2, O2SAT  HgBA1c:    Lab Results   Component Value Date    LABA1C 5.2 08/14/2018     FLP:    Lab Results   Component Value Date    TRIG 81 04/17/2019    HDL 60 02/02/2017    LDLCALC 122 02/02/2017    LABVLDL 17 02/02/2017     TSH:    Lab Results   Component Value Date    TSH 1.290 04/17/2019     IRON:  No results found for: IRON  LIPASE:    Lab Results   Component Value Date    LIPASE 10 12/28/2018       ASSESSMENT AND PLAN:      Patient Active Problem List    Diagnosis Date Noted    Primary osteoarthritis of right knee 06/03/2019    Primary localized osteoarthritis of right knee 05/20/2019    Vitamin D deficiency     Thiamine deficiency     Marginal ulcer 10/22/2018    S/P bariatric surgery 04/30/2018    Gastroesophageal reflux disease with esophagitis     [facility]     Bilateral primary osteoarthritis of knee 05/23/2016     -Home meds reviewed and continued  -Monitor heart rate, blood pressure and O2 saturation  -We'll discuss with orthopedics about stopping the Celebrex with patient's recent history of having a marginal ulcer      Scott Gilman D.O.  6/3/2019  11:47 AM

## 2019-06-03 NOTE — PROGRESS NOTES
Occupational Therapy  Occupational Therapy Initial Assessment    Date:6/3/2019  Patient Name: Nemo Alonso  MRN: [id number]  : 1970  ROOM #: [id number]    Placement Recommendation: HHOT   Equipment Prescriptions Needed: none     [facility]   AM-PAC Daily Activity Inpatient   How much help for putting on and taking off regular lower body clothing?: Total  How much help for Bathing?: A Lot  How much help for Toileting?: A Lot  How much help for putting on and taking off regular upper body clothing?: A Little  How much help for taking care of personal grooming?: A Little  How much help for eating meals?: None  AM-PAC Inpatient Daily Activity Raw Score: 15  AM-PAC Inpatient ADL T-Scale Score : 34.69  ADL Inpatient CMS 0-100% Score: 56.46  ADL Inpatient CMS G-Code Modifier : CK    Diagnosis:   1. Primary localized osteoarthritis of right knee      Past Medical History:   Past Medical History:   Diagnosis Date    B[facility] disorder [facility]     Depression     Obesity     Osteoarthritis     bilateral knees    Thiamine deficiency     Vitamin D deficiency          The admitting diagnosis and active problem list as listed above have been reviewed prior to the initiation of this evaluation. Nursing cleared the patient for evaluation. Patient is agreeable to evaluation. Precautions: falls, full weight bearing: R LE d/t TKA, dizziness   Pain Scale: Numeric Rate: no pain; Nursing notified. Social history: with family : daughter      Home architecture: single family home, 2 story, ramp to enter, bedroom and bathroom/ walk in shower on 2nd floor, half bath on 1st floor. PLOF: independent with BADL and IADL, ambulated with no device   Equipment owned: wheeled walker, built in shower chair, grab bars  Cognition: oriented x 4; follows 3 step directions.     good  Problem solving skills   good  Memory    good  Sequencing  Communication: intact   Visual perceptual skills: intact     Glasses: yes   Edema: no Sensation: intact   Hand Dominance:  Left     X  Right     Left Right Comment   Passive range of motion [facility]     Active range of motion [facility]     Muscle Grade 4/5 4/5    /pinch Strength Intact  Intact     [] Malnutrition indicators have been identified and nursing has been notified to ensure a dietitian consult is ordered. Function Assessment    Status  Goal Comment   Feeding:  Independent   Independent      Grooming:  Minimal Assist  Independent      UE dressing:  Minimal Assist  Independent     LE dressing:  Dependent  Independent     Bathing: Moderate Assist  Independent     Bed Mobility:     Supervision  for supine to sit using leg ,   Supervision  for scooting,  Supervision  for sit to supine  Independent     Functional Transfers:    Minimal Assist x 2 for sit to stand transfer from EOB Independent  Safety: fair    Functional Mobility: 4 side steps towards head of bed with wheeled walker and  Minimal Assist x 2  Modified [place]       Pt/family participated in establishment of goals. Balance:   Sitting: good   Standing: fair with wheeled walker    Endurance: fair       Assessment of Current Deficits:   [x]Functional mobility  []ROM  [x]Strength   []Cognition   []Safety Awareness    [x]ADLs [x]IADLs   [x]Endurance  [x]Balance    []Vision  []Sensation     []Gross Motor Coordination       []Fine Motor Coordination     · Low Complexity  · History: Brief history including review of medical records relating to the problem  · Exam: 1-3 performance Deficits  · Assistance/Modification: No assistance or modifications required to perform tasks. No comorbities affecting occupational performance.      Patients Goal: return home   Treatment: OT susan, pt educated and trained in weight bearing status and dressing kit, pt educated in use of leg  prior to use, bed mobility with assistance to guide lower extremity off bed using leg , sitting balance at EOB for 10 minutes with supervision, transfer training with verbal cues for hand placement, static standing balance with wheeled walker, functional mobility with wheeled walker, verbal cues for walker sequence and safety, pt returned to bed at end of eval. Pt was able to bridge to adjust chux. Education provided for proper positioning of lower extremity in bed to prevent contractures. All needs within reach. Rehab Potential: good   Plan of care: Patient will be seen by OT 2-4 times a week for therapeutic activity, ADL re-training, bed mobility, functional transfers, functional mobility, safety and fall prevention, balance and endurance activities, instruction in energy conservation principles, and patient/family education. Patient and/or family understands diagnosis, prognosis, education and plan of care. Pt/family verbalized understanding.      Time Code treatment minutes: Preet OTR/L #[id number]

## 2019-06-03 NOTE — PLAN OF CARE
Problem: Infection - Surgical Site:  Goal: Will show no infection signs and symptoms  Description  Will show no infection signs and symptoms  Outcome: Met This Shift     Problem: Pain - Acute:  Goal: Pain level will decrease  Description  Pain level will decrease  Outcome: Met This Shift     Problem: Pain:  Goal: Pain level will decrease  Description  Pain level will decrease  Outcome: Met This Shift     Problem: Pain:  Goal: Control of acute pain  Description  Control of acute pain  Outcome: Met This Shift     Problem: Falls - Risk of:  Goal: Will remain free from falls  Description  Will remain free from falls  Outcome: Met This Shift     Problem: Nausea/Vomiting:  Goal: Absence of nausea/vomiting  Description  Absence of nausea/vomiting  Outcome: Met This Shift     Problem: Nausea/Vomiting:  Goal: Able to drink  Description  Able to drink  Outcome: Met This Shift

## 2019-06-03 NOTE — ANESTHESIA PRE PROCEDURE
Department of Anesthesiology  Preprocedure Note       Name:  Abisai Jain   Age:  47 y.o.  :  1970                                          MRN:  [id number]         Date:  6/3/2019      Surgeon: Neftali Bellamy): Gregor Koch DO    Procedure: RIGHT KNEE TOTAL KNEE ARTHROPLASTY [facility] [place]) (Right )    Medications prior to admission:   Prior to Admission medications    Medication Sig Start Date End Date Taking?  Authorizing Provider   Cholecalciferol (VITAMIN D3) 5000 units TABS Take by mouth daily    Historical Provider, MD   Docusate Sodium (COLACE PO) Take by mouth every other day    Historical Provider, MD   QUEtiapine (SEROQUEL) 100 MG tablet Take 1 tablet by mouth nightly 4/15/19   Tristan Dominguez MD   lithium 300 MG capsule Take 1 capsule by mouth daily 4/15/19   Tristan Dominguez MD   FLUoxetine (PROZAC) 20 MG capsule Take 1 capsule by mouth daily 4/15/19   Tristan Dominguez MD   Cholecalciferol (VITAMIN D3) 90325 units CAPS Take one capsule each week for 13 weeks 19   Evans Kim MD   sucralfate (CARAFATE) 1 GM tablet Take 0.5 tablets by mouth every 3 hours 19   Cisco Perez MD   omeprazole (PRILOSEC) 20 MG delayed release capsule Take 1 capsule by mouth 2 times daily 10/23/18   Olga Lidia Godinez MD   Biotin 99627 MCG TABS Take 1 capsule by mouth daily     Historical Provider, MD   Ferrous Sulfate (IRON) 28 MG TABS Take 1 tablet by mouth daily     Historical Provider, MD   Multiple Vitamins-Minerals (THERAPEUTIC MULTIVITAMIN-MINERALS) tablet Take 1 tablet by mouth 2 times daily    Historical Provider, MD   calcium-cholecalciferol 500-200 MG-UNIT per tablet Take 1 tablet by mouth daily 18   Cisco Perez MD   FLUoxetine (PROZAC) 10 MG capsule Take 1 capsule by mouth daily  Patient taking differently: Take 10 mg by mouth daily Take with Prozac 20 mg for total dose 30 mg daily 17   Tristan Dominguez MD       Current medications: No current facility-administered medications for this visit. No current outpatient medications on file.      Facility-Administered Medications Ordered in Other Visits   Medication Dose Route Frequency Provider Last Rate Last Dose    chlorhexidine (HIBICLENS) 4 % liquid   Topical [facility] - C[facility]        sodium chloride flush 0.9 % injection 10 mL  10 mL Intravenous 2 times per day [facility], APRN - CNP        sodium chloride flush 0.9 % injection 10 mL  10 mL Intravenous PRN [facility], APRN - CNP        ceFAZolin (ANCEF) 2 g in dextrose 5 % 100 mL IVPB  2 g Intravenous [facility] - CNP        fentaNYL (SUBLIMAZE) injection 50 mcg  50 mcg Intravenous Q5 Min PRN Shilpa Murrieta MD        lactated ringers infusion   Intravenous Continuous Jaime Maki MD 10 mL/hr at 06/03/19 0539      midazolam (VERSED) injection 1 mg  1 mg Intravenous Q5 Min PRN Shilpa Murrieta MD        ropivacaine (NAROPIN) 0.5% injection 30 mL  30 mL Infiltration Once Shilpa Murrieta MD        CEFAZOLIN 2 G D5W 100 ML IVPB IVPB             ropivacaine (NAROPIN) 0.5% injection             fentaNYL (SUBLIMAZE) 100 MCG/2ML injection             midazolam (VERSED) 2 MG/2ML injection             meperidine (DEMEROL) injection 12.5 mg  12.5 mg Intravenous Q5 Min PRN Medina Espinoza MD        HYDROmorphone HCl PF (DILAUDID) injection 0.5 mg  0.5 mg Intravenous Q5 Min PRN Medina Espinoza MD           Allergies:  No Known Allergies    Problem List:    Patient Active Problem List   Diagnosis Code    Bilateral primary osteoarthritis of knee M17.0    B[facility] 1 disorder ([facility] F31.9    Gastroesophageal reflux disease with esophagitis K21.0    S/P bariatric surgery Z98.84    Marginal ulcer K28.9    Vitamin D deficiency E55.9    Thiamine deficiency E51.9    Primary localized osteoarthritis of right knee M17.11    Primary osteoarthritis of right knee M17.11       Past Medical History:        Diagnosis Date    B[facility] 1 disorder ([facility]     Depression     Obesity     Osteoarthritis     bilateral knees    Thiamine deficiency     Vitamin D deficiency        Past Surgical History:        Procedure Laterality Date    CHOLECYSTECTOMY      ENDOSCOPY, COLON, DIAGNOSTIC      GALLBLADDER SURGERY  1994    [place] LAP GASTRIC BYPASS/KIERAN-EN-Y N/A 4/30/2018    GASTRIC BYPASS KIERAN-EN-Y LAPAROSCOPIC performed by Denny Skiff, MD at [address] [facility] N/A 10/22/2018    EGD BIOPSY performed by Rodriguez Garsia MD at [facility] N/A 2/21/2019    EGD ESOPHAGOGASTRODUODENOSCOPY performed by Denny Skiff, MD at [address] History:    Social History     Tobacco Use    Smoking status: Never Smoker    Smokeless tobacco: Never Used   Substance Use Topics    Alcohol use: No     Alcohol/week: 0.0 oz                                Counseling given: Not Answered      Vital Signs (Current): There were no vitals filed for this visit.                                            BP Readings from Last 3 Encounters:   06/03/19 (!) 154/82   05/22/19 100/80   05/15/19 110/80       NPO Status:                                                                                 BMI:   Wt Readings from Last 3 Encounters:   06/03/19 148 lb (67.1 kg)   05/22/19 155 lb 1.6 oz (70.4 kg)   05/15/19 151 lb (68.5 kg)     There is no height or weight on file to calculate BMI.    CBC:   Lab Results   Component Value Date    WBC 5.0 05/22/2019    RBC 4.37 05/22/2019    HGB 11.8 05/22/2019    HCT 38.4 05/22/2019    MCV 87.9 05/22/2019    RDW 12.8 05/22/2019     05/22/2019       CMP:   Lab Results   Component Value Date     05/22/2019    K 4.4 05/22/2019    K 3.5 10/22/2018     05/22/2019    CO2 33 05/22/2019    BUN 13 05/22/2019    CREATININE 0.8 05/22/2019    GFRAA >60 05/22/2019    LABGLOM >60 05/22/2019    GLUCOSE 92 05/22/2019    GLUCOSE 95 10/20/2011    PROT 6.9 04/17/2019    CALCIUM 9.4 05/22/2019    BILITOT 0.3 04/17/2019    ALKPHOS 66 04/17/2019    AST 12 04/17/2019    ALT <5 04/17/2019       POC Tests: No results for input(s): POCGLU, POCNA, POCK, POCCL, POCBUN, POCHEMO, POCHCT in the last 72 hours. Coags:   Lab Results   Component Value Date    PROTIME 11.3 05/22/2019    INR 1.0 05/22/2019    APTT 32.6 05/22/2019       HCG (If Applicable):   Lab Results   Component Value Date    PREGTESTUR NEGATIVE 06/03/2019        ABGs: No results found for: PHART, PO2ART, ZFW3LEQ, TRU3EQR, BEART, C8JHDZOY     Type & Screen (If Applicable):  No results found for: [facility]    Anesthesia Evaluation  Patient summary reviewed no history of anesthetic complications:   Airway: Mallampati: II  TM distance: >3 FB   Neck ROM: full  Mouth opening: > = 3 FB Dental: normal exam         Pulmonary:Negative Pulmonary ROS breath sounds clear to auscultation                             Cardiovascular:    (+) hypertension:,       ECG reviewed  Rhythm: regular  Rate: normal  Echocardiogram reviewed               ROS comment: EKG: Normal Sinus Rhythm 65. ECHO:  Normal diastolic function.   Ejection fraction is visually estimated at 55-60%.   Normal L atrium.   Normal mitral valve structure and function.   The aortic valve is trileaflet.   The aortic valve appears mildly sclerotic.   No pericardial effusion. Neuro/Psych:   (+) psychiatric history (Bipolar 1 disorder):depression/anxiety             GI/Hepatic/Renal:            ROS comment: S/P bariatric surgery (approximately 170 pound weight loss)   . Endo/Other:    (+) : arthritis: OA., .                 Abdominal:         (-) obese     Vascular: negative vascular ROS. Anesthesia Plan      spinal     ASA 2     (Right adductor canal nerve block for post-op pain control  Spinal as first option)  Induction: intravenous.       Anesthetic plan and risks

## 2019-06-03 NOTE — PROGRESS NOTES
Physical Therapy      Room #:   4886/0724-53  Patient Name: George Rogers    PHYSICAL THERAPY INITIAL EVALUATION    Tentative placement recommendation: HHPT 5 days/week    Equipment recommendation: Patient has needed equipment       Plan of care: Patient will be seen  twice daily  for therapeutic exercise, functional retraining, endurance activities, balance exercises, family and patient education. AM-PAC Basic Mobility        AM-[facility] Mobility Inpatient   How much difficulty turning over in bed?: A Little  How much difficulty sitting down on / standing up from a chair with arms?: A Little  How much difficulty moving from lying on back to sitting on side of bed?: A Little  How much help from another person moving to and from a bed to a chair?: A Lot  How much help from another person needed to walk in hospital room?: A Lot  How much help from another person for climbing 3-5 steps with a railing?: A Lot  AM-PAC Inpatient Mobility Raw Score : 15  AM-PAC Inpatient T-Scale Score : 39.45  Mobility Inpatient CMS 0-100% Score: 57.7  Mobility Inpatient CMS G-Code Modifier : CK    Order:  EVALUATE AND TREAT    Diagnosis/Problem list:    1.  Primary localized osteoarthritis of right knee        Patient Active Problem List   Diagnosis    Bilateral primary osteoarthritis of knee    [facility]    Gastroesophageal reflux disease with esophagitis    S/P bariatric surgery    Marginal ulcer    Vitamin D deficiency    Thiamine deficiency    Primary localized osteoarthritis of right knee    Primary osteoarthritis of right knee    Obesity    Depression       Past medical history:       Diagnosis Date    [facility]     Depression     Obesity     Osteoarthritis     bilateral knees    Thiamine deficiency     Vitamin D deficiency    ;      Procedure Laterality Date    CHOLECYSTECTOMY      ENDOSCOPY, COLON, DIAGNOSTIC      GALLBLADDER SURGERY  1994    JOINT REPLACEMENT Right 06/03/2019

## 2019-06-03 NOTE — PROGRESS NOTES
Pt to recovery for right adductor canal block, pt hooked to monitor and placed on 3 liters per nasal cannula, 0630 time out done, Dr Gerald Lozano at cart side right side prepped, 0630 pt medicated per order and 4053 re-medicated for effective sedation, 0632 block started and completed 0636 with the use of ultrasound, pt awakens to name then falls back to sleep, VSS, will cont to monitor

## 2019-06-03 NOTE — OP NOTE
Department of Orthopedic Surgery  Operative Report        Pre-operative Diagnosis:  Right Knee Osteoarthritis    Post-operative Diagnosis:  Right Knee Osteoarthritis    Procedure:  Right Knee Arthroplasty    Surgeon:  Rachel Tucker DO     Assistant(s):  justo    Anesthesia:  Spinal anesthesia    Estimated blood loss:  Minimal    Specimens:  none    Implants:  S&N journey ii femur 4, tibia 3, ti 10 p 26x7. 5    Complications:  none    Condition:  Stable    Brief Hospital Course:  Maciej Craig is a patient known to Rachel Tucker DO's practice with persistent complaints of Right knee pain. Knee pain has failed to be relieved by non-operative conservative measures, and has began affecting daily activities of living. After examination of the patient, review of the radiologic studies, and appropriate pre-operative risk assessment, Rachel Tucker DO recommended Right knee arthroplasty, which the patient was agreeable towards. Operative Course: The patient was seen and identified outside the operative suite, in which the operative site was marked as appropriate by patient, surgeon, staff, and anesthesia. The patient was then taken into the operative suite, transferred to the operative table with all bony prominences and neurovascular structures well padded and protected. A tourniquet was placed high on the proximal thigh of the operative extremity. The patient was sedated under the care of the anesthesia team. The operative site was prepped and draped in standard sterile fashion. The tourniquet was inflated to 300 mmHg. An anterior midline incision was made over the knee with full-thickness flaps reflected revealing the anterior joint capsule. Medial parapatellar arthrotomy was performed reflecting the patella laterally. Anterior fat pad and anterior horns of the lateral and medial meniscus were sharply excised. The knee was flexed up.  Anterior cruciate ligament and posterior cruciate ligament were then excised. All osteophytes on the distal femur were removed with rongeur. At this point, drilling of the intramedullary canal of the distal femur was then performed. Distal femoral cutting jig was then placed and pinned in appropriate position. An oscillating saw was used to make the distal femoral cut, discarding the pieces of cut femoral bone and sent for study. The posterior reference guide was then placed on the distal femur after the intramedullary guide and the distal femoral cutting guide were then removed. The posterior referencing guide was then pinned in appropriate position. Biju wing was used to confirm appropriate femoral trial size according to pre-operative templating. Posterior reference guide was then removed. An appropriate sized 4-in-1 cutting guide was applied to the distal femur. Oscillating saw was used to make the anterior, posterior, and chamfer cuts. The 4-in-1 cutting guide was then removed. Attention was turned to the tibia. Intramedullary drilling was then performed of the proximal tibia. The intramedullary guide with the proximal tibial cutting guide was then placed on the tibia. Proximal tibial cutting guide was then pinned in appropriate position. After pinning the proximal tibial cutting guide in appropriate position, oscillating saw was then used to make the proximal tibial cut. The guide was then removed. The proximal tibia that was cut was sharply excised and removed. At this time, all osteophytes on the proximal tibia were then removed with a rongeur. Tensiometer was then placed in extension and flexion, confirming appropriate ligamentous balancing. The box-cutting guide for the posterior-stabilized knee was then placed on the distal femur. The box was then cut in the distal femur without complication. Box-cutting guide was then removed. An appropriately sized trial tibial baseplate and a polyethylene component trial were then placed into the knee.  The appropriately sized femur trial component was then placed. The knee was reduced and taken through a range of motion and found to be appropriately stable. Half pins were then placed in the tibial base plate confirming position in preparation for keel punch. The patella was then prepared. The patella surface was free hand cut for the appropriate size implant. The patella holes were drilled and the patella was then trialed. There was good alignment and tracking of the patella. Distal femoral component was removed, trial poly was then removed. Keel punch was then performed of the proximal tibial. Tibial base plate was then removed. Copious irrigation was performed of the wound and final inspection for all interposed soft tissue and loose bodies was then performed as cement was being mixed. Copious irrigation was performed once again of the knee. Cement was placed on the proximal tibial component and the tibial component was then impacted into appropriate position with all excess extruded cement being removed with [place] elevator. A polyethylene component was then impacted into appropriate position and checked for stability, which it was stable. Cement was then placed on the distal femoral component. Distal femoral component was then impacted in appropriate position with all excess extruded cement being removed with a [place] elevator. The knee was extended, taken through a range of motion, and found to be appropriately stable. Final inspection for all excess extruded cement was then performed. [place] elevator removed all excess extruded cement. Copious irrigation was performed of the knee. The knee was then soaked with a bedadine solution soak for 3 minutes. The knee was then throughly irrigated with saline solution. Then 1 gram of vanomycin powder was placed within the wound. The capsule was  then closed with strata-fix closure. I then injected our TXA mixture into the knee joint.   Copious irrigation was performed of this layer

## 2019-06-03 NOTE — H&P
Updated H&P    Chief Complaint   Patient presents with    Knee Pain       Bilateral knee pain, right worse than left.         Gladis Haas returns today for follow-up of her bilateral knee pain. she reports this is worse than when I saw her last.  The patient's pain level is a 10/10.  The previous treatment was not successful.     Past Medical History        Past Medical History:   Diagnosis Date    Bipolar 1 disorder ([address]      Depression      Obesity      Osteoarthritis       bilateral knees    Thiamine deficiency      Vitamin D deficiency           Past Surgical History         Past Surgical History:   Procedure Laterality Date    CHOLECYSTECTOMY        ENDOSCOPY, COLON, DIAGNOSTIC        GALLBLADDER SURGERY   1994    ND LAP GASTRIC BYPASS/KIERAN-EN-Y N/A 4/30/2018     GASTRIC BYPASS KIERAN-EN-Y LAPAROSCOPIC performed by Jo Ann Davis MD at [address] [place] [address] N/A 10/22/2018     EGD BIOPSY performed by Bernarda Marin MD at [address] [place] [address] N/A 2/21/2019     EGD ESOPHAGOGASTRODUODENOSCOPY performed by Jo Ann Davis MD at [address] [place] [address]      Current Outpatient Medications:     QUEtiapine (SEROQUEL) 100 MG tablet, Take 1 tablet by mouth nightly, Disp: 30 tablet, Rfl: 5    lithium 300 MG capsule, Take 1 capsule by mouth daily, Disp: 30 capsule, Rfl: 5    FLUoxetine (PROZAC) 20 MG capsule, Take 1 capsule by mouth daily, Disp: 30 capsule, Rfl: 5    Cholecalciferol (VITAMIN D3) 32734 units CAPS, Take one capsule each week for 13 weeks, Disp: 12 capsule, Rfl: 0    sucralfate (CARAFATE) 1 GM tablet, Take 0.5 tablets by mouth every 3 hours, Disp: 120 tablet, Rfl: 3    omeprazole (PRILOSEC) 20 MG delayed release capsule, Take 1 capsule by mouth 2 times daily, Disp: 60 capsule, Rfl: 3    Biotin 06359 MCG TABS, Take 1 capsule by mouth daily , Disp: , Rfl:     Ferrous Sulfate (IRON) 28 MG TABS, Take 1 tablet by Drawer negative, Posterior Drawer negative  Mercedez's negative, Thallasy  negative,   PF grind test negative, Apprehension test negative,  Patellar J sign  negative     Xrays:   4 views of the right knee. Intact alignment. Moderate to   severe tricompartmental joint space narrowing with marginal spurring. No acute osseous abnormality or joint effusion. Similar findings left   knee.            MRI:   n/a  Radiographic findings reviewed with patient     Impression:       Encounter Diagnoses   Name Primary?  Primary osteoarthritis of right knee Yes    Primary osteoarthritis of left knee           Plan:   Natural history and expected course discussed. Questions answered. Educational materials distributed. Rest, ice, compression, and elevation (RICE) therapy. Reduction in offending activity. I had a lengthy discussion with the patient regarding their diagnosis. I explained treatment options including surgical vs non surgical treatment. I reviewed in detail the risks and benefits and outlined the procedure in detail with expected outcomes and possible complications. I also discussed non surgical treatment such as injections (CSI and visco supplementation), physical therapy, topical creams and NSAID's. They have elected for surgical management at this time. We discussed various treatment options both surgical and non-surgical.   The patient is unable to ambulate more than 100 feet and is unable to perform the average daily activities including:  Light housework, ADLs, donning clothes, toileting and exercise. Patient has failed previous conservative measures including cortisone injections, NSAIDs, PT, HEP and pain medication and is currently a fall risk to the disability and decreased functioning. The patient wishes to have the total knee arthroplasty. The risks and benefits of a total knee replacement were discussed with the patient.   The risks include but are not limited to: infection, injury to blood

## 2019-06-03 NOTE — ANESTHESIA PROCEDURE NOTES
Peripheral Block    Patient location during procedure: PACU  Start time: 6/3/2019 6:30 AM  End time: 6/3/2019 6:36 AM  Staffing  Anesthesiologist: Imelda Wynne MD  Other anesthesia staff: Bryan Sun RN  Preanesthetic Checklist  Completed: patient identified, site marked, surgical consent, pre-op evaluation, timeout performed, IV checked, risks and benefits discussed, monitors and equipment checked, anesthesia consent given, oxygen available and patient being monitored  Peripheral Block  Patient position: supine  Prep: ChloraPrep  Patient monitoring: cardiac monitor, continuous pulse ox, frequent blood pressure checks and IV access  Block type: Femoral  Laterality: right  Injection technique: single-shot  Procedures: ultrasound guided  Adductor canal  Provider prep: mask and sterile gloves  Needle  Needle type: combined needle/nerve stimulator   Needle gauge: 22 G  Needle length: 5 cm  Needle localization: ultrasound guidance  Needle insertion depth: 5.5 cm  Assessment  Injection assessment: negative aspiration for heme, no paresthesia on injection and local visualized surrounding nerve on ultrasound  Paresthesia pain: none  Slow fractionated injection: yes  Hemodynamics: stable  Additional Notes  Versed 2 mgm IV abnd Fentanyl 50 microgram IV, Right groin prepared. Right groin and medial thigh are sterilized and draped. Ultra sound picture at the junction of the upper third and the lower two third of medial thigh, till we got a picture of the femoral artery and vein, then I passed the stimuplex needle on the proximal side of the femoral artery and injected 40 ccof Ropivicaine  0.5%. No complications.         Reason for block: post-op pain management and at surgeon's request

## 2019-06-03 NOTE — CARE COORDINATION
SS Note:  Consult for post-op d/c planning, d/c plan for pt to return home with [facility], notified Kevin Stevenson intake liaison who will follow for [place] [id number] orders and anticipated medical d/c tomorrow 6/4, pt has dme needed. Nursaing notified.  Electronically signed by GIO Bran on 6/3/2019 at 2:26 PM

## 2019-06-04 VITALS
BODY MASS INDEX: 25.02 KG/M2 | RESPIRATION RATE: 20 BRPM | DIASTOLIC BLOOD PRESSURE: 61 MMHG | HEART RATE: 70 BPM | TEMPERATURE: 98.8 F | HEIGHT: 65 IN | SYSTOLIC BLOOD PRESSURE: 109 MMHG | WEIGHT: 150.2 LBS | OXYGEN SATURATION: 99 %

## 2019-06-04 LAB
ANION GAP SERPL CALCULATED.3IONS-SCNC: 8 MMOL/L (ref 7–16)
BUN BLDV-MCNC: 12 MG/DL (ref 6–20)
CALCIUM SERPL-MCNC: 8.7 MG/DL (ref 8.6–10.2)
CHLORIDE BLD-SCNC: 101 MMOL/L (ref 98–107)
CO2: 29 MMOL/L (ref 22–29)
CREAT SERPL-MCNC: 0.6 MG/DL (ref 0.5–1)
GFR AFRICAN AMERICAN: >60
GFR NON-AFRICAN AMERICAN: >60 ML/MIN/1.73
GLUCOSE BLD-MCNC: 92 MG/DL (ref 74–99)
HCT VFR BLD CALC: 32 % (ref 34–48)
HEMOGLOBIN: 9.9 G/DL (ref 11.5–15.5)
POTASSIUM SERPL-SCNC: 4.3 MMOL/L (ref 3.5–5)
SODIUM BLD-SCNC: 138 MMOL/L (ref 132–146)

## 2019-06-04 PROCEDURE — 88311 DECALCIFY TISSUE: CPT

## 2019-06-04 PROCEDURE — 85018 HEMOGLOBIN: CPT

## 2019-06-04 PROCEDURE — 6370000000 HC RX 637 (ALT 250 FOR IP): Performed by: INTERNAL MEDICINE

## 2019-06-04 PROCEDURE — 97530 THERAPEUTIC ACTIVITIES: CPT

## 2019-06-04 PROCEDURE — 6360000002 HC RX W HCPCS: Performed by: ORTHOPAEDIC SURGERY

## 2019-06-04 PROCEDURE — 85014 HEMATOCRIT: CPT

## 2019-06-04 PROCEDURE — 2580000003 HC RX 258: Performed by: ORTHOPAEDIC SURGERY

## 2019-06-04 PROCEDURE — 96361 HYDRATE IV INFUSION ADD-ON: CPT

## 2019-06-04 PROCEDURE — 36415 COLL VENOUS BLD VENIPUNCTURE: CPT

## 2019-06-04 PROCEDURE — 6370000000 HC RX 637 (ALT 250 FOR IP): Performed by: ORTHOPAEDIC SURGERY

## 2019-06-04 PROCEDURE — 88305 TISSUE EXAM BY PATHOLOGIST: CPT

## 2019-06-04 PROCEDURE — 80048 BASIC METABOLIC PNL TOTAL CA: CPT

## 2019-06-04 PROCEDURE — 97110 THERAPEUTIC EXERCISES: CPT

## 2019-06-04 PROCEDURE — 97535 SELF CARE MNGMENT TRAINING: CPT

## 2019-06-04 RX ORDER — OXYCODONE HYDROCHLORIDE AND ACETAMINOPHEN 5; 325 MG/1; MG/1
1 TABLET ORAL EVERY 6 HOURS PRN
Qty: 28 TABLET | Refills: 0 | Status: SHIPPED | OUTPATIENT
Start: 2019-06-04 | End: 2019-06-11

## 2019-06-04 RX ADMIN — LITHIUM CARBONATE 300 MG: 300 CAPSULE, GELATIN COATED ORAL at 10:45

## 2019-06-04 RX ADMIN — Medication 2 G: at 03:59

## 2019-06-04 RX ADMIN — GABAPENTIN 100 MG: 100 CAPSULE ORAL at 03:13

## 2019-06-04 RX ADMIN — OXYCODONE HYDROCHLORIDE AND ACETAMINOPHEN 1 TABLET: 10; 325 TABLET ORAL at 12:01

## 2019-06-04 RX ADMIN — GABAPENTIN 100 MG: 100 CAPSULE ORAL at 12:00

## 2019-06-04 RX ADMIN — DOCUSATE SODIUM 100 MG: 100 CAPSULE, LIQUID FILLED ORAL at 10:44

## 2019-06-04 RX ADMIN — FLUOXETINE HYDROCHLORIDE 30 MG: 10 CAPSULE ORAL at 10:45

## 2019-06-04 RX ADMIN — ASPIRIN 325 MG: 325 TABLET, DELAYED RELEASE ORAL at 09:30

## 2019-06-04 RX ADMIN — OXYCODONE HYDROCHLORIDE AND ACETAMINOPHEN 1 TABLET: 10; 325 TABLET ORAL at 03:13

## 2019-06-04 RX ADMIN — CELECOXIB 200 MG: 100 CAPSULE ORAL at 06:38

## 2019-06-04 RX ADMIN — PANTOPRAZOLE SODIUM 40 MG: 40 TABLET, DELAYED RELEASE ORAL at 06:38

## 2019-06-04 ASSESSMENT — PAIN SCALES - GENERAL
PAINLEVEL_OUTOF10: 1
PAINLEVEL_OUTOF10: 5
PAINLEVEL_OUTOF10: 3
PAINLEVEL_OUTOF10: 2
PAINLEVEL_OUTOF10: 4
PAINLEVEL_OUTOF10: 1

## 2019-06-04 ASSESSMENT — PAIN DESCRIPTION - DESCRIPTORS
DESCRIPTORS: ACHING;DISCOMFORT;DULL
DESCRIPTORS: ACHING;DISCOMFORT;SORE

## 2019-06-04 ASSESSMENT — PAIN DESCRIPTION - PROGRESSION: CLINICAL_PROGRESSION: GRADUALLY WORSENING

## 2019-06-04 ASSESSMENT — PAIN DESCRIPTION - PAIN TYPE
TYPE: SURGICAL PAIN
TYPE: SURGICAL PAIN

## 2019-06-04 ASSESSMENT — PAIN DESCRIPTION - FREQUENCY
FREQUENCY: INTERMITTENT
FREQUENCY: INTERMITTENT

## 2019-06-04 ASSESSMENT — PAIN DESCRIPTION - ORIENTATION
ORIENTATION: RIGHT
ORIENTATION: RIGHT

## 2019-06-04 ASSESSMENT — PAIN DESCRIPTION - LOCATION
LOCATION: KNEE
LOCATION: KNEE

## 2019-06-04 ASSESSMENT — PAIN DESCRIPTION - ONSET
ONSET: ON-GOING
ONSET: ON-GOING

## 2019-06-04 ASSESSMENT — PAIN - FUNCTIONAL ASSESSMENT: PAIN_FUNCTIONAL_ASSESSMENT: PREVENTS OR INTERFERES SOME ACTIVE ACTIVITIES AND ADLS

## 2019-06-04 NOTE — PROGRESS NOTES
Occupational Therapy  O.T. Bedside Treatment Note    Date:2019  Patient Name: Venkat Parada  MRN: [id number]  : 1970  ROOM #: [id number]    Problem list / Diagnosis:   Patient Active Problem List   Diagnosis    Bilateral primary osteoarthritis of knee    [facility] ([facility]    Gastroesophageal reflux disease with esophagitis    S/P bariatric surgery    Marginal ulcer    Vitamin D deficiency    Thiamine deficiency    Primary localized osteoarthritis of right knee    Primary osteoarthritis of right knee    Obesity    Depression     Past Medical History:   Past Medical History:   Diagnosis Date    Bipolar 1 disorder ([facility]     Depression     Obesity     Osteoarthritis     bilateral knees    Thiamine deficiency     Vitamin D deficiency      Onset/Medical history: medical history reviewed  Past medical history: reviewed    The admitting diagnosis and active problem list as listed above have been reviewed prior to treatment. Nursing cleared the patient for treatment. Patient is agreeable to treatment. Discharge recommendations: [place] prescriptions needed: front wheeled walker; pt states she has all other needed equipment at her mother's house, where she will be staying after D/C    AM - [facility] Daily Activity Inpatient      AM-PAC Daily Activity Inpatient   How much help for putting on and taking off regular lower body clothing?: A Little  How much help for Bathing?: A Little  How much help for Toileting?: A Little  How much help for putting on and taking off regular upper body clothing?:None  How much help for taking care of personal grooming?: A Little  How much help for eating meals?: None  AM-PAC Inpatient Daily Activity Raw Score: 20    Precautions:falls, full weight bearing: R LE d/t TKA, dizziness     S:  - Pt cleared for treatment through nursing.  - Pain: pt c/o \"minimal\" pain in R knee, however pt did not rate pain. Nsg aware.   - Pt pleasant and cooperative during session. O:    Function Assessment     Status  Goal Comment   Feeding:  Independent   Independent   To bring cup to mouth   Grooming:  Minimal Assist  Independent   Pt able to wash face when seated in chair   UE dressing:  Independent   Independent  Pt able to doff gown and don shirt when seated in chair   LE dressing:  MIn assist Independent  Pt able to don underwear and pajama bottoms when seated in chair; pt demo'd good understanding and follow through with technique; min a for balance as pt donned pants over B hips   Bathing: Moderate Assist  Independent   Pt education, demonstration and participation with use of DME in clinic for bathroom safety/safe transfers. Pt has walk in shower at her mom's house, which has grab bars and seat.   Pt able to wash UB and proximal LE's when seated in chair, as well as pericare; assist to wash back; pt had SARAHI hose donned and declined washing distal LE's     Bed Mobility:       N/T as pt seated in chair at beginning and at end of session  Independent      Functional Transfers:    Min A for sit to stand transfers to/from EOB, to/from w/c and multiple surfaces in clinic with FWW; simulated car transfer at supervision; pt used LLE to support R LE to/from car Independent  Safety: fair    Functional Mobility: 15 ft x 2 in room with FWW min A progressing to supervision; 20 ft x 2 and 6 ft x 2 in clinic with FWW and min A progressing to supervision Modified [place]   No LOB noted; cuing for sequencing and joint protection when stepping back towards chair        A:  -Balance: Sitting: fair       Standing: fair with Min A progressing to supervision with FWW  -Endurance: fair   -Edema: yes - RLE ; SARAHI hose donned  -Safety: fair (VC's for walker safety, sequencing, hand placement, joint protection)  - Pt/family education/training: transfer training, functional mobility,  LE dressing, UE dressing,   sequencing, hand placement, walker safety, bathroom safety, DME,  ECT's, joint protection techniques,  grooming, ADL retraining, self-feeding  -Pt would benefit from additional ADLs, safety education, balance activities, transfer training, strength exercises, and over all endurance building. P:  - Plan of care: Patient will be D/C'd with HHOT PRN.    - Patient and/or family understands diagnosis, prognosis and plan of care: yes       Treatment minutes: Anali BEAVER [id number], [address]

## 2019-06-04 NOTE — PLAN OF CARE
Problem: Discharge Planning:  Goal: Discharged to appropriate level of care  Description  Discharged to appropriate level of care  Outcome: Met This Shift     Problem: Mobility - Impaired:  Goal: Mobility will improve  Description  Mobility will improve  6/4/2019 1038 by Lizbeth Mcneil RN  Outcome: Met This Shift  6/4/2019 0430 by Shalini Torres RN  Outcome: Met This Shift     Problem: Infection - Surgical Site:  Goal: Will show no infection signs and symptoms  Description  Will show no infection signs and symptoms  6/4/2019 1038 by Lizbeth Mcneil RN  Outcome: Met This Shift  6/4/2019 0430 by Shalini Torres RN  Outcome: Met This Shift     Problem: Pain - Acute:  Goal: Pain level will decrease  Description  Pain level will decrease  6/4/2019 1038 by Lizbeth Mcneil RN  Outcome: Met This Shift  6/4/2019 0430 by Shalini Torres RN  Outcome: Met This Shift     Problem: Pain:  Goal: Pain level will decrease  Description  Pain level will decrease  6/4/2019 1038 by Lizbeth Mcneil RN  Outcome: Met This Shift  6/4/2019 0430 by Shalini Torres RN  Outcome: Met This Shift  Goal: Control of acute pain  Description  Control of acute pain  6/4/2019 1038 by Lizbeth Mcneil RN  Outcome: Met This Shift  6/4/2019 0430 by Shalini Torres RN  Outcome: Met This Shift  Goal: Control of chronic pain  Description  Control of chronic pain  6/4/2019 1038 by Lizbeth Mcneil RN  Outcome: Met This Shift  6/4/2019 0430 by Shalini Torres RN  Outcome: Met This Shift     Problem: Falls - Risk of:  Goal: Will remain free from falls  Description  Will remain free from falls  6/4/2019 1038 by Lizbeth Mcneil RN  Outcome: Met This Shift  6/4/2019 0430 by Shalini Torres RN  Outcome: Met This Shift  Goal: Absence of physical injury  Description  Absence of physical injury  6/4/2019 1038 by Lizbeth Mcneil RN  Outcome: Met This Shift  6/4/2019 0430 by Shalini Torres RN  Outcome: Met This Shift     Problem: Nausea/Vomiting:  Goal: Absence of nausea/vomiting  Description  Absence of nausea/vomiting  Outcome: Met This Shift  Goal: Able to drink  Description  Able to drink  Outcome: Met This Shift  Goal: Able to eat  Description  Able to eat  Outcome: Met This Shift  Goal: Ability to achieve adequate nutritional intake will improve  Description  Ability to achieve adequate nutritional intake will improve  Outcome: Met This Shift

## 2019-06-04 NOTE — PLAN OF CARE
Problem: Discharge Planning:  Goal: Discharged to appropriate level of care  Description  Discharged to appropriate level of care  6/4/2019 1433 by Andria Jackson RN  Outcome: Completed  6/4/2019 1038 by Andria Jackson RN  Outcome: Met This Shift     Problem: Mobility - Impaired:  Goal: Mobility will improve  Description  Mobility will improve  6/4/2019 1433 by Andria Jackson RN  Outcome: Completed  6/4/2019 1038 by Andria Jackson RN  Outcome: Met This Shift  6/4/2019 0430 by Mindy Sharif RN  Outcome: Met This Shift     Problem: Infection - Surgical Site:  Goal: Will show no infection signs and symptoms  Description  Will show no infection signs and symptoms  6/4/2019 1433 by Andria Jackson RN  Outcome: Completed  6/4/2019 1038 by Andria Jackson RN  Outcome: Met This Shift  6/4/2019 0430 by Mindy Sharif RN  Outcome: Met This Shift     Problem: Pain - Acute:  Goal: Pain level will decrease  Description  Pain level will decrease  6/4/2019 1433 by Andria Jackson RN  Outcome: Completed  6/4/2019 1038 by Andria Jackson RN  Outcome: Met This Shift  6/4/2019 0430 by Mindy Sharif RN  Outcome: Met This Shift     Problem: Pain:  Goal: Pain level will decrease  Description  Pain level will decrease  6/4/2019 1433 by Andria Jackson RN  Outcome: Completed  6/4/2019 1038 by Andria Jackson RN  Outcome: Met This Shift  6/4/2019 0430 by Mindy Sharif RN  Outcome: Met This Shift  Goal: Control of acute pain  Description  Control of acute pain  6/4/2019 1433 by Andria Jackson RN  Outcome: Completed  6/4/2019 1038 by Andria Jackson RN  Outcome: Met This Shift  6/4/2019 0430 by Mindy Sharif RN  Outcome: Met This Shift  Goal: Control of chronic pain  Description  Control of chronic pain  6/4/2019 1433 by Andria Jackson RN  Outcome: Completed  6/4/2019 1038 by Andria Jackson RN  Outcome: Met This Shift  6/4/2019 0430 by Mindy Sharif RN  Outcome: Met This Shift     Problem: Falls - Risk of:  Goal: Will remain free from falls  Description  Will remain free from falls  6/4/2019 1433 by Filomena Rangel RN  Outcome: Completed  6/4/2019 1038 by Filomena Rangel RN  Outcome: Met This Shift  6/4/2019 0430 by Lisa Mak RN  Outcome: Met This Shift  Goal: Absence of physical injury  Description  Absence of physical injury  6/4/2019 1433 by Filomena Rangel RN  Outcome: Completed  6/4/2019 1038 by Filomena Rangel RN  Outcome: Met This Shift  6/4/2019 0430 by Lisa Mak RN  Outcome: Met This Shift     Problem: Nausea/Vomiting:  Goal: Absence of nausea/vomiting  Description  Absence of nausea/vomiting  6/4/2019 1433 by Filomena Rangel RN  Outcome: Completed  6/4/2019 1038 by Filomena Rangel RN  Outcome: Met This Shift  Goal: Able to drink  Description  Able to drink  6/4/2019 1433 by Filomena Rangel RN  Outcome: Completed  6/4/2019 1038 by Filomena Rangel RN  Outcome: Met This Shift  Goal: Able to eat  Description  Able to eat  6/4/2019 1433 by Filomena Rangel RN  Outcome: Completed  6/4/2019 1038 by Filomena Rangel RN  Outcome: Met This Shift  Goal: Ability to achieve adequate nutritional intake will improve  Description  Ability to achieve adequate nutritional intake will improve  6/4/2019 1433 by Filomena Rangel RN  Outcome: Completed  6/4/2019 1038 by Filomena Rangel RN  Outcome: Met This Shift

## 2019-06-04 NOTE — PROGRESS NOTES
CLINICAL PHARMACY NOTE: MEDS TO [address] Select Patient?: Yes  Total # of Prescriptions Filled: 1   The following medications were delivered to the patient:  · Oxycodone/apap 5-325  Total # of Interventions Completed: 3  Time Spent (min): 15    Additional Documentation:

## 2019-06-04 NOTE — PROGRESS NOTES
Department of Orthopedic Surgery  Resident Progress Note    Patient seen and examined. Pain controlled. Stood with therapy yesterday. A few steps to the commode. She has not been seen by therapy today. No complaints. Urinating well. Denies chest pain, shortness of breath, dizziness/lightheadedness.  No Flatulence, No BM     VITALS:  /61   Pulse 70   Temp 98.8 °F (37.1 °C) (Oral)   Resp 20   Ht 5' 5\" (1.651 m)   Wt 150 lb 3.2 oz (68.1 kg)   LMP 10/01/2018   SpO2 99%   BMI 24.99 kg/m²     General: alert and oriented to person, place and time, well-developed and well-nourished, in no acute distress    MUSCULOSKELETAL:   right lower extremity:  · Dressing C/D/I - picos no saturation  · Compartments soft and compressible  · +PF/DF/EHL  · +2/4 DP & PT pulses, Brisk Cap refill, Toes warm and perfused  · Distal sensation grossly intact to Peroneals, Sural, Saphenous, and tibial nrs    CBC:   Lab Results   Component Value Date    WBC 5.0 05/22/2019    HGB 9.9 06/04/2019    HCT 32.0 06/04/2019     05/22/2019     PT/INR:    Lab Results   Component Value Date    PROTIME 11.3 05/22/2019    INR 1.0 05/22/2019       ASSESSMENT  · S/P R TKA - 6/3/19    PLAN      · Continue physical therapy and protocol: WBAT - R LE  · 24 hour abx coverage  · Deep venous thrombosis prophylaxis - Aspirin BID, early mobilization  · PT/OT  · Pain Control: IV and PO  · Monitor H&H 9.9  · D/C Plan:  Home today if pt does well with therapy  · Discuss with Dr. Ramírez Sanchez

## 2019-06-04 NOTE — PROGRESS NOTES
not assessed    Treatment: Supine QS, GS, Ankle pumps  x 20 reps. Right side - hip abduction, hip adduction, SLR, heel slides 2x 10 reps   Comment: Slight fatigue between right sided exercises. Rest provided. Comment: Pt in chair with no alarm, nursing approval. Call light left by patient. A: Pt with little to no dizziness and an increased tolerance to treatment with no LOB as above. Pt motivated to progress. P: Continue with physical therapy   TIFF Redd  [employer], PTA     GOALS to be met in 1 days. Bed mobility-  Supervision                                          Transfers-Sit to stand-Supervision                 Gait:  Patient to ambulate 75 feet using wheeled walker with Supervision                 Steps: Patient to go up and down 10  step(s) using 1 rail(s) and crutch with  Minimal assist        Increase rom in affected joints by 10%  Increase strength in affected mm groups by 1/3 grade  Increase balance to allow for improvement towards functional goals.   Increase endurance to allow for improvement towards functional goals.

## 2019-06-04 NOTE — PROGRESS NOTES
Patient seen and examined. Patient has postop discomfort. Patient denies any nausea this morning. No complaints of unusual SOB, vomiting, chest pain,abd. pain, dizziness, diarrhea or constipation. /61   Pulse 70   Temp 98.8 °F (37.1 °C) (Oral)   Resp 20   Ht 5' 5\" (1.651 m)   Wt 150 lb 3.2 oz (68.1 kg)   LMP 10/01/2018   SpO2 99%   BMI 24.99 kg/m²     HEENT: negative to gross visual examination  Heart: regular rate and rhythm  Lungs: clear  Abdomen: soft, positive bowel sounds, no hepatosplenomegaly, no guarding, rebound, rigidity  Ext: no clubbing, cyanosis, erythema, + right knee edema post op  Neuro: alert and oriented.  No gross deficits    CBC with Differential:    Lab Results   Component Value Date    WBC 5.0 05/22/2019    RBC 4.37 05/22/2019    HGB 9.9 06/04/2019    HCT 32.0 06/04/2019     05/22/2019    MCV 87.9 05/22/2019    MCH 27.0 05/22/2019    MCHC 30.7 05/22/2019    RDW 12.8 05/22/2019    LYMPHOPCT 45.1 05/22/2019    MONOPCT 6.0 05/22/2019    BASOPCT 1.0 05/22/2019    MONOSABS 0.30 05/22/2019    LYMPHSABS 2.25 05/22/2019    EOSABS 0.08 05/22/2019    BASOSABS 0.05 05/22/2019     CMP:    Lab Results   Component Value Date     06/04/2019    K 4.3 06/04/2019    K 3.5 10/22/2018     06/04/2019    CO2 29 06/04/2019    BUN 12 06/04/2019    CREATININE 0.6 06/04/2019    GFRAA >60 06/04/2019    LABGLOM >60 06/04/2019    GLUCOSE 92 06/04/2019    GLUCOSE 95 10/20/2011    PROT 6.9 04/17/2019    LABALBU 3.6 04/17/2019    LABALBU 4.1 10/20/2011    CALCIUM 8.7 06/04/2019    BILITOT 0.3 04/17/2019    ALKPHOS 66 04/17/2019    AST 12 04/17/2019    ALT <5 04/17/2019     Magnesium:    Lab Results   Component Value Date    MG 2.3 12/02/2018     Phosphorus:    Lab Results   Component Value Date    PHOS 3.3 12/02/2018     PT/INR:    Lab Results   Component Value Date    PROTIME 11.3 05/22/2019    INR 1.0 05/22/2019     Troponin:    Lab Results   Component Value Date    TROPONINI <0.01 12/01/2018     U/A:    Lab Results   Component Value Date    COLORU Yellow 05/22/2019    PROTEINU Negative 05/22/2019    PHUR 6.5 05/22/2019    LABCAST RARE 11/16/2018    WBCUA 10-20 05/22/2019    RBCUA NONE 05/22/2019    MUCUS Present 11/29/2018    YEAST FEW 12/07/2018    BACTERIA MODERATE 05/22/2019    CLARITYU SLCLOUDY 05/22/2019    SPECGRAV <=1.005 05/22/2019    LEUKOCYTESUR LARGE 05/22/2019    UROBILINOGEN 0.2 05/22/2019    BILIRUBINUR Negative 05/22/2019    BLOODU Negative 05/22/2019    GLUCOSEU Negative 05/22/2019     HgBA1c:    Lab Results   Component Value Date    LABA1C 5.2 08/14/2018     FLP:    Lab Results   Component Value Date    TRIG 81 04/17/2019    HDL 60 02/02/2017    LDLCALC 122 02/02/2017    LABVLDL 17 02/02/2017     TSH:    Lab Results   Component Value Date    TSH 1.290 04/17/2019     LIPASE:    Lab Results   Component Value Date    LIPASE 10 12/28/2018       No results for input(s): GLUMET in the last 72 hours. XR KNEE RIGHT (3 VIEWS)   Final Result   No radiographic complication           docusate sodium  100 mg Oral BID    gabapentin  100 mg Oral Q8H    celecoxib  200 mg Oral Q12H    aspirin  325 mg Oral BID    calcium-vitamin D  1 tablet Oral Daily    ferrous sulfate  325 mg Oral Daily    therapeutic multivitamin-minerals  1 tablet Oral BID    QUEtiapine  100 mg Oral Nightly    lithium  300 mg Oral Daily    FLUoxetine  30 mg Oral Daily    pantoprazole  40 mg Oral BID AC        Assessment; Active Problems:    Primary localized osteoarthritis of right knee-S/P R TKA 6/3/19    History gastric bypass    Depression    Normocytic anemia post op-hemoglobin 9.9    Plan:   Patient is okay to be discharged home  Patient follow-up primary care physician    See orders.         Elena Monique DO  9:58 AM  6/4/2019

## 2019-06-04 NOTE — PROGRESS NOTES
Physical Therapy  Daily Treatment Note  6/4/2019  0316/0316-01                      Rashel Garrett   [id number]                              1970    Patient Active Problem List   Diagnosis    Bilateral primary osteoarthritis of knee    Bipolar 1 disorder ([address]    Gastroesophageal reflux disease with esophagitis    S/P bariatric surgery    Marginal ulcer    Vitamin D deficiency    Thiamine deficiency    Primary localized osteoarthritis of right knee    Primary osteoarthritis of right knee    Obesity    Depression       Recommendation for discharge: HHPT 5days/week  Equipment prescriptions needed:Patient has needed equipment    AM-[facility] Mobility Inpatient   How much difficulty turning over in bed?: None  How much difficulty sitting down on / standing up from a chair with arms?: A Little  How much difficulty moving from lying on back to sitting on side of bed?: A Little  How much help from another person moving to and from a bed to a chair?: A Little  How much help from another person needed to walk in hospital room?: A Little  How much help from another person for climbing 3-5 steps with a railing?: A Little  AM-[facility] Inpatient Mobility Raw Score : 19  AM-[facility] Inpatient T-Scale Score : 45.44  Mobility Inpatient CMS 0-100% Score: 41.77  Mobility Inpatient CMS G-Code Modifier : CK      Precautions:  falls , right FWB (full weight bearing) dizziness    S: Patient cleared by nursing for treatment. Patient is agreeable to treatment. Pain status: (measured on a visual analog scale with 0=no pain and 10=excruciating pain) 0/10. Pt said she had very little pain. O: Pt was instructed in and performed the following:   Bed Mobility- Supine to sit-Independent     Scooting- Independent     Sit to supine-supervision    Transfers-sit to stand- Supervision with cues initially for hand placement    Gait:  Patient ambulated 180 feet using [place] with Supervision.   Comments:    Steps:  Pt ascended/descended 7

## 2019-06-04 NOTE — PLAN OF CARE
Problem: Mobility - Impaired:  Goal: Mobility will improve  Description  Mobility will improve  Outcome: Met This Shift     Problem: Infection - Surgical Site:  Goal: Will show no infection signs and symptoms  Description  Will show no infection signs and symptoms  6/4/2019 0430 by Vera Wilder RN  Outcome: Met This Shift     Problem: Pain - Acute:  Goal: Pain level will decrease  Description  Pain level will decrease  6/4/2019 0430 by Vera Wilder RN  Outcome: Met This Shift     Problem: Pain:  Description  Pain management should include both nonpharmacologic and pharmacologic interventions. Goal: Pain level will decrease  Description  Pain level will decrease  6/4/2019 0430 by Vera Wilder RN  Outcome: Met This Shift     Problem: Pain:  Description  Pain management should include both nonpharmacologic and pharmacologic interventions.   Goal: Control of acute pain  Description  Control of acute pain  6/4/2019 0430 by Vera Wilder RN  Outcome: Met This Shift

## 2019-06-05 ENCOUNTER — TELEPHONE (OUTPATIENT)
Dept: PHARMACY | Facility: CLINIC | Age: 49
End: 2019-06-05

## 2019-06-05 DIAGNOSIS — M17.11 PRIMARY LOCALIZED OSTEOARTHRITIS OF RIGHT KNEE: Primary | ICD-10-CM

## 2019-06-05 PROCEDURE — 1111F DSCHRG MED/CURRENT MED MERGE: CPT

## 2019-06-05 RX ORDER — OMEPRAZOLE 20 MG/1
20 CAPSULE, DELAYED RELEASE ORAL DAILY
COMMUNITY
End: 2019-07-14

## 2019-06-05 RX ORDER — LITHIUM CARBONATE 300 MG/1
900 CAPSULE ORAL NIGHTLY
COMMUNITY
End: 2019-08-23 | Stop reason: ALTCHOICE

## 2019-06-05 NOTE — TELEPHONE ENCOUNTER
CLINICAL PHARMACY NOTE  Post-Discharge Transitions of Care ([facility]OO)    Non-face-to-face services provided:  Assessment and support for treatment adherence and medication management-- med rec      Subjective/Objective:  Beatriz Silverio is a 47 y.o. female. Patient was discharged from [facility] on 6/4/19 with a diagnosis of right knee total arthroplasty. Patient outreach to review discharge medications and provide medication review and management. Spoke with patient. Patient states she does not want to take the percocet. Encouraged patient if she has pain to take the pain medication. Advised patient that percocet can be cut in half if she is concerned to try a lower dose first. Physical therapy nurse did come to the house today, waiting to hear from OT. Patient feels well, no fevers, not much pain. Is taking aspirin 325 mg twice a day. Patient states she did recently get over a stomach ulcer to carafate was stopped and prilosec was lowered to 20 mg once a day per gastro doctor. I educated patient to call gastro doctor to update him that now she is on high doses of aspirin and he may want her back on higher dose of omeprazole. Patient understands risk of GI bleed and states will call gastro doctor. No Known Allergies    Discharge Medications (as per current medication list/AVS):  There are NEW medications for you. START taking them after you leave the hospital:  aspirin 325 MG EC tablet Take 1 tablet by mouth 2 times daily     oxyCODONE-acetaminophen (PERCOCET) 5-325 MG per tablet Take 1 tablet by mouth every 6 hours as needed for Pain for up to 7 days. Intended supply: 7 days.  Take lowest dose possible to manage pain     You told us you were taking these medications at home, but the amount or how often you take this medication has CHANGED:  omeprazole (PRILOSEC) 20 MG delayed release capsule Take 20 mg by mouth BID  - pt states takes daily     These are medications you told us you were taking at home, severe illness, 14 days for others):      · Patient encouraged to schedule follow up with PCP    Thank you,    Chance Zelaya, PharmD, [place] Pharmacist  Direct: [phone], Ext [phone]  =================================  CLINICAL PHARMACY NOTE   POST-DISCHARGE TELEPHONE FOLLOW-UP ADDENDUM    For Pharmacy Admin Tracking Only    TCM Call Made?: Yes  [facility] Select Patient?: Yes  Total # of Interventions Recommended: 2 - Increased Dose #: 1  - Updated Order #: 1  Total # Interventions Accepted: 1  Intervention Severity:   - Level 1 Intervention Present?: No   - Level 2 #: 0   - Level 3 #: 0  Outreach Status: Review Complete  Care Coordinator Outreach to Patient?: No  Provider Contacted?: No  Time Spent (min): 30    Additional Documentation:

## 2019-06-06 ENCOUNTER — TELEPHONE (OUTPATIENT)
Dept: BARIATRICS/WEIGHT MGMT | Age: 49
End: 2019-06-06

## 2019-06-06 NOTE — TELEPHONE ENCOUNTER
Patient called in with question anout ASA use. She had knee replacement and they want her on ASA 325mg BID. She was recently taken off her Omeprazole and Carafate. I recommended going back on both and if ASA use extends over a month to recall. She voiced understanding.

## 2019-06-11 ENCOUNTER — HOSPITAL ENCOUNTER (OUTPATIENT)
Age: 49
Discharge: HOME | End: 2019-06-13
Payer: MEDICARE

## 2019-06-11 ENCOUNTER — OFFICE VISIT (OUTPATIENT)
Dept: FAMILY MEDICINE CLINIC | Age: 49
End: 2019-06-11
Payer: MEDICARE

## 2019-06-11 VITALS
WEIGHT: 151 LBS | SYSTOLIC BLOOD PRESSURE: 128 MMHG | TEMPERATURE: 98.4 F | DIASTOLIC BLOOD PRESSURE: 72 MMHG | BODY MASS INDEX: 25.16 KG/M2 | RESPIRATION RATE: 14 BRPM | OXYGEN SATURATION: 98 % | HEIGHT: 65 IN | HEART RATE: 80 BPM

## 2019-06-11 DIAGNOSIS — N39.0 URINARY TRACT INFECTION WITHOUT HEMATURIA, SITE UNSPECIFIED: Primary | ICD-10-CM

## 2019-06-11 DIAGNOSIS — N39.0 URINARY TRACT INFECTION WITHOUT HEMATURIA, SITE UNSPECIFIED: ICD-10-CM

## 2019-06-11 LAB
BILIRUBIN, POC: ABNORMAL
BLOOD URINE, POC: ABNORMAL
CLARITY, POC: ABNORMAL
COLOR, POC: ABNORMAL
GLUCOSE URINE, POC: ABNORMAL
KETONES, POC: ABNORMAL
LEUKOCYTE EST, POC: ABNORMAL
NITRITE, POC: ABNORMAL
PH, POC: 6.5
PROTEIN, POC: ABNORMAL
SPECIFIC GRAVITY, POC: 1.02
UROBILINOGEN, POC: 1

## 2019-06-11 PROCEDURE — 3017F COLORECTAL CA SCREEN DOC REV: CPT | Performed by: PHYSICIAN ASSISTANT

## 2019-06-11 PROCEDURE — 1036F TOBACCO NON-USER: CPT | Performed by: PHYSICIAN ASSISTANT

## 2019-06-11 PROCEDURE — 99213 OFFICE O/P EST LOW 20 MIN: CPT | Performed by: PHYSICIAN ASSISTANT

## 2019-06-11 PROCEDURE — 87088 URINE BACTERIA CULTURE: CPT

## 2019-06-11 PROCEDURE — G8427 DOCREV CUR MEDS BY ELIG CLIN: HCPCS | Performed by: PHYSICIAN ASSISTANT

## 2019-06-11 PROCEDURE — 81002 URINALYSIS NONAUTO W/O SCOPE: CPT | Performed by: PHYSICIAN ASSISTANT

## 2019-06-11 PROCEDURE — G8417 CALC BMI ABV UP PARAM F/U: HCPCS | Performed by: PHYSICIAN ASSISTANT

## 2019-06-11 RX ORDER — NITROFURANTOIN 25; 75 MG/1; MG/1
100 CAPSULE ORAL 2 TIMES DAILY
Qty: 20 CAPSULE | Refills: 0 | Status: SHIPPED | OUTPATIENT
Start: 2019-06-11 | End: 2019-06-21

## 2019-06-11 RX ORDER — FLUCONAZOLE 150 MG/1
150 TABLET ORAL ONCE
Qty: 2 TABLET | Refills: 0 | Status: SHIPPED | OUTPATIENT
Start: 2019-06-11 | End: 2019-06-11

## 2019-06-11 NOTE — PROGRESS NOTES
by Stefano Laguna DO at [address] [employer] N/A 10/22/2018    EGD BIOPSY performed by Nguyễn Coronado MD at [address] [facility] N/A 2/21/2019    EGD ESOPHAGOGASTRODUODENOSCOPY performed by Balwinder Quinonez MD at [address] [facility] History:  reports that she has never smoked. She has never used smokeless tobacco. She reports that she does not drink alcohol or use drugs. Family History: family history includes Breast Cancer in her mother; Cancer in her maternal grandmother; Heart Disease in her father, maternal grandmother, mother, and paternal grandmother; Parkinsonism in her father. Allergies: Patient has no known allergies. Physical Exam         VS:  /72   Pulse 80   Temp 98.4 °F (36.9 °C)   Resp 14   Ht 5' 5\" (1.651 m)   Wt 151 lb (68.5 kg)   LMP 10/01/2018   SpO2 98%   BMI 25.13 kg/m²    Oxygen Saturation Interpretation: Normal.    Constitutional:  Alert, development consistent with age. HEENT:  Atraumatic. Airway patent. PERRL. Neck:  Normal ROM. Supple. Lungs:  Clear to auscultation and breath sounds equal.  Heart:  Regular rate and rhythm, normal heart sounds, without pathological murmurs, ectopy, gallops, or rubs. Abdomen: Soft, mild suprapubic tenderness without rebound or guarding. BS X 4. No organomegaly. Back: No CVA tenderness. Skin:  Normal turgor. Warm, dry, without visible rash, unless noted elsewhere. Neurological:  Alert and oriented. Motor functions intact. Responds to verbal commands.     Lab / Imaging Results   (All laboratory and radiology results have been personally reviewed by myself)  Labs:  Results for orders placed or performed in visit on 06/11/19   POCT Urinalysis no Micro   Result Value Ref Range    Color, UA      Clarity, UA      Glucose, UA POC neg     Bilirubin, UA small     Ketones, UA 15 mg     Spec Grav, UA 1.020     Blood, UA POC neg     pH, UA 6.5     Protein, UA POC 30 mg Urobilinogen, UA 1.0     Leukocytes, UA small     Nitrite, UA neg      Imaging: All Radiology results interpreted by Radiologist unless otherwise noted. No orders to display     Medical Decision Making:    Patient is well appearing, non toxic and appropriate for outpatient management. Plan is for symptom management and PCP follow up. Assessment / Plan     Impression(s):  1. Urinary tract infection without hematuria, site unspecified      Disposition:  Disposition: Discharge to home    New Prescriptions    FLUCONAZOLE (DIFLUCAN) 150 MG TABLET    Take 1 tablet by mouth once for 1 dose May repeat in 3-5 days, if symptoms persist or recur. NITROFURANTOIN, MACROCRYSTAL-MONOHYDRATE, (MACROBID) 100 MG CAPSULE    Take 1 capsule by mouth 2 times daily for 10 days     UA with small leukocytes. Given symptoms, will cover with macrobid until culture returns. Recommended increasing clear fluids. Follow up with PCP in 7-10 days for repeat urine and recheck of symptoms. Discussed red flag symptoms. ER if changes or worse. Advised to take all medications as directed.

## 2019-06-12 LAB — URINE CULTURE, ROUTINE: NORMAL

## 2019-06-13 ENCOUNTER — OFFICE VISIT (OUTPATIENT)
Dept: FAMILY MEDICINE CLINIC | Age: 49
End: 2019-06-13
Payer: MEDICARE

## 2019-06-13 ENCOUNTER — APPOINTMENT (OUTPATIENT)
Dept: GENERAL RADIOLOGY | Age: 49
End: 2019-06-13
Payer: MEDICARE

## 2019-06-13 ENCOUNTER — HOSPITAL ENCOUNTER (EMERGENCY)
Age: 49
Discharge: HOME | End: 2019-06-13
Attending: EMERGENCY MEDICINE
Payer: MEDICARE

## 2019-06-13 VITALS
RESPIRATION RATE: 16 BRPM | OXYGEN SATURATION: 98 % | HEIGHT: 65 IN | BODY MASS INDEX: 24.83 KG/M2 | SYSTOLIC BLOOD PRESSURE: 104 MMHG | TEMPERATURE: 98.3 F | HEART RATE: 75 BPM | DIASTOLIC BLOOD PRESSURE: 64 MMHG | WEIGHT: 149 LBS

## 2019-06-13 VITALS
RESPIRATION RATE: 14 BRPM | SYSTOLIC BLOOD PRESSURE: 118 MMHG | WEIGHT: 149.5 LBS | HEIGHT: 65 IN | HEART RATE: 71 BPM | TEMPERATURE: 98.5 F | DIASTOLIC BLOOD PRESSURE: 79 MMHG | OXYGEN SATURATION: 98 % | BODY MASS INDEX: 24.91 KG/M2

## 2019-06-13 DIAGNOSIS — M25.561 ACUTE PAIN OF RIGHT KNEE: ICD-10-CM

## 2019-06-13 DIAGNOSIS — R25.1 SHAKINESS: Primary | ICD-10-CM

## 2019-06-13 DIAGNOSIS — R11.2 NON-INTRACTABLE VOMITING WITH NAUSEA, UNSPECIFIED VOMITING TYPE: Primary | ICD-10-CM

## 2019-06-13 DIAGNOSIS — M17.11 PRIMARY OSTEOARTHRITIS OF RIGHT KNEE: Primary | ICD-10-CM

## 2019-06-13 LAB
ALBUMIN SERPL-MCNC: 3.2 G/DL (ref 3.5–5.2)
ALP BLD-CCNC: 72 U/L (ref 35–104)
ALT SERPL-CCNC: 8 U/L (ref 0–32)
ANION GAP SERPL CALCULATED.3IONS-SCNC: 11 MMOL/L (ref 7–16)
AST SERPL-CCNC: 23 U/L (ref 0–31)
BACTERIA: ABNORMAL /HPF
BASOPHILS ABSOLUTE: 0.09 E9/L (ref 0–0.2)
BASOPHILS RELATIVE PERCENT: 1 % (ref 0–2)
BILIRUB SERPL-MCNC: 0.3 MG/DL (ref 0–1.2)
BILIRUBIN URINE: NEGATIVE
BLOOD, URINE: NEGATIVE
BUN BLDV-MCNC: 20 MG/DL (ref 6–20)
CALCIUM SERPL-MCNC: 9.7 MG/DL (ref 8.6–10.2)
CHLORIDE BLD-SCNC: 102 MMOL/L (ref 98–107)
CHP ED QC CHECK: NORMAL
CLARITY: ABNORMAL
CO2: 26 MMOL/L (ref 22–29)
CO2: 29 MMOL/L (ref 22–29)
COLOR: ABNORMAL
CREAT SERPL-MCNC: 0.7 MG/DL (ref 0.5–1)
EOSINOPHILS ABSOLUTE: 0.42 E9/L (ref 0.05–0.5)
EOSINOPHILS RELATIVE PERCENT: 4.7 % (ref 0–6)
EPITHELIAL CELLS, UA: ABNORMAL /HPF
GFR AFRICAN AMERICAN: >60
GFR AFRICAN AMERICAN: >60
GFR NON-AFRICAN AMERICAN: >60 ML/MIN/1.73
GFR NON-AFRICAN AMERICAN: >60 ML/MIN/1.73
GLUCOSE BLD-MCNC: 104 MG/DL
GLUCOSE BLD-MCNC: 86 MG/DL (ref 74–99)
GLUCOSE BLD-MCNC: 87 MG/DL (ref 74–99)
GLUCOSE URINE: NEGATIVE MG/DL
HCT VFR BLD CALC: 38.9 % (ref 34–48)
HEMOGLOBIN: 12 G/DL (ref 11.5–15.5)
IMMATURE GRANULOCYTES #: 0.03 E9/L
IMMATURE GRANULOCYTES %: 0.3 % (ref 0–5)
KETONES, URINE: 15 MG/DL
LACTIC ACID: 0.5 MMOL/L (ref 0.5–2.2)
LEUKOCYTE ESTERASE, URINE: NEGATIVE
LIPASE: 24 U/L (ref 13–60)
LYMPHOCYTES ABSOLUTE: 2.02 E9/L (ref 1.5–4)
LYMPHOCYTES RELATIVE PERCENT: 22.8 % (ref 20–42)
MCH RBC QN AUTO: 27.5 PG (ref 26–35)
MCHC RBC AUTO-ENTMCNC: 30.8 % (ref 32–34.5)
MCV RBC AUTO: 89.2 FL (ref 80–99.9)
MONOCYTES ABSOLUTE: 0.55 E9/L (ref 0.1–0.95)
MONOCYTES RELATIVE PERCENT: 6.2 % (ref 2–12)
NEUTROPHILS ABSOLUTE: 5.75 E9/L (ref 1.8–7.3)
NEUTROPHILS RELATIVE PERCENT: 65 % (ref 43–80)
NITRITE, URINE: NEGATIVE
PDW BLD-RTO: 13 FL (ref 11.5–15)
PH UA: 8 (ref 5–9)
PLATELET # BLD: 250 E9/L (ref 130–450)
PMV BLD AUTO: 12.6 FL (ref 7–12)
POC ANION GAP: 7 MMOL/L (ref 7–16)
POC BUN: 19 MG/DL (ref 8–23)
POC CHLORIDE: 102 MMOL/L (ref 100–108)
POC CREATININE: 0.7 MG/DL (ref 0.5–1)
POC POTASSIUM: 3.8 MMOL/L (ref 3.5–5)
POC SODIUM: 138 MMOL/L (ref 132–146)
POTASSIUM SERPL-SCNC: 5.8 MMOL/L (ref 3.5–5)
PROTEIN UA: ABNORMAL MG/DL
RBC # BLD: 4.36 E12/L (ref 3.5–5.5)
RBC UA: ABNORMAL /HPF (ref 0–2)
SEDIMENTATION RATE, ERYTHROCYTE: 44 MM/HR (ref 0–20)
SODIUM BLD-SCNC: 139 MMOL/L (ref 132–146)
SPECIFIC GRAVITY UA: 1.02 (ref 1–1.03)
TOTAL PROTEIN: 7.4 G/DL (ref 6.4–8.3)
UROBILINOGEN, URINE: 0.2 E.U./DL
WBC # BLD: 8.9 E9/L (ref 4.5–11.5)
WBC UA: ABNORMAL /HPF (ref 0–5)

## 2019-06-13 PROCEDURE — 99213 OFFICE O/P EST LOW 20 MIN: CPT | Performed by: NURSE PRACTITIONER

## 2019-06-13 PROCEDURE — 87040 BLOOD CULTURE FOR BACTERIA: CPT

## 2019-06-13 PROCEDURE — 1036F TOBACCO NON-USER: CPT | Performed by: NURSE PRACTITIONER

## 2019-06-13 PROCEDURE — 83690 ASSAY OF LIPASE: CPT

## 2019-06-13 PROCEDURE — 80053 COMPREHEN METABOLIC PANEL: CPT

## 2019-06-13 PROCEDURE — 3017F COLORECTAL CA SCREEN DOC REV: CPT | Performed by: NURSE PRACTITIONER

## 2019-06-13 PROCEDURE — G8427 DOCREV CUR MEDS BY ELIG CLIN: HCPCS | Performed by: NURSE PRACTITIONER

## 2019-06-13 PROCEDURE — 36415 COLL VENOUS BLD VENIPUNCTURE: CPT

## 2019-06-13 PROCEDURE — 81001 URINALYSIS AUTO W/SCOPE: CPT

## 2019-06-13 PROCEDURE — 80051 ELECTROLYTE PANEL: CPT

## 2019-06-13 PROCEDURE — 6360000002 HC RX W HCPCS: Performed by: STUDENT IN AN ORGANIZED HEALTH CARE EDUCATION/TRAINING PROGRAM

## 2019-06-13 PROCEDURE — 99284 EMERGENCY DEPT VISIT MOD MDM: CPT

## 2019-06-13 PROCEDURE — 84520 ASSAY OF UREA NITROGEN: CPT

## 2019-06-13 PROCEDURE — 73562 X-RAY EXAM OF KNEE 3: CPT

## 2019-06-13 PROCEDURE — G8420 CALC BMI NORM PARAMETERS: HCPCS | Performed by: NURSE PRACTITIONER

## 2019-06-13 PROCEDURE — 85025 COMPLETE CBC W/AUTO DIFF WBC: CPT

## 2019-06-13 PROCEDURE — 85651 RBC SED RATE NONAUTOMATED: CPT

## 2019-06-13 PROCEDURE — 82565 ASSAY OF CREATININE: CPT

## 2019-06-13 PROCEDURE — 96374 THER/PROPH/DIAG INJ IV PUSH: CPT

## 2019-06-13 PROCEDURE — 2580000003 HC RX 258: Performed by: STUDENT IN AN ORGANIZED HEALTH CARE EDUCATION/TRAINING PROGRAM

## 2019-06-13 PROCEDURE — 82947 ASSAY GLUCOSE BLOOD QUANT: CPT

## 2019-06-13 PROCEDURE — 86140 C-REACTIVE PROTEIN: CPT

## 2019-06-13 PROCEDURE — 87088 URINE BACTERIA CULTURE: CPT

## 2019-06-13 PROCEDURE — 82962 GLUCOSE BLOOD TEST: CPT | Performed by: NURSE PRACTITIONER

## 2019-06-13 PROCEDURE — 83605 ASSAY OF LACTIC ACID: CPT

## 2019-06-13 RX ORDER — 0.9 % SODIUM CHLORIDE 0.9 %
1000 INTRAVENOUS SOLUTION INTRAVENOUS ONCE
Status: COMPLETED | OUTPATIENT
Start: 2019-06-13 | End: 2019-06-13

## 2019-06-13 RX ORDER — ONDANSETRON 2 MG/ML
4 INJECTION INTRAMUSCULAR; INTRAVENOUS ONCE
Status: COMPLETED | OUTPATIENT
Start: 2019-06-13 | End: 2019-06-13

## 2019-06-13 RX ORDER — ONDANSETRON 4 MG/1
4 TABLET, ORALLY DISINTEGRATING ORAL EVERY 8 HOURS PRN
Qty: 10 TABLET | Refills: 0 | Status: SHIPPED | OUTPATIENT
Start: 2019-06-13 | End: 2019-08-23

## 2019-06-13 RX ADMIN — SODIUM CHLORIDE 1000 ML: 9 INJECTION, SOLUTION INTRAVENOUS at 19:40

## 2019-06-13 RX ADMIN — ONDANSETRON 4 MG: 2 INJECTION INTRAMUSCULAR; INTRAVENOUS at 19:42

## 2019-06-13 ASSESSMENT — ENCOUNTER SYMPTOMS
PHOTOPHOBIA: 0
EYE ITCHING: 0
NAUSEA: 0
SORE THROAT: 0
WHEEZING: 0
TROUBLE SWALLOWING: 0
APNEA: 0
COLOR CHANGE: 0
EYE REDNESS: 0
DIARRHEA: 0
WHEEZING: 0
ABDOMINAL DISTENTION: 0
STRIDOR: 0
FACIAL SWELLING: 0
SINUS PAIN: 0
EYE PAIN: 0
COUGH: 0
EYE DISCHARGE: 0
EYE DISCHARGE: 0
SINUS PRESSURE: 0
EYE REDNESS: 0
VOMITING: 0
CHOKING: 0
SINUS PRESSURE: 0
BACK PAIN: 0
NAUSEA: 1
SHORTNESS OF BREATH: 0
VOICE CHANGE: 0
DIARRHEA: 0
CHEST TIGHTNESS: 0
SHORTNESS OF BREATH: 0
RHINORRHEA: 0
EYE PAIN: 0
COUGH: 0
VOMITING: 1
SORE THROAT: 0
ABDOMINAL PAIN: 0

## 2019-06-13 ASSESSMENT — PAIN DESCRIPTION - LOCATION: LOCATION: KNEE

## 2019-06-13 ASSESSMENT — PAIN DESCRIPTION - PAIN TYPE: TYPE: CHRONIC PAIN

## 2019-06-13 ASSESSMENT — PAIN DESCRIPTION - ORIENTATION: ORIENTATION: RIGHT

## 2019-06-13 NOTE — PROGRESS NOTES
George Rogers is a 47 y.o. female who presents today for   Chief Complaint   Patient presents with    Shaking     whole body, started yesterday, pt states her legs almost went out while standing. HPI    Pt presents with her mother today. Pt stated she has intermittent episodes of shaking that started yesterday. Pt stated shaking occurs to her whole body. Pt stated this first started while she was at Physical Therapy ( s/p right total knee arthroplasty 6/3/19). Pt does not have a Seizure Disorder and is not Diabetic, she is afebrile and tolerating PO well. Vitals are stable. Pt is not having any shaking episodes during visit today, pt is currently being treated for UTI w/Macrobid. Pt is currently on Lithium for Bipolar, last Lithium level was 4/2019 0.90-low end of normal      [address]:  Patient's past medical, surgical, social and/or family history reviewed, updated in chart, and are non-contributory (unless otherwise stated). Medications and allergies also reviewed and updated in chart. Review of Systems  Review of Systems   Constitutional: Negative for appetite change, chills, diaphoresis and fever. HENT: Negative for congestion, ear discharge, ear pain, facial swelling, mouth sores, nosebleeds, postnasal drip, rhinorrhea, sinus pressure, sinus pain, sneezing, sore throat, trouble swallowing and voice change. Eyes: Negative for photophobia, pain, discharge, redness, itching and visual disturbance. Respiratory: Negative for apnea, cough, choking, chest tightness, shortness of breath, wheezing and stridor. Cardiovascular: Negative for chest pain, palpitations and leg swelling. Gastrointestinal: Negative for abdominal pain, diarrhea, nausea and vomiting. Endocrine: Negative for cold intolerance, heat intolerance, polydipsia, polyphagia and polyuria.    Genitourinary: Negative for decreased urine volume, difficulty urinating, dysuria, enuresis, flank pain, frequency, hematuria normal muscle tone. Coordination normal.   Skin: Skin is warm and dry. No rash noted. She is not diaphoretic. No erythema. No pallor. Psychiatric: Judgment and thought content normal.   Pleasant and cooperative, flat affect, intermittent smiling, answers questions appropriately, good eye contact   Nursing note and vitals reviewed. Assessment / Plan:      Rhiannon Johnson was seen today for shaking. Diagnoses and all orders for this visit:    Shakiness  Advised Glucose is normal  Recommended going to ED for further evaluation  -     POCT Glucose 104         Call or go to ED immediately if symptoms worsen or persist.    Return if symptoms worsen or fail to improve. , or sooner if necessary. Educational materials and/or home exercises printed for patient's review and were included in patient instructions on his/her After Visit Summary and given to patient at the end of visit. Counseled regarding above diagnosis, including possible risks and complications,  especially if left uncontrolled. Counseled regarding the possible side effects, risks, benefits and alternatives to treatment; patient and/or guardian verbalizes understanding, agrees, feels comfortable with and wishes to proceed with above treatment plan. Advised patient to call with any new medication issues, and read all Rx info from pharmacy to assure aware of all possible risks and side effects of medication before taking. Reviewed age and gender appropriate health screening exams and vaccinations. Advised patient regarding importance of keeping up with recommended health maintenance and to schedule as soon as possible if overdue, as this is important in assessing for undiagnosed pathology, especially cancer, as well as protecting against potentially harmful/life threatening disease. Patient and/or guardian verbalizes understanding and agrees with above counseling, assessment and plan. All questions answered.     Meghana James, APRN - CNP

## 2019-06-14 LAB — C-REACTIVE PROTEIN: 0.3 MG/DL (ref 0–0.4)

## 2019-06-14 NOTE — ED PROVIDER NOTES
The patient is a 25-year-old female presents the emergency department to be evaluated for chills, shaking, and vomiting that began yesterday. She states that she has had decreased appetite during this time with several bouts of nonbloody emesis. She denies any fevers but admits to chills. She denies any abdominal pain. There is no diarrhea or constipation. Patient states that she is currently on Macrobid being treated for urinary tract infection. She notes that she underwent a total joint replacement of the right knee on Chelo 3, 2019. She states that she has been receiving home physical therapy and home care since then. She states that she is been informed that her wound looks good without any bleeding or purulence. She is not using any pain medicines at this time because she does not tolerate them very well. She also denies NSAID use. She denies any chest pain or shortness of breath. There is no cough. There is no swelling in her bilateral lower extremities. She states that a urine culture was sent yesterday due to concern for urinary tract infection. She has not received the results yet. She denies any other complaints at this time. The history is provided by the patient. Review of Systems   Constitutional: Positive for chills. Negative for fever. HENT: Negative for ear pain, sinus pressure and sore throat. Eyes: Negative for pain, discharge and redness. Respiratory: Negative for cough, shortness of breath and wheezing. Cardiovascular: Negative for chest pain. Gastrointestinal: Positive for nausea and vomiting. Negative for abdominal distention and diarrhea. Genitourinary: Negative for dysuria and frequency. Musculoskeletal: Negative for arthralgias and back pain. Skin: Negative for rash and wound. Neurological: Negative for weakness and headaches. Hematological: Negative for adenopathy. All other systems reviewed and are negative.       Physical Exam Constitutional: She is oriented to person, place, and time. She appears well-developed and well-nourished. No distress. HENT:   Mouth/Throat: Mucous membranes are not dry. No oropharyngeal exudate, posterior oropharyngeal edema or posterior oropharyngeal erythema. Cardiovascular: Normal rate, regular rhythm, normal heart sounds and intact distal pulses. No murmur heard. Pulmonary/Chest: Effort normal and breath sounds normal. No stridor. No respiratory distress. She has no wheezes. She has no rales. Abdominal: Soft. She exhibits no distension and no mass. There is no tenderness. There is no rebound and no guarding. Musculoskeletal:   Right knee is warm when compared to the left knee, but the incision appears to be healing well without any purulence. There is no erythematous changes of the right knee. There is mild swelling of the right knee compared to the left. No lower extremity edema. Neurological: She is alert and oriented to person, place, and time. Skin: Skin is warm and dry. She is not diaphoretic. Nursing note and vitals reviewed. Procedures    MDM  Number of Diagnoses or Management Options  Acute pain of right knee:   Non-intractable vomiting with nausea, unspecified vomiting type:   Diagnosis management comments: No signs of systemic illness on work-up. Right knee was evaluated by orthopedic surgery with little concern for postoperative infection. Patient's symptoms improved after symptom Medicare in the emergency department. Discharged home in stable condition. Return instructions provided. Patient understands and is agreeable to this plan.       ED Course as of Jun 17 1952 Thu Jun 13, 2019 2150 Spoke with orthopedic surgery resident, Angelina Flores, he will evaluate the patient's postoperative knee in the emergency department tonight.    [MB]      ED Course User Index  [MB] Yamileth Madrigal,      ED Course as of Jun 17 1953   Thu Jun 13, 2019 2150 Spoke with orthopedic surgery resident, Denver Parcel, he will evaluate the patient's postoperative knee in the emergency department tonight.    [MB]      ED Course User Index  [MB] Merary Dyson, DO       --------------------------------------------- PAST HISTORY ---------------------------------------------  Past Medical History:  has a past medical history of Bipolar 1 disorder ([facility] [address] Depression, Obesity, Osteoarthritis, Thiamine deficiency, and Vitamin D deficiency. Past Surgical History:  has a past surgical history that includes Gallbladder surgery (1994); Cholecystectomy; Endoscopy, colon, diagnostic; pr lap gastric bypass/luciano-en-y (N/A, 4/30/2018); Upper gastrointestinal endoscopy (N/A, 10/22/2018); Upper gastrointestinal endoscopy (N/A, 2/21/2019); joint replacement (Right, 06/03/2019); and Total knee arthroplasty (Right, 6/3/2019). Social History:  reports that she has never smoked. She has never used smokeless tobacco. She reports that she does not drink alcohol or use drugs. Family History: family history includes Breast Cancer in her mother; Cancer in her maternal grandmother; Heart Disease in her father, maternal grandmother, mother, and paternal grandmother; Parkinsonism in her father. The patients home medications have been reviewed. Allergies: Patient has no known allergies. -------------------------------------------------- RESULTS -------------------------------------------------  Labs:  Results for orders placed or performed during the hospital encounter of 06/13/19   Culture blood #1   Result Value Ref Range    Blood Culture, Routine 24 Hours- no growth    Urine culture   Result Value Ref Range    Urine Culture, Routine       ,000 CFU/mL  Mixed hira isolated. Further workup and sensitivity testing  is not routinely indicated and will not be performed.   Mixed hira isolated includes:  Mixed gram positive organisms  Gram negative rods     CBC auto differential   Result Value Ref

## 2019-06-14 NOTE — CONSULTS
Department of Orthopedic Surgery  Resident Consult Note          CHIEF COMPLAINT:  Right knee warmth    HISTORY OF PRESENT ILLNESS:                The patient is a 47 y.o. female who presents with warmth to her right knee. Patient is roughly 10 days out from a Right total knee arthroplasty. She denies any falls or traumas. States she is making good progress with home therapy. Denies any other orthopedic complaints at this time. Past Medical History:        Diagnosis Date    Bipolar 1 disorder ([facility] [address]     Depression     Obesity     Osteoarthritis     bilateral knees    Thiamine deficiency     Vitamin D deficiency      Past Surgical History:        Procedure Laterality Date    CHOLECYSTECTOMY      ENDOSCOPY, COLON, DIAGNOSTIC      GALLBLADDER SURGERY  1994    JOINT REPLACEMENT Right 06/03/2019    UT LAP GASTRIC BYPASS/KIERAN-EN-Y N/A 4/30/2018    GASTRIC BYPASS KIERAN-EN-Y LAPAROSCOPIC performed by Cj Lam MD at [address] Right 6/3/2019    RIGHT KNEE TOTAL KNEE ARTHROPLASTY (Renaye Bellow & NEPHEW Chris Sires) performed by Nerissa Curtis DO at [address] [facility] 10/22/2018    EGD BIOPSY performed by Jimmy Cortés MD at [address] [facility] N/A 2/21/2019    EGD ESOPHAGOGASTRODUODENOSCOPY performed by Cj Lam MD at [place] [address]     Current Medications:   No current facility-administered medications for this encounter. Allergies:  Patient has no known allergies. Social History:   TOBACCO:   reports that she has never smoked. She has never used smokeless tobacco.  ETOH:   reports that she does not drink alcohol. DRUGS:   reports that she does not use drugs.   ACTIVITIES OF DAILY LIVING:    OCCUPATION:    Family History:       Problem Relation Age of Onset    Breast Cancer Mother     Heart Disease Mother     Heart Disease Father     Parkinsonism Father     Heart Disease Maternal Grandmother     Cancer Maternal Grandmother     Heart Disease Paternal Grandmother        General ROS: nausea  Cardiovascular ROS: no chest pain or dyspnea on exertion  Respiratory ROS: no cough, shortness of breath, or wheezing  Gastrointestinal ROS: no abdominal pain, change in bowel habits, or black or bloody stools  positive for - nausea/vomiting  Neurological ROS: no TIA or stroke symptoms  Musculoskeletal ROS: history of R TKA    PHYSICAL EXAM:    VITALS:  /77   Pulse 64   Temp 98 °F (36.7 °C) (Oral)   Resp 17   Ht 5' 5\" (1.651 m)   Wt 149 lb 8 oz (67.8 kg)   LMP 10/01/2018   SpO2 98%   BMI 24.88 kg/m²   CONSTITUTIONAL:  awake, alert, cooperative, no apparent distress, and appears stated age  MUSCULOSKELETAL:  RLE  · Surgical incision well approximated. No erythema or drainage. Minimal edema. There is some warmth to the knee as expected this early post operatively  · Compartments soft and compressible  · +2 dp/pt pulses  · +AROM pf/df/ehl  · SILT dp/sp/pt/s/s nerves  · No pain with PROM of knee  · AROM without pain to 0-80 degrees          DATA:    CBC:   Lab Results   Component Value Date    WBC 8.9 06/13/2019    RBC 4.36 06/13/2019    HGB 12.0 06/13/2019    HCT 38.9 06/13/2019    MCV 89.2 06/13/2019    MCH 27.5 06/13/2019    MCHC 30.8 06/13/2019    RDW 13.0 06/13/2019     06/13/2019    MPV 12.6 06/13/2019     PT/INR:    Lab Results   Component Value Date    PROTIME 11.3 05/22/2019    INR 1.0 05/22/2019     Radiology Review:      XR right knee: no acute fractures or dislocations. TKA in good alignment. IMPRESSION:  · Very low suspicion at this time for a post operative infection. Patient stated that she thinks she may have been a little dehydrated and feels better after receiving some fluid and anti-emetics     PLAN:  · WBAT RLE  · continue home PT  · Continue DVT prophy  · Pain control PRN with surgical script  · Continue medical management  · Ok to follow up in office with Dr Narciso Alejo.  Keep appointment for 6/17  · Will discuss with Dr Any Koo

## 2019-06-15 LAB — URINE CULTURE, ROUTINE: NORMAL

## 2019-06-17 ENCOUNTER — OFFICE VISIT (OUTPATIENT)
Dept: ORTHOPEDIC SURGERY | Age: 49
End: 2019-06-17

## 2019-06-17 VITALS — WEIGHT: 150 LBS | BODY MASS INDEX: 24.99 KG/M2 | HEIGHT: 65 IN

## 2019-06-17 DIAGNOSIS — Z96.651 STATUS POST TOTAL RIGHT KNEE REPLACEMENT: Primary | ICD-10-CM

## 2019-06-17 PROCEDURE — 99024 POSTOP FOLLOW-UP VISIT: CPT | Performed by: ORTHOPAEDIC SURGERY

## 2019-06-19 ENCOUNTER — EVALUATION (OUTPATIENT)
Dept: PHYSICAL THERAPY | Age: 49
End: 2019-06-19
Payer: MEDICARE

## 2019-06-19 DIAGNOSIS — M17.11 PRIMARY OSTEOARTHRITIS OF RIGHT KNEE: Primary | ICD-10-CM

## 2019-06-19 LAB — BLOOD CULTURE, ROUTINE: NORMAL

## 2019-06-19 PROCEDURE — 97163 PT EVAL HIGH COMPLEX 45 MIN: CPT | Performed by: PHYSICAL THERAPIST

## 2019-06-19 PROCEDURE — 97110 THERAPEUTIC EXERCISES: CPT | Performed by: PHYSICAL THERAPIST

## 2019-06-19 NOTE — PROGRESS NOTES
alignment with interval partial resolution of previously seen postoperative changes. There is no evidence of acute fracture or dislocation. There still remains joint effusion in both supra and infrapatellar regions. Mild soft tissue prominence anterior to the patella. Status post right total knee replacement in good anatomic alignment as before with resolving postoperative changes. Xr Knee Right (3 Views)    Result Date: 6/3/2019  Reading location: [facility] INDICATION: Arthritis, postoperative FINDINGS: 2 views right knee. Total knee prosthesis in normal alignment. No fracture.      No radiographic complication      Past Medical History:  Past Medical History:   Diagnosis Date    Bipolar 1 disorder ([address]     Depression     Obesity     Osteoarthritis     bilateral knees    Thiamine deficiency     Vitamin D deficiency      Past Surgical History:   Procedure Laterality Date    CHOLECYSTECTOMY      ENDOSCOPY, COLON, DIAGNOSTIC      GALLBLADDER SURGERY  1994    JOINT REPLACEMENT Right 06/03/2019    MI LAP GASTRIC BYPASS/KIERAN-EN-Y N/A 4/30/2018    GASTRIC BYPASS KIERAN-EN-Y LAPAROSCOPIC performed by Cisco Perez MD at [address] [facility] Right 6/3/2019    RIGHT KNEE TOTAL KNEE ARTHROPLASTY ([address]") performed by Gregor Koch DO at [address] [facility] 10/22/2018    EGD BIOPSY performed by Olga Lidia Godinez MD at [address] [facility] 2/21/2019    EGD ESOPHAGOGASTRODUODENOSCOPY performed by Cisco Perez MD at [facility] ENDOSCOPY       Medications:   Current Outpatient Medications   Medication Sig Dispense Refill    ondansetron (ZOFRAN ODT) 4 MG disintegrating tablet Take 1 tablet by mouth every 8 hours as needed for Nausea or Vomiting 10 tablet 0    nitrofurantoin, macrocrystal-monohydrate, (MACROBID) 100 MG capsule Take 1 capsule by mouth 2 times daily for 10 days 20 capsule 0    lithium 300 MG capsule Take 900 mg by mouth nightly      omeprazole (PRILOSEC) 20 MG delayed release capsule Take 20 mg by mouth daily      aspirin 325 MG EC tablet Take 1 tablet by mouth 2 times daily 60 tablet 1    Cholecalciferol (VITAMIN D3) 5000 units TABS Take by mouth daily      Docusate Sodium (COLACE PO) Take 100 mg by mouth every other day       QUEtiapine (SEROQUEL) 100 MG tablet Take 1 tablet by mouth nightly 30 tablet 5    lithium 300 MG capsule Take 1 capsule by mouth daily 30 capsule 5    FLUoxetine (PROZAC) 20 MG capsule Take 1 capsule by mouth daily 30 capsule 5    Cholecalciferol (VITAMIN D3) 73271 units CAPS Take one capsule each week for 13 weeks 12 capsule 0    Biotin 63990 MCG TABS Take 1 capsule by mouth daily       Ferrous Sulfate (IRON) 28 MG TABS Take 1 tablet by mouth daily       Multiple Vitamins-Minerals (THERAPEUTIC MULTIVITAMIN-MINERALS) tablet Take 1 tablet by mouth 2 times daily      calcium-cholecalciferol 500-200 MG-UNIT per tablet Take 1 tablet by mouth daily 60 tablet 5    FLUoxetine (PROZAC) 10 MG capsule Take 1 capsule by mouth daily 30 capsule 5     No current facility-administered medications for this visit. 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 Exercise regimen: none    Hobbies: baking    Patient Goals: walk normally    Precautions/Contraindications: recent surgery    OBJECTIVE:     Observations: Well nourished female with normal affect. Rises independently from chair. Ambulates with front-wheeled walker. Inspection: Incision edges approximated, no purulent drainage, no redness, no swelling, no tenderness and not hot to touch.       Edema: mild global    Gait: ambulates with wheeled walker    Joint/Motion:    Knee:  Right:   AROM: 101° Flexion,  -12° Extension  PROM: 104° Flexion,  -9° Extension  Left:   AROM: 108° Flexion,  -15° Extension  PROM: 110° Flexion,  -7° Extension    Strength:    Knee:   Right: Flexion 4/5,  Extension 4/5  Left: Flexion 5/5,  Extension 5/5    Palpation: Tender to palpation joint lines     ASSESSMENT     Outcome Measure:   Lower Extremity Functional Scale (LEFS) 69% impairment    Problems:    Pain reported 3/10   ROM decreased   Strength decreased   Decreased functional ability with walking    [x] There are no barriers affecting plan of care or recovery    [] Barriers to this patient's plan of care or recovery include. Domestic Concerns:  [x] No  [] Yes:    Short Term goals (2-3 weeks)   Decrease reported pain to 1/10   Increase AROM to 108 flex, -7 ext   Increase Strength to 4+/5    Able to perform/complete the following functions/tasks: walk w/ straight cane   Lower Extremity Functional Scale (LEFS) 40% impairment    Long Term goals (4-6 weeks)   Decrease reported pain to 0/10   Increase ROM to 115 flex, -3 ext   Increase Strength to 5-/5    Able to perform/complete the following functions/tasks: walk w/o device independently   Independent with Home Exercise Programs   Lower Extremity Functional Scale (LEFS) 20% impairment    Rehab Potential: [x] Good  [] Fair  [] Poor    PLAN       Treatment Plan:  [x] Therapeutic Exercise  [x] Therapeutic Activity  [x] Neuromuscular Re-education   [x] Gait Training  [x] Balance Training  [] Aerobic conditioning  [] Manual Therapy  [] Massage/Fascial release   [x] Work/Sport specific activities    [] Pain Neuroscience [x] Cold/hotpack  [x] Vasocompression  [] Electrical Stimulation  [] Lumbar/Cervical Traction  [] Ultrasound   [] Iontophoresis: 4 mg/mL Dexamethasone Sodium Phosphate 40-80 mAmin        [x] Instruction in HEP      []  Medication allergies reviewed for use of Dexamethasone Sodium Phosphate 4mg/ml  with iontophoresis treatments. Patient is not allergic.       The following CPT codes are likely to be used in the care of this patient: 89510 Therapeutic Exercise , 19967 Neuromuscular Re-Education , 58552 Therapeutic Activities , 14196 Vasopneumatic

## 2019-06-19 NOTE — PROGRESS NOTES
Physical Therapy Daily Treatment Note    Date: 2019  Patient Name: Jorge Luis Dunn  : 1970   MRN: [id number]  DOSx: 6-3-19  Referring Provider: Stefano Laguna DO  [id number] [facility]     Medical Diagnosis:   P16.330 (ICD-10-CM) - Status post total right knee replacement        Outcome Measure:  LEFS 69% impairment    S: See eval  O: Min edema  Time  0639-9686       Visit  1 Repeat outcome measure at mid point and end. Pain    1/10     ROM  Right:   AROM: 101° Flexion,  -12° Extension  PROM: 104° Flexion,  -9° Extension     Modalities       Ice, compression, elevation 75 mmHg x 15 min     Stretching       Patella mobs      Prone hangs      Heel props 10 min     Knee flex stretch-seated  xx    Prone self flexion stretch      Exercise       Nustep  Next xx    Quad sets  xx    Heel slides  xx    SLR  xx    Marching   xx    Sidekicks  xx    Squat   xx    Step-ups - FWD   xx    Step-ups - LAT      Step-ups - BWD       Step up and over reciprocally       TG squats  xx    TG Calf raises       LAQ             NMR For safe ambulation in community and home    Marching gait      Side stepping      Retrowalk      Heel to toe      A: Tolerated well. Above added to written HEP along with instructions for ice and elevation.   P: Continue with rehab plan  Pramod Rivera, PT    Treatment Charges: Mins Units   Initial Evaluation 40 1   Re-Evaluation     Ther Exercise         TE 10 1   Manual Therapy     MT     Ther Activities        TA     Gait Training          GT     Neuro Re-education NR     Modalities     Non-Billable Service Time     Other     Total Time/Units 50 1

## 2019-06-21 ENCOUNTER — TREATMENT (OUTPATIENT)
Dept: PHYSICAL THERAPY | Age: 49
End: 2019-06-21
Payer: MEDICARE

## 2019-06-21 DIAGNOSIS — M17.11 PRIMARY OSTEOARTHRITIS OF RIGHT KNEE: Primary | ICD-10-CM

## 2019-06-21 PROCEDURE — 97110 THERAPEUTIC EXERCISES: CPT

## 2019-06-21 PROCEDURE — 97016 VASOPNEUMATIC DEVICE THERAPY: CPT

## 2019-06-25 ENCOUNTER — TREATMENT (OUTPATIENT)
Dept: PHYSICAL THERAPY | Age: 49
End: 2019-06-25
Payer: MEDICARE

## 2019-06-25 DIAGNOSIS — M17.11 PRIMARY OSTEOARTHRITIS OF RIGHT KNEE: Primary | ICD-10-CM

## 2019-06-25 PROCEDURE — 97530 THERAPEUTIC ACTIVITIES: CPT

## 2019-06-25 PROCEDURE — 97110 THERAPEUTIC EXERCISES: CPT

## 2019-06-25 PROCEDURE — 97112 NEUROMUSCULAR REEDUCATION: CPT

## 2019-06-28 ENCOUNTER — TREATMENT (OUTPATIENT)
Dept: PHYSICAL THERAPY | Age: 49
End: 2019-06-28
Payer: MEDICARE

## 2019-06-28 DIAGNOSIS — M17.11 PRIMARY OSTEOARTHRITIS OF RIGHT KNEE: Primary | ICD-10-CM

## 2019-06-28 PROCEDURE — 97110 THERAPEUTIC EXERCISES: CPT

## 2019-06-28 PROCEDURE — 97116 GAIT TRAINING THERAPY: CPT

## 2019-06-28 PROCEDURE — 97016 VASOPNEUMATIC DEVICE THERAPY: CPT

## 2019-07-01 ENCOUNTER — HOSPITAL ENCOUNTER (OUTPATIENT)
Age: 49
Discharge: HOME | End: 2019-07-01
Payer: MEDICARE

## 2019-07-01 ENCOUNTER — TREATMENT (OUTPATIENT)
Dept: PHYSICAL THERAPY | Age: 49
End: 2019-07-01
Payer: MEDICARE

## 2019-07-01 DIAGNOSIS — M17.11 PRIMARY OSTEOARTHRITIS OF RIGHT KNEE: Primary | ICD-10-CM

## 2019-07-01 LAB — VITAMIN D 25-HYDROXY: 51 NG/ML (ref 30–100)

## 2019-07-01 PROCEDURE — 36415 COLL VENOUS BLD VENIPUNCTURE: CPT

## 2019-07-01 PROCEDURE — 97116 GAIT TRAINING THERAPY: CPT

## 2019-07-01 PROCEDURE — 97016 VASOPNEUMATIC DEVICE THERAPY: CPT

## 2019-07-01 PROCEDURE — 82306 VITAMIN D 25 HYDROXY: CPT

## 2019-07-01 PROCEDURE — 97110 THERAPEUTIC EXERCISES: CPT

## 2019-07-03 ENCOUNTER — TREATMENT (OUTPATIENT)
Dept: PHYSICAL THERAPY | Age: 49
End: 2019-07-03
Payer: MEDICARE

## 2019-07-03 DIAGNOSIS — M17.11 PRIMARY OSTEOARTHRITIS OF RIGHT KNEE: Primary | ICD-10-CM

## 2019-07-03 PROCEDURE — 97016 VASOPNEUMATIC DEVICE THERAPY: CPT

## 2019-07-03 PROCEDURE — 97116 GAIT TRAINING THERAPY: CPT

## 2019-07-03 PROCEDURE — 97110 THERAPEUTIC EXERCISES: CPT

## 2019-07-08 ENCOUNTER — TREATMENT (OUTPATIENT)
Dept: PHYSICAL THERAPY | Age: 49
End: 2019-07-08
Payer: MEDICARE

## 2019-07-08 DIAGNOSIS — M17.11 PRIMARY OSTEOARTHRITIS OF RIGHT KNEE: Primary | ICD-10-CM

## 2019-07-08 PROCEDURE — 97116 GAIT TRAINING THERAPY: CPT

## 2019-07-08 PROCEDURE — 97016 VASOPNEUMATIC DEVICE THERAPY: CPT

## 2019-07-08 PROCEDURE — 97110 THERAPEUTIC EXERCISES: CPT

## 2019-07-08 NOTE — PROGRESS NOTES
Physical Therapy Daily Treatment Note    Date: 2019  Patient Name: Iqra Warren  : 1970   MRN: [id number]  DOSx: 6-3-19  Referring Provider:  Teresa Gold DO  [id number] [facility]           Medical Diagnosis:   P29.271 (ICD-10-CM) - Status post total right knee replacement        Outcome Measure:  LEFS 69% impairment    S: pt reports doing ok  O: Min edema  Time  6941-2735      Visit  7/  Repeat outcome measure at mid point and end. Pain    1/10     ROM  Right:   AROM: 105° Flexion,  -10° Extension  PROM: 115° Flexion,  -5° Extension     Modalities       Ice, compression, elevation 75 mmHg x 15 min  MO   Stretching       Patella mobs      Prone hangs 5# 5 min     Heel props   TE   Knee flex stretch-seated 3 x 1 min     Prone self flexion stretch      Exercise       Nustep  L 5 x 10 min  Seat 5 flex, seat10 ext TE         Quad sets xx TE   Heel slides xx TE   SLR xx TE         Marching  20x     Sidekicks 20x     Squat  20x     Step-ups - FWD  8\" 20x     Step-ups - LAT 8\" 20x     Step-ups - BWD  6\" 20x     Step up and over reciprocally       TG squats L18/  4x 20     TG Calf raises       LAQ             NMR For safe ambulation in community and home    Marching gait      Side stepping      Retrowalk      Gait training 400 ft SC uneven surface    A: Tolerated well. Above added to written HEP along with instructions for ice and elevation.   P: Continue with rehab plan  Iza Hickey PTA    Treatment Charges: Mins Units   Initial Evaluation     Re-Evaluation     Ther Exercise         TE 30 2   Manual Therapy     MT     Ther Activities        TA     Gait Training          GT 15 1   Neuro Re-education NR     Modalities ND x 15 min 1   Non-Billable Service Time     Other     Total Time/Units 60 4

## 2019-07-10 ENCOUNTER — OFFICE VISIT (OUTPATIENT)
Dept: ORTHOPEDIC SURGERY | Age: 49
End: 2019-07-10

## 2019-07-10 ENCOUNTER — TREATMENT (OUTPATIENT)
Dept: PHYSICAL THERAPY | Age: 49
End: 2019-07-10
Payer: MEDICARE

## 2019-07-10 VITALS — TEMPERATURE: 98 F | WEIGHT: 143 LBS | HEIGHT: 65 IN | BODY MASS INDEX: 23.82 KG/M2

## 2019-07-10 DIAGNOSIS — Z96.651 STATUS POST TOTAL RIGHT KNEE REPLACEMENT: Primary | ICD-10-CM

## 2019-07-10 DIAGNOSIS — M17.11 PRIMARY OSTEOARTHRITIS OF RIGHT KNEE: Primary | ICD-10-CM

## 2019-07-10 PROCEDURE — 97110 THERAPEUTIC EXERCISES: CPT

## 2019-07-10 PROCEDURE — 97530 THERAPEUTIC ACTIVITIES: CPT

## 2019-07-10 PROCEDURE — 99024 POSTOP FOLLOW-UP VISIT: CPT | Performed by: NURSE PRACTITIONER

## 2019-07-12 ENCOUNTER — OFFICE VISIT (OUTPATIENT)
Dept: BARIATRICS/WEIGHT MGMT | Age: 49
End: 2019-07-12
Payer: MEDICARE

## 2019-07-12 DIAGNOSIS — E51.9 THIAMINE DEFICIENCY: ICD-10-CM

## 2019-07-12 DIAGNOSIS — E55.9 VITAMIN D DEFICIENCY: Primary | ICD-10-CM

## 2019-07-12 PROCEDURE — 3017F COLORECTAL CA SCREEN DOC REV: CPT | Performed by: INTERNAL MEDICINE

## 2019-07-12 PROCEDURE — G8428 CUR MEDS NOT DOCUMENT: HCPCS | Performed by: INTERNAL MEDICINE

## 2019-07-12 PROCEDURE — 1036F TOBACCO NON-USER: CPT | Performed by: INTERNAL MEDICINE

## 2019-07-12 PROCEDURE — 99213 OFFICE O/P EST LOW 20 MIN: CPT | Performed by: INTERNAL MEDICINE

## 2019-07-12 PROCEDURE — G8420 CALC BMI NORM PARAMETERS: HCPCS | Performed by: INTERNAL MEDICINE

## 2019-07-12 PROCEDURE — 99211 OFF/OP EST MAY X REQ PHY/QHP: CPT

## 2019-07-14 ENCOUNTER — APPOINTMENT (OUTPATIENT)
Dept: GENERAL RADIOLOGY | Age: 49
End: 2019-07-14
Payer: MEDICARE

## 2019-07-14 ENCOUNTER — HOSPITAL ENCOUNTER (EMERGENCY)
Age: 49
Discharge: HOME | End: 2019-07-14
Attending: EMERGENCY MEDICINE
Payer: MEDICARE

## 2019-07-14 VITALS
RESPIRATION RATE: 16 BRPM | OXYGEN SATURATION: 97 % | HEIGHT: 65 IN | BODY MASS INDEX: 23.66 KG/M2 | WEIGHT: 142 LBS | TEMPERATURE: 98.5 F | HEART RATE: 61 BPM | DIASTOLIC BLOOD PRESSURE: 70 MMHG | SYSTOLIC BLOOD PRESSURE: 154 MMHG

## 2019-07-14 DIAGNOSIS — R11.2 NAUSEA AND VOMITING, INTRACTABILITY OF VOMITING NOT SPECIFIED, UNSPECIFIED VOMITING TYPE: Primary | ICD-10-CM

## 2019-07-14 DIAGNOSIS — R10.13 EPIGASTRIC PAIN: ICD-10-CM

## 2019-07-14 LAB
ALBUMIN SERPL-MCNC: 3.4 G/DL (ref 3.5–5.2)
ALP BLD-CCNC: 63 U/L (ref 35–104)
ALT SERPL-CCNC: 9 U/L (ref 0–32)
ANION GAP SERPL CALCULATED.3IONS-SCNC: 8 MMOL/L (ref 7–16)
AST SERPL-CCNC: 14 U/L (ref 0–31)
BASOPHILS ABSOLUTE: 0.02 E9/L (ref 0–0.2)
BASOPHILS RELATIVE PERCENT: 0.3 % (ref 0–2)
BILIRUB SERPL-MCNC: 0.5 MG/DL (ref 0–1.2)
BUN BLDV-MCNC: 12 MG/DL (ref 6–20)
CALCIUM SERPL-MCNC: 8.7 MG/DL (ref 8.6–10.2)
CHLORIDE BLD-SCNC: 99 MMOL/L (ref 98–107)
CO2: 28 MMOL/L (ref 22–29)
CREAT SERPL-MCNC: 0.7 MG/DL (ref 0.5–1)
EOSINOPHILS ABSOLUTE: 0 E9/L (ref 0.05–0.5)
EOSINOPHILS RELATIVE PERCENT: 0 % (ref 0–6)
GFR AFRICAN AMERICAN: >60
GFR NON-AFRICAN AMERICAN: >60 ML/MIN/1.73
GLUCOSE BLD-MCNC: 129 MG/DL (ref 74–99)
HCT VFR BLD CALC: 37.8 % (ref 34–48)
HEMOGLOBIN: 12.4 G/DL (ref 11.5–15.5)
IMMATURE GRANULOCYTES #: 0.02 E9/L
IMMATURE GRANULOCYTES %: 0.3 % (ref 0–5)
LACTIC ACID: 1 MMOL/L (ref 0.5–2.2)
LYMPHOCYTES ABSOLUTE: 1.48 E9/L (ref 1.5–4)
LYMPHOCYTES RELATIVE PERCENT: 22 % (ref 20–42)
MCH RBC QN AUTO: 27.7 PG (ref 26–35)
MCHC RBC AUTO-ENTMCNC: 32.8 % (ref 32–34.5)
MCV RBC AUTO: 84.6 FL (ref 80–99.9)
MONOCYTES ABSOLUTE: 0.5 E9/L (ref 0.1–0.95)
MONOCYTES RELATIVE PERCENT: 7.4 % (ref 2–12)
NEUTROPHILS ABSOLUTE: 4.7 E9/L (ref 1.8–7.3)
NEUTROPHILS RELATIVE PERCENT: 70 % (ref 43–80)
PDW BLD-RTO: 14.5 FL (ref 11.5–15)
PLATELET # BLD: 250 E9/L (ref 130–450)
PMV BLD AUTO: 11.9 FL (ref 7–12)
POTASSIUM SERPL-SCNC: 3.8 MMOL/L (ref 3.5–5)
RBC # BLD: 4.47 E12/L (ref 3.5–5.5)
REASON FOR REJECTION: NORMAL
REJECTED TEST: NORMAL
SODIUM BLD-SCNC: 135 MMOL/L (ref 132–146)
TOTAL PROTEIN: 6.3 G/DL (ref 6.4–8.3)
WBC # BLD: 6.7 E9/L (ref 4.5–11.5)

## 2019-07-14 PROCEDURE — 36415 COLL VENOUS BLD VENIPUNCTURE: CPT

## 2019-07-14 PROCEDURE — 96375 TX/PRO/DX INJ NEW DRUG ADDON: CPT

## 2019-07-14 PROCEDURE — 2580000003 HC RX 258: Performed by: EMERGENCY MEDICINE

## 2019-07-14 PROCEDURE — C9113 INJ PANTOPRAZOLE SODIUM, VIA: HCPCS | Performed by: EMERGENCY MEDICINE

## 2019-07-14 PROCEDURE — 6370000000 HC RX 637 (ALT 250 FOR IP): Performed by: EMERGENCY MEDICINE

## 2019-07-14 PROCEDURE — 80053 COMPREHEN METABOLIC PANEL: CPT

## 2019-07-14 PROCEDURE — 83605 ASSAY OF LACTIC ACID: CPT

## 2019-07-14 PROCEDURE — 96374 THER/PROPH/DIAG INJ IV PUSH: CPT

## 2019-07-14 PROCEDURE — 85025 COMPLETE CBC W/AUTO DIFF WBC: CPT

## 2019-07-14 PROCEDURE — 74022 RADEX COMPL AQT ABD SERIES: CPT

## 2019-07-14 PROCEDURE — 6360000002 HC RX W HCPCS: Performed by: EMERGENCY MEDICINE

## 2019-07-14 PROCEDURE — 99284 EMERGENCY DEPT VISIT MOD MDM: CPT

## 2019-07-14 RX ORDER — ONDANSETRON 2 MG/ML
4 INJECTION INTRAMUSCULAR; INTRAVENOUS ONCE
Status: COMPLETED | OUTPATIENT
Start: 2019-07-14 | End: 2019-07-14

## 2019-07-14 RX ORDER — OMEPRAZOLE 40 MG/1
40 CAPSULE, DELAYED RELEASE ORAL
Qty: 14 CAPSULE | Refills: 0 | Status: ON HOLD | OUTPATIENT
Start: 2019-07-14 | End: 2019-09-16 | Stop reason: HOSPADM

## 2019-07-14 RX ORDER — ONDANSETRON 4 MG/1
4 TABLET, ORALLY DISINTEGRATING ORAL EVERY 8 HOURS PRN
Qty: 10 TABLET | Refills: 0 | Status: SHIPPED | OUTPATIENT
Start: 2019-07-14 | End: 2019-08-23

## 2019-07-14 RX ORDER — PANTOPRAZOLE SODIUM 40 MG/10ML
40 INJECTION, POWDER, LYOPHILIZED, FOR SOLUTION INTRAVENOUS ONCE
Status: COMPLETED | OUTPATIENT
Start: 2019-07-14 | End: 2019-07-14

## 2019-07-14 RX ORDER — SUCRALFATE 1 G/1
1 TABLET ORAL ONCE
Status: COMPLETED | OUTPATIENT
Start: 2019-07-14 | End: 2019-07-14

## 2019-07-14 RX ORDER — 0.9 % SODIUM CHLORIDE 0.9 %
1000 INTRAVENOUS SOLUTION INTRAVENOUS ONCE
Status: COMPLETED | OUTPATIENT
Start: 2019-07-14 | End: 2019-07-14

## 2019-07-14 RX ORDER — SUCRALFATE ORAL 1 G/10ML
1 SUSPENSION ORAL 4 TIMES DAILY
Qty: 1200 ML | Refills: 0 | Status: SHIPPED | OUTPATIENT
Start: 2019-07-14 | End: 2019-08-23

## 2019-07-14 RX ADMIN — ONDANSETRON 4 MG: 2 INJECTION INTRAMUSCULAR; INTRAVENOUS at 14:44

## 2019-07-14 RX ADMIN — SODIUM CHLORIDE 1000 ML: 9 INJECTION, SOLUTION INTRAVENOUS at 14:44

## 2019-07-14 RX ADMIN — PANTOPRAZOLE SODIUM 40 MG: 40 INJECTION, POWDER, FOR SOLUTION INTRAVENOUS at 14:44

## 2019-07-14 RX ADMIN — LIDOCAINE HYDROCHLORIDE: 20 SOLUTION ORAL; TOPICAL at 15:50

## 2019-07-14 RX ADMIN — SUCRALFATE 1 G: 1 TABLET ORAL at 15:29

## 2019-07-14 ASSESSMENT — ENCOUNTER SYMPTOMS
NAUSEA: 1
CONSTIPATION: 1
COUGH: 0
ABDOMINAL PAIN: 1
SHORTNESS OF BREATH: 0
FLATUS: 0
DIARRHEA: 0

## 2019-07-14 NOTE — ED PROVIDER NOTES
Patient presents with epigastric discomfort and emesis. She had gastric bypass by Dr Malia Lara in 4/18. She states she is off the carfate and continues to take the omeprazole. She has not eaten in 3 days due to nausea. Bowel movements have been decreased. The history is provided by the patient. Abdominal Pain   Pain location:  Epigastric  Pain quality: aching    Pain radiates to:  Does not radiate  Pain severity:  Mild  Onset quality:  Gradual  Duration:  2 days  Timing:  Constant  Progression:  Unchanged  Chronicity:  Recurrent  Context: eating and previous surgery (gastric bypass)    Context: not sick contacts, not suspicious food intake and not trauma    Relieved by:  None tried  Worsened by:  Eating  Ineffective treatments:  None tried  Associated symptoms: anorexia, chills, constipation and nausea    Associated symptoms: no chest pain, no cough, no diarrhea, no dysuria, no fever, no flatus, no hematuria, no melena and no shortness of breath  Vomiting: multiple times yesterday, once today. Risk factors comment:  Gastric bypass      Review of Systems   Constitutional: Positive for appetite change and chills. Negative for activity change and fever. HENT: Negative. Respiratory: Negative for cough and shortness of breath. Cardiovascular: Negative for chest pain. Gastrointestinal: Positive for abdominal pain, anorexia, constipation and nausea. Negative for diarrhea, flatus and melena. Vomiting: multiple times yesterday, once today. Genitourinary: Negative for dysuria, flank pain and hematuria. Musculoskeletal: Negative. Skin: Negative. Neurological: Negative for weakness, light-headedness and numbness. Physical Exam   Constitutional: She is oriented to person, place, and time. She appears well-developed and well-nourished. No distress. HENT:   Head: Normocephalic and atraumatic.    Right Ear: External ear normal.   Left Ear: External ear normal.   Nose: Nose normal. vomiting not specified, unspecified vomiting type    2. Epigastric pain        Disposition:  Patient's disposition: Discharge to home  Patient's condition is stable.          Rebecca Isaac DO  07/14/19 1602

## 2019-08-20 ENCOUNTER — HOSPITAL ENCOUNTER (EMERGENCY)
Age: 49
Discharge: HOME | End: 2019-08-20
Attending: EMERGENCY MEDICINE
Payer: MEDICARE

## 2019-08-20 ENCOUNTER — APPOINTMENT (OUTPATIENT)
Dept: GENERAL RADIOLOGY | Age: 49
End: 2019-08-20
Payer: MEDICARE

## 2019-08-20 VITALS
OXYGEN SATURATION: 99 % | RESPIRATION RATE: 18 BRPM | HEIGHT: 65 IN | HEART RATE: 76 BPM | DIASTOLIC BLOOD PRESSURE: 64 MMHG | WEIGHT: 145 LBS | TEMPERATURE: 98.2 F | BODY MASS INDEX: 24.16 KG/M2 | SYSTOLIC BLOOD PRESSURE: 102 MMHG

## 2019-08-20 DIAGNOSIS — R11.2 NON-INTRACTABLE VOMITING WITH NAUSEA, UNSPECIFIED VOMITING TYPE: Primary | ICD-10-CM

## 2019-08-20 LAB
ALBUMIN SERPL-MCNC: 2.9 G/DL (ref 3.5–5.2)
ALP BLD-CCNC: 69 U/L (ref 35–104)
ALT SERPL-CCNC: <5 U/L (ref 0–32)
ANION GAP SERPL CALCULATED.3IONS-SCNC: 10 MMOL/L (ref 7–16)
AST SERPL-CCNC: 11 U/L (ref 0–31)
BASOPHILS ABSOLUTE: 0.04 E9/L (ref 0–0.2)
BASOPHILS RELATIVE PERCENT: 0.8 % (ref 0–2)
BILIRUB SERPL-MCNC: 0.2 MG/DL (ref 0–1.2)
BUN BLDV-MCNC: 13 MG/DL (ref 6–20)
CALCIUM SERPL-MCNC: 8.8 MG/DL (ref 8.6–10.2)
CHLORIDE BLD-SCNC: 106 MMOL/L (ref 98–107)
CO2: 29 MMOL/L (ref 22–29)
CREAT SERPL-MCNC: 0.6 MG/DL (ref 0.5–1)
EKG ATRIAL RATE: 72 BPM
EKG P AXIS: 60 DEGREES
EKG P-R INTERVAL: 156 MS
EKG Q-T INTERVAL: 386 MS
EKG QRS DURATION: 70 MS
EKG QTC CALCULATION (BAZETT): 422 MS
EKG R AXIS: 48 DEGREES
EKG T AXIS: 54 DEGREES
EKG VENTRICULAR RATE: 72 BPM
EOSINOPHILS ABSOLUTE: 0.05 E9/L (ref 0.05–0.5)
EOSINOPHILS RELATIVE PERCENT: 1 % (ref 0–6)
GFR AFRICAN AMERICAN: >60
GFR NON-AFRICAN AMERICAN: >60 ML/MIN/1.73
GLUCOSE BLD-MCNC: 82 MG/DL (ref 74–99)
HCT VFR BLD CALC: 34.3 % (ref 34–48)
HEMOGLOBIN: 10.5 G/DL (ref 11.5–15.5)
IMMATURE GRANULOCYTES #: 0.01 E9/L
IMMATURE GRANULOCYTES %: 0.2 % (ref 0–5)
LACTIC ACID, SEPSIS: 0.7 MMOL/L (ref 0.5–1.9)
LIPASE: 12 U/L (ref 13–60)
LYMPHOCYTES ABSOLUTE: 2.13 E9/L (ref 1.5–4)
LYMPHOCYTES RELATIVE PERCENT: 42 % (ref 20–42)
MCH RBC QN AUTO: 27.1 PG (ref 26–35)
MCHC RBC AUTO-ENTMCNC: 30.6 % (ref 32–34.5)
MCV RBC AUTO: 88.4 FL (ref 80–99.9)
MONOCYTES ABSOLUTE: 0.42 E9/L (ref 0.1–0.95)
MONOCYTES RELATIVE PERCENT: 8.3 % (ref 2–12)
NEUTROPHILS ABSOLUTE: 2.42 E9/L (ref 1.8–7.3)
NEUTROPHILS RELATIVE PERCENT: 47.7 % (ref 43–80)
PDW BLD-RTO: 13.6 FL (ref 11.5–15)
PLATELET # BLD: 383 E9/L (ref 130–450)
PMV BLD AUTO: 10.3 FL (ref 7–12)
POTASSIUM SERPL-SCNC: 3.6 MMOL/L (ref 3.5–5)
RBC # BLD: 3.88 E12/L (ref 3.5–5.5)
SODIUM BLD-SCNC: 145 MMOL/L (ref 132–146)
TOTAL PROTEIN: 6.2 G/DL (ref 6.4–8.3)
TROPONIN: <0.01 NG/ML (ref 0–0.03)
WBC # BLD: 5.1 E9/L (ref 4.5–11.5)

## 2019-08-20 PROCEDURE — 2580000003 HC RX 258: Performed by: STUDENT IN AN ORGANIZED HEALTH CARE EDUCATION/TRAINING PROGRAM

## 2019-08-20 PROCEDURE — C9113 INJ PANTOPRAZOLE SODIUM, VIA: HCPCS | Performed by: STUDENT IN AN ORGANIZED HEALTH CARE EDUCATION/TRAINING PROGRAM

## 2019-08-20 PROCEDURE — 84484 ASSAY OF TROPONIN QUANT: CPT

## 2019-08-20 PROCEDURE — 71045 X-RAY EXAM CHEST 1 VIEW: CPT

## 2019-08-20 PROCEDURE — 80053 COMPREHEN METABOLIC PANEL: CPT

## 2019-08-20 PROCEDURE — 6370000000 HC RX 637 (ALT 250 FOR IP): Performed by: STUDENT IN AN ORGANIZED HEALTH CARE EDUCATION/TRAINING PROGRAM

## 2019-08-20 PROCEDURE — 6360000002 HC RX W HCPCS: Performed by: STUDENT IN AN ORGANIZED HEALTH CARE EDUCATION/TRAINING PROGRAM

## 2019-08-20 PROCEDURE — 83690 ASSAY OF LIPASE: CPT

## 2019-08-20 PROCEDURE — 96374 THER/PROPH/DIAG INJ IV PUSH: CPT

## 2019-08-20 PROCEDURE — 96372 THER/PROPH/DIAG INJ SC/IM: CPT

## 2019-08-20 PROCEDURE — 83605 ASSAY OF LACTIC ACID: CPT

## 2019-08-20 PROCEDURE — 36415 COLL VENOUS BLD VENIPUNCTURE: CPT

## 2019-08-20 PROCEDURE — 93005 ELECTROCARDIOGRAM TRACING: CPT | Performed by: STUDENT IN AN ORGANIZED HEALTH CARE EDUCATION/TRAINING PROGRAM

## 2019-08-20 PROCEDURE — 99284 EMERGENCY DEPT VISIT MOD MDM: CPT

## 2019-08-20 PROCEDURE — 85025 COMPLETE CBC W/AUTO DIFF WBC: CPT

## 2019-08-20 PROCEDURE — 93010 ELECTROCARDIOGRAM REPORT: CPT | Performed by: INTERNAL MEDICINE

## 2019-08-20 RX ORDER — PANTOPRAZOLE SODIUM 40 MG/10ML
40 INJECTION, POWDER, LYOPHILIZED, FOR SOLUTION INTRAVENOUS ONCE
Status: COMPLETED | OUTPATIENT
Start: 2019-08-20 | End: 2019-08-20

## 2019-08-20 RX ORDER — 0.9 % SODIUM CHLORIDE 0.9 %
1000 INTRAVENOUS SOLUTION INTRAVENOUS ONCE
Status: DISCONTINUED | OUTPATIENT
Start: 2019-08-20 | End: 2019-08-20 | Stop reason: HOSPADM

## 2019-08-20 RX ORDER — PROMETHAZINE HYDROCHLORIDE 25 MG/ML
25 INJECTION, SOLUTION INTRAMUSCULAR; INTRAVENOUS ONCE
Status: COMPLETED | OUTPATIENT
Start: 2019-08-20 | End: 2019-08-20

## 2019-08-20 RX ORDER — PROMETHAZINE HYDROCHLORIDE 25 MG/1
25 TABLET ORAL EVERY 6 HOURS PRN
Qty: 10 TABLET | Refills: 0 | Status: SHIPPED | OUTPATIENT
Start: 2019-08-20 | End: 2019-08-23

## 2019-08-20 RX ADMIN — PANTOPRAZOLE SODIUM 40 MG: 40 INJECTION, POWDER, LYOPHILIZED, FOR SOLUTION INTRAVENOUS at 14:28

## 2019-08-20 RX ADMIN — PROMETHAZINE HYDROCHLORIDE 25 MG: 25 INJECTION INTRAMUSCULAR; INTRAVENOUS at 14:28

## 2019-08-20 RX ADMIN — LIDOCAINE HYDROCHLORIDE: 20 SOLUTION ORAL; TOPICAL at 14:32

## 2019-08-20 ASSESSMENT — PAIN SCALES - GENERAL: PAINLEVEL_OUTOF10: 4

## 2019-08-20 ASSESSMENT — ENCOUNTER SYMPTOMS
ABDOMINAL PAIN: 1
SORE THROAT: 0
EYE PAIN: 0
VOMITING: 1
SINUS PRESSURE: 0
COUGH: 0
BACK PAIN: 0
EYE DISCHARGE: 0
SHORTNESS OF BREATH: 0
EYE REDNESS: 0
ABDOMINAL DISTENTION: 0
DIARRHEA: 0
WHEEZING: 0
NAUSEA: 1

## 2019-08-20 ASSESSMENT — PAIN DESCRIPTION - PAIN TYPE: TYPE: ACUTE PAIN

## 2019-08-20 ASSESSMENT — PAIN DESCRIPTION - ORIENTATION: ORIENTATION: MID

## 2019-08-20 ASSESSMENT — PAIN DESCRIPTION - LOCATION: LOCATION: ABDOMEN

## 2019-08-20 NOTE — ED PROVIDER NOTES
The patient is a 69-year-old female that presents the emergency department to be evaluated for nausea and vomiting. Patient states that since last night she has had intermittent nausea and vomiting as well as epigastric abdominal pain. She states that nothing makes symptoms better or worse. She has not taken any medications for the nausea specifically home. Patient is status post Georgette-en-Y gastric bypass and has a history of a marginal ulcer. She takes Carafate as well as Prilosec at home daily, but states that this is not improving her symptoms. She denies any melanotic stools or diarrhea. There is been no bright red blood per stool. She states that she had dark vomit, but no coffee-ground emesis. There is no chest pain or shortness of breath. She denies any pain or discomfort with urination. She denies any trauma or injury to the abdomen. There is been no suspect food intake. Is been no recent travel or antibiotic use. Patient states that she has had the symptoms in the past.    The history is provided by the patient. Review of Systems   Constitutional: Negative for chills and fever. HENT: Negative for ear pain, sinus pressure and sore throat. Eyes: Negative for pain, discharge and redness. Respiratory: Negative for cough, shortness of breath and wheezing. Cardiovascular: Negative for chest pain. Gastrointestinal: Positive for abdominal pain, nausea and vomiting. Negative for abdominal distention and diarrhea. Genitourinary: Negative for dysuria and frequency. Musculoskeletal: Negative for arthralgias and back pain. Skin: Negative for rash and wound. Neurological: Negative for weakness and headaches. Hematological: Negative for adenopathy. All other systems reviewed and are negative. Physical Exam   Constitutional: She is oriented to person, place, and time. She appears well-developed. No distress.    Cardiovascular: Normal rate, regular rhythm, normal heart REVIEWED ---------------------------  Date / Time Roomed:  8/20/2019  1:16 PM  ED Bed Assignment:  23/23    The nursing notes within the ED encounter and vital signs as below have been reviewed. /72   Pulse 72   Temp 98.2 °F (36.8 °C) (Oral)   Resp 20   Ht 5' 5\" (1.651 m)   Wt 145 lb (65.8 kg)   LMP 10/17/2018   SpO2 97%   BMI 24.13 kg/m²   Oxygen Saturation Interpretation: Normal      ------------------------------------------ PROGRESS NOTES ------------------------------------------  I have spoken with the patient and discussed todays results, in addition to providing specific details for the plan of care and counseling regarding the diagnosis and prognosis. Their questions are answered at this time and they are agreeable with the plan. I discussed at length with them reasons for immediate return here for re evaluation. They will followup with primary care by calling their office tomorrow. --------------------------------- ADDITIONAL PROVIDER NOTES ---------------------------------  At this time the patient is without objective evidence of an acute process requiring hospitalization or inpatient management. They have remained hemodynamically stable throughout their entire ED visit and are stable for discharge with outpatient follow-up. The plan has been discussed in detail and they are aware of the specific conditions for emergent return, as well as the importance of follow-up. New Prescriptions    PROMETHAZINE (PHENERGAN) 25 MG TABLET    Take 1 tablet by mouth every 6 hours as needed for Nausea       Diagnosis:  1. Non-intractable vomiting with nausea, unspecified vomiting type        Disposition:  Patient's disposition: Discharge to home  Patient's condition is stable.          Derrick Peralta DO  Resident  08/20/19 6866

## 2019-08-21 ENCOUNTER — TELEPHONE (OUTPATIENT)
Dept: BARIATRICS/WEIGHT MGMT | Age: 49
End: 2019-08-21

## 2019-08-23 ENCOUNTER — OFFICE VISIT (OUTPATIENT)
Dept: BARIATRICS/WEIGHT MGMT | Age: 49
End: 2019-08-23
Payer: MEDICARE

## 2019-08-23 VITALS
BODY MASS INDEX: 24.16 KG/M2 | HEIGHT: 65 IN | SYSTOLIC BLOOD PRESSURE: 131 MMHG | WEIGHT: 145 LBS | RESPIRATION RATE: 20 BRPM | DIASTOLIC BLOOD PRESSURE: 75 MMHG | TEMPERATURE: 96.4 F | HEART RATE: 52 BPM

## 2019-08-23 DIAGNOSIS — R10.13 EPIGASTRIC PAIN: Primary | ICD-10-CM

## 2019-08-23 DIAGNOSIS — K28.9 MARGINAL ULCER: ICD-10-CM

## 2019-08-23 PROCEDURE — 1036F TOBACCO NON-USER: CPT | Performed by: SURGERY

## 2019-08-23 PROCEDURE — 99212 OFFICE O/P EST SF 10 MIN: CPT

## 2019-08-23 PROCEDURE — G8427 DOCREV CUR MEDS BY ELIG CLIN: HCPCS | Performed by: SURGERY

## 2019-08-23 PROCEDURE — G8420 CALC BMI NORM PARAMETERS: HCPCS | Performed by: SURGERY

## 2019-08-23 PROCEDURE — 3017F COLORECTAL CA SCREEN DOC REV: CPT | Performed by: SURGERY

## 2019-08-23 PROCEDURE — 99213 OFFICE O/P EST LOW 20 MIN: CPT | Performed by: SURGERY

## 2019-08-23 RX ORDER — SUCRALFATE 1 G/1
1 TABLET ORAL 4 TIMES DAILY
Qty: 120 TABLET | Refills: 3 | Status: ON HOLD | OUTPATIENT
Start: 2019-08-23 | End: 2019-09-16 | Stop reason: SDUPTHER

## 2019-08-27 ENCOUNTER — CARE COORDINATION (OUTPATIENT)
Dept: CARE COORDINATION | Age: 49
End: 2019-08-27

## 2019-08-27 ENCOUNTER — OFFICE VISIT (OUTPATIENT)
Dept: FAMILY MEDICINE CLINIC | Age: 49
End: 2019-08-27
Payer: MEDICARE

## 2019-08-27 VITALS
HEIGHT: 65 IN | BODY MASS INDEX: 24.49 KG/M2 | DIASTOLIC BLOOD PRESSURE: 82 MMHG | SYSTOLIC BLOOD PRESSURE: 130 MMHG | OXYGEN SATURATION: 98 % | HEART RATE: 63 BPM | WEIGHT: 147 LBS | TEMPERATURE: 98.3 F

## 2019-08-27 DIAGNOSIS — Z23 NEED FOR SHINGLES VACCINE: ICD-10-CM

## 2019-08-27 DIAGNOSIS — Z12.11 SCREENING FOR COLORECTAL CANCER: ICD-10-CM

## 2019-08-27 DIAGNOSIS — Z12.12 SCREENING FOR COLORECTAL CANCER: ICD-10-CM

## 2019-08-27 DIAGNOSIS — Z00.00 ROUTINE GENERAL MEDICAL EXAMINATION AT A HEALTH CARE FACILITY: Primary | ICD-10-CM

## 2019-08-27 PROCEDURE — G0439 PPPS, SUBSEQ VISIT: HCPCS | Performed by: FAMILY MEDICINE

## 2019-08-27 PROCEDURE — 3017F COLORECTAL CA SCREEN DOC REV: CPT | Performed by: FAMILY MEDICINE

## 2019-08-27 ASSESSMENT — PATIENT HEALTH QUESTIONNAIRE - PHQ9
SUM OF ALL RESPONSES TO PHQ QUESTIONS 1-9: 0
SUM OF ALL RESPONSES TO PHQ QUESTIONS 1-9: 0

## 2019-08-27 ASSESSMENT — LIFESTYLE VARIABLES: HOW OFTEN DO YOU HAVE A DRINK CONTAINING ALCOHOL: 0

## 2019-08-27 NOTE — PATIENT INSTRUCTIONS
when you are stressed, you may develop neck, shoulder, or low back pain. Stress is linked to high blood pressure and heart disease. Stress also harms your emotional health. It can make you rodriguez, tense, or depressed. Your relationships may suffer, and you may not do well at work or school. What can you do to manage stress? How to relax your mind  Write. It may help to write about things that are bothering you. This helps you find out how much stress you feel and what is causing it. When you know this, you can find better ways to cope. Let your feelings out. Talk, laugh, cry, and express anger when you need to. Talking with friends, family, a counselor, or a member of the clergy about your feelings is a healthy way to relieve stress. Do something you enjoy. For example, listen to music or go to a movie. Practice your hobby or do volunteer work. Meditate. This can help you relax, because you are not worrying about what happened before or what may happen in the future. Do guided imagery. Imagine yourself in any setting that helps you feel calm. You can use audiotapes, books, or a teacher to guide you. How to relax your body  Do something active. Exercise or activity can help reduce stress. Walking is a great way to get started. Even everyday activities such as housecleaning or yard work can help. Do breathing exercises. For example:  From a standing position, bend forward from the waist with your knees slightly bent. Let your arms dangle close to the floor. Breathe in slowly and deeply as you return to a standing position. Roll up slowly and lift your head last.  Hold your breath for just a few seconds in the standing position. Breathe out slowly and bend forward from the waist.  Try yoga or jay chi. These techniques combine exercise and meditation. You may need some training at first to learn them. What can you do to prevent stress? Manage your time.  This helps you find time to do the things you want and need to do. Get enough sleep. Your body recovers from the stresses of the day while you are sleeping. Get support. Your family, friends, and community can make a difference in how you experience stress. Where can you learn more? Go to https://kiley.[employer]. org and sign in to your [employer] account. Enter R927 in the Cabana box to learn more about \"Learning About Stress. \"     If you do not have an account, please click on the \"Sign Up Now\" link. Current as of: June 28, 2018  Content Version: 12.1  © 1243-5732 [employer]. Care instructions adapted under license by [facility] ([facility]). If you have questions about a medical condition or this instruction, always ask your healthcare professional. [address] any warranty or liability for your use of this information. Patient Education        Learning About Guided Imagery for Stress  What are guided imagery and stress? Stress is what you feel when you have to handle more than you are used to. A lot of things can cause stress. You may feel stress when you go on a job interview, take a test, or run a race. This kind of short-term stress is normal and even useful. It can help you if you need to work hard or react quickly. Stress also can last a long time. Long-term stress is caused by stressful situations or events. Examples of long-term stress include long-term health problems, ongoing problems at work, and conflicts in your family. Long-term stress can harm your health. Guided imagery is a technique of directed thoughts and suggestions that guide your mind toward a relaxed, focused state. This technique helps you use your mind to take you to a calm, peaceful place. You can use it to relax, which can lower blood pressure and reduce other problems related to stress. How does guided imagery help to relieve stress?   Because of the way the mind and body are connected, guided imagery can make you feel

## 2019-08-27 NOTE — CARE COORDINATION
Ambulatory Care Coordination Note  8/27/2019  CM Risk Score: 5  Charlson 10 Year Mortality Risk Score: 10%     ACC: Angeles Ling, RN    Summary Note: Called patient to discuss Care Coordination Program, assess needs, and complete enrollment as requested by Hermelinda Pickering MD.  Pt has been referred to [facility] for: Multiple chronic conditions and high utilization. Pt states she is interested in participating in [facility]. She is currently not at home and unable to discuss her issues at length. She is agreeable to a call back to complete enrollment. Ambulatory Care Coordination Assessment    Care Coordination Protocol  Program Enrollment:  Rising Risk  Referral from Primary Care Provider:  Yes  Week 1 - Initial Assessment     Do you have all of your prescriptions and are they filled?:  Yes  Barriers to medication adherence:  None  Are you able to afford your medications?:  Yes  How often do you have trouble taking your medications the way you have been told to take them?:  Sometimes I take them as prescribed. Do you have Home O2 Therapy?:  No      Is patient able to live independently?:  Yes                    Suggested Interventions and Community Resources   Other Services or Interventions:  Facilitate Admission to inpt rehab         Set up/Review Goals, Set up/Review an Education Plan, Schedule an appointment with the patient's PCP              Prior to Admission medications    Medication Sig Start Date End Date Taking?  Authorizing Provider   sucralfate (CARAFATE) 1 GM tablet Take 1 tablet by mouth 4 times daily 8/23/19   Jovani Joe MD   omeprazole (PRILOSEC) 40 MG delayed release capsule Take 1 capsule by mouth every morning (before breakfast) 7/14/19   Mendel Councilman Stefanucci-Uberti, DO   Cholecalciferol (VITAMIN D3) 5000 units TABS Take by mouth daily    Historical Provider, MD   Docusate Sodium (COLACE PO) Take 100 mg by mouth every other day     Historical Provider, MD   QUEtiapine (SEROQUEL) 100

## 2019-09-03 ENCOUNTER — CARE COORDINATION (OUTPATIENT)
Dept: CARE COORDINATION | Age: 49
End: 2019-09-03

## 2019-09-03 NOTE — CARE COORDINATION
Ambulatory Care Coordination Note  9/3/2019  CM Risk Score: 5  Charlson 10 Year Mortality Risk Score: 10%     ACC: Jose Driscoll RN    Summary Note: Called pt to follow up on progress, reinforce previous education/ provide pt education, and discuss any new issues or concerns. LVMM requesting a call back to discuss. Care Coordination Interventions    Program Enrollment:  Rising Risk  Referral from Primary Care Provider:  Yes  Suggested Interventions and Community Resources         Goals Addressed    None         Prior to Admission medications    Medication Sig Start Date End Date Taking?  Authorizing Provider   sucralfate (CARAFATE) 1 GM tablet Take 1 tablet by mouth 4 times daily 8/23/19   Cherie Carranza MD   omeprazole (PRILOSEC) 40 MG delayed release capsule Take 1 capsule by mouth every morning (before breakfast) 7/14/19   Mona Caballero DO   Cholecalciferol (VITAMIN D3) 5000 units TABS Take by mouth daily    Historical Provider, MD   Docusate Sodium (COLACE PO) Take 100 mg by mouth every other day     Historical Provider, MD   QUEtiapine (SEROQUEL) 100 MG tablet Take 1 tablet by mouth nightly  Patient taking differently: Take 50 mg by mouth nightly  4/15/19   Gallito Lu MD   lithium 300 MG capsule Take 1 capsule by mouth daily  Patient taking differently: Take 300 mg by mouth 4 times daily  4/15/19   Gallito Lu MD   FLUoxetine (PROZAC) 20 MG capsule Take 1 capsule by mouth daily 4/15/19   Gallito Lu MD   Cholecalciferol (VITAMIN D3) 90532 units CAPS Take one capsule each week for 13 weeks 4/12/19   Chiki Miles MD   Biotin 41027 MCG TABS Take 1 capsule by mouth daily     Historical Provider, MD   Ferrous Sulfate (IRON) 28 MG TABS Take 1 tablet by mouth daily     Historical Provider, MD   Multiple Vitamins-Minerals (THERAPEUTIC MULTIVITAMIN-MINERALS) tablet Take 1 tablet by mouth 2 times daily    Historical Provider, MD

## 2019-09-07 DIAGNOSIS — Z96.651 STATUS POST TOTAL RIGHT KNEE REPLACEMENT: Primary | ICD-10-CM

## 2019-09-09 ENCOUNTER — CARE COORDINATION (OUTPATIENT)
Dept: CARE COORDINATION | Age: 49
End: 2019-09-09

## 2019-09-10 ENCOUNTER — OFFICE VISIT (OUTPATIENT)
Dept: ORTHOPEDIC SURGERY | Age: 49
End: 2019-09-10
Payer: MEDICARE

## 2019-09-10 VITALS — HEIGHT: 65 IN | BODY MASS INDEX: 23.82 KG/M2 | WEIGHT: 143 LBS

## 2019-09-10 DIAGNOSIS — Z96.651 STATUS POST TOTAL RIGHT KNEE REPLACEMENT: Primary | ICD-10-CM

## 2019-09-10 PROCEDURE — G8428 CUR MEDS NOT DOCUMENT: HCPCS | Performed by: ORTHOPAEDIC SURGERY

## 2019-09-10 PROCEDURE — 99213 OFFICE O/P EST LOW 20 MIN: CPT | Performed by: ORTHOPAEDIC SURGERY

## 2019-09-10 PROCEDURE — G8420 CALC BMI NORM PARAMETERS: HCPCS | Performed by: ORTHOPAEDIC SURGERY

## 2019-09-10 PROCEDURE — 3017F COLORECTAL CA SCREEN DOC REV: CPT | Performed by: ORTHOPAEDIC SURGERY

## 2019-09-10 PROCEDURE — 1036F TOBACCO NON-USER: CPT | Performed by: ORTHOPAEDIC SURGERY

## 2019-09-13 ENCOUNTER — TELEPHONE (OUTPATIENT)
Dept: BARIATRICS/WEIGHT MGMT | Age: 49
End: 2019-09-13

## 2019-09-16 ENCOUNTER — HOSPITAL ENCOUNTER (OUTPATIENT)
Age: 49
Setting detail: OUTPATIENT SURGERY
Discharge: HOME | End: 2019-09-16
Attending: SURGERY | Admitting: SURGERY
Payer: MEDICARE

## 2019-09-16 ENCOUNTER — ANESTHESIA EVENT (OUTPATIENT)
Dept: ENDOSCOPY | Age: 49
End: 2019-09-16
Payer: MEDICARE

## 2019-09-16 ENCOUNTER — APPOINTMENT (OUTPATIENT)
Dept: GENERAL RADIOLOGY | Age: 49
End: 2019-09-16
Attending: SURGERY
Payer: MEDICARE

## 2019-09-16 ENCOUNTER — ANESTHESIA (OUTPATIENT)
Dept: ENDOSCOPY | Age: 49
End: 2019-09-16
Payer: MEDICARE

## 2019-09-16 VITALS
OXYGEN SATURATION: 98 % | TEMPERATURE: 96.5 F | SYSTOLIC BLOOD PRESSURE: 143 MMHG | HEART RATE: 87 BPM | RESPIRATION RATE: 16 BRPM | WEIGHT: 145.5 LBS | BODY MASS INDEX: 24.24 KG/M2 | DIASTOLIC BLOOD PRESSURE: 73 MMHG | HEIGHT: 65 IN

## 2019-09-16 VITALS
OXYGEN SATURATION: 100 % | RESPIRATION RATE: 23 BRPM | SYSTOLIC BLOOD PRESSURE: 129 MMHG | DIASTOLIC BLOOD PRESSURE: 87 MMHG

## 2019-09-16 LAB — HCG(URINE) PREGNANCY TEST: NEGATIVE

## 2019-09-16 PROCEDURE — 2580000003 HC RX 258: Performed by: ANESTHESIOLOGY

## 2019-09-16 PROCEDURE — C1726 CATH, BAL DIL, NON-VASCULAR: HCPCS | Performed by: SURGERY

## 2019-09-16 PROCEDURE — 74019 RADEX ABDOMEN 2 VIEWS: CPT

## 2019-09-16 PROCEDURE — 43249 ESOPH EGD DILATION <30 MM: CPT | Performed by: SURGERY

## 2019-09-16 PROCEDURE — 3609012500 HC EGD DILATION BALLOON: Performed by: SURGERY

## 2019-09-16 PROCEDURE — 2709999900 HC NON-CHARGEABLE SUPPLY: Performed by: SURGERY

## 2019-09-16 PROCEDURE — 7100000010 HC PHASE II RECOVERY - FIRST 15 MIN: Performed by: SURGERY

## 2019-09-16 PROCEDURE — 81025 URINE PREGNANCY TEST: CPT

## 2019-09-16 PROCEDURE — 7100000011 HC PHASE II RECOVERY - ADDTL 15 MIN: Performed by: SURGERY

## 2019-09-16 PROCEDURE — 6360000002 HC RX W HCPCS: Performed by: NURSE ANESTHETIST, CERTIFIED REGISTERED

## 2019-09-16 PROCEDURE — 3700000001 HC ADD 15 MINUTES (ANESTHESIA): Performed by: SURGERY

## 2019-09-16 PROCEDURE — 3700000000 HC ANESTHESIA ATTENDED CARE: Performed by: SURGERY

## 2019-09-16 RX ORDER — LIDOCAINE HYDROCHLORIDE 20 MG/ML
15 SOLUTION OROPHARYNGEAL PRN
Qty: 100 ML | Refills: 0 | Status: SHIPPED | OUTPATIENT
Start: 2019-09-16 | End: 2019-10-25

## 2019-09-16 RX ORDER — SODIUM CHLORIDE, SODIUM LACTATE, POTASSIUM CHLORIDE, CALCIUM CHLORIDE 600; 310; 30; 20 MG/100ML; MG/100ML; MG/100ML; MG/100ML
INJECTION, SOLUTION INTRAVENOUS CONTINUOUS
Status: DISCONTINUED | OUTPATIENT
Start: 2019-09-16 | End: 2019-09-16 | Stop reason: HOSPADM

## 2019-09-16 RX ORDER — LIDOCAINE HYDROCHLORIDE 20 MG/ML
15 SOLUTION OROPHARYNGEAL
Status: DISCONTINUED | OUTPATIENT
Start: 2019-09-16 | End: 2019-09-16 | Stop reason: HOSPADM

## 2019-09-16 RX ORDER — SUCRALFATE 1 G/1
0.5 TABLET ORAL EVERY 4 HOURS
Qty: 120 TABLET | Refills: 3 | Status: SHIPPED | OUTPATIENT
Start: 2019-09-16 | End: 2021-03-12 | Stop reason: ALTCHOICE

## 2019-09-16 RX ORDER — PROPOFOL 10 MG/ML
INJECTION, EMULSION INTRAVENOUS PRN
Status: DISCONTINUED | OUTPATIENT
Start: 2019-09-16 | End: 2019-09-16 | Stop reason: SDUPTHER

## 2019-09-16 RX ORDER — SODIUM CHLORIDE 9 MG/ML
INJECTION, SOLUTION INTRAVENOUS CONTINUOUS
Status: DISCONTINUED | OUTPATIENT
Start: 2019-09-16 | End: 2019-09-16 | Stop reason: HOSPADM

## 2019-09-16 RX ORDER — OMEPRAZOLE 20 MG/1
20 CAPSULE, DELAYED RELEASE ORAL
Qty: 180 CAPSULE | Refills: 3 | Status: SHIPPED | OUTPATIENT
Start: 2019-09-16 | End: 2021-03-12

## 2019-09-16 RX ORDER — SODIUM CHLORIDE 0.9 % (FLUSH) 0.9 %
10 SYRINGE (ML) INJECTION EVERY 12 HOURS SCHEDULED
Status: DISCONTINUED | OUTPATIENT
Start: 2019-09-16 | End: 2019-09-16 | Stop reason: HOSPADM

## 2019-09-16 RX ORDER — SODIUM CHLORIDE 0.9 % (FLUSH) 0.9 %
10 SYRINGE (ML) INJECTION PRN
Status: DISCONTINUED | OUTPATIENT
Start: 2019-09-16 | End: 2019-09-16 | Stop reason: HOSPADM

## 2019-09-16 RX ADMIN — SODIUM CHLORIDE, POTASSIUM CHLORIDE, SODIUM LACTATE AND CALCIUM CHLORIDE: 600; 310; 30; 20 INJECTION, SOLUTION INTRAVENOUS at 12:45

## 2019-09-16 RX ADMIN — PROPOFOL 200 MG: 10 INJECTION, EMULSION INTRAVENOUS at 12:58

## 2019-09-16 RX ADMIN — PROPOFOL 20 MG: 10 INJECTION, EMULSION INTRAVENOUS at 13:04

## 2019-09-16 RX ADMIN — PROPOFOL 40 MG: 10 INJECTION, EMULSION INTRAVENOUS at 13:00

## 2019-09-16 RX ADMIN — PROPOFOL 20 MG: 10 INJECTION, EMULSION INTRAVENOUS at 13:02

## 2019-09-16 RX ADMIN — SODIUM CHLORIDE, POTASSIUM CHLORIDE, SODIUM LACTATE AND CALCIUM CHLORIDE: 600; 310; 30; 20 INJECTION, SOLUTION INTRAVENOUS at 12:50

## 2019-09-16 ASSESSMENT — LIFESTYLE VARIABLES: SMOKING_STATUS: 0

## 2019-09-16 ASSESSMENT — PAIN DESCRIPTION - ORIENTATION
ORIENTATION: LEFT;LOWER
ORIENTATION: LEFT;LOWER

## 2019-09-16 ASSESSMENT — PAIN DESCRIPTION - DESCRIPTORS
DESCRIPTORS: SHARP;SHOOTING
DESCRIPTORS: SHARP;SHOOTING

## 2019-09-16 ASSESSMENT — PAIN SCALES - GENERAL
PAINLEVEL_OUTOF10: 5
PAINLEVEL_OUTOF10: 8

## 2019-09-16 ASSESSMENT — PAIN DESCRIPTION - PAIN TYPE
TYPE: CHRONIC PAIN
TYPE: CHRONIC PAIN

## 2019-09-16 ASSESSMENT — PAIN DESCRIPTION - FREQUENCY: FREQUENCY: CONTINUOUS

## 2019-09-16 ASSESSMENT — PAIN DESCRIPTION - ONSET: ONSET: ON-GOING

## 2019-09-16 ASSESSMENT — PAIN DESCRIPTION - LOCATION
LOCATION: ABDOMEN
LOCATION: ABDOMEN

## 2019-09-16 ASSESSMENT — PAIN - FUNCTIONAL ASSESSMENT: PAIN_FUNCTIONAL_ASSESSMENT: 0-10

## 2019-09-16 NOTE — H&P
tablet by mouth daily    Yes Historical Provider, MD   Multiple Vitamins-Minerals (THERAPEUTIC MULTIVITAMIN-MINERALS) tablet Take 1 tablet by mouth 2 times daily   Yes Historical Provider, MD   calcium-cholecalciferol 500-200 MG-UNIT per tablet Take 1 tablet by mouth daily 1/5/18  Yes Keara Hurtado MD   FLUoxetine (PROZAC) 10 MG capsule Take 1 capsule by mouth daily 2/2/17  Yes Margarito Clement MD   FLUoxetine (PROZAC) 20 MG capsule Take 1 capsule by mouth daily 4/15/19   Margarito Clement MD        No Known Allergies        Past Medical History:   Diagnosis Date    Bipolar 1 disorder ([facility] [address]     Depression     Obesity     Osteoarthritis     bilateral knees    Thiamine deficiency     Vitamin D deficiency        Past Surgical History:   Procedure Laterality Date    CHOLECYSTECTOMY      ENDOSCOPY, COLON, DIAGNOSTIC      GALLBLADDER SURGERY  1994    JOINT REPLACEMENT Right 06/03/2019    [place] LAP GASTRIC BYPASS/KIERAN-EN-Y N/A 4/30/2018    GASTRIC BYPASS KIERAN-EN-Y LAPAROSCOPIC performed by Keara Hurtado MD at [address] [place] [address] 6/3/2019    RIGHT KNEE TOTAL KNEE ARTHROPLASTY ([place] & NEPHEW VISIONAIRE) performed by Ventura Navarro DO at [facility] 10/22/2018    EGD BIOPSY performed by Mirtha Pedraza MD at [address] [place] [address] 2/21/2019    EGD ESOPHAGOGASTRODUODENOSCOPY performed by Keara Hurtado MD at [place] [address] Facility-Administered Medications   Medication Dose Route Frequency Provider Last Rate Last Dose    lactated ringers infusion   Intravenous Continuous Darshana Lee DO 75 mL/hr at 09/16/19 1245      0.9 % sodium chloride infusion   Intravenous Continuous Keara Hurtado MD        sodium chloride flush 0.9 % injection 10 mL  10 mL Intravenous 2 times per day Keara Hurtado MD        sodium chloride flush 0.9 % injection 10 mL  10 mL Intravenous PRN

## 2019-09-16 NOTE — PROGRESS NOTES
1315 Procedure complete. Pt awakens to voice, alert orx3. abd soft, nontender, nondistended. Pt reports abdominal pain is a little better now. Vitals stable: HR 76, /87 RR 25 99% O2 sat on room air. Dr Lisa Medellin ordered to notify him with any increase in pain or changes in vitals. Cont to monitor. 1321 Report give to [facility].

## 2019-09-16 NOTE — OP NOTE
[address] [place] [address] [facility]           Operative Report    DATE OF PROCEDURE: 9/16/2019    Yordy De La Rosa    SURGEON: Eusebio Villanueva MD.     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 History and consent: This is a 47y.o. year old female who is having epigastric pain and vomiting. I have discussed with the patient the indication, alternatives, and the possible risks and/or complications of upper endoscopy and the conscious sedation anesthesia. The patient and/or family understands and agrees to proceed. Monitoring and Safety:    The patient was placed on a cardiac monitor and vital signs, pulse oximetry and level of consciousness were continuously evaluated throughout the procedure. The patient was closely monitored until recovery from the medications was complete and the patient had returned to baseline status. Anesthesia was present at all times during the procedure. OPERATIONS: The patient was placed on the table and sedated while blood pressure, pulse and pulse oximetry were continuously monitored by the anesthesia team. A bite block was placed prior to sedation and the patient was placed in the left lateral decubitus position. A lubricated scope was easily passed into the upper esophagus which looked normal. The distal esophagus looked normal. The scope was passed into the stomach and retroflexed. The gastroesophageal junction was at 39cm. There was no hiatal hernia. The scope was passed down toward the gastrojejunal anastomosis. The gastric mucosa looked normal. Biopsy was taken to check for H. pylori.  The scope was then passed through the anastamosis

## 2019-09-19 ENCOUNTER — CARE COORDINATION (OUTPATIENT)
Dept: CARE COORDINATION | Age: 49
End: 2019-09-19

## 2019-09-25 ENCOUNTER — CARE COORDINATION (OUTPATIENT)
Dept: CARE COORDINATION | Age: 49
End: 2019-09-25

## 2019-09-25 NOTE — CARE COORDINATION
Ambulatory Care Coordination Note  9/25/2019  CM Risk Score: 5  Charlson 10 Year Mortality Risk Score: 10%     ACC: Martha Walters, RN    Summary Note: Called patient to discuss Care Coordination Program as requested by Radha Wright MD. I left a voice mail message introducing myself and a brief description of the Care Coordination Program.   Requested a call back to discuss the program, enrollment, and schedule a time to speak with me. Direct contact information given. Will mail out unable to contact as pt has not responded to last 3 attempts t contact. Will close ACC. Ambulatory Care Coordination Assessment    Care Coordination Protocol  Program Enrollment:  Rising Risk  Referral from Primary Care Provider:  Yes  Week 1 - Initial Assessment     Do you have all of your prescriptions and are they filled?:  Yes  Barriers to medication adherence:  None  Are you able to afford your medications?:  Yes  How often do you have trouble taking your medications the way you have been told to take them?:  Sometimes I take them as prescribed. Do you have Home O2 Therapy?:  No      Is patient able to live independently?:  Yes                    Suggested Interventions and Community Resources                  Prior to Admission medications    Medication Sig Start Date End Date Taking?  Authorizing Provider   sucralfate (CARAFATE) 1 GM tablet Take 0.5 tablets by mouth every 4 hours 9/16/19   Eusebio Villanueva MD   omeprazole (PRILOSEC) 20 MG delayed release capsule Take 1 capsule by mouth 2 times daily (before meals) 9/16/19   Eusebio Villanueva MD   lidocaine viscous hcl (XYLOCAINE) 2 % SOLN solution Take 15 mLs by mouth as needed for Irritation 9/16/19   Eusebio Villanueva MD   Cholecalciferol (VITAMIN D3) 5000 units TABS Take by mouth daily    Historical Provider, MD   Docusate Sodium (COLACE PO) Take 100 mg by mouth every other day     Historical Provider, MD   QUEtiapine (SEROQUEL) 100 MG tablet Take 1

## 2019-10-02 ENCOUNTER — HOSPITAL ENCOUNTER (OUTPATIENT)
Age: 49
Discharge: HOME | End: 2019-10-02
Payer: MEDICARE

## 2019-10-02 LAB
ALBUMIN SERPL-MCNC: 3.8 G/DL (ref 3.5–5.2)
ALP BLD-CCNC: 58 U/L (ref 35–104)
ALT SERPL-CCNC: 8 U/L (ref 0–32)
ANION GAP SERPL CALCULATED.3IONS-SCNC: 8 MMOL/L (ref 7–16)
AST SERPL-CCNC: 14 U/L (ref 0–31)
BILIRUB SERPL-MCNC: <0.2 MG/DL (ref 0–1.2)
BUN BLDV-MCNC: 14 MG/DL (ref 6–20)
CALCIUM SERPL-MCNC: 9.2 MG/DL (ref 8.6–10.2)
CHLORIDE BLD-SCNC: 103 MMOL/L (ref 98–107)
CHOLESTEROL, TOTAL: 239 MG/DL (ref 0–199)
CO2: 31 MMOL/L (ref 22–29)
CREAT SERPL-MCNC: 0.8 MG/DL (ref 0.5–1)
FERRITIN: 350 NG/ML
FOLATE: 12.8 NG/ML (ref 4.8–24.2)
GFR AFRICAN AMERICAN: >60
GFR NON-AFRICAN AMERICAN: >60 ML/MIN/1.73
GLUCOSE BLD-MCNC: 93 MG/DL (ref 74–99)
HCT VFR BLD CALC: 38.3 % (ref 34–48)
HEMOGLOBIN: 11.8 G/DL (ref 11.5–15.5)
LITHIUM DOSE AMOUNT: NORMAL
LITHIUM LEVEL: 0.95 MMOL/L (ref 0.5–1.5)
MCH RBC QN AUTO: 28.1 PG (ref 26–35)
MCHC RBC AUTO-ENTMCNC: 30.8 % (ref 32–34.5)
MCV RBC AUTO: 91.2 FL (ref 80–99.9)
PDW BLD-RTO: 13.2 FL (ref 11.5–15)
PLATELET # BLD: 319 E9/L (ref 130–450)
PMV BLD AUTO: 10.9 FL (ref 7–12)
POTASSIUM SERPL-SCNC: 4.3 MMOL/L (ref 3.5–5)
PREALBUMIN: 20 MG/DL (ref 20–40)
RBC # BLD: 4.2 E12/L (ref 3.5–5.5)
SODIUM BLD-SCNC: 142 MMOL/L (ref 132–146)
TOTAL PROTEIN: 7.3 G/DL (ref 6.4–8.3)
TRIGL SERPL-MCNC: 96 MG/DL (ref 0–149)
TSH SERPL DL<=0.05 MIU/L-ACNC: 1.96 UIU/ML (ref 0.27–4.2)
VITAMIN B-12: 293 PG/ML (ref 211–946)
VITAMIN D 25-HYDROXY: 46 NG/ML (ref 30–100)
WBC # BLD: 4.5 E9/L (ref 4.5–11.5)

## 2019-10-02 PROCEDURE — 85027 COMPLETE CBC AUTOMATED: CPT

## 2019-10-02 PROCEDURE — 84478 ASSAY OF TRIGLYCERIDES: CPT

## 2019-10-02 PROCEDURE — 80178 ASSAY OF LITHIUM: CPT

## 2019-10-02 PROCEDURE — 84425 ASSAY OF VITAMIN B-1: CPT

## 2019-10-02 PROCEDURE — 36415 COLL VENOUS BLD VENIPUNCTURE: CPT

## 2019-10-02 PROCEDURE — 80053 COMPREHEN METABOLIC PANEL: CPT

## 2019-10-02 PROCEDURE — 84134 ASSAY OF PREALBUMIN: CPT

## 2019-10-02 PROCEDURE — 82465 ASSAY BLD/SERUM CHOLESTEROL: CPT

## 2019-10-02 PROCEDURE — 84630 ASSAY OF ZINC: CPT

## 2019-10-02 PROCEDURE — 84443 ASSAY THYROID STIM HORMONE: CPT

## 2019-10-02 PROCEDURE — 82607 VITAMIN B-12: CPT

## 2019-10-02 PROCEDURE — 82728 ASSAY OF FERRITIN: CPT

## 2019-10-02 PROCEDURE — 82306 VITAMIN D 25 HYDROXY: CPT

## 2019-10-02 PROCEDURE — 82746 ASSAY OF FOLIC ACID SERUM: CPT

## 2019-10-05 LAB — ZINC: 55.2 UG/DL (ref 60–120)

## 2019-10-08 LAB — VITAMIN B1 WHOLE BLOOD: 97 NMOL/L (ref 70–180)

## 2019-10-25 ENCOUNTER — OFFICE VISIT (OUTPATIENT)
Dept: BARIATRICS/WEIGHT MGMT | Age: 49
End: 2019-10-25
Payer: MEDICARE

## 2019-10-25 ENCOUNTER — INITIAL CONSULT (OUTPATIENT)
Dept: BARIATRICS/WEIGHT MGMT | Age: 49
End: 2019-10-25
Payer: MEDICARE

## 2019-10-25 VITALS — BODY MASS INDEX: 24.16 KG/M2 | HEIGHT: 65 IN | WEIGHT: 145 LBS

## 2019-10-25 VITALS — WEIGHT: 145 LBS | BODY MASS INDEX: 24.16 KG/M2 | TEMPERATURE: 97.1 F | HEIGHT: 65 IN | RESPIRATION RATE: 20 BRPM

## 2019-10-25 DIAGNOSIS — E60 ZINC DEFICIENCY: Primary | ICD-10-CM

## 2019-10-25 DIAGNOSIS — R10.13 EPIGASTRIC PAIN: ICD-10-CM

## 2019-10-25 DIAGNOSIS — K28.9 MARGINAL ULCER: ICD-10-CM

## 2019-10-25 DIAGNOSIS — K91.2 MALNUTRITION FOLLOWING GASTROINTESTINAL SURGERY: Primary | ICD-10-CM

## 2019-10-25 PROCEDURE — G8484 FLU IMMUNIZE NO ADMIN: HCPCS | Performed by: SURGERY

## 2019-10-25 PROCEDURE — G8420 CALC BMI NORM PARAMETERS: HCPCS | Performed by: SURGERY

## 2019-10-25 PROCEDURE — 1036F TOBACCO NON-USER: CPT | Performed by: SURGERY

## 2019-10-25 PROCEDURE — 99213 OFFICE O/P EST LOW 20 MIN: CPT | Performed by: SURGERY

## 2019-10-25 PROCEDURE — 3017F COLORECTAL CA SCREEN DOC REV: CPT | Performed by: SURGERY

## 2019-10-25 PROCEDURE — G8427 DOCREV CUR MEDS BY ELIG CLIN: HCPCS | Performed by: SURGERY

## 2019-10-25 PROCEDURE — 99212 OFFICE O/P EST SF 10 MIN: CPT

## 2019-10-25 RX ORDER — ZINC GLUCONATE 50 MG
50 TABLET ORAL 2 TIMES DAILY
Qty: 60 TABLET | Refills: 0 | Status: SHIPPED
Start: 2019-10-25 | End: 2020-06-18 | Stop reason: ALTCHOICE

## 2019-10-25 RX ORDER — AMOXICILLIN 500 MG/1
500 CAPSULE ORAL 4 TIMES DAILY
COMMUNITY
Start: 2019-09-26 | End: 2020-06-18 | Stop reason: ALTCHOICE

## 2019-10-25 RX ORDER — QUETIAPINE FUMARATE 50 MG/1
50 TABLET, FILM COATED ORAL EVERY EVENING
Refills: 2 | Status: ON HOLD | COMMUNITY
Start: 2019-09-19 | End: 2021-09-10 | Stop reason: ALTCHOICE

## 2019-12-09 DIAGNOSIS — Z96.651 STATUS POST TOTAL RIGHT KNEE REPLACEMENT: Primary | ICD-10-CM

## 2019-12-10 ENCOUNTER — OFFICE VISIT (OUTPATIENT)
Dept: ORTHOPEDIC SURGERY | Age: 49
End: 2019-12-10
Payer: MEDICARE

## 2019-12-10 VITALS — HEIGHT: 65 IN | BODY MASS INDEX: 24.99 KG/M2 | WEIGHT: 150 LBS

## 2019-12-10 DIAGNOSIS — Z96.651 STATUS POST TOTAL RIGHT KNEE REPLACEMENT: Primary | ICD-10-CM

## 2019-12-10 PROCEDURE — 99213 OFFICE O/P EST LOW 20 MIN: CPT | Performed by: ORTHOPAEDIC SURGERY

## 2019-12-10 PROCEDURE — G8484 FLU IMMUNIZE NO ADMIN: HCPCS | Performed by: ORTHOPAEDIC SURGERY

## 2019-12-10 PROCEDURE — G8420 CALC BMI NORM PARAMETERS: HCPCS | Performed by: ORTHOPAEDIC SURGERY

## 2019-12-10 PROCEDURE — 3017F COLORECTAL CA SCREEN DOC REV: CPT | Performed by: ORTHOPAEDIC SURGERY

## 2019-12-10 PROCEDURE — 1036F TOBACCO NON-USER: CPT | Performed by: ORTHOPAEDIC SURGERY

## 2019-12-10 PROCEDURE — G8427 DOCREV CUR MEDS BY ELIG CLIN: HCPCS | Performed by: ORTHOPAEDIC SURGERY

## 2019-12-17 ENCOUNTER — TELEPHONE (OUTPATIENT)
Dept: BARIATRICS/WEIGHT MGMT | Age: 49
End: 2019-12-17

## 2019-12-27 ENCOUNTER — TELEPHONE (OUTPATIENT)
Dept: BARIATRICS/WEIGHT MGMT | Age: 49
End: 2019-12-27

## 2020-02-28 ASSESSMENT — PROMIS GLOBAL HEALTH SCALE
HOW IS THE PROMIS V1.1 BEING ADMINISTERED?: 2
IN GENERAL, HOW WOULD YOU RATE YOUR SATISFACTION WITH YOUR SOCIAL ACTIVITIES AND RELATIONSHIPS [ON A SCALE OF 1 (POOR) TO 5 (EXCELLENT)]?: 5
IN GENERAL, HOW WOULD YOU RATE YOUR PHYSICAL HEALTH [ON A SCALE OF 1 (POOR) TO 5 (EXCELLENT)]?: 3
WHO IS THE PERSON COMPLETING THE PROMIS V1.1 SURVEY?: 0
IN GENERAL, HOW WOULD YOU RATE YOUR MENTAL HEALTH, INCLUDING YOUR MOOD AND YOUR ABILITY TO THINK [ON A SCALE OF 1 (POOR) TO 5 (EXCELLENT)]?: 3
IN GENERAL, WOULD YOU SAY YOUR HEALTH IS...[ON A SCALE OF 1 (POOR) TO 5 (EXCELLENT)]: 4
IN GENERAL, PLEASE RATE HOW WELL YOU CARRY OUT YOUR USUAL SOCIAL ACTIVITIES (INCLUDES ACTIVITIES AT HOME, AT WORK, AND IN YOUR COMMUNITY, AND RESPONSIBILITIES AS A PARENT, CHILD, SPOUSE, EMPLOYEE, FRIEND, ETC) [ON A SCALE OF 1 (POOR) TO 5 (EXCELLENT)]?: 4
TO WHAT EXTENT ARE YOU ABLE TO CARRY OUT YOUR EVERYDAY PHYSICAL ACTIVITIES SUCH AS WALKING, CLIMBING STAIRS, CARRYING GROCERIES, OR MOVING A CHAIR [ON A SCALE OF 1 (NOT AT ALL) TO 5 (COMPLETELY)]?: 5
IN THE PAST 7 DAYS, HOW WOULD YOU RATE YOUR FATIGUE ON AVERAGE [ON A SCALE FROM 1 (NONE) TO 5 (VERY SEVERE)]?: 4
IN THE PAST 7 DAYS, HOW WOULD YOU RATE YOUR PAIN ON AVERAGE [ON A SCALE FROM 0 (NO PAIN) TO 10 (WORST IMAGINABLE PAIN)]?: 5
IN GENERAL, WOULD YOU SAY YOUR QUALITY OF LIFE IS...[ON A SCALE OF 1 (POOR) TO 5 (EXCELLENT)]: 3
IN THE PAST 7 DAYS, HOW OFTEN HAVE YOU BEEN BOTHERED BY EMOTIONAL PROBLEMS, SUCH AS FEELING ANXIOUS, DEPRESSED, OR IRRITABLE [ON A SCALE FROM 1 (NEVER) TO 5 (ALWAYS)]?: 4

## 2020-02-28 ASSESSMENT — KOOS JR
STANDING UPRIGHT: 1
BENDING TO THE FLOOR TO PICK UP OBJECT: 1
STRAIGHTENING KNEE FULLY: 1
HOW SEVERE IS YOUR KNEE STIFFNESS AFTER FIRST WAKING IN MORNING: 2
TWISING OR PIVOTING ON KNEE: 1
RISING FROM SITTING: 1
GOING UP OR DOWN STAIRS: 1

## 2020-03-04 ENCOUNTER — NURSE ONLY (OUTPATIENT)
Dept: FAMILY MEDICINE CLINIC | Age: 50
End: 2020-03-04
Payer: MEDICARE

## 2020-03-04 PROCEDURE — 90686 IIV4 VACC NO PRSV 0.5 ML IM: CPT | Performed by: FAMILY MEDICINE

## 2020-03-04 PROCEDURE — G0008 ADMIN INFLUENZA VIRUS VAC: HCPCS | Performed by: FAMILY MEDICINE

## 2020-03-04 PROCEDURE — 36415 COLL VENOUS BLD VENIPUNCTURE: CPT | Performed by: FAMILY MEDICINE

## 2020-03-04 NOTE — PROGRESS NOTES
Vaccine Information Sheet, \"Influenza - Inactivated\"  given to Asa Augustine, or parent/legal guardian of  Asa Augustine and verbalized understanding. Patient responses:    Have you ever had a reaction to a flu vaccine? No  Do you have any current illness? No  Have you ever had [place]illian [place] Syndrome? No  Do you have a serious allergy to any of the follow: Neomycin, Polymyxin, Thimerosal, eggs or egg products? No    Flu vaccine given per order. Please see immunization tab. Risks and benefits explained. Current VIS given.       Immunizations Administered     Name Date Dose Route    Influenza, Quadv, IM, PF (6 mo and older Fluzone, Flulaval, Fluarix, and 3 yrs and older Afluria) 3/4/2020 0.5 mL Intramuscular    Site: Deltoid- Left    Lot: [id number]    NDC: 95438-787-00

## 2020-03-10 ENCOUNTER — CARE COORDINATION (OUTPATIENT)
Dept: CARE COORDINATION | Age: 50
End: 2020-03-10

## 2020-03-10 NOTE — LETTER
3/10/2020    Nasrin Rabago  [address] [place] [address]  Avelino Narayan [id number]      Dear Nasrin Rabago,    My name is Asha Corbin and I am a registered nurse who partners with Dianne Perez MD to improve patients' health. Dianne Perez MD believes you would benefit from working with me. As a member of your health care team, I would work with other providers involved in your care, offer education for your specific health conditions, and connect you with additional resources as needed. I will collaborate with Dianne Perez MD to support you in following your treatment plan. The additional support I provide is no additional cost to you. My primary focus is to help you achieve specific goals and improve your health. We are committed to walk with you on this journey and look forward to working with you. Please call me to further discuss your healthcare needs. I am available by phone or for appointments at the office. You can reach me at [phone] [facility]) or [phone] (Work cell).       In good health,       Asha Corbin, [address]

## 2020-03-10 NOTE — CARE COORDINATION
Called patient to discuss Care Coordination Program as requested by Arvid Riedel, MD. I left a voice mail message introducing myself and a brief description of the Care Coordination Program.   Requested a call back to discuss the program, enrollment, and schedule a time to speak with me. Direct contact information given. Will send introduction letter in the meantime.

## 2020-04-13 ENCOUNTER — TELEPHONE (OUTPATIENT)
Dept: BARIATRICS/WEIGHT MGMT | Age: 50
End: 2020-04-13

## 2020-05-07 ENCOUNTER — TELEPHONE (OUTPATIENT)
Dept: BARIATRICS/WEIGHT MGMT | Age: 50
End: 2020-05-07

## 2020-05-11 ENCOUNTER — OFFICE VISIT (OUTPATIENT)
Dept: ORTHOPEDIC SURGERY | Age: 50
End: 2020-05-11
Payer: MEDICARE

## 2020-05-11 VITALS — HEIGHT: 65 IN | TEMPERATURE: 98.4 F | WEIGHT: 160 LBS | BODY MASS INDEX: 26.66 KG/M2

## 2020-05-11 PROBLEM — Z96.651 STATUS POST TOTAL RIGHT KNEE REPLACEMENT: Status: ACTIVE | Noted: 2020-05-11

## 2020-05-11 PROBLEM — M17.12 PRIMARY OSTEOARTHRITIS OF LEFT KNEE: Status: ACTIVE | Noted: 2020-05-11

## 2020-05-11 PROCEDURE — G8417 CALC BMI ABV UP PARAM F/U: HCPCS | Performed by: ORTHOPAEDIC SURGERY

## 2020-05-11 PROCEDURE — G8427 DOCREV CUR MEDS BY ELIG CLIN: HCPCS | Performed by: ORTHOPAEDIC SURGERY

## 2020-05-11 PROCEDURE — 3017F COLORECTAL CA SCREEN DOC REV: CPT | Performed by: ORTHOPAEDIC SURGERY

## 2020-05-11 PROCEDURE — 99214 OFFICE O/P EST MOD 30 MIN: CPT | Performed by: ORTHOPAEDIC SURGERY

## 2020-05-11 PROCEDURE — 1036F TOBACCO NON-USER: CPT | Performed by: ORTHOPAEDIC SURGERY

## 2020-05-21 ASSESSMENT — ENCOUNTER SYMPTOMS
SHORTNESS OF BREATH: 0
WHEEZING: 0
SORE THROAT: 0
COUGH: 0
DIARRHEA: 0
BACK PAIN: 0
BLOOD IN STOOL: 0
NAUSEA: 0
VOMITING: 0
ABDOMINAL PAIN: 0
CONSTIPATION: 0

## 2020-05-27 ENCOUNTER — OFFICE VISIT (OUTPATIENT)
Dept: FAMILY MEDICINE CLINIC | Age: 50
End: 2020-05-27
Payer: MEDICARE

## 2020-05-27 VITALS
SYSTOLIC BLOOD PRESSURE: 120 MMHG | BODY MASS INDEX: 28.66 KG/M2 | WEIGHT: 172 LBS | HEART RATE: 72 BPM | RESPIRATION RATE: 16 BRPM | OXYGEN SATURATION: 98 % | TEMPERATURE: 98 F | DIASTOLIC BLOOD PRESSURE: 62 MMHG | HEIGHT: 65 IN

## 2020-05-27 PROCEDURE — 3017F COLORECTAL CA SCREEN DOC REV: CPT | Performed by: FAMILY MEDICINE

## 2020-05-27 PROCEDURE — G8417 CALC BMI ABV UP PARAM F/U: HCPCS | Performed by: FAMILY MEDICINE

## 2020-05-27 PROCEDURE — 1036F TOBACCO NON-USER: CPT | Performed by: FAMILY MEDICINE

## 2020-05-27 PROCEDURE — G8427 DOCREV CUR MEDS BY ELIG CLIN: HCPCS | Performed by: FAMILY MEDICINE

## 2020-05-27 PROCEDURE — 99214 OFFICE O/P EST MOD 30 MIN: CPT | Performed by: FAMILY MEDICINE

## 2020-05-27 ASSESSMENT — PATIENT HEALTH QUESTIONNAIRE - PHQ9
2. FEELING DOWN, DEPRESSED OR HOPELESS: 0
SUM OF ALL RESPONSES TO PHQ9 QUESTIONS 1 & 2: 0
SUM OF ALL RESPONSES TO PHQ QUESTIONS 1-9: 0
1. LITTLE INTEREST OR PLEASURE IN DOING THINGS: 0
SUM OF ALL RESPONSES TO PHQ QUESTIONS 1-9: 0

## 2020-06-05 ASSESSMENT — KOOS JR
STRAIGHTENING KNEE FULLY: 0
HOW SEVERE IS YOUR KNEE STIFFNESS AFTER FIRST WAKING IN MORNING: 2
STANDING UPRIGHT: 1
RISING FROM SITTING: 1
TWISING OR PIVOTING ON KNEE: 1
BENDING TO THE FLOOR TO PICK UP OBJECT: 1
GOING UP OR DOWN STAIRS: 1

## 2020-06-05 ASSESSMENT — PROMIS GLOBAL HEALTH SCALE
IN THE PAST 7 DAYS, HOW WOULD YOU RATE YOUR PAIN ON AVERAGE [ON A SCALE FROM 0 (NO PAIN) TO 10 (WORST IMAGINABLE PAIN)]?: 3
TO WHAT EXTENT ARE YOU ABLE TO CARRY OUT YOUR EVERYDAY PHYSICAL ACTIVITIES SUCH AS WALKING, CLIMBING STAIRS, CARRYING GROCERIES, OR MOVING A CHAIR [ON A SCALE OF 1 (NOT AT ALL) TO 5 (COMPLETELY)]?: 4
IN THE PAST 7 DAYS, HOW WOULD YOU RATE YOUR FATIGUE ON AVERAGE [ON A SCALE FROM 1 (NONE) TO 5 (VERY SEVERE)]?: 4
IN GENERAL, WOULD YOU SAY YOUR QUALITY OF LIFE IS...[ON A SCALE OF 1 (POOR) TO 5 (EXCELLENT)]: 5
HOW IS THE PROMIS V1.1 BEING ADMINISTERED?: 2
IN GENERAL, PLEASE RATE HOW WELL YOU CARRY OUT YOUR USUAL SOCIAL ACTIVITIES (INCLUDES ACTIVITIES AT HOME, AT WORK, AND IN YOUR COMMUNITY, AND RESPONSIBILITIES AS A PARENT, CHILD, SPOUSE, EMPLOYEE, FRIEND, ETC) [ON A SCALE OF 1 (POOR) TO 5 (EXCELLENT)]?: 3
IN THE PAST 7 DAYS, HOW OFTEN HAVE YOU BEEN BOTHERED BY EMOTIONAL PROBLEMS, SUCH AS FEELING ANXIOUS, DEPRESSED, OR IRRITABLE [ON A SCALE FROM 1 (NEVER) TO 5 (ALWAYS)]?: 3
IN GENERAL, HOW WOULD YOU RATE YOUR SATISFACTION WITH YOUR SOCIAL ACTIVITIES AND RELATIONSHIPS [ON A SCALE OF 1 (POOR) TO 5 (EXCELLENT)]?: 4
IN GENERAL, WOULD YOU SAY YOUR HEALTH IS...[ON A SCALE OF 1 (POOR) TO 5 (EXCELLENT)]: 4
IN GENERAL, HOW WOULD YOU RATE YOUR MENTAL HEALTH, INCLUDING YOUR MOOD AND YOUR ABILITY TO THINK [ON A SCALE OF 1 (POOR) TO 5 (EXCELLENT)]?: 4
IN GENERAL, HOW WOULD YOU RATE YOUR PHYSICAL HEALTH [ON A SCALE OF 1 (POOR) TO 5 (EXCELLENT)]?: 4
WHO IS THE PERSON COMPLETING THE PROMIS V1.1 SURVEY?: 0

## 2020-06-11 ENCOUNTER — TELEPHONE (OUTPATIENT)
Dept: FAMILY MEDICINE CLINIC | Age: 50
End: 2020-06-11

## 2020-06-17 RX ORDER — SODIUM CHLORIDE, SODIUM LACTATE, POTASSIUM CHLORIDE, CALCIUM CHLORIDE 600; 310; 30; 20 MG/100ML; MG/100ML; MG/100ML; MG/100ML
INJECTION, SOLUTION INTRAVENOUS CONTINUOUS
Status: CANCELLED | OUTPATIENT
Start: 2020-06-17

## 2020-06-18 ENCOUNTER — HOSPITAL ENCOUNTER (OUTPATIENT)
Age: 50
Discharge: HOME | End: 2020-06-20
Payer: MEDICARE

## 2020-06-18 ENCOUNTER — HOSPITAL ENCOUNTER (OUTPATIENT)
Dept: PREADMISSION TESTING | Age: 50
Discharge: HOME | End: 2020-06-18
Payer: MEDICARE

## 2020-06-18 ENCOUNTER — HOSPITAL ENCOUNTER (OUTPATIENT)
Dept: GENERAL RADIOLOGY | Age: 50
Discharge: HOME | End: 2020-06-20
Payer: MEDICARE

## 2020-06-18 ENCOUNTER — ANESTHESIA EVENT (OUTPATIENT)
Dept: OPERATING ROOM | Age: 50
End: 2020-06-18
Payer: MEDICARE

## 2020-06-18 VITALS
TEMPERATURE: 98.5 F | OXYGEN SATURATION: 98 % | HEART RATE: 73 BPM | SYSTOLIC BLOOD PRESSURE: 131 MMHG | WEIGHT: 173.13 LBS | BODY MASS INDEX: 29.56 KG/M2 | RESPIRATION RATE: 16 BRPM | HEIGHT: 64 IN | DIASTOLIC BLOOD PRESSURE: 66 MMHG

## 2020-06-18 LAB
ABO/RH: NORMAL
ANION GAP SERPL CALCULATED.3IONS-SCNC: 9 MMOL/L (ref 7–16)
ANTIBODY SCREEN: NORMAL
APTT: 29.6 SEC (ref 24.5–35.1)
BILIRUBIN URINE: NEGATIVE
BLOOD, URINE: NEGATIVE
BUN BLDV-MCNC: 17 MG/DL (ref 6–20)
CALCIUM SERPL-MCNC: 9.2 MG/DL (ref 8.6–10.2)
CHLORIDE BLD-SCNC: 102 MMOL/L (ref 98–107)
CLARITY: CLEAR
CO2: 30 MMOL/L (ref 22–29)
COLOR: YELLOW
CREAT SERPL-MCNC: 0.7 MG/DL (ref 0.5–1)
EKG ATRIAL RATE: 52 BPM
EKG P AXIS: 69 DEGREES
EKG P-R INTERVAL: 168 MS
EKG Q-T INTERVAL: 414 MS
EKG QRS DURATION: 84 MS
EKG QTC CALCULATION (BAZETT): 385 MS
EKG R AXIS: 48 DEGREES
EKG T AXIS: 56 DEGREES
EKG VENTRICULAR RATE: 52 BPM
GFR AFRICAN AMERICAN: >60
GFR NON-AFRICAN AMERICAN: >60 ML/MIN/1.73
GLUCOSE BLD-MCNC: 100 MG/DL (ref 74–99)
GLUCOSE URINE: NEGATIVE MG/DL
HCT VFR BLD CALC: 39.7 % (ref 34–48)
HEMOGLOBIN: 12.4 G/DL (ref 11.5–15.5)
INR BLD: 1
KETONES, URINE: NEGATIVE MG/DL
LEUKOCYTE ESTERASE, URINE: NEGATIVE
MCH RBC QN AUTO: 27.8 PG (ref 26–35)
MCHC RBC AUTO-ENTMCNC: 31.2 % (ref 32–34.5)
MCV RBC AUTO: 89 FL (ref 80–99.9)
NITRITE, URINE: NEGATIVE
PDW BLD-RTO: 12.6 FL (ref 11.5–15)
PH UA: 5.5 (ref 5–9)
PLATELET # BLD: 281 E9/L (ref 130–450)
PMV BLD AUTO: 11.3 FL (ref 7–12)
POTASSIUM SERPL-SCNC: 4.6 MMOL/L (ref 3.5–5)
PREALBUMIN: 16 MG/DL (ref 20–40)
PROTEIN UA: NEGATIVE MG/DL
PROTHROMBIN TIME: 10.8 SEC (ref 9.3–12.4)
RBC # BLD: 4.46 E12/L (ref 3.5–5.5)
SODIUM BLD-SCNC: 141 MMOL/L (ref 132–146)
SPECIFIC GRAVITY UA: <=1.005 (ref 1–1.03)
UROBILINOGEN, URINE: 0.2 E.U./DL
WBC # BLD: 4.2 E9/L (ref 4.5–11.5)

## 2020-06-18 PROCEDURE — 87088 URINE BACTERIA CULTURE: CPT

## 2020-06-18 PROCEDURE — 93010 ELECTROCARDIOGRAM REPORT: CPT | Performed by: INTERNAL MEDICINE

## 2020-06-18 PROCEDURE — 71046 X-RAY EXAM CHEST 2 VIEWS: CPT

## 2020-06-18 PROCEDURE — 93005 ELECTROCARDIOGRAM TRACING: CPT | Performed by: NURSE PRACTITIONER

## 2020-06-18 PROCEDURE — 87081 CULTURE SCREEN ONLY: CPT

## 2020-06-18 PROCEDURE — 36415 COLL VENOUS BLD VENIPUNCTURE: CPT

## 2020-06-18 PROCEDURE — 85730 THROMBOPLASTIN TIME PARTIAL: CPT

## 2020-06-18 PROCEDURE — 81003 URINALYSIS AUTO W/O SCOPE: CPT

## 2020-06-18 PROCEDURE — 86850 RBC ANTIBODY SCREEN: CPT

## 2020-06-18 PROCEDURE — 80048 BASIC METABOLIC PNL TOTAL CA: CPT

## 2020-06-18 PROCEDURE — 86901 BLOOD TYPING SEROLOGIC RH(D): CPT

## 2020-06-18 PROCEDURE — U0003 INFECTIOUS AGENT DETECTION BY NUCLEIC ACID (DNA OR RNA); SEVERE ACUTE RESPIRATORY SYNDROME CORONAVIRUS 2 (SARS-COV-2) (CORONAVIRUS DISEASE [COVID-19]), AMPLIFIED PROBE TECHNIQUE, MAKING USE OF HIGH THROUGHPUT TECHNOLOGIES AS DESCRIBED BY CMS-2020-01-R: HCPCS

## 2020-06-18 PROCEDURE — 84134 ASSAY OF PREALBUMIN: CPT

## 2020-06-18 PROCEDURE — 86900 BLOOD TYPING SEROLOGIC ABO: CPT

## 2020-06-18 PROCEDURE — 85610 PROTHROMBIN TIME: CPT

## 2020-06-18 PROCEDURE — 85027 COMPLETE CBC AUTOMATED: CPT

## 2020-06-18 RX ORDER — MIDAZOLAM HYDROCHLORIDE 1 MG/ML
1 INJECTION INTRAMUSCULAR; INTRAVENOUS EVERY 5 MIN PRN
Status: CANCELLED | OUTPATIENT
Start: 2020-06-18

## 2020-06-18 RX ORDER — ROPIVACAINE HYDROCHLORIDE 5 MG/ML
30 INJECTION, SOLUTION EPIDURAL; INFILTRATION; PERINEURAL ONCE
Status: CANCELLED | OUTPATIENT
Start: 2020-06-18 | End: 2020-06-18

## 2020-06-18 RX ORDER — FENTANYL CITRATE 50 UG/ML
100 INJECTION, SOLUTION INTRAMUSCULAR; INTRAVENOUS ONCE
Status: DISCONTINUED | OUTPATIENT
Start: 2020-06-24 | End: 2020-06-19 | Stop reason: HOSPADM

## 2020-06-18 ASSESSMENT — PAIN DESCRIPTION - ORIENTATION: ORIENTATION: LEFT

## 2020-06-18 ASSESSMENT — PAIN SCALES - GENERAL: PAINLEVEL_OUTOF10: 2

## 2020-06-18 ASSESSMENT — PAIN DESCRIPTION - DESCRIPTORS: DESCRIPTORS: ACHING;SORE

## 2020-06-18 ASSESSMENT — PAIN DESCRIPTION - PAIN TYPE: TYPE: CHRONIC PAIN

## 2020-06-18 ASSESSMENT — PAIN DESCRIPTION - LOCATION: LOCATION: KNEE

## 2020-06-18 NOTE — ANESTHESIA PRE PROCEDURE
Department of Anesthesiology  Preprocedure Note       Name:  Izabella Quinonez   Age:  45 y.o.  :  1970                                          MRN:  [id number]         Date:  2020      Surgeon: Diana Yang): Susie Moore DO    Procedure: Procedure(s):  LEFT KNEE TOTAL ARTHROPLASTY [facility] & NEPHEW)    Medications prior to admission:   Prior to Admission medications    Medication Sig Start Date End Date Taking?  Authorizing Provider   QUEtiapine (SEROQUEL) 50 MG tablet Take 50 mg by mouth every evening 19   Historical Provider, MD   sucralfate (CARAFATE) 1 GM tablet Take 0.5 tablets by mouth every 4 hours 19   Kenya Adler MD   omeprazole (PRILOSEC) 20 MG delayed release capsule Take 1 capsule by mouth 2 times daily (before meals)  Patient taking differently: Take 20 mg by mouth Daily  19   Kenya Adler MD   Cholecalciferol (VITAMIN D3) 5000 units TABS Take by mouth daily    Historical Provider, MD   Docusate Sodium (COLACE PO) Take 100 mg by mouth every other day     Historical Provider, MD   lithium 300 MG capsule Take 1 capsule by mouth daily  Patient taking differently: Take 300 mg by mouth 4 times daily  4/15/19   Tisha Molina MD   FLUoxetine (PROZAC) 20 MG capsule Take 1 capsule by mouth daily 4/15/19   Tisha Molina MD   Biotin 49257 MCG TABS Take 1 capsule by mouth daily     Historical Provider, MD   Ferrous Sulfate (IRON) 28 MG TABS Take 1 tablet by mouth daily     Historical Provider, MD   Multiple Vitamins-Minerals (THERAPEUTIC MULTIVITAMIN-MINERALS) tablet Take 1 tablet by mouth 2 times daily    Historical Provider, MD   calcium-cholecalciferol 500-200 MG-UNIT per tablet Take 1 tablet by mouth daily 18   Kenya Adler MD   FLUoxetine (PROZAC) 10 MG capsule Take 1 capsule by mouth daily 17   Tisha Molina MD       Current medications:    Current Outpatient Medications   Medication Sig Dispense Refill    QUEtiapine

## 2020-06-18 NOTE — PROGRESS NOTES
makeup) or nail polish on your fingers or toes. 11. DO NOT wear any jewelry or piercings on day of surgery. All body piercing jewelry must be removed. 12. Shower the night before surgery with _x__Antibacterial soap /DARIO WIPES__x______    13. TOTAL JOINT REPLACEMENT/HYSTERECTOMY PATIENTS ONLY---Remember to bring Blood Bank bracelet to the hospital on the day of surgery. 14. If you have a Living Will and Durable Power of  for Healthcare, please bring in a copy. 15. If appropriate bring crutches, inspirex, WALKER, CANE etc... 12. Notify your Surgeon if you develop any illness between now and surgery time, cough, cold, fever, sore throat, nausea, vomiting, etc.  Please notify your surgeon if you experience dizziness, shortness of breath or blurred vision between now & the time of your surgery. 17. If you have ___dentures, they will be removed before going to the OR; we will provide you a container. If you wear ___contact lenses or __x_glasses, they will be removed; please bring a case for them. 18. To provide excellent care visitors will be limited to 1 in the room at any given time. 19. Please bring picture ID and insurance card. 20. Sleep apnea patients need to bring CPAP AND SETTINGS to hospital on day of surgery. 21. During flu season no children under the age of 15 are permitted in the hospital for the safety of all patients. 22. Other Please check in at the main lobby/information desk. Please bring a mask if you have one. Please call AMBULATORY CARE if you have any further questions.    [address]     [address]

## 2020-06-19 LAB — MRSA CULTURE ONLY: NORMAL

## 2020-06-20 LAB
SARS-COV-2: NOT DETECTED
SOURCE: NORMAL
URINE CULTURE, ROUTINE: NORMAL

## 2020-06-22 ENCOUNTER — HOSPITAL ENCOUNTER (OUTPATIENT)
Dept: GENERAL RADIOLOGY | Age: 50
Discharge: HOME | End: 2020-06-24
Payer: MEDICARE

## 2020-06-22 PROCEDURE — 77063 BREAST TOMOSYNTHESIS BI: CPT

## 2020-06-24 ENCOUNTER — ANESTHESIA (OUTPATIENT)
Dept: OPERATING ROOM | Age: 50
End: 2020-06-24
Payer: MEDICARE

## 2020-06-24 ENCOUNTER — HOSPITAL ENCOUNTER (OUTPATIENT)
Age: 50
Setting detail: OBSERVATION
Discharge: HOME | End: 2020-06-25
Attending: ORTHOPAEDIC SURGERY | Admitting: ORTHOPAEDIC SURGERY
Payer: MEDICARE

## 2020-06-24 ENCOUNTER — APPOINTMENT (OUTPATIENT)
Dept: GENERAL RADIOLOGY | Age: 50
End: 2020-06-24
Attending: ORTHOPAEDIC SURGERY
Payer: MEDICARE

## 2020-06-24 VITALS
RESPIRATION RATE: 14 BRPM | TEMPERATURE: 96.1 F | SYSTOLIC BLOOD PRESSURE: 122 MMHG | OXYGEN SATURATION: 98 % | DIASTOLIC BLOOD PRESSURE: 76 MMHG

## 2020-06-24 PROBLEM — M17.12 PRIMARY OSTEOARTHRITIS OF ONE KNEE, LEFT: Status: ACTIVE | Noted: 2020-06-24

## 2020-06-24 PROBLEM — Z96.652 STATUS POST TOTAL LEFT KNEE REPLACEMENT: Status: ACTIVE | Noted: 2020-06-24

## 2020-06-24 LAB — HCG(URINE) PREGNANCY TEST: NEGATIVE

## 2020-06-24 PROCEDURE — 88311 DECALCIFY TISSUE: CPT

## 2020-06-24 PROCEDURE — 3700000001 HC ADD 15 MINUTES (ANESTHESIA): Performed by: ORTHOPAEDIC SURGERY

## 2020-06-24 PROCEDURE — 2580000003 HC RX 258: Performed by: ORTHOPAEDIC SURGERY

## 2020-06-24 PROCEDURE — 2709999900 HC NON-CHARGEABLE SUPPLY: Performed by: ORTHOPAEDIC SURGERY

## 2020-06-24 PROCEDURE — 2500000003 HC RX 250 WO HCPCS: Performed by: ORTHOPAEDIC SURGERY

## 2020-06-24 PROCEDURE — 7100000000 HC PACU RECOVERY - FIRST 15 MIN: Performed by: ORTHOPAEDIC SURGERY

## 2020-06-24 PROCEDURE — 3700000000 HC ANESTHESIA ATTENDED CARE: Performed by: ORTHOPAEDIC SURGERY

## 2020-06-24 PROCEDURE — 2580000003 HC RX 258

## 2020-06-24 PROCEDURE — 97165 OT EVAL LOW COMPLEX 30 MIN: CPT

## 2020-06-24 PROCEDURE — C1713 ANCHOR/SCREW BN/BN,TIS/BN: HCPCS | Performed by: ORTHOPAEDIC SURGERY

## 2020-06-24 PROCEDURE — 6370000000 HC RX 637 (ALT 250 FOR IP): Performed by: ORTHOPAEDIC SURGERY

## 2020-06-24 PROCEDURE — 6360000002 HC RX W HCPCS: Performed by: NURSE PRACTITIONER

## 2020-06-24 PROCEDURE — 3600000015 HC SURGERY LEVEL 5 ADDTL 15MIN: Performed by: ORTHOPAEDIC SURGERY

## 2020-06-24 PROCEDURE — 6370000000 HC RX 637 (ALT 250 FOR IP): Performed by: NURSE PRACTITIONER

## 2020-06-24 PROCEDURE — 73560 X-RAY EXAM OF KNEE 1 OR 2: CPT

## 2020-06-24 PROCEDURE — 97530 THERAPEUTIC ACTIVITIES: CPT | Performed by: PHYSICAL THERAPIST

## 2020-06-24 PROCEDURE — 27447 TOTAL KNEE ARTHROPLASTY: CPT | Performed by: ORTHOPAEDIC SURGERY

## 2020-06-24 PROCEDURE — 88305 TISSUE EXAM BY PATHOLOGIST: CPT

## 2020-06-24 PROCEDURE — 6360000002 HC RX W HCPCS: Performed by: ORTHOPAEDIC SURGERY

## 2020-06-24 PROCEDURE — 6360000002 HC RX W HCPCS

## 2020-06-24 PROCEDURE — G0378 HOSPITAL OBSERVATION PER HR: HCPCS

## 2020-06-24 PROCEDURE — 97535 SELF CARE MNGMENT TRAINING: CPT

## 2020-06-24 PROCEDURE — 3600000005 HC SURGERY LEVEL 5 BASE: Performed by: ORTHOPAEDIC SURGERY

## 2020-06-24 PROCEDURE — 2580000003 HC RX 258: Performed by: ANESTHESIOLOGY

## 2020-06-24 PROCEDURE — 97110 THERAPEUTIC EXERCISES: CPT | Performed by: PHYSICAL THERAPIST

## 2020-06-24 PROCEDURE — 97530 THERAPEUTIC ACTIVITIES: CPT

## 2020-06-24 PROCEDURE — C1776 JOINT DEVICE (IMPLANTABLE): HCPCS | Performed by: ORTHOPAEDIC SURGERY

## 2020-06-24 PROCEDURE — 97161 PT EVAL LOW COMPLEX 20 MIN: CPT | Performed by: PHYSICAL THERAPIST

## 2020-06-24 PROCEDURE — 6360000002 HC RX W HCPCS: Performed by: ANESTHESIOLOGY

## 2020-06-24 PROCEDURE — 81025 URINE PREGNANCY TEST: CPT

## 2020-06-24 PROCEDURE — 7100000001 HC PACU RECOVERY - ADDTL 15 MIN: Performed by: ORTHOPAEDIC SURGERY

## 2020-06-24 PROCEDURE — 64447 NJX AA&/STRD FEMORAL NRV IMG: CPT | Performed by: ANESTHESIOLOGY

## 2020-06-24 PROCEDURE — 2500000003 HC RX 250 WO HCPCS

## 2020-06-24 DEVICE — CEMENT BNE 40 GM RADIOPAQUE BA SIMPLEX P: Type: IMPLANTABLE DEVICE | Status: FUNCTIONAL

## 2020-06-24 DEVICE — JOURNEY II INSERT XLPE DEEP DISHED LEFT SIZE 3-4 10MM
Type: IMPLANTABLE DEVICE | Site: KNEE | Status: FUNCTIONAL
Brand: JOURNEY

## 2020-06-24 DEVICE — JOURNEY TIBIAL BASEPLATE NONPOROUS                                    LEFT SIZE 3
Type: IMPLANTABLE DEVICE | Site: KNEE | Status: FUNCTIONAL
Brand: JOURNEY

## 2020-06-24 DEVICE — JOURNEY 7.5 ROUND RESURF PAT 29MM STANDARD
Type: IMPLANTABLE DEVICE | Site: KNEE | Status: FUNCTIONAL
Brand: JOURNEY

## 2020-06-24 DEVICE — KNEE K1 TOT HEMI STD CEM IMPL CAPPED K1 SN: Type: IMPLANTABLE DEVICE | Site: KNEE | Status: FUNCTIONAL

## 2020-06-24 RX ORDER — CELECOXIB 100 MG/1
200 CAPSULE ORAL EVERY 12 HOURS
Status: DISCONTINUED | OUTPATIENT
Start: 2020-06-24 | End: 2020-06-25 | Stop reason: HOSPADM

## 2020-06-24 RX ORDER — QUETIAPINE FUMARATE 25 MG/1
50 TABLET, FILM COATED ORAL EVERY EVENING
Status: DISCONTINUED | OUTPATIENT
Start: 2020-06-24 | End: 2020-06-25 | Stop reason: HOSPADM

## 2020-06-24 RX ORDER — OXYCODONE AND ACETAMINOPHEN 10; 325 MG/1; MG/1
1 TABLET ORAL EVERY 4 HOURS PRN
Status: DISCONTINUED | OUTPATIENT
Start: 2020-06-24 | End: 2020-06-25 | Stop reason: HOSPADM

## 2020-06-24 RX ORDER — PROMETHAZINE HYDROCHLORIDE 25 MG/ML
25 INJECTION, SOLUTION INTRAMUSCULAR; INTRAVENOUS EVERY 6 HOURS PRN
Status: DISCONTINUED | OUTPATIENT
Start: 2020-06-24 | End: 2020-06-25 | Stop reason: HOSPADM

## 2020-06-24 RX ORDER — FLUOXETINE 10 MG/1
10 CAPSULE ORAL DAILY
Status: DISCONTINUED | OUTPATIENT
Start: 2020-06-24 | End: 2020-06-25 | Stop reason: HOSPADM

## 2020-06-24 RX ORDER — OXYCODONE AND ACETAMINOPHEN 10; 325 MG/1; MG/1
1 TABLET ORAL EVERY 6 HOURS PRN
Qty: 28 TABLET | Refills: 0 | Status: SHIPPED | OUTPATIENT
Start: 2020-06-24 | End: 2020-07-09 | Stop reason: SDUPTHER

## 2020-06-24 RX ORDER — VANCOMYCIN HYDROCHLORIDE 1 G/20ML
INJECTION, POWDER, LYOPHILIZED, FOR SOLUTION INTRAVENOUS PRN
Status: DISCONTINUED | OUTPATIENT
Start: 2020-06-24 | End: 2020-06-24 | Stop reason: ALTCHOICE

## 2020-06-24 RX ORDER — SODIUM CHLORIDE, SODIUM LACTATE, POTASSIUM CHLORIDE, CALCIUM CHLORIDE 600; 310; 30; 20 MG/100ML; MG/100ML; MG/100ML; MG/100ML
INJECTION, SOLUTION INTRAVENOUS CONTINUOUS PRN
Status: DISCONTINUED | OUTPATIENT
Start: 2020-06-24 | End: 2020-06-24 | Stop reason: SDUPTHER

## 2020-06-24 RX ORDER — PROMETHAZINE HYDROCHLORIDE 25 MG/ML
6.25 INJECTION, SOLUTION INTRAMUSCULAR; INTRAVENOUS
Status: DISCONTINUED | OUTPATIENT
Start: 2020-06-24 | End: 2020-06-24 | Stop reason: HOSPADM

## 2020-06-24 RX ORDER — MIDAZOLAM HYDROCHLORIDE 1 MG/ML
INJECTION INTRAMUSCULAR; INTRAVENOUS
Status: COMPLETED
Start: 2020-06-24 | End: 2020-06-24

## 2020-06-24 RX ORDER — PROPOFOL 10 MG/ML
INJECTION, EMULSION INTRAVENOUS CONTINUOUS PRN
Status: DISCONTINUED | OUTPATIENT
Start: 2020-06-24 | End: 2020-06-24 | Stop reason: SDUPTHER

## 2020-06-24 RX ORDER — SUCRALFATE 1 G/1
0.5 TABLET ORAL EVERY 4 HOURS
Status: DISCONTINUED | OUTPATIENT
Start: 2020-06-24 | End: 2020-06-25 | Stop reason: HOSPADM

## 2020-06-24 RX ORDER — CEFAZOLIN SODIUM 2 G/50ML
2 SOLUTION INTRAVENOUS EVERY 8 HOURS
Status: DISCONTINUED | OUTPATIENT
Start: 2020-06-24 | End: 2020-06-24 | Stop reason: CLARIF

## 2020-06-24 RX ORDER — FERROUS SULFATE 325(65) MG
325 TABLET ORAL DAILY
Status: DISCONTINUED | OUTPATIENT
Start: 2020-06-25 | End: 2020-06-25 | Stop reason: HOSPADM

## 2020-06-24 RX ORDER — DOCUSATE SODIUM 100 MG/1
100 CAPSULE, LIQUID FILLED ORAL 2 TIMES DAILY
Status: DISCONTINUED | OUTPATIENT
Start: 2020-06-24 | End: 2020-06-25 | Stop reason: HOSPADM

## 2020-06-24 RX ORDER — ONDANSETRON 2 MG/ML
4 INJECTION INTRAMUSCULAR; INTRAVENOUS
Status: DISCONTINUED | OUTPATIENT
Start: 2020-06-24 | End: 2020-06-24 | Stop reason: HOSPADM

## 2020-06-24 RX ORDER — FENTANYL CITRATE 50 UG/ML
50 INJECTION, SOLUTION INTRAMUSCULAR; INTRAVENOUS ONCE
Status: COMPLETED | OUTPATIENT
Start: 2020-06-24 | End: 2020-06-24

## 2020-06-24 RX ORDER — SODIUM CHLORIDE 9 MG/ML
INJECTION, SOLUTION INTRAVENOUS CONTINUOUS PRN
Status: DISCONTINUED | OUTPATIENT
Start: 2020-06-24 | End: 2020-06-24

## 2020-06-24 RX ORDER — FENTANYL CITRATE 50 UG/ML
50 INJECTION, SOLUTION INTRAMUSCULAR; INTRAVENOUS EVERY 5 MIN PRN
Status: DISCONTINUED | OUTPATIENT
Start: 2020-06-24 | End: 2020-06-24 | Stop reason: HOSPADM

## 2020-06-24 RX ORDER — M-VIT,TX,IRON,MINS/CALC/FOLIC 27MG-0.4MG
1 TABLET ORAL DAILY
Status: DISCONTINUED | OUTPATIENT
Start: 2020-06-24 | End: 2020-06-25 | Stop reason: HOSPADM

## 2020-06-24 RX ORDER — DOCUSATE SODIUM 100 MG/1
100 CAPSULE, LIQUID FILLED ORAL EVERY OTHER DAY
Status: DISCONTINUED | OUTPATIENT
Start: 2020-06-24 | End: 2020-06-24 | Stop reason: SDUPTHER

## 2020-06-24 RX ORDER — FENTANYL CITRATE 50 UG/ML
INJECTION, SOLUTION INTRAMUSCULAR; INTRAVENOUS
Status: COMPLETED
Start: 2020-06-24 | End: 2020-06-24

## 2020-06-24 RX ORDER — GABAPENTIN 300 MG/1
600 CAPSULE ORAL ONCE
Status: COMPLETED | OUTPATIENT
Start: 2020-06-24 | End: 2020-06-24

## 2020-06-24 RX ORDER — MIDAZOLAM HYDROCHLORIDE 1 MG/ML
1 INJECTION INTRAMUSCULAR; INTRAVENOUS EVERY 5 MIN PRN
Status: COMPLETED | OUTPATIENT
Start: 2020-06-24 | End: 2020-06-24

## 2020-06-24 RX ORDER — SODIUM CHLORIDE 0.9 % (FLUSH) 0.9 %
10 SYRINGE (ML) INJECTION PRN
Status: DISCONTINUED | OUTPATIENT
Start: 2020-06-24 | End: 2020-06-24 | Stop reason: HOSPADM

## 2020-06-24 RX ORDER — ACETAMINOPHEN 325 MG/1
650 TABLET ORAL EVERY 4 HOURS PRN
Status: DISCONTINUED | OUTPATIENT
Start: 2020-06-24 | End: 2020-06-25 | Stop reason: HOSPADM

## 2020-06-24 RX ORDER — ROPIVACAINE HYDROCHLORIDE 5 MG/ML
INJECTION, SOLUTION EPIDURAL; INFILTRATION; PERINEURAL
Status: COMPLETED
Start: 2020-06-24 | End: 2020-06-24

## 2020-06-24 RX ORDER — LIDOCAINE HYDROCHLORIDE 20 MG/ML
INJECTION, SOLUTION INFILTRATION; PERINEURAL
Status: COMPLETED | OUTPATIENT
Start: 2020-06-24 | End: 2020-06-24

## 2020-06-24 RX ORDER — BUPIVACAINE HYDROCHLORIDE 7.5 MG/ML
INJECTION, SOLUTION INTRASPINAL PRN
Status: DISCONTINUED | OUTPATIENT
Start: 2020-06-24 | End: 2020-06-24 | Stop reason: SDUPTHER

## 2020-06-24 RX ORDER — CELECOXIB 100 MG/1
200 CAPSULE ORAL ONCE
Status: COMPLETED | OUTPATIENT
Start: 2020-06-24 | End: 2020-06-24

## 2020-06-24 RX ORDER — BISACODYL 10 MG
10 SUPPOSITORY, RECTAL RECTAL PRN
Status: DISCONTINUED | OUTPATIENT
Start: 2020-06-24 | End: 2020-06-25 | Stop reason: HOSPADM

## 2020-06-24 RX ORDER — SODIUM CHLORIDE, SODIUM LACTATE, POTASSIUM CHLORIDE, CALCIUM CHLORIDE 600; 310; 30; 20 MG/100ML; MG/100ML; MG/100ML; MG/100ML
INJECTION, SOLUTION INTRAVENOUS CONTINUOUS
Status: DISCONTINUED | OUTPATIENT
Start: 2020-06-24 | End: 2020-06-24

## 2020-06-24 RX ORDER — MORPHINE SULFATE 4 MG/ML
4 INJECTION, SOLUTION INTRAMUSCULAR; INTRAVENOUS EVERY 4 HOURS PRN
Status: DISCONTINUED | OUTPATIENT
Start: 2020-06-24 | End: 2020-06-25 | Stop reason: HOSPADM

## 2020-06-24 RX ORDER — FENTANYL CITRATE 50 UG/ML
INJECTION, SOLUTION INTRAMUSCULAR; INTRAVENOUS PRN
Status: DISCONTINUED | OUTPATIENT
Start: 2020-06-24 | End: 2020-06-24 | Stop reason: SDUPTHER

## 2020-06-24 RX ORDER — SODIUM CHLORIDE 0.9 % (FLUSH) 0.9 %
10 SYRINGE (ML) INJECTION PRN
Status: CANCELLED | OUTPATIENT
Start: 2020-06-24

## 2020-06-24 RX ORDER — MIDAZOLAM HYDROCHLORIDE 1 MG/ML
INJECTION INTRAMUSCULAR; INTRAVENOUS PRN
Status: DISCONTINUED | OUTPATIENT
Start: 2020-06-24 | End: 2020-06-24 | Stop reason: SDUPTHER

## 2020-06-24 RX ORDER — SODIUM CHLORIDE 0.9 % (FLUSH) 0.9 %
10 SYRINGE (ML) INJECTION EVERY 12 HOURS SCHEDULED
Status: CANCELLED | OUTPATIENT
Start: 2020-06-24

## 2020-06-24 RX ORDER — MEPERIDINE HYDROCHLORIDE 25 MG/ML
25 INJECTION INTRAMUSCULAR; INTRAVENOUS; SUBCUTANEOUS EVERY 5 MIN PRN
Status: DISCONTINUED | OUTPATIENT
Start: 2020-06-24 | End: 2020-06-24 | Stop reason: HOSPADM

## 2020-06-24 RX ORDER — SODIUM CHLORIDE 0.9 % (FLUSH) 0.9 %
10 SYRINGE (ML) INJECTION EVERY 12 HOURS SCHEDULED
Status: DISCONTINUED | OUTPATIENT
Start: 2020-06-24 | End: 2020-06-24 | Stop reason: HOSPADM

## 2020-06-24 RX ORDER — CEFAZOLIN SODIUM 2 G/50ML
SOLUTION INTRAVENOUS
Status: COMPLETED
Start: 2020-06-24 | End: 2020-06-24

## 2020-06-24 RX ORDER — CEFAZOLIN SODIUM 2 G/50ML
2 SOLUTION INTRAVENOUS
Status: DISCONTINUED | OUTPATIENT
Start: 2020-06-24 | End: 2020-06-24 | Stop reason: HOSPADM

## 2020-06-24 RX ORDER — DEXAMETHASONE SODIUM PHOSPHATE 10 MG/ML
8 INJECTION INTRAMUSCULAR; INTRAVENOUS ONCE
Status: COMPLETED | OUTPATIENT
Start: 2020-06-24 | End: 2020-06-24

## 2020-06-24 RX ORDER — ACETAMINOPHEN 500 MG
1000 TABLET ORAL ONCE
Status: COMPLETED | OUTPATIENT
Start: 2020-06-24 | End: 2020-06-24

## 2020-06-24 RX ORDER — GLUCOSAMINE/D3/BOSWELLIA SERRA 1500MG-400
1 TABLET ORAL DAILY
Status: DISCONTINUED | OUTPATIENT
Start: 2020-06-24 | End: 2020-06-24 | Stop reason: CLARIF

## 2020-06-24 RX ORDER — FENTANYL CITRATE 50 UG/ML
25 INJECTION, SOLUTION INTRAMUSCULAR; INTRAVENOUS EVERY 5 MIN PRN
Status: DISCONTINUED | OUTPATIENT
Start: 2020-06-24 | End: 2020-06-24 | Stop reason: HOSPADM

## 2020-06-24 RX ORDER — FLUOXETINE HYDROCHLORIDE 20 MG/1
20 CAPSULE ORAL DAILY
Status: DISCONTINUED | OUTPATIENT
Start: 2020-06-24 | End: 2020-06-25 | Stop reason: HOSPADM

## 2020-06-24 RX ORDER — CHLORHEXIDINE GLUCONATE 4 G/100ML
SOLUTION TOPICAL
Status: DISCONTINUED | OUTPATIENT
Start: 2020-06-24 | End: 2020-06-24 | Stop reason: HOSPADM

## 2020-06-24 RX ORDER — LITHIUM CARBONATE 300 MG/1
600 CAPSULE ORAL 2 TIMES DAILY
Status: DISCONTINUED | OUTPATIENT
Start: 2020-06-24 | End: 2020-06-25 | Stop reason: HOSPADM

## 2020-06-24 RX ORDER — ROPIVACAINE HYDROCHLORIDE 5 MG/ML
30 INJECTION, SOLUTION EPIDURAL; INFILTRATION; PERINEURAL ONCE
Status: COMPLETED | OUTPATIENT
Start: 2020-06-24 | End: 2020-06-24

## 2020-06-24 RX ORDER — PANTOPRAZOLE SODIUM 40 MG/1
40 TABLET, DELAYED RELEASE ORAL
Status: DISCONTINUED | OUTPATIENT
Start: 2020-06-24 | End: 2020-06-25 | Stop reason: HOSPADM

## 2020-06-24 RX ORDER — GABAPENTIN 100 MG/1
100 CAPSULE ORAL EVERY 8 HOURS
Status: DISCONTINUED | OUTPATIENT
Start: 2020-06-24 | End: 2020-06-25 | Stop reason: HOSPADM

## 2020-06-24 RX ADMIN — MIDAZOLAM HYDROCHLORIDE 1 MG: 1 INJECTION, SOLUTION INTRAMUSCULAR; INTRAVENOUS at 08:01

## 2020-06-24 RX ADMIN — WATER 2 G: 1 INJECTION INTRAMUSCULAR; INTRAVENOUS; SUBCUTANEOUS at 19:00

## 2020-06-24 RX ADMIN — FENTANYL CITRATE 25 MCG: 50 INJECTION, SOLUTION INTRAMUSCULAR; INTRAVENOUS at 08:56

## 2020-06-24 RX ADMIN — FLUOXETINE 20 MG: 20 CAPSULE ORAL at 17:58

## 2020-06-24 RX ADMIN — FLUOXETINE 10 MG: 10 CAPSULE ORAL at 16:05

## 2020-06-24 RX ADMIN — LIDOCAINE HYDROCHLORIDE 2 ML: 20 INJECTION, SOLUTION INFILTRATION; PERINEURAL at 08:13

## 2020-06-24 RX ADMIN — QUETIAPINE FUMARATE 50 MG: 25 TABLET ORAL at 18:54

## 2020-06-24 RX ADMIN — MIDAZOLAM 1 MG: 1 INJECTION INTRAMUSCULAR; INTRAVENOUS at 10:19

## 2020-06-24 RX ADMIN — CEFAZOLIN SODIUM 2 G: 2 SOLUTION INTRAVENOUS at 09:01

## 2020-06-24 RX ADMIN — CEFAZOLIN SODIUM 2 G: 2 SOLUTION INTRAVENOUS at 12:18

## 2020-06-24 RX ADMIN — SODIUM CHLORIDE, POTASSIUM CHLORIDE, SODIUM LACTATE AND CALCIUM CHLORIDE: 600; 310; 30; 20 INJECTION, SOLUTION INTRAVENOUS at 07:23

## 2020-06-24 RX ADMIN — PANTOPRAZOLE SODIUM 40 MG: 40 TABLET, DELAYED RELEASE ORAL at 14:37

## 2020-06-24 RX ADMIN — FENTANYL CITRATE 75 MCG: 50 INJECTION, SOLUTION INTRAMUSCULAR; INTRAVENOUS at 10:19

## 2020-06-24 RX ADMIN — LITHIUM CARBONATE 600 MG: 300 CAPSULE, GELATIN COATED ORAL at 20:31

## 2020-06-24 RX ADMIN — DEXAMETHASONE SODIUM PHOSPHATE 8 MG: 10 INJECTION INTRAMUSCULAR; INTRAVENOUS at 07:24

## 2020-06-24 RX ADMIN — FENTANYL CITRATE 50 MCG: 50 INJECTION, SOLUTION INTRAMUSCULAR; INTRAVENOUS at 11:18

## 2020-06-24 RX ADMIN — PROPOFOL 50 MCG/KG/MIN: 10 INJECTION, EMULSION INTRAVENOUS at 09:07

## 2020-06-24 RX ADMIN — GABAPENTIN 600 MG: 300 CAPSULE ORAL at 07:23

## 2020-06-24 RX ADMIN — LIDOCAINE HYDROCHLORIDE 2 ML: 20 INJECTION, SOLUTION INFILTRATION; PERINEURAL at 09:05

## 2020-06-24 RX ADMIN — DOCUSATE SODIUM 100 MG: 100 CAPSULE, LIQUID FILLED ORAL at 20:31

## 2020-06-24 RX ADMIN — ROPIVACAINE HYDROCHLORIDE 30 ML: 5 INJECTION, SOLUTION EPIDURAL; INFILTRATION; PERINEURAL at 08:08

## 2020-06-24 RX ADMIN — BUPIVACAINE HYDROCHLORIDE IN DEXTROSE 2 ML: 7.5 INJECTION, SOLUTION SUBARACHNOID at 08:56

## 2020-06-24 RX ADMIN — ACETAMINOPHEN 1000 MG: 500 TABLET, FILM COATED ORAL at 07:23

## 2020-06-24 RX ADMIN — SUCRALFATE 0.5 G: 1 TABLET ORAL at 20:31

## 2020-06-24 RX ADMIN — SODIUM CHLORIDE, POTASSIUM CHLORIDE, SODIUM LACTATE AND CALCIUM CHLORIDE: 600; 310; 30; 20 INJECTION, SOLUTION INTRAVENOUS at 10:18

## 2020-06-24 RX ADMIN — MIDAZOLAM HYDROCHLORIDE 1 MG: 1 INJECTION, SOLUTION INTRAMUSCULAR; INTRAVENOUS at 08:03

## 2020-06-24 RX ADMIN — SODIUM CHLORIDE, POTASSIUM CHLORIDE, SODIUM LACTATE AND CALCIUM CHLORIDE: 600; 310; 30; 20 INJECTION, SOLUTION INTRAVENOUS at 08:46

## 2020-06-24 RX ADMIN — BUPIVACAINE HYDROCHLORIDE: 5 INJECTION, SOLUTION PERINEURAL at 10:56

## 2020-06-24 RX ADMIN — GABAPENTIN 100 MG: 100 CAPSULE ORAL at 16:00

## 2020-06-24 RX ADMIN — FENTANYL CITRATE 50 MCG: 50 INJECTION, SOLUTION INTRAMUSCULAR; INTRAVENOUS at 08:01

## 2020-06-24 RX ADMIN — CELECOXIB 200 MG: 100 CAPSULE ORAL at 18:59

## 2020-06-24 RX ADMIN — OYSTER SHELL CALCIUM WITH VITAMIN D 1 TABLET: 500; 200 TABLET, FILM COATED ORAL at 14:37

## 2020-06-24 RX ADMIN — GABAPENTIN 100 MG: 100 CAPSULE ORAL at 23:37

## 2020-06-24 RX ADMIN — DOCUSATE SODIUM 100 MG: 100 CAPSULE, LIQUID FILLED ORAL at 14:37

## 2020-06-24 RX ADMIN — CELECOXIB 200 MG: 100 CAPSULE ORAL at 07:24

## 2020-06-24 RX ADMIN — OXYCODONE HYDROCHLORIDE AND ACETAMINOPHEN 1 TABLET: 10; 325 TABLET ORAL at 14:37

## 2020-06-24 ASSESSMENT — PULMONARY FUNCTION TESTS
PIF_VALUE: 1
PIF_VALUE: 0
PIF_VALUE: 1
PIF_VALUE: 1
PIF_VALUE: 0
PIF_VALUE: 1
PIF_VALUE: 0
PIF_VALUE: 1
PIF_VALUE: 0
PIF_VALUE: 1
PIF_VALUE: 0
PIF_VALUE: 1
PIF_VALUE: 0
PIF_VALUE: 1
PIF_VALUE: 0
PIF_VALUE: 1
PIF_VALUE: 2
PIF_VALUE: 1
PIF_VALUE: 0
PIF_VALUE: 1
PIF_VALUE: 1
PIF_VALUE: 0
PIF_VALUE: 1
PIF_VALUE: 0
PIF_VALUE: 1
PIF_VALUE: 0
PIF_VALUE: 1
PIF_VALUE: 0
PIF_VALUE: 1

## 2020-06-24 ASSESSMENT — PAIN DESCRIPTION - ONSET
ONSET: ON-GOING

## 2020-06-24 ASSESSMENT — PAIN SCALES - GENERAL
PAINLEVEL_OUTOF10: 2
PAINLEVEL_OUTOF10: 3
PAINLEVEL_OUTOF10: 0
PAINLEVEL_OUTOF10: 2
PAINLEVEL_OUTOF10: 0
PAINLEVEL_OUTOF10: 2
PAINLEVEL_OUTOF10: 0
PAINLEVEL_OUTOF10: 4
PAINLEVEL_OUTOF10: 0
PAINLEVEL_OUTOF10: 0

## 2020-06-24 ASSESSMENT — PAIN DESCRIPTION - PROGRESSION
CLINICAL_PROGRESSION: NOT CHANGED
CLINICAL_PROGRESSION: GRADUALLY WORSENING
CLINICAL_PROGRESSION: GRADUALLY WORSENING

## 2020-06-24 ASSESSMENT — PAIN DESCRIPTION - DESCRIPTORS
DESCRIPTORS: ACHING;DISCOMFORT;DULL

## 2020-06-24 ASSESSMENT — PAIN DESCRIPTION - PAIN TYPE
TYPE: SURGICAL PAIN
TYPE: SURGICAL PAIN

## 2020-06-24 ASSESSMENT — PAIN DESCRIPTION - ORIENTATION
ORIENTATION: LEFT

## 2020-06-24 ASSESSMENT — PAIN DESCRIPTION - LOCATION
LOCATION: KNEE
LOCATION: KNEE
LOCATION: LEG

## 2020-06-24 ASSESSMENT — PAIN DESCRIPTION - FREQUENCY
FREQUENCY: CONTINUOUS

## 2020-06-24 ASSESSMENT — PAIN - FUNCTIONAL ASSESSMENT
PAIN_FUNCTIONAL_ASSESSMENT: 0-10
PAIN_FUNCTIONAL_ASSESSMENT: PREVENTS OR INTERFERES SOME ACTIVE ACTIVITIES AND ADLS

## 2020-06-24 NOTE — CONSULTS
Department of Internal Medicine        HISTORY OF PRESENT ILLNESS:      The patient is a 45 y.o. female who presents with having a elective left knee TKA. Patient is seen postop. Temperature 98.7 with blood pressure 128/82. Heart rate 72 with O2 sat 97% on room air. Patient has expected postop discomfort. Patient currently denies any chest pain, abdominal pain, dizziness. Past Medical History:    Past Medical History:   Diagnosis Date    Bipolar 1 disorder ([facility] [address]     Depression     Obesity     Osteoarthritis     bilateral knees    Thiamine deficiency     Vitamin D deficiency      Past Surgical History:    Past Surgical History:   Procedure Laterality Date    CHOLECYSTECTOMY      ENDOSCOPY, COLON, DIAGNOSTIC      GALLBLADDER SURGERY  1994    JOINT REPLACEMENT Right 06/03/2019    [place] LAP GASTRIC BYPASS/KIERAN-EN-Y N/A 4/30/2018    GASTRIC BYPASS KIERAN-EN-Y LAPAROSCOPIC performed by Christopher Leigh MD at [facility] Right 6/3/2019    RIGHT KNEE TOTAL KNEE ARTHROPLASTY ([place] & NEPHEW Arvid Bottoms) performed by Edis Gray DO at [facility] 10/22/2018    EGD BIOPSY performed by Refugio Gray MD at [facility] 2/21/2019    EGD ESOPHAGOGASTRODUODENOSCOPY performed by Christopher Leigh MD at [address] [place] [address] [address] 9/16/2019    EGD DILATION BALLOON 14mm performed by Christopher eLigh MD at [place] [address]       Medications Prior to Admission:    @  Prior to Admission medications    Medication Sig Start Date End Date Taking?  Authorizing Provider   QUEtiapine (SEROQUEL) 50 MG tablet Take 50 mg by mouth every evening 9/19/19  Yes Historical Provider, MD   sucralfate (CARAFATE) 1 GM tablet Take 0.5 tablets by mouth every 4 hours  Patient taking differently: Take 0.5 g by mouth every 4 hours as needed  9/16/19  Yes Christopher Leigh MD   omeprazole (PRILOSEC) 20 MG delayed since 2000. Sleeps in separate bedrooms   Lifestyle    Physical activity     Days per week: Not on file     Minutes per session: Not on file    Stress: Not on file   Relationships    Social connections     Talks on phone: Not on file     Gets together: Not on file     Attends Congregation service: Not on file     Active member of club or organization: Not on file     Attends meetings of clubs or organizations: Not on file     Relationship status: Not on file    Intimate partner violence     Fear of current or ex partner: Not on file     Emotionally abused: Not on file     Physically abused: Not on file     Forced sexual activity: Not on file   Other Topics Concern    Not on file   Social History Narrative    Not on file       Family History:   Family History   Problem Relation Age of Onset    Breast Cancer Mother     Heart Disease Mother     Heart Disease Father     Parkinsonism Father     Heart Disease Maternal Grandmother     Cancer Maternal Grandmother     Heart Disease Paternal Grandmother        REVIEW OF SYSTEMS:    Gen: Patient denies any lightheadedness or dizziness. No LOC or syncope. No fevers or chills. HEENT: No earache, sore throat or nasal congestion. Resp: Denies cough, hemoptysis or sputum production. Cardiac: Denies chest pain, SOB, diaphoresis or palpitations. GI: No nausea, vomiting, diarrhea or constipation. No melena or hematochezia. : No urinary complaints, dysuria, hematuria or frequency. MSK: + Left knee pain. No extremity weakness, paralysis or paresthesias. PHYSICAL EXAM:    Vitals:  /82   Pulse 72   Temp 98.7 °F (37.1 °C) (Oral)   Resp 20   Ht 5' 4\" (1.626 m)   Wt 173 lb (78.5 kg)   SpO2 97%   BMI 29.70 kg/m²     General:  This is a 45 y.o. yo female who is alert and oriented in NAD  HEENT:  Head is normocephalic and atraumatic, PERRLA, EOMI, mucus membranes moist with no pharyngeal erythema or exudate.   Neck:  Supple with no carotid bruits, JVD or thyromegaly.   No cervical adenopathy  CV:  Regular rate and rhythm, no murmurs  Lungs:  Clear to auscultation bilaterally with no wheezes, rales or rhonchi  Abdomen:  Soft, nontender, nondistended, bowel sounds present  Extremities:  + Left knee edema postop, peripheral pulses intact bilaterally  Neuro:  Cranial nerves II-XII grossly intact; motor and sensory function intact with no focal deficits  Skin:  No rashes, lesions or wounds      DATA:  CBC with Differential:    Lab Results   Component Value Date    WBC 4.2 06/18/2020    RBC 4.46 06/18/2020    HGB 12.4 06/18/2020    HCT 39.7 06/18/2020     06/18/2020    MCV 89.0 06/18/2020    MCH 27.8 06/18/2020    MCHC 31.2 06/18/2020    RDW 12.6 06/18/2020    LYMPHOPCT 42.0 08/20/2019    MONOPCT 8.3 08/20/2019    BASOPCT 0.8 08/20/2019    MONOSABS 0.42 08/20/2019    LYMPHSABS 2.13 08/20/2019    EOSABS 0.05 08/20/2019    BASOSABS 0.04 08/20/2019     CMP:    Lab Results   Component Value Date     06/18/2020    K 4.6 06/18/2020    K 3.5 10/22/2018     06/18/2020    CO2 30 06/18/2020    BUN 17 06/18/2020    CREATININE 0.7 06/18/2020    GFRAA >60 06/18/2020    LABGLOM >60 06/18/2020    GLUCOSE 100 06/18/2020    GLUCOSE 95 10/20/2011    PROT 7.3 10/02/2019    LABALBU 3.8 10/02/2019    LABALBU 4.1 10/20/2011    CALCIUM 9.2 06/18/2020    BILITOT <0.2 10/02/2019    ALKPHOS 58 10/02/2019    AST 14 10/02/2019    ALT 8 10/02/2019     Magnesium:    Lab Results   Component Value Date    MG 2.3 12/02/2018     Phosphorus:    Lab Results   Component Value Date    PHOS 3.3 12/02/2018     PT/INR:    Lab Results   Component Value Date    PROTIME 10.8 06/18/2020    INR 1.0 06/18/2020     Troponin:    Lab Results   Component Value Date    TROPONINI <0.01 08/20/2019     U/A:    Lab Results   Component Value Date    NITRITE neg 06/11/2019    COLORU Yellow 06/18/2020    PROTEINU Negative 06/18/2020    PHUR 5.5 06/18/2020    LABCAST RARE 11/16/2018    WBCUA 1-3

## 2020-06-24 NOTE — OP NOTE
Operative Note      Patient: Katina Wolff  YOB: 1970  MRN: [id number]    Date of Procedure: 6/24/2020    Pre-Op Diagnosis: LEFT KNEE DJD    Post-Op Diagnosis: Same       Procedure(s):  LEFT KNEE TOTAL ARTHROPLASTY [facility])    Surgeon(s): Sonido Kenny DO    Assistant:   * No surgical staff found *    Anesthesia: Spinal    Estimated Blood Loss (mL): Minimal    Complications: None    Specimens:   * No specimens in log *    Implants:  * No implants in log *      Drains: * No LDAs found *    Findings: as above    Detailed Description of Procedure:   below    Department of Orthopedic Surgery  Operative Report        Pre-operative Diagnosis:  Left Knee Osteoarthritis    Post-operative Diagnosis:  Left Knee Osteoarthritis    Procedure:  Left Knee Arthroplasty    Surgeon:  Sonido Kenny DO     Assistant(s):  Oli/harsh/tessa    Anesthesia:  Spinal anesthesia    Estimated blood loss:  Minimal    Specimens:  none    Implants:  As abvoe    Complications:  none    Condition:  Stable    Brief Hospital Course:  Katina Wolff is a patient known to Sonido Kenny DO's practice with persistent complaints of Left knee pain. Knee pain has failed to be relieved by non-operative conservative measures, and has began affecting daily activities of living. After examination of the patient, review of the radiologic studies, and appropriate pre-operative risk assessment, Sonido Kenny DO recommended Left knee arthroplasty, which the patient was agreeable towards. Operative Course: The patient was seen and identified outside the operative suite, in which the operative site was marked as appropriate by patient, surgeon, staff, and anesthesia. The patient was then taken into the operative suite, transferred to the operative table with all bony prominences and neurovascular structures well padded and protected. A tourniquet was placed high on the proximal thigh of the operative extremity.  The cut was sharply excised and removed. At this time, all osteophytes on the proximal tibia were then removed with a rongeur. Tensiometer was then placed in extension and flexion, confirming appropriate ligamentous balancing. The box-cutting guide for the posterior-stabilized knee was then placed on the distal femur. The box was then cut in the distal femur without complication. Box-cutting guide was then removed. An appropriately sized trial tibial baseplate and a polyethylene component trial were then placed into the knee. The appropriately sized femur trial component was then placed. The knee was reduced and taken through a range of motion and found to be appropriately stable. Half pins were then placed in the tibial base plate confirming position in preparation for keel punch. The patella was then prepared. The patella surface was free hand cut for the appropriate size implant. The patella holes were drilled and the patella was then trialed. There was good alignment and tracking of the patella. Distal femoral component was removed, trial poly was then removed. Keel punch was then performed of the proximal tibial. Tibial base plate was then removed. Copious irrigation was performed of the wound and final inspection for all interposed soft tissue and loose bodies was then performed as cement was being mixed. Copious irrigation was performed once again of the knee. Cement was placed on the proximal tibial component and the tibial component was then impacted into appropriate position with all excess extruded cement being removed with [place] elevator. A polyethylene component was then impacted into appropriate position and checked for stability, which it was stable. Cement was then placed on the distal femoral component. Distal femoral component was then impacted in appropriate position with all excess extruded cement being removed with a [place] elevator.  The knee was extended, taken through a range of motion, and found to be appropriately stable. Final inspection for all excess extruded cement was then performed. [place] elevator removed all excess extruded cement. Copious irrigation was performed of the knee. The knee was then soaked with a bedadine solution soak for 3 minutes. The knee was then throughly irrigated with saline solution. Then 1 gram of vanomycin powder was placed within the wound. The capsule was  then closed with strata-fix closure. I then injected our TXA mixture into the knee joint. Copious irrigation was performed of this layer followed by, 2-0 Vicryl for the subcutaneous tissues, and zip line was used to secure incision. A sterile layered dressing was placed over the wound. Tourniquet was deflated. The patient was awakened from anesthesia, transferred to the hospital bed, and taken to the PACU in stable condition. Disposition: The patient was taken to PACU in stable condition. Once stable, the patient will be transferred to the floor. Orders have been provided to begin physical therapy, weight bear as tolerated Left lower extremity. Patient received a dose of antibiotics preoperatively. We will continue this for 24 hours postoperatively for infection prophylaxis. The patient will also be started on asa for DVT prophylaxis. We have consulted  and case management for discharge planning and consulted the PCP for medical management.      Electronically signed by Kyle Lau DO on 6/24/2020 at 7:15 AM

## 2020-06-24 NOTE — PLAN OF CARE
Problem: Pain:  Goal: Pain level will decrease  Description: Pain level will decrease  Outcome: Met This Shift  Note: Pain is less than 3 after being medicated     Problem: Pain:  Goal: Control of acute pain  Description: Control of acute pain  Outcome: Met This Shift     Problem: Pain:  Goal: Control of chronic pain  Description: Control of chronic pain  Outcome: Met This Shift     Problem: Falls - Risk of:  Goal: Will remain free from falls  Description: Will remain free from falls  Outcome: Met This Shift  Note: No falls     Problem: Falls - Risk of:  Goal: Absence of physical injury  Description: Absence of physical injury  Outcome: Met This Shift

## 2020-06-24 NOTE — ANESTHESIA PROCEDURE NOTES
Peripheral Block    Patient location during procedure: holding area  Start time: 6/24/2020 8:04 AM  End time: 6/24/2020 8:09 AM  Staffing  Anesthesiologist: Erika Minor MD  Other anesthesia staff: Charmain Severance, RN  Performed: other anesthesia staff   Preanesthetic Checklist  Completed: patient identified, site marked, surgical consent, pre-op evaluation, timeout performed, IV checked, risks and benefits discussed, monitors and equipment checked, anesthesia consent given, oxygen available and patient being monitored  Peripheral Block  Patient position: supine  Prep: ChloraPrep  Patient monitoring: cardiac monitor, continuous pulse ox, IV access and frequent blood pressure checks  Block type: Saphenous  Laterality: left  Injection technique: single-shot  Procedures: ultrasound guided  Local infiltration: ropivacaine  Infiltration strength: 0.5 %  Dose: 30 mL  Provider prep: mask and sterile gloves  Local infiltration: ropivacaine  Needle  Needle type: combined needle/nerve stimulator   Needle gauge: 20 G  Needle length: 10 cm  Needle localization: anatomical landmarks and ultrasound guidance  Assessment  Injection assessment: negative aspiration for heme, no paresthesia on injection and local visualized surrounding nerve on ultrasound  Paresthesia pain: none  Slow fractionated injection: yes  Hemodynamics: stable  Medications Administered  Lidocaine injection 2%, 2 mL  Reason for block: post-op pain management and at surgeon's request

## 2020-06-24 NOTE — PROGRESS NOTES
Physical Therapy Initial Evaluation    Room #:  0318/0318-01  Patient Name: Debo Flores  YOB: 1970  MRN: [id number]    Referring Provider: Edis Gray DO    Date of Service: 6/24/2020    Evaluating Physical Therapist:  Mamie Herrmann, PT #[id number]      Diagnosis:   Primary osteoarthritis of left knee [M17.12]  Preop testing [Z01.818]  Primary osteoarthritis of one knee, left [M17.12]    status post Left Total knee arthroplasty (TKA)    Patient Active Problem List   Diagnosis    Bilateral primary osteoarthritis of knee    Bipolar 1 disorder ([facility]    Gastroesophageal reflux disease with esophagitis    S/P bariatric surgery    Marginal ulcer    Vitamin D deficiency    Thiamine deficiency    Primary localized osteoarthritis of right knee    Primary osteoarthritis of right knee    Obesity    Depression    Status post total right knee replacement    Primary osteoarthritis of left knee    Primary osteoarthritis of one knee, left        Tentative placement recommendation: Home Health PT 5 days a week  Equipment recommendation: Tub transfer bench      Prior Level of Function: Patient ambulated independently    Rehab Potential: good  for baseline    Past medical history:  Past Medical History:   Diagnosis Date    Bipolar 1 disorder ([facility]     Depression     Obesity     Osteoarthritis     bilateral knees    Thiamine deficiency     Vitamin D deficiency      Past Surgical History:   Procedure Laterality Date    CHOLECYSTECTOMY      ENDOSCOPY, COLON, DIAGNOSTIC      GALLBLADDER SURGERY  1994    JOINT REPLACEMENT Right 06/03/2019    CA LAP GASTRIC BYPASS/KIERAN-EN-Y N/A 4/30/2018    GASTRIC BYPASS KIERAN-EN-Y LAPAROSCOPIC performed by Christopher Leigh MD at [address] [place] [address] Right 6/3/2019    RIGHT KNEE TOTAL KNEE ARTHROPLASTY ([facility]) performed by Edis Gray DO at [address] N/A 10/22/2018    EGD BIOPSY stand: Modified Independent     Ambulation    1 x 4 feet, 2 x 25 feet using  wheeled walker with Minimal assist of 1   cues for sequencing and safety   100 feet using  wheeled walker with Modified Independent    Stair negotiation: ascended and descended   Not assessed       1 step with supervision    ROM Within functional limits except Left knee 0-90°      Increase range of motion 10% of affected joints    Strength except Leftlower extremity 3-/5  Within functional limits   Balance Sitting EOB:  good     Dynamic Standing:  fair wheeled walker  wheeled walker   Sitting EOB:  good    Dynamic Standing:  good wheeled walker      Patient is Alert & Oriented x person, place, time and situation and follows directions    Sensation:  Patient denies numbness and tingling to extremities    Edema:  yes left lower extremity      Patient education  Patient educated on role of Physical Therapy, risks of immobility, safety and plan of care and weight bearing status       Patient response to education:   Pt verbalized understanding Pt demonstrated skill Pt requires further education in this area   Yes Partial Yes     ASSESSMENT: Patient exhibits decreased strength, balance, coordination impairing functional mobility. Treatment:  Patient practiced and was instructed in the following treatment:     Therapist educated and facilitated patient on techniques to increase safety and independence with bed mobility, balance, functional transfers, and functional mobility. Instruction knee extension in bed with kneecap and toes pointing to ceiling  Instruction and performance of incentive inspirometer. Patient performed ankle pumps, quad sets, glut sets x 15-20 each. Active assist heelslide, hip abduction/adduction, straight leg raise x 10 each    Sat edge of bed 10 minutes with Supervision  to increase dynamic sitting balance and activity tolerance.  assisted to bedside commode, donned depends with minimal assist. Ambulated in

## 2020-06-24 NOTE — ANESTHESIA POSTPROCEDURE EVALUATION
Department of Anesthesiology  Postprocedure Note    Patient: Paige Eagle  MRN: [id number]  YOB: 1970  Date of evaluation: 6/24/2020  Time:  12:59 PM     Procedure Summary     Date:  06/24/20 Room / Location:  [address] [place] [address] 03 / Larisa Meeks    Anesthesia Start:  9370 Anesthesia Stop:  7055    Procedure:  LEFT KNEE TOTAL ARTHROPLASTY [facility]) (Left Knee) Diagnosis:  (LEFT KNEE DJD)    Surgeon:  Cyril Rich DO Responsible Provider:  Ashlyn Rivero MD    Anesthesia Type:  spinal ASA Status:  3          Anesthesia Type: spinal    Luis Phase I: Luis Score: 10    Luis Phase II:      Last vitals: Reviewed and per EMR flowsheets.        Anesthesia Post Evaluation    Patient location during evaluation: PACU  Patient participation: complete - patient participated  Level of consciousness: awake  Pain score: 3  Airway patency: patent  Nausea & Vomiting: no nausea  Complications: no  Cardiovascular status: blood pressure returned to baseline  Respiratory status: acceptable  Hydration status: euvolemic

## 2020-06-24 NOTE — ANESTHESIA PROCEDURE NOTES
Spinal Block    Patient location during procedure: OR  Reason for block: procedure for pain and post-op pain management  Staffing  Anesthesiologist: Manuelito Pradhan MD  Resident/CRNA: MICH Calhoun - CRNA  Other anesthesia staff: Jose Griffith RN  Performed: other anesthesia staff   Preanesthetic Checklist  Completed: patient identified, site marked, surgical consent, pre-op evaluation, timeout performed, IV checked, risks and benefits discussed, monitors and equipment checked, anesthesia consent given, oxygen available and patient being monitored  Spinal Block  Patient position: sitting  Prep: Betadine  Patient monitoring: continuous pulse ox  Approach: midline  Location: L3/L4  Provider prep: mask and sterile gloves  Local infiltration: lidocaine  Dose: 0.5  Agent: bupivacaine  Adjuvant: fentanyl  Dose: 2  Dose: 2  Needle  Needle type: Pencan   Needle gauge: 24 G  Needle length: 3.5 in  Assessment  Events: cerebrospinal fluid  Swirl obtained: Yes  CSF: clear  Attempts: 1  Hemodynamics: stable

## 2020-06-24 NOTE — PROGRESS NOTES
verbal cues for walker sequence and safety. Pt assisted to bedside commode after being incontinent of bowel, supervision for hygiene, assistance to don underpants and had, assistance to don outer gown. Pt ambulated to and from hallway with wheeled walker. Pt assisted back to bed. OT services provided to include instruction/training on safety and adapted techniques for completion of transfers and ADL's. Evaluation Complexity:  · Low Complexity  · History: Brief history including review of medical records relating to the problem  · Exam: 1-3 performance Deficits  · Assistance/Modification: No assistance or modifications required to perform tasks. No comorbities affecting occupational performance. Assessment of current deficits   Functional mobility [x]        ADLs [x]        Strength [x]      Cognition []  Functional transfers  [x]       IADLs [x]        Safety Awareness []      Endurance [x]  Fine Motor Coordination []       Balance [x]       Vision/perception []     Sensation []   Gross Motor Coordination []       ROM []         Delirium []                          Motor Control []    Plan of Care: OT 1-3x/week PRN during hospitalization  ADL retraining [x]   Equipment needs [x]   Neuromuscular re-education [] Energy Conservation Techniques [x]  Functional Transfer training [x] Patient and/or Family Education [x]  Functional Mobility training [x]  Environmental Modifications []  Cognitive re-training []   Compensatory techniques for ADLs [x]  Splinting Needs []   Positioning to improve overall function [x]   Therapeutic Activity [x]  Therapeutic Exercise  []  Visual/Perceptual: []    Delirium prevention/treatment  []  Other:  []    Rehab Potential: Good for established goals developed with patient and family. Patient / Family Goal: return home      Patient and/or family were instructed on functional diagnosis, prognosis/goals and OT plan of care. Demonstrated fair understanding.     Time In: 2:30pm Time Out: 3:01pm                 Treatment Charges: Mins Units   ADL/Home Mgt                    [id number]    Therapeutic Activities          [id number] 8    Therapeutic Exercise          96013     Manual Therapy                  51171     Neuro Re-ed                       96766     Orthotic manage/training    27645     Total Timed Treatment 23 2     Evaluation time includes thorough review of current medical information, gathering information on past medical history/social history and prior level of function, completion of standardized testing/informal observation of tasks, assessment of data, and development of POC/Goals.     [facility] OTR/L [id number]

## 2020-06-24 NOTE — H&P
and non-surgical.   The patient is unable to ambulate more than 100 feet and is unable to perform the average daily activities including:  Light housework, ADLs, donning clothes, toileting and exercise. Patient has failed previous conservative measures including cortisone injections, NSAIDs, PT, HEP and pain medication and is currently a fall risk to the disability and decreased functioning. The patient wishes to have the total knee arthroplasty. The risks and benefits of a total knee replacement were discussed with the patient. The risks include but are not limited to: infection, injury to blood vessels and nerves, non relief of symptoms, arthrofibrosis of knee, aseptic loosening of prosthesis, intraoperative fracture, blood loss, PE/DVT, MI, dislocation of hip and knee, need for further operative intervention and death. The patient is aware of the risks and wished to proceed with a Left total knee replacement 6/24/2020. The patient was counseled at length about the risks of marina Covid-19 during their perioperative period and any recovery window from their procedure. The patient was made aware that marina Covid-19  may worsen their prognosis for recovering from their procedure  and lend to a higher morbidity and/or mortality risk. All material risks, benefits, and reasonable alternatives including postponing the procedure were discussed. The patient does wish to proceed with the procedure at this time.

## 2020-06-25 VITALS
OXYGEN SATURATION: 98 % | HEIGHT: 64 IN | BODY MASS INDEX: 32.93 KG/M2 | TEMPERATURE: 98.8 F | SYSTOLIC BLOOD PRESSURE: 100 MMHG | RESPIRATION RATE: 20 BRPM | WEIGHT: 192.9 LBS | DIASTOLIC BLOOD PRESSURE: 62 MMHG | HEART RATE: 78 BPM

## 2020-06-25 LAB
ANION GAP SERPL CALCULATED.3IONS-SCNC: 9 MMOL/L (ref 7–16)
BUN BLDV-MCNC: 13 MG/DL (ref 6–20)
CALCIUM SERPL-MCNC: 8.7 MG/DL (ref 8.6–10.2)
CHLORIDE BLD-SCNC: 105 MMOL/L (ref 98–107)
CO2: 28 MMOL/L (ref 22–29)
CREAT SERPL-MCNC: 0.7 MG/DL (ref 0.5–1)
GFR AFRICAN AMERICAN: >60
GFR NON-AFRICAN AMERICAN: >60 ML/MIN/1.73
GLUCOSE BLD-MCNC: 92 MG/DL (ref 74–99)
HCT VFR BLD CALC: 33.6 % (ref 34–48)
HEMOGLOBIN: 10.6 G/DL (ref 11.5–15.5)
POTASSIUM SERPL-SCNC: 4.2 MMOL/L (ref 3.5–5)
SODIUM BLD-SCNC: 142 MMOL/L (ref 132–146)

## 2020-06-25 PROCEDURE — 85018 HEMOGLOBIN: CPT

## 2020-06-25 PROCEDURE — 97530 THERAPEUTIC ACTIVITIES: CPT

## 2020-06-25 PROCEDURE — 80048 BASIC METABOLIC PNL TOTAL CA: CPT

## 2020-06-25 PROCEDURE — 97116 GAIT TRAINING THERAPY: CPT

## 2020-06-25 PROCEDURE — 6370000000 HC RX 637 (ALT 250 FOR IP): Performed by: ORTHOPAEDIC SURGERY

## 2020-06-25 PROCEDURE — 2580000003 HC RX 258: Performed by: ORTHOPAEDIC SURGERY

## 2020-06-25 PROCEDURE — G0378 HOSPITAL OBSERVATION PER HR: HCPCS

## 2020-06-25 PROCEDURE — 6360000002 HC RX W HCPCS: Performed by: ORTHOPAEDIC SURGERY

## 2020-06-25 PROCEDURE — 97110 THERAPEUTIC EXERCISES: CPT

## 2020-06-25 PROCEDURE — 85014 HEMATOCRIT: CPT

## 2020-06-25 PROCEDURE — 36415 COLL VENOUS BLD VENIPUNCTURE: CPT

## 2020-06-25 PROCEDURE — 97535 SELF CARE MNGMENT TRAINING: CPT

## 2020-06-25 RX ADMIN — ASPIRIN 325 MG: 325 TABLET, DELAYED RELEASE ORAL at 09:05

## 2020-06-25 RX ADMIN — GABAPENTIN 100 MG: 100 CAPSULE ORAL at 14:23

## 2020-06-25 RX ADMIN — FLUOXETINE 20 MG: 20 CAPSULE ORAL at 09:03

## 2020-06-25 RX ADMIN — OXYCODONE HYDROCHLORIDE AND ACETAMINOPHEN 1 TABLET: 10; 325 TABLET ORAL at 14:35

## 2020-06-25 RX ADMIN — DOCUSATE SODIUM 100 MG: 100 CAPSULE, LIQUID FILLED ORAL at 09:05

## 2020-06-25 RX ADMIN — WATER 2 G: 1 INJECTION INTRAMUSCULAR; INTRAVENOUS; SUBCUTANEOUS at 04:29

## 2020-06-25 RX ADMIN — FLUOXETINE 10 MG: 10 CAPSULE ORAL at 09:15

## 2020-06-25 RX ADMIN — PANTOPRAZOLE SODIUM 40 MG: 40 TABLET, DELAYED RELEASE ORAL at 06:57

## 2020-06-25 RX ADMIN — OXYCODONE HYDROCHLORIDE AND ACETAMINOPHEN 1 TABLET: 10; 325 TABLET ORAL at 09:15

## 2020-06-25 RX ADMIN — LITHIUM CARBONATE 600 MG: 300 CAPSULE, GELATIN COATED ORAL at 09:05

## 2020-06-25 RX ADMIN — GABAPENTIN 100 MG: 100 CAPSULE ORAL at 06:58

## 2020-06-25 RX ADMIN — FERROUS SULFATE TAB 325 MG (65 MG ELEMENTAL FE) 325 MG: 325 (65 FE) TAB at 09:05

## 2020-06-25 RX ADMIN — CELECOXIB 200 MG: 100 CAPSULE ORAL at 06:57

## 2020-06-25 RX ADMIN — OYSTER SHELL CALCIUM WITH VITAMIN D 1 TABLET: 500; 200 TABLET, FILM COATED ORAL at 09:05

## 2020-06-25 ASSESSMENT — PAIN DESCRIPTION - ONSET: ONSET: ON-GOING

## 2020-06-25 ASSESSMENT — PAIN - FUNCTIONAL ASSESSMENT: PAIN_FUNCTIONAL_ASSESSMENT: PREVENTS OR INTERFERES SOME ACTIVE ACTIVITIES AND ADLS

## 2020-06-25 ASSESSMENT — PAIN DESCRIPTION - PAIN TYPE: TYPE: SURGICAL PAIN

## 2020-06-25 ASSESSMENT — PAIN DESCRIPTION - PROGRESSION: CLINICAL_PROGRESSION: GRADUALLY WORSENING

## 2020-06-25 ASSESSMENT — PAIN DESCRIPTION - DESCRIPTORS: DESCRIPTORS: ACHING;DISCOMFORT;DULL

## 2020-06-25 ASSESSMENT — PAIN SCALES - GENERAL
PAINLEVEL_OUTOF10: 4
PAINLEVEL_OUTOF10: 2
PAINLEVEL_OUTOF10: 4
PAINLEVEL_OUTOF10: 0

## 2020-06-25 ASSESSMENT — PAIN DESCRIPTION - LOCATION: LOCATION: KNEE

## 2020-06-25 ASSESSMENT — PAIN DESCRIPTION - FREQUENCY: FREQUENCY: INTERMITTENT

## 2020-06-25 ASSESSMENT — PAIN DESCRIPTION - ORIENTATION: ORIENTATION: LEFT

## 2020-06-25 NOTE — PROGRESS NOTES
Department of Internal Medicine        HISTORY OF PRESENT ILLNESS:      The patient is a 45 y.o. female who presents with having a elective left knee TKA. Patient is seen postop. Temperature 98.7 with blood pressure 128/82. Heart rate 72 with O2 sat 97% on room air. Patient has expected postop discomfort. Patient currently denies any chest pain, abdominal pain, dizziness. 6/25/2020  Patient seen and examined on general medical floor. Patient denies any chest pain, abdominal pain, nausea/vomiting or unusual shortness of breath. BUN/creatinine 13/0.7 with hemoglobin 10.6. Temperature 98 degrees with heart rate of 58 and blood pressure currently 96/54. Blood pressure yesterday afternoon evening was about 120/70. O2 sats 97% on room air.     Past Medical History:    Past Medical History:   Diagnosis Date    Bipolar 1 disorder ([facility] [address]     Depression     Obesity     Osteoarthritis     bilateral knees    Thiamine deficiency     Vitamin D deficiency      Past Surgical History:    Past Surgical History:   Procedure Laterality Date    CHOLECYSTECTOMY      ENDOSCOPY, COLON, DIAGNOSTIC      GALLBLADDER SURGERY  1994    JOINT REPLACEMENT Right 06/03/2019    [facility] LAP GASTRIC BYPASS/KIERAN-EN-Y N/A 4/30/2018    GASTRIC BYPASS KIERAN-EN-Y LAPAROSCOPIC performed by Abiel Serrato MD at [address] [place] [address] Right 6/3/2019    RIGHT KNEE TOTAL KNEE ARTHROPLASTY ([facility] & NEPHEW Kyra Salcedo) performed by Angi Voss DO at [address] [place] [address] Left 6/24/2020    LEFT KNEE TOTAL ARTHROPLASTY [facility] & NEPHEW) performed by Angi Voss DO at [address] [place] [address] 10/22/2018    EGD BIOPSY performed by Sakshi Parada MD at [address] [facility] 2/21/2019    EGD ESOPHAGOGASTRODUODENOSCOPY performed by Abiel Serrato MD at [address] [facility] 9/16/2019    EGD DILATION BALLOON 14mm performed by Mateo Lynch MD at [place] [id number]       Medications Prior to Admission:    @  Prior to Admission medications    Medication Sig Start Date End Date Taking? Authorizing Provider   oxyCODONE-acetaminophen (PERCOCET)  MG per tablet Take 1 tablet by mouth every 6 hours as needed for Pain for up to 7 days.  Intended supply: 30 days 6/24/20 7/1/20 Yes Peyman Hardinr,    aspirin 325 MG EC tablet Take 1 tablet by mouth 2 times daily for 28 days 6/24/20 7/22/20 Yes Peyman Ott DO   QUEtiapine (SEROQUEL) 50 MG tablet Take 50 mg by mouth every evening 9/19/19  Yes Historical Provider, MD   sucralfate (CARAFATE) 1 GM tablet Take 0.5 tablets by mouth every 4 hours  Patient taking differently: Take 0.5 g by mouth every 4 hours as needed  9/16/19  Yes Mateo Lynch MD   omeprazole (PRILOSEC) 20 MG delayed release capsule Take 1 capsule by mouth 2 times daily (before meals)  Patient taking differently: Take 20 mg by mouth Daily  9/16/19  Yes Mateo Lynch MD   Cholecalciferol (VITAMIN D3) 5000 units TABS Take by mouth daily   Yes Historical Provider, MD   Docusate Sodium (COLACE PO) Take 100 mg by mouth every other day    Yes Historical Provider, MD   lithium 300 MG capsule Take 1 capsule by mouth daily  Patient taking differently: Take 300 mg by mouth 4 times daily  4/15/19  Yes Reva Cook MD   FLUoxetine (PROZAC) 20 MG capsule Take 1 capsule by mouth daily 4/15/19  Yes Reva Cook MD   Biotin 81658 MCG TABS Take 1 capsule by mouth daily    Yes Historical Provider, MD   Ferrous Sulfate (IRON) 28 MG TABS Take 1 tablet by mouth daily    Yes Historical Provider, MD   Multiple Vitamins-Minerals (THERAPEUTIC MULTIVITAMIN-MINERALS) tablet Take 1 tablet by mouth 2 times daily   Yes Historical Provider, MD   calcium-cholecalciferol 500-200 MG-UNIT per tablet Take 1 tablet by mouth daily 1/5/18  Yes Mateo Lynch MD   FLUoxetine (PROZAC) 10 MG capsule Take 1 capsule by mouth daily fevers or chills. HEENT: No earache, sore throat or nasal congestion. Resp: Denies cough, hemoptysis or sputum production. Cardiac: Denies chest pain, SOB, diaphoresis or palpitations. GI: No nausea, vomiting, diarrhea or constipation. No melena or hematochezia. : No urinary complaints, dysuria, hematuria or frequency. MSK: + Left knee pain. No extremity weakness, paralysis or paresthesias. PHYSICAL EXAM:    Vitals:  BP (!) 96/54   Pulse 55   Temp 98 °F (36.7 °C) (Oral)   Resp 16   Ht 5' 4\" (1.626 m)   Wt 192 lb 14.4 oz (87.5 kg)   SpO2 97%   BMI 33.11 kg/m²     General:  This is a 45 y.o. yo female who is alert and oriented in NAD  HEENT:  Head is normocephalic and atraumatic, PERRLA, EOMI, mucus membranes moist with no pharyngeal erythema or exudate. Neck:  Supple with no carotid bruits, JVD or thyromegaly.   No cervical adenopathy  CV:  Regular rate and rhythm, 2/6 systolic murmur  Lungs:  Clear to auscultation bilaterally with no wheezes, rales or rhonchi  Abdomen:  Soft, nontender, nondistended, bowel sounds present  Extremities:  + Left knee edema postop, peripheral pulses intact bilaterally  Neuro:  Cranial nerves II-XII grossly intact; motor and sensory function intact with no focal deficits  Skin:  No rashes, lesions or wounds      DATA:  CBC with Differential:    Lab Results   Component Value Date    WBC 4.2 06/18/2020    RBC 4.46 06/18/2020    HGB 10.6 06/25/2020    HCT 33.6 06/25/2020     06/18/2020    MCV 89.0 06/18/2020    MCH 27.8 06/18/2020    MCHC 31.2 06/18/2020    RDW 12.6 06/18/2020    LYMPHOPCT 42.0 08/20/2019    MONOPCT 8.3 08/20/2019    BASOPCT 0.8 08/20/2019    MONOSABS 0.42 08/20/2019    LYMPHSABS 2.13 08/20/2019    EOSABS 0.05 08/20/2019    BASOSABS 0.04 08/20/2019     CMP:    Lab Results   Component Value Date     06/25/2020    K 4.2 06/25/2020    K 3.5 10/22/2018     06/25/2020    CO2 28 06/25/2020    BUN 13 06/25/2020    CREATININE 0.7 06/25/2020    GFRAA >60 06/25/2020    LABGLOM >60 06/25/2020    GLUCOSE 92 06/25/2020    GLUCOSE 95 10/20/2011    PROT 7.3 10/02/2019    LABALBU 3.8 10/02/2019    LABALBU 4.1 10/20/2011    CALCIUM 8.7 06/25/2020    BILITOT <0.2 10/02/2019    ALKPHOS 58 10/02/2019    AST 14 10/02/2019    ALT 8 10/02/2019     Magnesium:    Lab Results   Component Value Date    MG 2.3 12/02/2018     Phosphorus:    Lab Results   Component Value Date    PHOS 3.3 12/02/2018     PT/INR:    Lab Results   Component Value Date    PROTIME 10.8 06/18/2020    INR 1.0 06/18/2020     Troponin:    Lab Results   Component Value Date    TROPONINI <0.01 08/20/2019     U/A:    Lab Results   Component Value Date    NITRITE neg 06/11/2019    COLORU Yellow 06/18/2020    PROTEINU Negative 06/18/2020    PHUR 5.5 06/18/2020    LABCAST RARE 11/16/2018    WBCUA 1-3 06/13/2019    RBCUA 1-3 06/13/2019    MUCUS Present 11/29/2018    YEAST FEW 12/07/2018    BACTERIA FEW 06/13/2019    CLARITYU Clear 06/18/2020    SPECGRAV <=1.005 06/18/2020    LEUKOCYTESUR Negative 06/18/2020    UROBILINOGEN 0.2 06/18/2020    BILIRUBINUR Negative 06/18/2020    BILIRUBINUR small 06/11/2019    BLOODU Negative 06/18/2020    GLUCOSEU Negative 06/18/2020     ABG:  No results found for: PH, PCO2, PO2, HCO3, BE, THGB, TCO2, O2SAT  HgBA1c:    Lab Results   Component Value Date    LABA1C 5.2 08/14/2018     FLP:    Lab Results   Component Value Date    TRIG 96 10/02/2019    HDL 60 02/02/2017    LDLCALC 122 02/02/2017    LABVLDL 17 02/02/2017     TSH:    Lab Results   Component Value Date    TSH 1.960 10/02/2019     IRON:  No results found for: IRON  LIPASE:    Lab Results   Component Value Date    LIPASE 12 08/20/2019       ASSESSMENT AND PLAN:      Patient Active Problem List    Diagnosis Date Noted    Primary osteoarthritis of one knee, left 06/24/2020    Status post total left knee replacement 06/24/2020    Status post total right knee replacement 05/11/2020    Primary osteoarthritis of left knee 05/11/2020    Primary osteoarthritis of right knee 06/03/2019    Obesity     Depression     Primary localized osteoarthritis of right knee 05/20/2019    Vitamin D deficiency     Thiamine deficiency     Marginal ulcer 10/22/2018    S/P bariatric surgery 04/30/2018    Gastroesophageal reflux disease with esophagitis     Bipolar 1 disorder (HCC)     Bilateral primary osteoarthritis of knee 05/23/2016     1. Status post left TKA  2. History of depression  3.   History of obesity      Plan:  Discharge home today  Resume home meds  Follow-up with primary care physician as directed    Rupali Zimmer D.O.  6/25/2020  10:01 AM

## 2020-06-25 NOTE — PLAN OF CARE
Problem: Pain:  Goal: Pain level will decrease  Description: Pain level will decrease  6/24/2020 2016 by Koki Gilbert RN  Outcome: Met This Shift     Problem: Pain:  Goal: Control of acute pain  Description: Control of acute pain  6/24/2020 2016 by Koki Gilbert RN  Outcome: Met This Shift     Problem: Falls - Risk of:  Goal: Will remain free from falls  Description: Will remain free from falls  6/24/2020 2016 by Koki Gilbert RN  Outcome: Met This Shift     Problem: Falls - Risk of:  Goal: Absence of physical injury  Description: Absence of physical injury  6/24/2020 2016 by Koki Gilbert RN  Outcome: Met This Shift

## 2020-06-25 NOTE — PROGRESS NOTES
Department of Orthopedic Surgery  Resident Progress Note    Patient seen and examined. No pain this AM, worked with therapy and feels well. Denies chest pain, shortness of breath, dizziness/lightheadedness. + Flatulence, - BM     VITALS:  BP (!) 96/54   Pulse 55   Temp 98 °F (36.7 °C) (Oral)   Resp 16   Ht 5' 4\" (1.626 m)   Wt 192 lb 14.4 oz (87.5 kg)   SpO2 97%   BMI 33.11 kg/m²     General: alert and oriented to person, place and time, well-developed and well-nourished, in no acute distress    MUSCULOSKELETAL:   left lower extremity:  · Dressing C/D/I  · Compartments soft and compressible  · +PF/DF/EHL  · +2/4 DP & PT pulses, Brisk Cap refill, Toes warm and perfused  · Distal sensation grossly intact to Peroneals, Sural, Saphenous, and tibial nrs    CBC:   Lab Results   Component Value Date    WBC 4.2 06/18/2020    HGB 12.4 06/18/2020    HCT 39.7 06/18/2020     06/18/2020     PT/INR:    Lab Results   Component Value Date    PROTIME 10.8 06/18/2020    INR 1.0 06/18/2020       ASSESSMENT  · S/P L TKA - 6/24/20    PLAN      · Continue physical therapy and protocol: WBAT - LLE  · 24 hour abx coverage  · Deep venous thrombosis prophylaxis - , early mobilization  · PT/OT  · Pain Control: IV and PO, wean to PO  · Monitor H&H- awaiting new labs  · D/C Plan:  Home today pending therapy and labs.

## 2020-06-25 NOTE — PROGRESS NOTES
Physical Therapy Treatment Note    Room #:  6950/3780-92  Patient Name: Katharine Flores  YOB: 1970  MRN: [id number]    Referring Provider: Jose E Syed DO    Date of Service: 6/25/2020    Evaluating Physical Therapist:  Karla Gardner, PT #[id number]      Diagnosis:   Primary osteoarthritis of left knee [M17.12]  Preop testing [Z01.818]  Primary osteoarthritis of one knee, left [M17.12]  Status post total left knee replacement [Z96.652]    status post Left Total knee arthroplasty (TKA)    Patient Active Problem List   Diagnosis    Bilateral primary osteoarthritis of knee    [facility] disorder ([facility] [address]    Gastroesophageal reflux disease with esophagitis    S/P bariatric surgery    Marginal ulcer    Vitamin D deficiency    Thiamine deficiency    Primary localized osteoarthritis of right knee    Primary osteoarthritis of right knee    Obesity    Depression    Status post total right knee replacement    Primary osteoarthritis of left knee    Primary osteoarthritis of one knee, left    Status post total left knee replacement        Tentative placement recommendation: Home Health PT 5 days a week  Equipment recommendation: Tub transfer bench      Prior Level of Function: Patient ambulated independently    Rehab Potential: good  for baseline    Past medical history:  Past Medical History:   Diagnosis Date    [facility]     Depression     Obesity     Osteoarthritis     bilateral knees    Thiamine deficiency     Vitamin D deficiency      Past Surgical History:   Procedure Laterality Date    CHOLECYSTECTOMY      ENDOSCOPY, COLON, DIAGNOSTIC      GALLBLADDER SURGERY  1994    JOINT REPLACEMENT Right 06/03/2019    [place] LAP GASTRIC BYPASS/KIERAN-EN-Y N/A 4/30/2018    GASTRIC BYPASS KIERAN-EN-Y LAPAROSCOPIC performed by Terrie Rivera MD at [address] [facility] Right 6/3/2019    RIGHT KNEE TOTAL KNEE ARTHROPLASTY ([facility] & NEPHEW [employer] [employer]) performed by Malcom Real

## 2020-06-25 NOTE — PLAN OF CARE
Problem: Pain:  Goal: Pain level will decrease  Description: Pain level will decrease  Outcome: Completed     Problem: Pain:  Goal: Control of acute pain  Description: Control of acute pain  Outcome: Completed     Problem: Pain:  Goal: Control of chronic pain  Description: Control of chronic pain  Outcome: Completed     Problem: Falls - Risk of:  Goal: Will remain free from falls  Description: Will remain free from falls  Outcome: Completed     Problem: Falls - Risk of:  Goal: Absence of physical injury  Description: Absence of physical injury  Outcome: Completed

## 2020-07-09 ENCOUNTER — OFFICE VISIT (OUTPATIENT)
Dept: ORTHOPEDIC SURGERY | Age: 50
End: 2020-07-09

## 2020-07-09 ENCOUNTER — HOSPITAL ENCOUNTER (EMERGENCY)
Age: 50
Discharge: HOME | End: 2020-07-10
Attending: EMERGENCY MEDICINE
Payer: MEDICARE

## 2020-07-09 VITALS — HEIGHT: 65 IN | WEIGHT: 185 LBS | BODY MASS INDEX: 30.82 KG/M2 | TEMPERATURE: 98 F

## 2020-07-09 VITALS
TEMPERATURE: 98.3 F | OXYGEN SATURATION: 97 % | RESPIRATION RATE: 21 BRPM | WEIGHT: 180 LBS | SYSTOLIC BLOOD PRESSURE: 152 MMHG | DIASTOLIC BLOOD PRESSURE: 74 MMHG | BODY MASS INDEX: 29.99 KG/M2 | HEIGHT: 65 IN | HEART RATE: 74 BPM

## 2020-07-09 LAB
ALBUMIN SERPL-MCNC: 3.7 G/DL (ref 3.5–5.2)
ALP BLD-CCNC: 96 U/L (ref 35–104)
ALT SERPL-CCNC: 7 U/L (ref 0–32)
AMORPHOUS: NORMAL
ANION GAP SERPL CALCULATED.3IONS-SCNC: 12 MMOL/L (ref 7–16)
AST SERPL-CCNC: 11 U/L (ref 0–31)
BACTERIA: NORMAL /HPF
BASOPHILS ABSOLUTE: 0.07 E9/L (ref 0–0.2)
BASOPHILS RELATIVE PERCENT: 0.8 % (ref 0–2)
BILIRUB SERPL-MCNC: 0.4 MG/DL (ref 0–1.2)
BILIRUBIN URINE: NEGATIVE
BLOOD, URINE: NEGATIVE
BUN BLDV-MCNC: 13 MG/DL (ref 6–20)
CALCIUM SERPL-MCNC: 9.3 MG/DL (ref 8.6–10.2)
CHLORIDE BLD-SCNC: 98 MMOL/L (ref 98–107)
CLARITY: ABNORMAL
CO2: 27 MMOL/L (ref 22–29)
COLOR: YELLOW
CREAT SERPL-MCNC: 0.7 MG/DL (ref 0.5–1)
EOSINOPHILS ABSOLUTE: 0.02 E9/L (ref 0.05–0.5)
EOSINOPHILS RELATIVE PERCENT: 0.2 % (ref 0–6)
EPITHELIAL CELLS, UA: NORMAL /HPF
GFR AFRICAN AMERICAN: >60
GFR NON-AFRICAN AMERICAN: >60 ML/MIN/1.73
GLUCOSE BLD-MCNC: 149 MG/DL (ref 74–99)
GLUCOSE URINE: NEGATIVE MG/DL
HCT VFR BLD CALC: 36.9 % (ref 34–48)
HEMOGLOBIN: 11.8 G/DL (ref 11.5–15.5)
IMMATURE GRANULOCYTES #: 0.04 E9/L
IMMATURE GRANULOCYTES %: 0.4 % (ref 0–5)
KETONES, URINE: NEGATIVE MG/DL
LACTIC ACID: 1.2 MMOL/L (ref 0.5–2.2)
LEUKOCYTE ESTERASE, URINE: NEGATIVE
LIPASE: 12 U/L (ref 13–60)
LYMPHOCYTES ABSOLUTE: 1.32 E9/L (ref 1.5–4)
LYMPHOCYTES RELATIVE PERCENT: 14.3 % (ref 20–42)
MCH RBC QN AUTO: 28 PG (ref 26–35)
MCHC RBC AUTO-ENTMCNC: 32 % (ref 32–34.5)
MCV RBC AUTO: 87.4 FL (ref 80–99.9)
MONOCYTES ABSOLUTE: 0.45 E9/L (ref 0.1–0.95)
MONOCYTES RELATIVE PERCENT: 4.9 % (ref 2–12)
NEUTROPHILS ABSOLUTE: 7.33 E9/L (ref 1.8–7.3)
NEUTROPHILS RELATIVE PERCENT: 79.4 % (ref 43–80)
NITRITE, URINE: NEGATIVE
PDW BLD-RTO: 12.6 FL (ref 11.5–15)
PH UA: 5 (ref 5–9)
PLATELET # BLD: 383 E9/L (ref 130–450)
PMV BLD AUTO: 10.5 FL (ref 7–12)
POTASSIUM SERPL-SCNC: 4.2 MMOL/L (ref 3.5–5)
PROTEIN UA: NEGATIVE MG/DL
RBC # BLD: 4.22 E12/L (ref 3.5–5.5)
RBC UA: NORMAL /HPF (ref 0–2)
SODIUM BLD-SCNC: 137 MMOL/L (ref 132–146)
SPECIFIC GRAVITY UA: >=1.03 (ref 1–1.03)
TOTAL PROTEIN: 7.5 G/DL (ref 6.4–8.3)
TROPONIN: <0.01 NG/ML (ref 0–0.03)
UROBILINOGEN, URINE: 0.2 E.U./DL
WBC # BLD: 9.2 E9/L (ref 4.5–11.5)
WBC UA: NORMAL /HPF (ref 0–5)

## 2020-07-09 PROCEDURE — 99284 EMERGENCY DEPT VISIT MOD MDM: CPT

## 2020-07-09 PROCEDURE — 84484 ASSAY OF TROPONIN QUANT: CPT

## 2020-07-09 PROCEDURE — 80053 COMPREHEN METABOLIC PANEL: CPT

## 2020-07-09 PROCEDURE — 93005 ELECTROCARDIOGRAM TRACING: CPT | Performed by: EMERGENCY MEDICINE

## 2020-07-09 PROCEDURE — 83605 ASSAY OF LACTIC ACID: CPT

## 2020-07-09 PROCEDURE — 83690 ASSAY OF LIPASE: CPT

## 2020-07-09 PROCEDURE — 96374 THER/PROPH/DIAG INJ IV PUSH: CPT

## 2020-07-09 PROCEDURE — 6360000002 HC RX W HCPCS: Performed by: EMERGENCY MEDICINE

## 2020-07-09 PROCEDURE — 2580000003 HC RX 258: Performed by: EMERGENCY MEDICINE

## 2020-07-09 PROCEDURE — 81001 URINALYSIS AUTO W/SCOPE: CPT

## 2020-07-09 PROCEDURE — 96375 TX/PRO/DX INJ NEW DRUG ADDON: CPT

## 2020-07-09 PROCEDURE — 99024 POSTOP FOLLOW-UP VISIT: CPT | Performed by: ORTHOPAEDIC SURGERY

## 2020-07-09 PROCEDURE — 85025 COMPLETE CBC W/AUTO DIFF WBC: CPT

## 2020-07-09 RX ORDER — OXYCODONE AND ACETAMINOPHEN 10; 325 MG/1; MG/1
1 TABLET ORAL EVERY 6 HOURS PRN
Qty: 28 TABLET | Refills: 0 | Status: SHIPPED | OUTPATIENT
Start: 2020-07-09 | End: 2020-07-16

## 2020-07-09 RX ORDER — DIPHENHYDRAMINE HYDROCHLORIDE 50 MG/ML
25 INJECTION INTRAMUSCULAR; INTRAVENOUS ONCE
Status: COMPLETED | OUTPATIENT
Start: 2020-07-09 | End: 2020-07-09

## 2020-07-09 RX ORDER — METOCLOPRAMIDE HYDROCHLORIDE 5 MG/ML
10 INJECTION INTRAMUSCULAR; INTRAVENOUS ONCE
Status: COMPLETED | OUTPATIENT
Start: 2020-07-09 | End: 2020-07-09

## 2020-07-09 RX ORDER — 0.9 % SODIUM CHLORIDE 0.9 %
1000 INTRAVENOUS SOLUTION INTRAVENOUS ONCE
Status: COMPLETED | OUTPATIENT
Start: 2020-07-09 | End: 2020-07-10

## 2020-07-09 RX ADMIN — DIPHENHYDRAMINE HYDROCHLORIDE 25 MG: 50 INJECTION, SOLUTION INTRAMUSCULAR; INTRAVENOUS at 23:16

## 2020-07-09 RX ADMIN — METOCLOPRAMIDE 10 MG: 5 INJECTION, SOLUTION INTRAMUSCULAR; INTRAVENOUS at 23:16

## 2020-07-09 RX ADMIN — SODIUM CHLORIDE 1000 ML: 9 INJECTION, SOLUTION INTRAVENOUS at 23:15

## 2020-07-09 ASSESSMENT — ENCOUNTER SYMPTOMS
EYE PAIN: 0
ABDOMINAL PAIN: 1
SINUS PRESSURE: 0
ABDOMINAL DISTENTION: 0
BACK PAIN: 0
EYE DISCHARGE: 0
VOMITING: 1
COUGH: 0
WHEEZING: 0
EYE REDNESS: 0
SHORTNESS OF BREATH: 0
SORE THROAT: 0
NAUSEA: 1
DIARRHEA: 0

## 2020-07-09 ASSESSMENT — PAIN DESCRIPTION - LOCATION: LOCATION: ABDOMEN

## 2020-07-09 ASSESSMENT — PAIN DESCRIPTION - ORIENTATION: ORIENTATION: RIGHT

## 2020-07-09 ASSESSMENT — PAIN SCALES - GENERAL: PAINLEVEL_OUTOF10: 6

## 2020-07-09 NOTE — PROGRESS NOTES
Subjective:     Post-Operative week: 2 Status Post left Total Knee Arthroplasty, surgery date 6/24/2020. Systemic or Specific Complaints:none    Objective:     General: alert, appears stated age and cooperative   Wound: Wound clean and dry no evidence of infection. , No Erythema, No Edema and No Drainage   Motion: Flexion: 0 to 95 Degrees   DVT Exam: No evidence of DVT seen on physical exam.  Negative Vera's sign. No cords or calf tenderness. No significant calf/ankle edema. Imaging:  Xrays:  No signs of fracture, there is good alignment, and no signs of aseptic loosening. Radiographic findings reviewed with patient    Assessment:     Encounter Diagnosis   Name Primary?  Status post total right knee replacement       Doing well postoperatively. Plan:     Continues current post-op course, staples were removed at today's appointment  Patient is to continue taking enteric coated aspirin 81 mg, 1 tablet twice daily. Patient is to continue wearing SARAHI hose, on during the day and off at night. Outpatient physical therapy is to begin immediately. No bathing/swimming/hot tub for two weeks or until incision is completely closed. We will see the patient back in 3 weeks for repeat xray and evaluation.   Please call office with any questions or concerns at [phone]

## 2020-07-10 ENCOUNTER — APPOINTMENT (OUTPATIENT)
Dept: CT IMAGING | Age: 50
End: 2020-07-10
Payer: MEDICARE

## 2020-07-10 LAB
EKG ATRIAL RATE: 70 BPM
EKG P AXIS: 68 DEGREES
EKG P-R INTERVAL: 170 MS
EKG Q-T INTERVAL: 398 MS
EKG QRS DURATION: 82 MS
EKG QTC CALCULATION (BAZETT): 429 MS
EKG R AXIS: 54 DEGREES
EKG T AXIS: 46 DEGREES
EKG VENTRICULAR RATE: 70 BPM

## 2020-07-10 PROCEDURE — 6360000002 HC RX W HCPCS: Performed by: EMERGENCY MEDICINE

## 2020-07-10 PROCEDURE — 74177 CT ABD & PELVIS W/CONTRAST: CPT

## 2020-07-10 PROCEDURE — 70450 CT HEAD/BRAIN W/O DYE: CPT

## 2020-07-10 PROCEDURE — 96375 TX/PRO/DX INJ NEW DRUG ADDON: CPT

## 2020-07-10 PROCEDURE — 6360000004 HC RX CONTRAST MEDICATION: Performed by: RADIOLOGY

## 2020-07-10 PROCEDURE — 93010 ELECTROCARDIOGRAM REPORT: CPT | Performed by: INTERNAL MEDICINE

## 2020-07-10 RX ORDER — ONDANSETRON 2 MG/ML
4 INJECTION INTRAMUSCULAR; INTRAVENOUS ONCE
Status: COMPLETED | OUTPATIENT
Start: 2020-07-10 | End: 2020-07-10

## 2020-07-10 RX ORDER — ONDANSETRON 4 MG/1
4 TABLET, ORALLY DISINTEGRATING ORAL EVERY 8 HOURS PRN
Qty: 10 TABLET | Refills: 0 | Status: SHIPPED | OUTPATIENT
Start: 2020-07-10 | End: 2021-03-12 | Stop reason: ALTCHOICE

## 2020-07-10 RX ADMIN — ONDANSETRON HYDROCHLORIDE 4 MG: 2 SOLUTION INTRAMUSCULAR; INTRAVENOUS at 00:31

## 2020-07-10 RX ADMIN — IOPAMIDOL 80 ML: 755 INJECTION, SOLUTION INTRAVENOUS at 00:24

## 2020-07-10 NOTE — ED PROVIDER NOTES
Patient is a 46 y/o female who presents to the ED with abdominal pain, nausea, vomiting and a headache. Patient states her pain began yesterday. The pain is in the right upper abdomen and does not radiate. Currently, it is 5/10. Today, she has had nausea and vomiting and now has a headache. The headache is frontal and bilateral. She denies any fever, trauma or neck stiffness. She denies any diarrhea, hematemesis, hematochezia, dysuria or hematuria. Review of Systems   Constitutional: Negative for chills and fever. HENT: Negative for ear pain, sinus pressure and sore throat. Eyes: Negative for pain, discharge and redness. Respiratory: Negative for cough, shortness of breath and wheezing. Cardiovascular: Negative for chest pain. Gastrointestinal: Positive for abdominal pain, nausea and vomiting. Negative for abdominal distention and diarrhea. Genitourinary: Negative for dysuria and frequency. Musculoskeletal: Negative for arthralgias and back pain. Skin: Negative for rash and wound. Neurological: Positive for headaches. Negative for weakness. Hematological: Negative for adenopathy. All other systems reviewed and are negative. Physical Exam  Vitals signs and nursing note reviewed. Constitutional:       General: She is not in acute distress. Appearance: Normal appearance. HENT:      Head: Normocephalic and atraumatic. Right Ear: External ear normal.      Left Ear: External ear normal.      Nose: Nose normal.      Mouth/Throat:      Mouth: Mucous membranes are moist.   Eyes:      Conjunctiva/sclera: Conjunctivae normal.      Pupils: Pupils are equal, round, and reactive to light. Neck:      Musculoskeletal: Normal range of motion and neck supple. Cardiovascular:      Rate and Rhythm: Normal rate and regular rhythm. Heart sounds: No murmur. Pulmonary:      Effort: Pulmonary effort is normal. No respiratory distress. Breath sounds: Normal breath sounds. No stridor. No wheezing, rhonchi or rales. Abdominal:      General: Bowel sounds are normal. There is no distension. Palpations: Abdomen is soft. Tenderness: There is abdominal tenderness (RUQ). There is no guarding. Musculoskeletal: Normal range of motion. Skin:     General: Skin is warm and dry. Findings: No rash. Neurological:      Mental Status: She is alert and oriented to person, place, and time. Procedures     MDM           EKG:  Normal sinus rhythm with ventricular rate of 70. LA interval, QRS duration and QT interval within normal range. Normal axis. No ST segment abnormalities to suggest acute ischemia.         --------------------------------------------- PAST HISTORY ---------------------------------------------  Past Medical History:  has a [facility] Depression, Obesity, Osteoarthritis, Thiamine deficiency, and Vitamin D deficiency. Past Surgical History:  has a past surgical history that includes Gallbladder surgery (1994); Cholecystectomy; Endoscopy, colon, diagnostic; pr lap gastric bypass/luciano-en-y (N/A, 4/30/2018); Upper gastrointestinal endoscopy (N/A, 10/22/2018); Upper gastrointestinal endoscopy (N/A, 2/21/2019); joint replacement (Right, 06/03/2019); Total knee arthroplasty (Right, 6/3/2019); Upper gastrointestinal endoscopy (N/A, 9/16/2019); and Total knee arthroplasty (Left, 6/24/2020). Social History:  reports that she has never smoked. She has never used smokeless tobacco. She reports that she does not drink alcohol or use drugs. Family History: family history includes Breast Cancer in her mother; Cancer in her maternal grandmother; Heart Disease in her father, maternal grandmother, mother, and paternal grandmother; Parkinsonism in her father. The patients home medications have been reviewed. Allergies: Patient has no known allergies.     -------------------------------------------------- RESULTS -------------------------------------------------  Labs:  Results for orders placed or performed during the hospital encounter of 07/09/20   CBC auto differential   Result Value Ref Range    WBC 9.2 4.5 - 11.5 E9/L    RBC 4.22 3.50 - 5.50 E12/L    Hemoglobin 11.8 11.5 - 15.5 g/dL    Hematocrit 36.9 34.0 - 48.0 %    MCV 87.4 80.0 - 99.9 fL    MCH 28.0 26.0 - 35.0 pg    MCHC 32.0 32.0 - 34.5 %    RDW 12.6 11.5 - 15.0 fL    Platelets 869 223 - 667 E9/L    MPV 10.5 7.0 - 12.0 fL    Neutrophils % 79.4 43.0 - 80.0 %    Immature Granulocytes % 0.4 0.0 - 5.0 %    Lymphocytes % 14.3 (L) 20.0 - 42.0 %    Monocytes % 4.9 2.0 - 12.0 %    Eosinophils % 0.2 0.0 - 6.0 %    Basophils % 0.8 0.0 - 2.0 %    Neutrophils Absolute 7.33 (H) 1.80 - 7.30 E9/L    Immature Granulocytes # 0.04 E9/L    Lymphocytes Absolute 1.32 (L) 1.50 - 4.00 E9/L    Monocytes Absolute 0.45 0.10 - 0.95 E9/L    Eosinophils Absolute 0.02 (L) 0.05 - 0.50 E9/L    Basophils Absolute 0.07 0.00 - 0.20 E9/L   Comprehensive Metabolic Panel   Result Value Ref Range    Sodium 137 132 - 146 mmol/L    Potassium 4.2 3.5 - 5.0 mmol/L    Chloride 98 98 - 107 mmol/L    CO2 27 22 - 29 mmol/L    Anion Gap 12 7 - 16 mmol/L    Glucose 149 (H) 74 - 99 mg/dL    BUN 13 6 - 20 mg/dL    CREATININE 0.7 0.5 - 1.0 mg/dL    GFR Non-African American >60 >=60 mL/min/1.73    GFR African American >60     Calcium 9.3 8.6 - 10.2 mg/dL    Total Protein 7.5 6.4 - 8.3 g/dL    Alb 3.7 3.5 - 5.2 g/dL    Total Bilirubin 0.4 0.0 - 1.2 mg/dL    Alkaline Phosphatase 96 35 - 104 U/L    ALT 7 0 - 32 U/L    AST 11 0 - 31 U/L   Lipase   Result Value Ref Range    Lipase 12 (L) 13 - 60 U/L   Lactic Acid, Plasma   Result Value Ref Range    Lactic Acid 1.2 0.5 - 2.2 mmol/L   Troponin   Result Value Ref Range    Troponin <0.01 0.00 - 0.03 ng/mL   Urinalysis   Result Value Ref Range    Color, UA Yellow Straw/Yellow    Clarity, UA TURBID (A) Clear    Glucose, Ur Negative Negative mg/dL    Bilirubin Urine Negative Negative    Ketones, Urine Negative Negative mg/dL    Specific Gravity, UA >=1.030 1.005 - 1.030    Blood, Urine Negative Negative    pH, UA 5.0 5.0 - 9.0    Protein, UA Negative Negative mg/dL    Urobilinogen, Urine 0.2 <2.0 E.U./dL    Nitrite, Urine Negative Negative    Leukocyte Esterase, Urine Negative Negative   Microscopic Urinalysis   Result Value Ref Range    WBC, UA NONE 0 - 5 /HPF    RBC, UA NONE 0 - 2 /HPF    Epithelial Cells, UA RARE /HPF    Bacteria, UA NONE SEEN None Seen /HPF    Amorphous, UA MANY    EKG 12 Lead   Result Value Ref Range    Ventricular Rate 70 BPM    Atrial Rate 70 BPM    P-R Interval 170 ms    QRS Duration 82 ms    Q-T Interval 398 ms    QTc Calculation (Bazett) 429 ms    P Axis 68 degrees    R Axis 54 degrees    T Axis 46 degrees       Radiology:  CT Head WO Contrast   Final Result   No acute intracranial abnormality. CT ABDOMEN PELVIS W IV CONTRAST Additional Contrast? None   Final Result   No acute finding in abdomen or pelvis to account for right upper quadrant   pain. Status post cholecystectomy and gastric bypass surgery. ------------------------- NURSING NOTES AND VITALS REVIEWED ---------------------------  Date / Time Roomed:  7/9/2020 10:24 PM  ED Bed Assignment:  21/21    The nursing notes within the ED encounter and vital signs as below have been reviewed. BP (!) 152/74   Pulse 74   Temp 98.3 °F (36.8 °C) (Oral)   Resp 21   Ht 5' 5\" (1.651 m)   Wt 180 lb (81.6 kg)   SpO2 97%   BMI 29.95 kg/m²   Oxygen Saturation Interpretation: Normal      ------------------------------------------ PROGRESS NOTES ------------------------------------------  I have spoken with the patient and discussed todays results, in addition to providing specific details for the plan of care and counseling regarding the diagnosis and prognosis. Their questions are answered at this time and they are agreeable with the plan.  I discussed at length with them reasons for immediate return here for re evaluation. They will followup with primary care by calling their office tomorrow. --------------------------------- ADDITIONAL PROVIDER NOTES ---------------------------------  At this time the patient is without objective evidence of an acute process requiring hospitalization or inpatient management. They have remained hemodynamically stable throughout their entire ED visit and are stable for discharge with outpatient follow-up. The plan has been discussed in detail and they are aware of the specific conditions for emergent return, as well as the importance of follow-up. New Prescriptions    ONDANSETRON (ZOFRAN ODT) 4 MG DISINTEGRATING TABLET    Take 1 tablet by mouth every 8 hours as needed for Nausea or Vomiting       Diagnosis:  1. Right upper quadrant abdominal pain    2. Non-intractable vomiting with nausea, unspecified vomiting type    3. Acute nonintractable headache, unspecified headache type        Disposition:  Patient's disposition: Discharge to home  Patient's condition is stable.          [address] [facility],   07/10/20 0673

## 2020-07-13 ENCOUNTER — EVALUATION (OUTPATIENT)
Dept: PHYSICAL THERAPY | Age: 50
End: 2020-07-13
Payer: MEDICARE

## 2020-07-13 ENCOUNTER — TELEPHONE (OUTPATIENT)
Dept: BARIATRICS/WEIGHT MGMT | Age: 50
End: 2020-07-13

## 2020-07-13 PROCEDURE — 97162 PT EVAL MOD COMPLEX 30 MIN: CPT | Performed by: PHYSICAL THERAPIST

## 2020-07-13 PROCEDURE — 97110 THERAPEUTIC EXERCISES: CPT | Performed by: PHYSICAL THERAPIST

## 2020-07-13 NOTE — PROGRESS NOTES
[facility] OUTPATIENT REHABILITATION  PHYSICAL THERAPY INITIAL EVALUATION         Date:  2020   Patient: Valencia Hanley  : 1970  MRN: [id number]  Referring Provider: DO Nasrin HernandezPaul kim     Medical Diagnosis:      Diagnosis Orders   1. S/P total knee arthroplasty, left         SUBJECTIVE:     Surgical procedure: L TKA    Date of surgery: 2020    Services provided following surgery: HH PT    History: Gradual Onset becoming worse, conservative measures previously did not work. Chief complaint: pain, decreased motion and weakness    Behavior: condition is getting better    Pain: intermittent  Current: 1/10   Worst: 5/10    Symptom Type/Quality: sharp, burning   Location[de-identified] Knee: global      Imaging results: Xr Chest Standard (2 Vw)    Result Date: 2020  EXAMINATION: TWO XRAY VIEWS OF THE CHEST 2020 9:27 am COMPARISON: 2019 HISTORY: ORDERING SYSTEM PROVIDED HISTORY: Primary osteoarthritis of left knee FINDINGS: The cardiomediastinal silhouette is within normal range. Lungs are clear. There is no focal pulmonary consolidation, pleural effusion, pneumothorax, or evidence of airspace pulmonary edema. Mild-to-moderate degenerative changes involve the thoracic spine. No acute cardiopulmonary process. Xr Knee Left (1-2 Views)    Result Date: 2020  EXAMINATION: TWO XRAY VIEWS OF THE LEFT KNEE 2020 9:21 am COMPARISON: 2020 HISTORY: ORDERING SYSTEM PROVIDED HISTORY: Primary osteoarthritis of left knee Postoperative follow-up secondary to osteoarthritis in knee replacement. Subsequent exam. FINDINGS: Status post left knee arthroplasty. There is anatomic alignment and position of the orthopedic hardware. No evidence of complication. A moderate joint effusion, soft tissue swelling, and skin staples due to the recent surgery remain. Anatomic alignment status post left knee arthroplasty.   No contrast. Multiplanar reformatted images are provided for review. Dose modulation, iterative reconstruction, and/or weight based adjustment of the mA/kV was utilized to reduce the radiation dose to as low as reasonably achievable. COMPARISON: 12/07/2018 HISTORY: ORDERING SYSTEM PROVIDED HISTORY: RUQ pain, vomiting, history gastric bypass TECHNOLOGIST PROVIDED HISTORY: Reason for exam:->RUQ pain, vomiting, history gastric bypass Additional Contrast?->None FINDINGS: Lower Chest: The lung bases are clear and the heart size is normal. Organs: The gallbladder is surgically absent. The liver, spleen, pancreas, adrenal glands and kidneys are normal.  There is a 3 cm cyst in the lower pole of the right kidney. GI/Bowel: Evidence of remote gastric bypass surgery. Oral contrast was administered passing rapidly to normal caliber proximal small bowel. No obstruction or extravasation of contrast.  No evidence of appendicitis. No bowel obstruction. Pelvis: Urinary bladder and ovaries are unremarkable. Small calcification in the right ovary. Urinary bladder is nearly empty and grossly. Peritoneum/Retroperitoneum: There is no adenopathy, free or free fluid. Bones/Soft Tissues: No acute bone or soft tissue. No acute finding in abdomen or pelvis to account for right upper quadrant pain. Status post cholecystectomy and gastric bypass surgery. Kenneth Digital Screen W Cad Bilateral    Result Date: 6/22/2020  INDICATION: Screening. HISTORY: The patient has no personal history of cancer. The patient has the following family history of breast cancer:  mother, at age 72, breast cancer. MAMMOGRAM VIEWS: The following mammographic views where obtained: bilateral craniocaudal; bilateral craniocaudal with tomosynthesis; bilateral mediolateral oblique; and bilateral mediolateral oblique with tomosynthesis  TOMOSYNTHESIS: Tomosynthesis (3 Dimensional Breast Imaging) was used on this examination to aid in evaluation.   COMPARISON: The Radiologist can be reached by calling the following number [phone]. Follow up Protocol for the [facility]: BI-RADS 1 or 2:  Center will send a reminder when next mammogram is due. BI-RADS 3:  Center will send a reminder for recommended short term follow up.  BI-RADS 0, 4 or 5:  Center will contact patient to schedule all additional views and procedures.   ======================================= BI-RADS Category 1:  Negative =======================================         Past Medical History:  Past Medical History:   Diagnosis Date    Bipolar 1 disorder ([address]     Depression     Obesity     Osteoarthritis     bilateral knees    Thiamine deficiency     Vitamin D deficiency      Past Surgical History:   Procedure Laterality Date    CHOLECYSTECTOMY      ENDOSCOPY, COLON, DIAGNOSTIC      GALLBLADDER SURGERY  1994    JOINT REPLACEMENT Right 06/03/2019    MO LAP GASTRIC BYPASS/KIERAN-EN-Y N/A 4/30/2018    GASTRIC BYPASS KIERAN-EN-Y LAPAROSCOPIC performed by Madison Diaz MD at [place] [address] Right 6/3/2019    RIGHT KNEE TOTAL KNEE ARTHROPLASTY ([facility] Gladis Larson) performed by Christine Finney DO at [place] [address] Left 6/24/2020    LEFT KNEE TOTAL ARTHROPLASTY [facility] & [facility]) performed by Christine Finney DO at [address] [place] [address] 10/22/2018    EGD BIOPSY performed by Brittney Samuel MD at [address] [place] [address] Avenue 2/21/2019    EGD ESOPHAGOGASTRODUODENOSCOPY performed by Madison Diaz MD at [address] [place] [address] Avenue 9/16/2019    EGD DILATION BALLOON 14mm performed by Madison Diaz MD at [facility] ENDOSCOPY       Medications:   Current Outpatient Medications   Medication Sig Dispense Refill    ondansetron (ZOFRAN ODT) 4 MG disintegrating tablet Take 1 tablet by mouth every 8 hours as needed for Nausea or Vomiting 10 tablet 0    oxyCODONE-acetaminophen (PERCOCET)  MG per tablet Take 1 tablet by mouth every 6 hours as needed for Pain for up to 7 days. Intended supply: 30 days 28 tablet 0    aspirin 325 MG EC tablet Take 1 tablet by mouth 2 times daily for 28 days 56 tablet 0    QUEtiapine (SEROQUEL) 50 MG tablet Take 50 mg by mouth every evening  2    sucralfate (CARAFATE) 1 GM tablet Take 0.5 tablets by mouth every 4 hours (Patient taking differently: Take 0.5 g by mouth every 4 hours as needed ) 120 tablet 3    omeprazole (PRILOSEC) 20 MG delayed release capsule Take 1 capsule by mouth 2 times daily (before meals) (Patient taking differently: Take 20 mg by mouth Daily ) 180 capsule 3    Cholecalciferol (VITAMIN D3) 5000 units TABS Take by mouth daily      Docusate Sodium (COLACE PO) Take 100 mg by mouth every other day       lithium 300 MG capsule Take 1 capsule by mouth daily (Patient taking differently: Take 300 mg by mouth 4 times daily ) 30 capsule 5    FLUoxetine (PROZAC) 20 MG capsule Take 1 capsule by mouth daily 30 capsule 5    Biotin 12125 MCG TABS Take 1 capsule by mouth daily       Ferrous Sulfate (IRON) 28 MG TABS Take 1 tablet by mouth daily       Multiple Vitamins-Minerals (THERAPEUTIC MULTIVITAMIN-MINERALS) tablet Take 1 tablet by mouth 2 times daily      calcium-cholecalciferol 500-200 MG-UNIT per tablet Take 1 tablet by mouth daily 60 tablet 5    FLUoxetine (PROZAC) 10 MG capsule Take 1 capsule by mouth daily 30 capsule 5     No current facility-administered medications for this visit. Occupation: does not work. Physical demands include: Not currently working. Status: N/A. Exercise regimen: walking    Hobbies: swimming, house work    Patient Goals: pain relief, return to prior activity, get back to normal    Precautions/Contraindications: none    OBJECTIVE:     Observations: Well nourished female with normal affect. Ambulates with cane.     Inspection: Incision edges approximated, no purulent Cold/hotpack  [] Vasocompression  [] Electrical Stimulation  [] Lumbar/Cervical Traction  [] Ultrasound   [] Iontophoresis: 4 mg/mL Dexamethasone Sodium Phosphate 40-80 mAmin        [x] Instruction in HEP      []  Medication allergies reviewed for use of Dexamethasone Sodium Phosphate 4mg/ml  with iontophoresis treatments. Patient is not allergic. The following CPT codes are likely to be used in the care of this patient: 09637 PT Evaluation: Moderate Complexity , 84315 PT Re-Evaluation , 46046 Therapeutic Exercise , 39886 Neuromuscular Re-Education , 53695 Therapeutic Activities , 75667 Gait Training , 18326 Vasopneumatic Device ,  Electrical Stimulation      Suggested Professional Referral: [x] No  [] Yes:     Patient Education:  [x] Plans/Goals, Risks/Benefits discussed  [x] Home exercise program  Method of Education: [x] Verbal  [x] Demo  [x] Written  Comprehension of Education:  [x] Verbalizes understanding. [x] Demonstrates understanding. [] Needs Review. [] Demonstrates/verbalizes understanding of HEP/Ed previously given. Frequency:  2-3 days per week for 4-6 weeks    Patient understands diagnosis/prognosis and consents to treatment, plan and goals: [x] Yes    [] No     Thank you for the opportunity to work with your patient. If you have questions or comments, please contact me at [phone]; fax: [phone]. Electronically signed by: Angel Falcon, PT    Medicare Patients Only     Please sign Physician's Certification and return to: [address] [place] [address]  Dept: [phone]  Dept Fax: [phone] Certification / Comments     Frequency/Duration 2-3 days per week for 4-6 weeks. Certification period from 7/13/2020  to 10/11/2020.     I have reviewed the Plan of Care established for skilled therapy services and certify that the services are required and that they will be provided while the patient is under my care.     Physician's Comments/Revisions:               Physician's Printed Name:                                           [de-identified] Signature:                                                               Date:

## 2020-07-13 NOTE — PROGRESS NOTES
Physical Therapy Daily Treatment Note    Date: 2020  Patient Name: Genaro Perry  : 1970   MRN: [id number]  DOInjury: Chronic   DOSx: 2020  Referring Provider: Levi Soliman DO  [address] [facility]:      Diagnosis Orders   1. S/P total knee arthroplasty, left         Outcome Measure:  Lower Extremity Functional Scale (LEFS) 48.75% impairment    S: See eval  O: Mod edema  Time  850-935       Visit  1 Repeat outcome measure at mid point and end. Pain    5/10 worst     ROM  AROM 91 flex, -7 ext     Modalities       Ice, compression, elevation 75 mmHg            Stretching       Patella mobs      Prone hangs      Heel props      Knee flex stretch-seated 1 minute x 2     Prone self flexion stretch            Exercise       Nustep  NEXT     Quad sets 20 reps x 1 set with 5 second hold     SLR 15 reps x 1 set     Heel slides 20 reps x 1 set     Calf stretch 5 reps x 1 set with 15 second hold     HS stretch 5 reps x 1 set with 15 second hold     SAQ 20 reps x 1 set with 3 second hold     Standing HS curls      Wall Pushes      Toe Raises 15 reps x 1 set           HS in sitting with pillow case      Marching       Sidekicks      Squat       Step-ups - FWD       Step-ups - LAT      Step-ups - BWD       Step up and over reciprocally       TG squats      TG Calf raises       NMR For safe ambulation in community and home    Marching gait      Side stepping      Retrowalk      Heel to toe      A: Tolerated well. Above added to written HEP along with instructions for ice and elevation.   P: Continue with rehab plan  Ninfa Ashton PT    Treatment Charges: Mins Units   Initial Evaluation 20 1   Re-Evaluation     Ther Exercise         TE 25 2   Manual Therapy     MT     Ther Activities        TA     Gait Training          GT     Neuro Re-education NR     Modalities     Non-Billable Service Time     Other     Total Time/Units 45 3

## 2020-07-16 ENCOUNTER — TREATMENT (OUTPATIENT)
Dept: PHYSICAL THERAPY | Age: 50
End: 2020-07-16
Payer: MEDICARE

## 2020-07-16 PROCEDURE — 97110 THERAPEUTIC EXERCISES: CPT | Performed by: PHYSICAL THERAPIST

## 2020-07-16 PROCEDURE — 97016 VASOPNEUMATIC DEVICE THERAPY: CPT | Performed by: PHYSICAL THERAPIST

## 2020-07-16 NOTE — PROGRESS NOTES
Physical Therapy Daily Treatment Note    Date: 2020  Patient Name: Silvia Roque  : 1970   MRN: [id number]  DOInjury: Chronic   DOSx: 2020  Referring Provider: Dr. Jen Katz Diagnosis:      Diagnosis Orders   1. S/P total knee arthroplasty, left         Outcome Measure:  Lower Extremity Functional Scale (LEFS) 48.75% impairment    S: Pt stated that she is feeling better today and feels like she is getting more flexion out of her knee. O: Mod edema  Time  7339-7986      Visit  2 Repeat outcome measure at mid point and end. Pain    2/10      ROM  AROM 92 flex, -6 ext     Modalities       Ice, compression, elevation 75 mmHg x 15 min           Stretching       Patella mobs      Prone hangs      Heel props 2 min x 2     Knee flex stretch-seated 1 minute x 2     Prone self flexion stretch            Exercise       Nustep  L5, 8 mins at 9     Quad sets 20 reps x 1 set with 5 second hold     SLR 20 reps x 1 set     Heel slides 20 reps x 1 set     Calf stretch 5 reps x 1 set with 15 second hold     HS stretch 5 reps x 1 set with 15 second hold     SAQ 20 reps x 1 set with 3 second hold     Standing HS curls      Wall Pushes      Toe Raises 20 reps x 1 set           HS in sitting with pillow case      Marching       Sidekicks      Squat       Step-ups - FWD       Step-ups - LAT      Step-ups - BWD       Step up and over reciprocally       TG squats      TG Calf raises       NMR For safe ambulation in community and home    Marching gait      Side stepping      Retrowalk      Heel to toe      A: Tolerated well. Above added to written HEP along with instructions for ice and elevation.   P: Continue with rehab plan  Marah Nicholson PT    Treatment Charges: Mins Units   Initial Evaluation     Re-Evaluation     Ther Exercise         TE 45 3   Manual Therapy     MT     Ther Activities        TA     Gait Training          GT     Neuro Re-education NR     Modalities 15 1   Non-Billable Service Time Other     Total Time/Units 60 4

## 2020-07-21 ENCOUNTER — TREATMENT (OUTPATIENT)
Dept: PHYSICAL THERAPY | Age: 50
End: 2020-07-21
Payer: MEDICARE

## 2020-07-21 PROCEDURE — 97110 THERAPEUTIC EXERCISES: CPT | Performed by: PHYSICAL THERAPIST

## 2020-07-21 ASSESSMENT — KOOS JR
BENDING TO THE FLOOR TO PICK UP OBJECT: 1
GOING UP OR DOWN STAIRS: 1
STRAIGHTENING KNEE FULLY: 1
TWISING OR PIVOTING ON KNEE: 2
HOW SEVERE IS YOUR KNEE STIFFNESS AFTER FIRST WAKING IN MORNING: 2
STANDING UPRIGHT: 1
RISING FROM SITTING: 1

## 2020-07-21 ASSESSMENT — PROMIS GLOBAL HEALTH SCALE
HOW IS THE PROMIS V1.1 BEING ADMINISTERED?: 2
IN GENERAL, PLEASE RATE HOW WELL YOU CARRY OUT YOUR USUAL SOCIAL ACTIVITIES (INCLUDES ACTIVITIES AT HOME, AT WORK, AND IN YOUR COMMUNITY, AND RESPONSIBILITIES AS A PARENT, CHILD, SPOUSE, EMPLOYEE, FRIEND, ETC) [ON A SCALE OF 1 (POOR) TO 5 (EXCELLENT)]?: 3
IN GENERAL, HOW WOULD YOU RATE YOUR SATISFACTION WITH YOUR SOCIAL ACTIVITIES AND RELATIONSHIPS [ON A SCALE OF 1 (POOR) TO 5 (EXCELLENT)]?: 3
IN THE PAST 7 DAYS, HOW WOULD YOU RATE YOUR FATIGUE ON AVERAGE [ON A SCALE FROM 1 (NONE) TO 5 (VERY SEVERE)]?: 4
IN THE PAST 7 DAYS, HOW WOULD YOU RATE YOUR PAIN ON AVERAGE [ON A SCALE FROM 0 (NO PAIN) TO 10 (WORST IMAGINABLE PAIN)]?: 3
IN GENERAL, WOULD YOU SAY YOUR HEALTH IS...[ON A SCALE OF 1 (POOR) TO 5 (EXCELLENT)]: 4
IN THE PAST 7 DAYS, HOW OFTEN HAVE YOU BEEN BOTHERED BY EMOTIONAL PROBLEMS, SUCH AS FEELING ANXIOUS, DEPRESSED, OR IRRITABLE [ON A SCALE FROM 1 (NEVER) TO 5 (ALWAYS)]?: 3
IN GENERAL, HOW WOULD YOU RATE YOUR MENTAL HEALTH, INCLUDING YOUR MOOD AND YOUR ABILITY TO THINK [ON A SCALE OF 1 (POOR) TO 5 (EXCELLENT)]?: 3
IN GENERAL, WOULD YOU SAY YOUR QUALITY OF LIFE IS...[ON A SCALE OF 1 (POOR) TO 5 (EXCELLENT)]: 4
IN GENERAL, HOW WOULD YOU RATE YOUR PHYSICAL HEALTH [ON A SCALE OF 1 (POOR) TO 5 (EXCELLENT)]?: 3
TO WHAT EXTENT ARE YOU ABLE TO CARRY OUT YOUR EVERYDAY PHYSICAL ACTIVITIES SUCH AS WALKING, CLIMBING STAIRS, CARRYING GROCERIES, OR MOVING A CHAIR [ON A SCALE OF 1 (NOT AT ALL) TO 5 (COMPLETELY)]?: 4

## 2020-07-23 ENCOUNTER — TREATMENT (OUTPATIENT)
Dept: PHYSICAL THERAPY | Age: 50
End: 2020-07-23
Payer: MEDICARE

## 2020-07-23 PROCEDURE — 97110 THERAPEUTIC EXERCISES: CPT

## 2020-07-23 NOTE — PROGRESS NOTES
Physical Therapy Daily Treatment Note    Date: 2020  Patient Name: Andre Guidry  : 1970   MRN: [id number]  DOInjury: Chronic   DOSx: 2020  Referring Provider: Dr. Harley Marin Diagnosis:      Diagnosis Orders   1. S/P total knee arthroplasty, left         Outcome Measure:  Lower Extremity Functional Scale (LEFS) 48.75% impairment    S: Pt stated that she is feeling  Ok today . O: Mod edema  Time  8637-7633 am      Visit  4  Repeat outcome measure at mid point and end. Pain    2/10      ROM  AROM 98 flex, -5 ext     Modalities       Ice, compression, elevation            Stretching       Patella mobs      Prone hangs      Heel props 2 min x 2     Knee flex stretch-seated 1 minute x 2     Prone self flexion stretch            Exercise       Nustep  L5  X 10 min      Quad sets  HEP     SLR t HEP     Heel slides 20 reps x 1 set     Calf stretch  HEP     HS stretch 5 reps x 1 set with 15 second hold     SAQ HEP     Standing HS curls      Wall Pushes      Toe Raises            HS curls 20 reps x 2 sets     Marching  20 reps x 2 sets     Sidekicks      Squat       Step-ups - FWD  20 reps x 2 sets      Step-ups - LAT 20 reps x 2 sets      Step-ups - BWD       Step up and over reciprocally       TG squats 20 reps x 2 sets      TG Calf raises 20 reps x 2 sets      NMR For safe ambulation in community and home    Marching gait      Side stepping      Retrowalk      Heel to toe      A: Tolerated well. Above added to written HEP along with instructions for ice and elevation.   P: Continue with rehab plan  Willams Odor, PTA    Treatment Charges: Mins Units   Initial Evaluation     Re-Evaluation     Ther Exercise         TE 45 3   Manual Therapy     MT     Ther Activities        TA     Gait Training          GT     Neuro Re-education NR     Modalities     Non-Billable Service Time     Other     Total Time/Units 45 3

## 2020-07-28 ENCOUNTER — TREATMENT (OUTPATIENT)
Dept: PHYSICAL THERAPY | Age: 50
End: 2020-07-28
Payer: MEDICARE

## 2020-07-28 PROCEDURE — 97110 THERAPEUTIC EXERCISES: CPT | Performed by: PHYSICAL THERAPIST

## 2020-07-29 ENCOUNTER — OFFICE VISIT (OUTPATIENT)
Dept: ORTHOPEDIC SURGERY | Age: 50
End: 2020-07-29

## 2020-07-29 VITALS — HEIGHT: 65 IN | TEMPERATURE: 98 F | WEIGHT: 170 LBS | BODY MASS INDEX: 28.32 KG/M2

## 2020-07-29 PROCEDURE — 99024 POSTOP FOLLOW-UP VISIT: CPT | Performed by: NURSE PRACTITIONER

## 2020-07-29 NOTE — PROGRESS NOTES
Post-Operative week: 6 Status Post left Total Knee Arthroplasty, DOS: 6/24/2020  Systemic or Specific Complaints:No Complaints    Objective:     General: alert, appears stated age and cooperative   Wound: Wound clean and dry no evidence of infection. , No Erythema, No Edema and No Drainage   Motion: Flexion: -5 to 120 Degrees   DVT Exam: No evidence of DVT seen on physical exam.  Negative Vera's sign. No cords or calf tenderness. No significant calf/ankle edema. Xrays:  None    Assessment:     Encounter Diagnosis   Name Primary?  S/P total knee arthroplasty, left Yes      Doing well postoperatively. Plan:     Continues current post-op course  Patient is to discontinue taking enteric coated aspirin 325mg. Patient is to discontinue wearing SARAHI hose. Continue with outpatient physical therapy. Follow up 2 months.

## 2020-07-30 ENCOUNTER — TREATMENT (OUTPATIENT)
Dept: PHYSICAL THERAPY | Age: 50
End: 2020-07-30
Payer: MEDICARE

## 2020-07-30 PROCEDURE — 97110 THERAPEUTIC EXERCISES: CPT | Performed by: PHYSICAL THERAPIST

## 2020-07-30 NOTE — PROGRESS NOTES
Physical Therapy Daily Treatment Note    Date: 2020  Patient Name: Kajal Shannon  : 1970   MRN: [id number]  DOInjury: Chronic   DOSx: 2020  Referring Provider: Dr. Rosana Blank Diagnosis:      Diagnosis Orders   1. S/P total knee arthroplasty, left         Outcome Measure:  Lower Extremity Functional Scale (LEFS) 48.75% impairment    S: Pt stated that she is feeling good today with very mild pain. O: Mod edema  Time  2233-7680      Visit  6  Repeat outcome measure at mid point and end. Pain    2/10      ROM  AROM 104 flex, -4 ext     Modalities       Ice, compression, elevation            Stretching       Patella mobs      Prone hangs      Heel props 4 min x 1     Knee flex stretch-seated 5 reps x 1 set with 30 second holds     Prone self flexion stretch            Exercise       Nustep  L5  X 8 mins at 5, 4     Quad sets  HEP     SLR t HEP     Heel slides      Calf stretch  HEP     HS stretch 7 reps x 1 set with 20 second hold     SAQ HEP     Standing HS curls      Wall Pushes      Toe Raises            HS curls 20 reps x 2 sets     Marching  20 reps x 2 sets     Sidekicks      Squat       Step-ups - FWD       Step-ups - LAT 16\" step 10 reps x 1 sets      Step-ups - BWD       Leg Curl 20 reps x 1 set 20#     Knee Ext 20 reps x 1 set 5#     Leg Press 20 reps x 1 set 40#     TG squats      TG Calf raises       NMR For safe ambulation in community and home    Marching gait      Side stepping      Retrowalk      Heel to toe      A: Tolerated well. Above added to written HEP along with instructions for ice and elevation.   P: Continue with rehab plan  Patricia Rico PT    Treatment Charges: Mins Units   Initial Evaluation     Re-Evaluation     Ther Exercise         TE 45 3   Manual Therapy     MT     Ther Activities        TA     Gait Training          GT     Neuro Re-education NR     Modalities     Non-Billable Service Time     Other     Total Time/Units 45 3 Patent

## 2020-08-04 ENCOUNTER — TREATMENT (OUTPATIENT)
Dept: PHYSICAL THERAPY | Age: 50
End: 2020-08-04
Payer: MEDICARE

## 2020-08-04 PROCEDURE — 97110 THERAPEUTIC EXERCISES: CPT | Performed by: PHYSICAL THERAPIST

## 2020-08-04 NOTE — PROGRESS NOTES
Physical Therapy Daily Treatment Note    Date: 2020  Patient Name: Clare Mcarthur  : 1970   MRN: [id number]  DOInjury: Chronic   DOSx: 2020  Referring Provider: Dr. Marita Parks Diagnosis:      Diagnosis Orders   1. S/P total knee arthroplasty, left         Outcome Measure:  Lower Extremity Functional Scale (LEFS) 48.75% impairment    S: Pt stated that she is feeling good today with very mild pain. O: Mod edema  Time  4254-4171      Visit  7 Repeat outcome measure at mid point and end. Pain    1/10      ROM  AROM 108 flex, -3 ext     Modalities       Ice, compression, elevation            Stretching       Patella mobs      Prone hangs      Heel props 4 min x 1     Knee flex stretch-seated 5 reps x 1 set with 30 second holds     Prone self flexion stretch            Exercise       Nustep  L5  X 8 mins at 4, 3     Quad sets  HEP     SLR t HEP     Heel slides      Calf stretch  HEP     HS stretch 7 reps x 1 set with 20 second hold     SAQ HEP     Standing HS curls      Wall Pushes      Toe Raises      Prone knee hang 5 minutes      HS curls      Marching       Sidekicks      Squat       Step-ups - FWD       Step-ups - LAT 16\" step 10 reps x 1 sets      Step-ups - BWD       Leg Curl 20 reps x 1 set 30#     Knee Ext 20 reps x 1 set 10#     Leg Press 20 reps x 2 set 60#     TG squats      Calf raises 20 reps x  2 sets  60#      NMR For safe ambulation in community and home    Marching gait      Side stepping      Retrowalk      Heel to toe      A: Tolerated well. Above added to written HEP along with instructions for ice and elevation.   P: Continue with rehab plan  Al Huff PT    Treatment Charges: Mins Units   Initial Evaluation     Re-Evaluation     Ther Exercise         TE 45 3   Manual Therapy     MT     Ther Activities        TA     Gait Training          GT     Neuro Re-education NR     Modalities     Non-Billable Service Time     Other     Total Time/Units 45 3

## 2020-08-11 ENCOUNTER — TELEPHONE (OUTPATIENT)
Dept: PHYSICAL THERAPY | Age: 50
End: 2020-08-11

## 2020-08-13 ENCOUNTER — TREATMENT (OUTPATIENT)
Dept: PHYSICAL THERAPY | Age: 50
End: 2020-08-13
Payer: MEDICARE

## 2020-08-13 PROCEDURE — 97110 THERAPEUTIC EXERCISES: CPT | Performed by: PHYSICAL THERAPIST

## 2020-08-18 ENCOUNTER — CARE COORDINATION (OUTPATIENT)
Dept: CARE COORDINATION | Age: 50
End: 2020-08-18

## 2020-08-18 NOTE — CARE COORDINATION
ACM attempted to contact pt to explain the care coordination program. Unable to leave a VM as pt's VM box is full. Will try again another time.

## 2020-08-19 ENCOUNTER — TELEPHONE (OUTPATIENT)
Dept: PHYSICAL THERAPY | Age: 50
End: 2020-08-19

## 2020-08-22 ENCOUNTER — HOSPITAL ENCOUNTER (EMERGENCY)
Age: 50
Discharge: ANOTHER HOSPITAL | End: 2020-08-22
Payer: MEDICARE

## 2020-08-22 ENCOUNTER — HOSPITAL ENCOUNTER (EMERGENCY)
Age: 50
Discharge: HOME | End: 2020-08-22
Attending: EMERGENCY MEDICINE
Payer: MEDICARE

## 2020-08-22 ENCOUNTER — APPOINTMENT (OUTPATIENT)
Dept: CT IMAGING | Age: 50
End: 2020-08-22
Payer: MEDICARE

## 2020-08-22 VITALS
RESPIRATION RATE: 20 BRPM | BODY MASS INDEX: 28.32 KG/M2 | HEIGHT: 65 IN | DIASTOLIC BLOOD PRESSURE: 97 MMHG | HEART RATE: 81 BPM | OXYGEN SATURATION: 97 % | SYSTOLIC BLOOD PRESSURE: 171 MMHG | WEIGHT: 170 LBS | TEMPERATURE: 98.1 F

## 2020-08-22 VITALS
HEART RATE: 74 BPM | OXYGEN SATURATION: 98 % | SYSTOLIC BLOOD PRESSURE: 153 MMHG | RESPIRATION RATE: 16 BRPM | BODY MASS INDEX: 28.29 KG/M2 | WEIGHT: 170 LBS | TEMPERATURE: 98.2 F | DIASTOLIC BLOOD PRESSURE: 103 MMHG

## 2020-08-22 LAB
ALBUMIN SERPL-MCNC: 4 G/DL (ref 3.5–5.2)
ALP BLD-CCNC: 77 U/L (ref 35–104)
ALT SERPL-CCNC: 8 U/L (ref 0–32)
AMORPHOUS: ABNORMAL
ANION GAP SERPL CALCULATED.3IONS-SCNC: 12 MMOL/L (ref 7–16)
AST SERPL-CCNC: 15 U/L (ref 0–31)
BACTERIA: ABNORMAL /HPF
BASOPHILS ABSOLUTE: 0.05 E9/L (ref 0–0.2)
BASOPHILS RELATIVE PERCENT: 0.8 % (ref 0–2)
BILIRUB SERPL-MCNC: 0.4 MG/DL (ref 0–1.2)
BILIRUBIN URINE: ABNORMAL
BLOOD, URINE: NEGATIVE
BUN BLDV-MCNC: 7 MG/DL (ref 6–20)
CALCIUM SERPL-MCNC: 9.1 MG/DL (ref 8.6–10.2)
CHLORIDE BLD-SCNC: 98 MMOL/L (ref 98–107)
CLARITY: ABNORMAL
CO2: 28 MMOL/L (ref 22–29)
COLOR: YELLOW
CREAT SERPL-MCNC: 0.7 MG/DL (ref 0.5–1)
CRYSTALS, UA: ABNORMAL /HPF
EOSINOPHILS ABSOLUTE: 0.04 E9/L (ref 0.05–0.5)
EOSINOPHILS RELATIVE PERCENT: 0.6 % (ref 0–6)
EPITHELIAL CELLS, UA: ABNORMAL /HPF
GFR AFRICAN AMERICAN: >60
GFR NON-AFRICAN AMERICAN: >60 ML/MIN/1.73
GLUCOSE BLD-MCNC: 117 MG/DL (ref 74–99)
GLUCOSE URINE: NEGATIVE MG/DL
HCT VFR BLD CALC: 40 % (ref 34–48)
HEMOGLOBIN: 12.8 G/DL (ref 11.5–15.5)
IMMATURE GRANULOCYTES #: 0.01 E9/L
IMMATURE GRANULOCYTES %: 0.2 % (ref 0–5)
KETONES, URINE: ABNORMAL MG/DL
LACTIC ACID: 0.7 MMOL/L (ref 0.5–2.2)
LEUKOCYTE ESTERASE, URINE: NEGATIVE
LIPASE: 11 U/L (ref 13–60)
LYMPHOCYTES ABSOLUTE: 1.26 E9/L (ref 1.5–4)
LYMPHOCYTES RELATIVE PERCENT: 19.8 % (ref 20–42)
MCH RBC QN AUTO: 27.9 PG (ref 26–35)
MCHC RBC AUTO-ENTMCNC: 32 % (ref 32–34.5)
MCV RBC AUTO: 87.1 FL (ref 80–99.9)
MONOCYTES ABSOLUTE: 0.55 E9/L (ref 0.1–0.95)
MONOCYTES RELATIVE PERCENT: 8.7 % (ref 2–12)
MUCUS: PRESENT /LPF
NEUTROPHILS ABSOLUTE: 4.44 E9/L (ref 1.8–7.3)
NEUTROPHILS RELATIVE PERCENT: 69.9 % (ref 43–80)
NITRITE, URINE: NEGATIVE
PDW BLD-RTO: 12.7 FL (ref 11.5–15)
PH UA: 5.5 (ref 5–9)
PLATELET # BLD: 240 E9/L (ref 130–450)
PMV BLD AUTO: 11.4 FL (ref 7–12)
POTASSIUM SERPL-SCNC: 3.5 MMOL/L (ref 3.5–5)
PROTEIN UA: ABNORMAL MG/DL
RBC # BLD: 4.59 E12/L (ref 3.5–5.5)
RBC UA: ABNORMAL /HPF (ref 0–2)
SODIUM BLD-SCNC: 138 MMOL/L (ref 132–146)
SPECIFIC GRAVITY UA: >=1.03 (ref 1–1.03)
TOTAL PROTEIN: 7.6 G/DL (ref 6.4–8.3)
TROPONIN: <0.01 NG/ML (ref 0–0.03)
UROBILINOGEN, URINE: 0.2 E.U./DL
WBC # BLD: 6.4 E9/L (ref 4.5–11.5)
WBC UA: ABNORMAL /HPF (ref 0–5)

## 2020-08-22 PROCEDURE — 84484 ASSAY OF TROPONIN QUANT: CPT

## 2020-08-22 PROCEDURE — 74177 CT ABD & PELVIS W/CONTRAST: CPT

## 2020-08-22 PROCEDURE — 85025 COMPLETE CBC W/AUTO DIFF WBC: CPT

## 2020-08-22 PROCEDURE — 2580000003 HC RX 258: Performed by: EMERGENCY MEDICINE

## 2020-08-22 PROCEDURE — 81001 URINALYSIS AUTO W/SCOPE: CPT

## 2020-08-22 PROCEDURE — 6370000000 HC RX 637 (ALT 250 FOR IP): Performed by: PHYSICIAN ASSISTANT

## 2020-08-22 PROCEDURE — 99283 EMERGENCY DEPT VISIT LOW MDM: CPT

## 2020-08-22 PROCEDURE — 96375 TX/PRO/DX INJ NEW DRUG ADDON: CPT

## 2020-08-22 PROCEDURE — 83605 ASSAY OF LACTIC ACID: CPT

## 2020-08-22 PROCEDURE — 6360000002 HC RX W HCPCS: Performed by: ORTHOPAEDIC SURGERY

## 2020-08-22 PROCEDURE — 83690 ASSAY OF LIPASE: CPT

## 2020-08-22 PROCEDURE — 96365 THER/PROPH/DIAG IV INF INIT: CPT

## 2020-08-22 PROCEDURE — 80053 COMPREHEN METABOLIC PANEL: CPT

## 2020-08-22 PROCEDURE — 6360000004 HC RX CONTRAST MEDICATION: Performed by: RADIOLOGY

## 2020-08-22 PROCEDURE — 99212 OFFICE O/P EST SF 10 MIN: CPT

## 2020-08-22 PROCEDURE — 99284 EMERGENCY DEPT VISIT MOD MDM: CPT

## 2020-08-22 RX ORDER — ONDANSETRON 4 MG/1
4 TABLET, ORALLY DISINTEGRATING ORAL ONCE
Status: COMPLETED | OUTPATIENT
Start: 2020-08-22 | End: 2020-08-22

## 2020-08-22 RX ORDER — SODIUM CHLORIDE, SODIUM LACTATE, POTASSIUM CHLORIDE, CALCIUM CHLORIDE 600; 310; 30; 20 MG/100ML; MG/100ML; MG/100ML; MG/100ML
1000 INJECTION, SOLUTION INTRAVENOUS ONCE
Status: COMPLETED | OUTPATIENT
Start: 2020-08-22 | End: 2020-08-22

## 2020-08-22 RX ORDER — SODIUM CHLORIDE, SODIUM LACTATE, POTASSIUM CHLORIDE, CALCIUM CHLORIDE 600; 310; 30; 20 MG/100ML; MG/100ML; MG/100ML; MG/100ML
INJECTION, SOLUTION INTRAVENOUS
Status: DISCONTINUED
Start: 2020-08-22 | End: 2020-08-22 | Stop reason: HOSPADM

## 2020-08-22 RX ORDER — FAMOTIDINE 20 MG/1
20 TABLET, FILM COATED ORAL 2 TIMES DAILY
Qty: 60 TABLET | Refills: 3 | Status: SHIPPED | OUTPATIENT
Start: 2020-08-22 | End: 2021-03-12

## 2020-08-22 RX ORDER — METOCLOPRAMIDE HYDROCHLORIDE 5 MG/ML
20 INJECTION INTRAMUSCULAR; INTRAVENOUS ONCE
Status: COMPLETED | OUTPATIENT
Start: 2020-08-22 | End: 2020-08-22

## 2020-08-22 RX ORDER — FENTANYL CITRATE 50 UG/ML
50 INJECTION, SOLUTION INTRAMUSCULAR; INTRAVENOUS ONCE
Status: COMPLETED | OUTPATIENT
Start: 2020-08-22 | End: 2020-08-22

## 2020-08-22 RX ADMIN — ONDANSETRON 4 MG: 4 TABLET, ORALLY DISINTEGRATING ORAL at 16:22

## 2020-08-22 RX ADMIN — IOPAMIDOL 75 ML: 755 INJECTION, SOLUTION INTRAVENOUS at 18:25

## 2020-08-22 RX ADMIN — SODIUM CHLORIDE, POTASSIUM CHLORIDE, SODIUM LACTATE AND CALCIUM CHLORIDE 1000 ML: 600; 310; 30; 20 INJECTION, SOLUTION INTRAVENOUS at 17:26

## 2020-08-22 RX ADMIN — METOCLOPRAMIDE 20 MG: 5 INJECTION, SOLUTION INTRAMUSCULAR; INTRAVENOUS at 17:25

## 2020-08-22 RX ADMIN — FENTANYL CITRATE 50 MCG: 50 INJECTION, SOLUTION INTRAMUSCULAR; INTRAVENOUS at 17:26

## 2020-08-22 ASSESSMENT — ENCOUNTER SYMPTOMS
BACK PAIN: 1
DIARRHEA: 0
ABDOMINAL PAIN: 1
NAUSEA: 1
CONSTIPATION: 0
SHORTNESS OF BREATH: 0
COUGH: 0
EYE DISCHARGE: 0
SINUS PRESSURE: 0
CHEST TIGHTNESS: 0
RHINORRHEA: 0
VOMITING: 1
BLOOD IN STOOL: 0
PHOTOPHOBIA: 0

## 2020-08-22 ASSESSMENT — PAIN DESCRIPTION - PAIN TYPE: TYPE: ACUTE PAIN

## 2020-08-22 ASSESSMENT — PAIN DESCRIPTION - LOCATION: LOCATION: ABDOMEN;FLANK

## 2020-08-22 ASSESSMENT — PAIN DESCRIPTION - ORIENTATION: ORIENTATION: RIGHT

## 2020-08-22 ASSESSMENT — PAIN SCALES - GENERAL
PAINLEVEL_OUTOF10: 3
PAINLEVEL_OUTOF10: 6

## 2020-08-22 NOTE — ED PROVIDER NOTES
ATTENDING PROVIDER ATTESTATION:     Brian Padilla presented to the emergency department for evaluation of abdominal pain and was initially evaluated by the resident physician. See Original ED Note for H&P and ED course above. I have reviewed and discussed the case, including pertinent history (medical, surgical, family and social) and exam findings with the resident. I have personally performed and/or participated in the history, exam, medical decision making, and procedures and agree with all pertinent clinical information. I was present for all key portions of any procedures performed during the ED course. I have reviewed my findings and recommendations with the assigned resident, the patient, and members of family present at the time of disposition. Patient sent from urgent care for acute onset abdominal pain, progressing over the past few days. Associated nausea and vomiting. Constipation x2 to 3 days. Decreased flatus. History of gastric bypass, cholecystectomy. History of marginal ulcer. No black or bloody stools. Epigastric and right lower quadrant pain, radiating to right flank. Patient is afebrile, nontoxic-appearing, hemodynamically stable, and in no acute distress. Treated symptomatically and feeling improved on reevaluation. CT AP shows no acute process. Low suspicion for acute intraabdominal process requiring further workup or imaging. After discussion of findings and return precautions, patient agrees with plan for discharge and outpatient follow up with PCP. My findings/plan:   1. Nausea and vomiting, intractability of vomiting not specified, unspecified vomiting type    2.  Epigastric pain        MD Ryan Ramon MD  08/22/20 2200

## 2020-08-22 NOTE — ED PROVIDER NOTES
Chief Complaint: nausea, vomiting, abdominal pain        HPI:  8/22/20, Time: 4:51 PM EDT      HPI       Prabhakar Quiles is a 45 y.o. female presenting to the ED for chief complaint of nausea vomiting and epigastric abdominal pain. The patient states that on Thursday her nausea and vomiting began. Has not gotten any better and she now has abdominal pain in the epigastric region as well as the right lower quadrant radiating into the back. Patient came from urgent care where she was given Zofran for nausea which is not seem to help. Patient denies any blood when she vomits. She states she does have a history of peptic ulcer disease. Patient denies any diarrhea, Blood in the stool, constipation. Patient denies any shortness of breath, fever, chills, chest pain. Patient denies any sick contacts. Patient denies alcohol use, smoking, other drug use. Patient denies any urinary symptoms. ROS:     Review of Systems   Constitutional: Negative for chills, fatigue and fever. HENT: Negative for congestion, rhinorrhea and sinus pressure. Eyes: Negative for photophobia, discharge and visual disturbance. Respiratory: Negative for cough, chest tightness and shortness of breath. Cardiovascular: Negative for palpitations. Gastrointestinal: Positive for abdominal pain, nausea and vomiting. Negative for blood in stool, constipation and diarrhea. Genitourinary: Negative for difficulty urinating, dysuria and flank pain. Musculoskeletal: Positive for back pain. Negative for arthralgias and myalgias. Skin: Negative for rash. Neurological: Negative for dizziness, weakness, numbness and headaches.       --------------------------------------------- PAST HISTORY ---------------------------------------------  Past Medical History:  has a past medical history of Bipolar 1 disorder ([facility] Depression, Obesity, Osteoarthritis, Thiamine deficiency, and Vitamin D deficiency.     Past Surgical History:  has a past surgical history that includes Gallbladder surgery (1994); Cholecystectomy; Endoscopy, colon, diagnostic; pr lap gastric bypass/luciano-en-y (N/A, 4/30/2018); Upper gastrointestinal endoscopy (N/A, 10/22/2018); Upper gastrointestinal endoscopy (N/A, 2/21/2019); joint replacement (Right, 06/03/2019); Total knee arthroplasty (Right, 6/3/2019); Upper gastrointestinal endoscopy (N/A, 9/16/2019); and Total knee arthroplasty (Left, 6/24/2020). Social History:  reports that she has never smoked. She has never used smokeless tobacco. She reports that she does not drink alcohol or use drugs. Family History: family history includes Breast Cancer in her mother; Cancer in her maternal grandmother; Heart Disease in her father, maternal grandmother, mother, and paternal grandmother; Parkinsonism in her father. The patients home medications have been reviewed. Allergies: Patient has no known allergies. ---------------------------------------------------PHYSICAL EXAM--------------------------------------  Constitutional/General: Alert and oriented x3, well appearing, non toxic in NAD  Head: Normocephalic and atraumatic  Eyes: PERRL, EOMI  Mouth: Oropharynx clear, handling secretions, no trismus  Neck: Supple, full ROM, non tender to palpation in the midline, no stridor, no crepitus, no meningeal signs  Pulmonary: Lungs clear to auscultation bilaterally, no wheezes, rales, or rhonchi. Not in respiratory distress  Cardiovascular:  Regular rate. Regular rhythm. No murmurs, gallops, or rubs. 2+ distal pulses  Chest: no chest wall tenderness  Abdomen: Soft. Tenderness to palpation in the epigastric region. Mild discomfort with palpation of the right lower quadrant. No tenderness to percussion of the right or left flank. . Non distended. +BS. No rebound, guarding, or rigidity. No pulsatile masses appreciated. Musculoskeletal: Moves all extremities x 4. Warm and well perfused, no clubbing, cyanosis, or edema.  Capillary refill <3 seconds  Skin: warm and dry. No rashes. Neurologic: GCS 15, CN 2-12 grossly intact, no focal deficits, symmetric strength 5/5 in the upper and lower extremities bilaterally  Psych: Normal Affect    -------------------------------------------------- RESULTS -------------------------------------------------  I have personally reviewed all laboratory and imaging results for this patient. Results are listed below.      LABS:  Results for orders placed or performed during the hospital encounter of 08/22/20   Lipase   Result Value Ref Range    Lipase 11 (L) 13 - 60 U/L   Troponin   Result Value Ref Range    Troponin <0.01 0.00 - 0.03 ng/mL   CBC auto differential   Result Value Ref Range    WBC 6.4 4.5 - 11.5 E9/L    RBC 4.59 3.50 - 5.50 E12/L    Hemoglobin 12.8 11.5 - 15.5 g/dL    Hematocrit 40.0 34.0 - 48.0 %    MCV 87.1 80.0 - 99.9 fL    MCH 27.9 26.0 - 35.0 pg    MCHC 32.0 32.0 - 34.5 %    RDW 12.7 11.5 - 15.0 fL    Platelets 654 180 - 588 E9/L    MPV 11.4 7.0 - 12.0 fL    Neutrophils % 69.9 43.0 - 80.0 %    Immature Granulocytes % 0.2 0.0 - 5.0 %    Lymphocytes % 19.8 (L) 20.0 - 42.0 %    Monocytes % 8.7 2.0 - 12.0 %    Eosinophils % 0.6 0.0 - 6.0 %    Basophils % 0.8 0.0 - 2.0 %    Neutrophils Absolute 4.44 1.80 - 7.30 E9/L    Immature Granulocytes # 0.01 E9/L    Lymphocytes Absolute 1.26 (L) 1.50 - 4.00 E9/L    Monocytes Absolute 0.55 0.10 - 0.95 E9/L    Eosinophils Absolute 0.04 (L) 0.05 - 0.50 E9/L    Basophils Absolute 0.05 0.00 - 0.20 E9/L   Comprehensive metabolic panel   Result Value Ref Range    Sodium 138 132 - 146 mmol/L    Potassium 3.5 3.5 - 5.0 mmol/L    Chloride 98 98 - 107 mmol/L    CO2 28 22 - 29 mmol/L    Anion Gap 12 7 - 16 mmol/L    Glucose 117 (H) 74 - 99 mg/dL    BUN 7 6 - 20 mg/dL    CREATININE 0.7 0.5 - 1.0 mg/dL    GFR Non-African American >60 >=60 mL/min/1.73    GFR African American >60     Calcium 9.1 8.6 - 10.2 mg/dL    Total Protein 7.6 6.4 - 8.3 g/dL    Alb 4.0 3.5 - 5.2 g/dL 8/22/20 1726)   iopamidol (ISOVUE-370) 76 % injection 75 mL (75 mLs Intravenous Given 8/22/20 1825)       ED Course as of Aug 22 2053   Sat Aug 22, 2020   2052 Patient was reevaluated. The patients symptoms have improved. Labs and Imaging results were discussed with the patient. The patient states she is ready to go home. [JG]      ED Course User Index  [JG] Ronna Filter, DO          Medical Decision Making: This is a 45year old female with the chief complaint of nausea, vomiting, and epigastric abdominal pain. Nausea and vomiting began on Thursday, the abdominal pain began today and has been getting progressively worse. The patient denies blood in the vomit denies diarrhea or blood in her stool. Patient denies chest pain, shortness of breath, fever, chills. Patient had Zofran at urgent care. She was given reglan in the ED. Patient denies alcohol use. Lipase, CBC, CMP, UA, Troponin, Lactic acid, were obtained. CT abdomen was also obtained and demonstrates. Re-Evaluations:             Re-evaluation. Patients symptoms are improving      This patient's ED course included: re-evaluation prior to disposition and a personal history and physicial eaxmination    This patient has remained hemodynamically stable during their ED course. Counseling: The emergency provider has spoken with the patient and discussed todays results, in addition to providing specific details for the plan of care and counseling regarding the diagnosis and prognosis. Questions are answered at this time and they are agreeable with the plan.       --------------------------------- IMPRESSION AND DISPOSITION ---------------------------------    IMPRESSION  1. Nausea and vomiting, intractability of vomiting not specified, unspecified vomiting type    2. Epigastric pain        ED Course as of Aug 22 2053   Sat Aug 22, 2020   2052 Patient was reevaluated. The patients symptoms have improved.  Labs and Imaging results were discussed with the patient. The patient states she is ready to go home. [JG]      ED Course User Index  [JG] Isabel Ignacio DO       DISPOSITION  Disposition: Discharge to home  Patient condition is stable        NOTE: This report was transcribed using voice recognition software.  Every effort was made to ensure accuracy; however, inadvertent computerized transcription errors may be present         Isabel Ignacio DO  Resident  08/22/20 9937

## 2020-08-22 NOTE — ED PROVIDER NOTES
This is a 50-year-old female that presents to urgent care complaining of severe mid to lower abdominal pain mostly on the right side. This is been gone for the last 3 days. She is also had a headache and ear pain as well. She complains of nausea. Denies rash. No fevers or chills. Does state a history of gastric bypass. She also states history of cholecystectomy as well. States pain is just as bad as it was when she was seen in the ER several months ago. She denies any chest pain or shortness of breath. On first contact patient she appears to be no acute distress. Review of Systems   Constitutional:        Pertinent positives and negatives are stated within HPI, all other systems reviewed and are negative. Physical Exam  Vitals signs and nursing note reviewed. Constitutional:       Appearance: She is well-developed. HENT:      Head: Normocephalic and atraumatic. Right Ear: Hearing and external ear normal.      Left Ear: Hearing and external ear normal.      Nose: Nose normal.      Mouth/Throat:      Pharynx: Uvula midline. Eyes:      General: Lids are normal.      Conjunctiva/sclera: Conjunctivae normal.      Pupils: Pupils are equal, round, and reactive to light. Neck:      Musculoskeletal: Normal range of motion and neck supple. Cardiovascular:      Rate and Rhythm: Normal rate and regular rhythm. Heart sounds: Normal heart sounds. No murmur. Pulmonary:      Effort: Pulmonary effort is normal.      Breath sounds: Normal breath sounds. Abdominal:      General: Bowel sounds are normal.      Palpations: Abdomen is soft. Abdomen is not rigid. Tenderness: There is abdominal tenderness in the right upper quadrant, right lower quadrant, periumbilical area, suprapubic area and left lower quadrant. There is no right CVA tenderness, left CVA tenderness, guarding or rebound. Skin:     General: Skin is warm and dry. Findings: No abrasion or rash.    Neurological: reviewed. Allergies: Patient has no known allergies. -------------------------------------------------- RESULTS -------------------------------------------------  No results found for this visit on 08/22/20. No orders to display       ------------------------- NURSING NOTES AND VITALS REVIEWED ---------------------------   The nursing notes within the ED encounter and vital signs as below have been reviewed. BP (!) 153/103   Pulse 74   Temp 98.2 °F (36.8 °C)   Resp 16   Wt 170 lb (77.1 kg)   SpO2 98%   BMI 28.29 kg/m²   Oxygen Saturation Interpretation: Normal      ------------------------------------------ PROGRESS NOTES ------------------------------------------   I have spoken with the patient and discussed todays results, in addition to providing specific details for the plan of care and counseling regarding the diagnosis and prognosis. Their questions are answered at this time and they are agreeable with the plan.      --------------------------------- ADDITIONAL PROVIDER NOTES ---------------------------------     This patient is stable for discharge. I have shared the specific conditions for return, as well as the importance of follow-up. * NOTE: This report was transcribed using voice recognition software. Every effort was made to ensure accuracy; however, inadvertent computerized transcription errors may be present.    --------------------------------- IMPRESSION AND DISPOSITION ---------------------------------    IMPRESSION  1. Abdominal pain, unspecified abdominal location    2. Nausea        DISPOSITION  Disposition: Discharge to ED.   Patient condition is stable         Ramona Infante PA-C  08/22/20 6507

## 2020-08-25 ENCOUNTER — TELEPHONE (OUTPATIENT)
Dept: PHYSICAL THERAPY | Age: 50
End: 2020-08-25

## 2020-09-02 ENCOUNTER — CARE COORDINATION (OUTPATIENT)
Dept: CARE COORDINATION | Age: 50
End: 2020-09-02

## 2020-09-02 NOTE — CARE COORDINATION
CHRISTINA called and spoke with Tricia to explain the care coordination program. Tricia reports that she doesn't have any nursing care coordination needs but expressed that she is experiencing financial resource strain since her   a few years ago. She reports that she receives $846/month from Social security and she is not currently working. Reports that her teenage daughter, who is in high school has a part time job and helps to support them. Reports that she struggles to pay her utility bills, afford groceries as well as she stated that she has cancelled her PT visits as she can't afford the co-pay. ACM discussed that pt can apply for financial aid, pt reports that she has applied for financial aid for some hospital bills but was unsure if the PT visits could be covered under financial aid. Reports that she sees a psychiatrist at [facility] every 3 months for her depression, bipolar disorder, and stress and is prescribed medications but would like to see a counselor at a facility outside of [facility] as she has not had a good experience with the counselors there. ACM recommended a referral to our care coordination  for financial resource strain and counselor recommendations. Pt will be referred to the Prescription Assistance Program for possible assistance with her medications as she reports that she can afford them most times but it will help her financially.

## 2020-09-04 NOTE — CARE COORDINATION
ACM received secure email from Catie Prescott with Prescription Assistance stating that the enrollment process for Prozac was started for the pt on 9/3/2020.

## 2020-09-08 ENCOUNTER — CARE COORDINATION (OUTPATIENT)
Dept: CARE COORDINATION | Age: 50
End: 2020-09-08

## 2020-09-08 NOTE — CARE COORDINATION
Call to pt to initiate ACM referral. Unable to reach pt. Mailbox full. Will try again.   Chalo Cole, MSW, [facility]   [phone]

## 2020-09-14 NOTE — CARE COORDINATION
Second call to pt to initiate referral. Unable to reach.   ENRIQUE Alvarenga, [facility]   [phone]

## 2020-09-15 ENCOUNTER — OFFICE VISIT (OUTPATIENT)
Dept: FAMILY MEDICINE CLINIC | Age: 50
End: 2020-09-15
Payer: MEDICARE

## 2020-09-15 ENCOUNTER — HOSPITAL ENCOUNTER (OUTPATIENT)
Age: 50
Discharge: HOME | End: 2020-09-17
Payer: MEDICARE

## 2020-09-15 VITALS
HEIGHT: 65 IN | BODY MASS INDEX: 30.16 KG/M2 | OXYGEN SATURATION: 98 % | SYSTOLIC BLOOD PRESSURE: 124 MMHG | WEIGHT: 181 LBS | TEMPERATURE: 98.9 F | DIASTOLIC BLOOD PRESSURE: 78 MMHG | HEART RATE: 75 BPM | RESPIRATION RATE: 17 BRPM

## 2020-09-15 LAB
ESTRADIOL LEVEL: 134.6 PG/ML
FOLLICLE STIMULATING HORMONE: 22.1 MIU/ML
LUTEINIZING HORMONE: 22.2 MIU/ML

## 2020-09-15 PROCEDURE — 82670 ASSAY OF TOTAL ESTRADIOL: CPT

## 2020-09-15 PROCEDURE — G0439 PPPS, SUBSEQ VISIT: HCPCS | Performed by: FAMILY MEDICINE

## 2020-09-15 PROCEDURE — 3017F COLORECTAL CA SCREEN DOC REV: CPT | Performed by: FAMILY MEDICINE

## 2020-09-15 PROCEDURE — 83002 ASSAY OF GONADOTROPIN (LH): CPT

## 2020-09-15 PROCEDURE — 99497 ADVNCD CARE PLAN 30 MIN: CPT | Performed by: FAMILY MEDICINE

## 2020-09-15 PROCEDURE — 83001 ASSAY OF GONADOTROPIN (FSH): CPT

## 2020-09-15 ASSESSMENT — PATIENT HEALTH QUESTIONNAIRE - PHQ9
1. LITTLE INTEREST OR PLEASURE IN DOING THINGS: 1
SUM OF ALL RESPONSES TO PHQ QUESTIONS 1-9: 2
SUM OF ALL RESPONSES TO PHQ QUESTIONS 1-9: 2
SUM OF ALL RESPONSES TO PHQ9 QUESTIONS 1 & 2: 2
2. FEELING DOWN, DEPRESSED OR HOPELESS: 1

## 2020-09-15 ASSESSMENT — LIFESTYLE VARIABLES: HOW OFTEN DO YOU HAVE A DRINK CONTAINING ALCOHOL: 0

## 2020-09-15 NOTE — PROGRESS NOTES
Medicare Annual Wellness Visit  Name: Valente Russell Date: 9/15/2020   MRN: [id number] Sex: Female   Age: 45 y.o. Ethnicity: Non-/Non    : 1970 Race: Melva Beck is here for Medicare AWV    Screenings for behavioral, psychosocial and functional/safety risks, and cognitive dysfunction are all negative except as indicated below. These results, as well as other patient data from the [address] form, are documented in Flowsheets linked to this Encounter. No Known Allergies    Prior to Visit Medications    Medication Sig Taking?  Authorizing Provider   famotidine (PEPCID) 20 MG tablet Take 1 tablet by mouth 2 times daily Yes Isabel Ignacio,    ondansetron (ZOFRAN ODT) 4 MG disintegrating tablet Take 1 tablet by mouth every 8 hours as needed for Nausea or Vomiting Yes Adelfo Anglin,    QUEtiapine (SEROQUEL) 50 MG tablet Take 50 mg by mouth every evening Yes Historical Provider, MD   sucralfate (CARAFATE) 1 GM tablet Take 0.5 tablets by mouth every 4 hours  Patient taking differently: Take 0.5 g by mouth every 4 hours as needed  Yes Grace Vanegas MD   Cholecalciferol (VITAMIN D3) 5000 units TABS Take by mouth daily Yes Historical Provider, MD   Docusate Sodium (COLACE PO) Take 100 mg by mouth every other day  Yes Historical Provider, MD   lithium 300 MG capsule Take 1 capsule by mouth daily  Patient taking differently: Take 300 mg by mouth 4 times daily  Yes Lukasz Candelario MD   FLUoxetine (PROZAC) 20 MG capsule Take 1 capsule by mouth daily Yes Lukasz Candelario MD   Biotin 96731 MCG TABS Take 1 capsule by mouth daily  Yes Historical Provider, MD   Ferrous Sulfate (IRON) 28 MG TABS Take 1 tablet by mouth daily  Yes Historical Provider, MD   Multiple Vitamins-Minerals (THERAPEUTIC MULTIVITAMIN-MINERALS) tablet Take 1 tablet by mouth 2 times daily Yes Historical Provider, MD   calcium-cholecalciferol 500-200 MG-UNIT per tablet Take 1 tablet by mouth daily Yes Jaydon Turner MD   FLUoxetine (PROZAC) 10 MG capsule Take 1 capsule by mouth daily Yes Tanner Gaytan MD   aspirin 325 MG EC tablet Take 1 tablet by mouth 2 times daily for 28 days  Melany Geller DO   omeprazole (PRILOSEC) 20 MG delayed release capsule Take 1 capsule by mouth 2 times daily (before meals)  Patient taking differently: Take 20 mg by mouth Daily   Jaydon Turner MD       Past Medical History:   Diagnosis Date    B[facility] 1 disorder ([facility]     Depression     Obesity     Osteoarthritis     bilateral knees    Thiamine deficiency     Vitamin D deficiency        Past Surgical History:   Procedure Laterality Date    CHOLECYSTECTOMY      ENDOSCOPY, COLON, DIAGNOSTIC      GALLBLADDER SURGERY  1994    JOINT REPLACEMENT Right 06/03/2019    NM LAP GASTRIC BYPASS/KIERAN-EN-Y N/A 4/30/2018    GASTRIC BYPASS KIERAN-EN-Y LAPAROSCOPIC performed by Jaydon Turner MD at [place] [address] Right 6/3/2019    RIGHT KNEE TOTAL KNEE ARTHROPLASTY (Nita Ott & NEPHEW Harjit Delarosa) performed by Festus Lemos DO at [place] [address] Left 6/24/2020    LEFT KNEE TOTAL ARTHROPLASTY [facility]) performed by Festus Lemos DO at [address] [facility] N/A 10/22/2018    EGD BIOPSY performed by Justin Martini MD at [address] [facility] N/A 2/21/2019    EGD ESOPHAGOGASTRODUODENOSCOPY performed by Jaydon Turner MD at [facility] N/A 9/16/2019    EGD DILATION BALLOON 14mm performed by Jaydon Turner MD at [address] [facility] History   Problem Relation Age of Onset    Breast Cancer Mother     Heart Disease Mother     Heart Disease Father     Parkinsonism Father     Heart Disease Maternal Grandmother     Cancer Maternal Grandmother     Heart Disease Paternal Grandmother        CareTeam (Including outside providers/suppliers coordination and speech normal    Patient's complete Health Risk Assessment and screening values have been reviewed and are found in Flowsheets. The following problems were reviewed today and where indicated follow up appointments were made and/or referrals ordered. Positive Risk Factor Screenings with Interventions:     General Health:  General  In general, how would you say your health is?: Good  In the past 7 days, have you experienced any of the following? New or Increased Pain, New or Increased Fatigue, Loneliness, Social Isolation, Stress or Anger?: (!) Stress  Do you get the social and emotional support that you need?: Yes  Do you have a Living Will?: Yes  General Health Risk Interventions:  · Stress: patient's comments regarding reasons for stress and/or anger: pandemic and sequelae, concerns about her health and her mother's issues, regular exercise recommended- 3-5 times per week, 30-45 minutes per session, under care of psychiatry    Health Habits/Nutrition:  Health Habits/Nutrition  Do you exercise for at least 20 minutes 2-3 times per week?: Yes  Have you lost any weight without trying in the past 3 months?: No  Do you eat fewer than 2 meals per day?: No  Have you seen a dentist within the past year?: Yes  Body mass index is 30.12 kg/m².   Health Habits/Nutrition Interventions:  · Health Habits/Nutrition UTD and appropriate    Hearing/Vision:  No exam data present  Hearing/Vision  Do you or your family notice any trouble with your hearing?: No  Do you have difficulty driving, watching TV, or doing any of your daily activities because of your eyesight?: No  Have you had an eye exam within the past year?: (!) No  Hearing/Vision Interventions:  · Vision concerns:  patient encouraged to make appointment with his/her eye specialist    Personalized Preventive Plan   Current Health Maintenance Status  Immunization History   Administered Date(s) Administered    Influenza, Intradermal, Quadrivalent, decisions for the patient if incompetent. Patient was asked to complete the declaration forms, either acknowledge the forms by a public notary or an eligible witness and provide a signed copy to the practice office. Time spent (minutes): 10 minutes  -     Ambulatory Referral to ACP Clinical Specialist    ACP (advance care planning)  -     [place] ADVANCED CARE PLAN FACE TO [address] [facility], [address] [facility] [[id number]]        -     Advanced Care Planning: Discussed the patients choices for care and treatment in case of a health event that adversely affects decision-making abilities. Also discussed the patients long-term treatment options. Reviewed with the patient the [address] [place] [address] Declaration forms  Reviewed the process of designating a competent adult as an Agent (or -in-fact) that could take make health care decisions for the patient if incompetent. Patient was asked to complete the declaration forms, either acknowledge the forms by a public notary or an eligible witness and provide a signed copy to the practice office. Time spent (minutes): 10 minutes  -     Ambulatory Referral to ACP Clinical Specialist    Need for prophylactic vaccination and inoculation against varicella  -     zoster recombinant adjuvanted vaccine [facility]) 50 MCG/0.5ML SUSR injection; Inject 0.5 mLs into the muscle once for 1 dose Repeat a second dose 2-6 months after the first dose. CALL INSURANCE TO VERIFY COVERAGE    Screening for colorectal cancer  -     POCT FECAL IMMUNOCHEMICAL TEST (FIT); Future    Amenorrhea  -     Follicle Stimulating Hormone; Future  -     Luteinizing Hormone; Future  -     ESTRADIOL; Future           Follow Up:  Return for Medicare Annual Wellness Visit in 1 year.         Tk Amador MD

## 2020-09-15 NOTE — PATIENT INSTRUCTIONS
understands what makes life meaningful for you. · Understands your Tenriism and moral values. · Will do what you want, not what he or she wants. · Will be able to make difficult choices at a stressful time. · Will be able to refuse or stop treatment, if that is what you would want, even if you could die. · Will be firm and confident with health professionals if needed. · Will ask questions to get needed information. · Lives near you or agrees to travel to you if needed. Your family may help you make medical decisions while you can still be part of that process. But it's important to choose one person to be your health care agent in case you aren't able to make decisions for yourself. If you don't fill out the legal form and name a health care agent, the decisions your family can make may be limited. A health care agent may be called something else in your state. Who will make decisions for you if you don't have a health care agent? If you don't have a health care agent or a living will, you may not get the care you want. Decisions may be made by family members who disagree about your medical care. Or decisions may be made by a medical professional who doesn't know you well. In some cases, a  makes the decisions. When you name a health care agent, it is very clear who has the power to make health decisions for you. How do you name a health care agent? You name your health care agent on a legal form. This form is usually called a medical power of . Ask your hospital, state bar association, or office on aging where to find these forms. You must sign the form to make it legal. Some states require you to get the form notarized. This means that a person called a  watches you sign the form and then he or she signs the form. Some states also require that two or more witnesses sign the form. Be sure to tell your family members and doctors who your health care agent is.   Where can you learn more? Go to [email or web address] and sign in to your [employer] account. Enter [id number] in the [facility] box to learn more about \"Learning About [address]. \"     If you do not have an account, please click on the \"Sign Up Now\" link. Current as of: December 9, 2019               Content Version: 12.5  © 8330-8354 [employer]. Care instructions adapted under license by [facility] ([facility]). If you have questions about a medical condition or this instruction, always ask your healthcare professional. [address] any warranty or liability for your use of this information. Learning About Living [place] [place]  What is a living will? A living will, also called a declaration, is a legal form. It tells your family and your doctor your wishes when you can't speak for yourself. It's used by the health professionals who will treat you as you near the end of your life or if you get seriously hurt or ill. If you put your wishes in writing, your loved ones and others will know what kind of care you want. They won't need to guess. This can ease your mind and be helpful to others. And you can change or cancel your living will at any time. A living will is not the same as an estate or property will. An estate will explains what you want to happen with your money and property after you die. How do you use it? A living will is used to describe the kinds of treatment or life support you want as you near the end of your life or if you get seriously hurt or ill. Keep these facts in mind about living vasquez. · Your living will is used only if you can't speak or make decisions for yourself. Most often, one or more doctors must certify that you can't speak or decide for yourself before your living will takes effect. · If you get better and can speak for yourself again, you can accept or refuse any treatment.  It doesn't matter what you said in your information. Personalized Preventive Plan for Silvia Roque - 9/15/2020  Medicare offers a range of preventive health benefits. Some of the tests and screenings are paid in full while other may be subject to a deductible, co-insurance, and/or copay. Some of these benefits include a comprehensive review of your medical history including lifestyle, illnesses that may run in your family, and various assessments and screenings as appropriate. After reviewing your medical record and screening and assessments performed today your provider may have ordered immunizations, labs, imaging, and/or referrals for you. A list of these orders (if applicable) as well as your Preventive Care list are included within your After Visit Summary for your review. Other Preventive Recommendations:    · A preventive eye exam performed by an eye specialist is recommended every 1-2 years to screen for glaucoma; cataracts, macular degeneration, and other eye disorders. · A preventive dental visit is recommended every 6 months. · Try to get at least 150 minutes of exercise per week or 10,000 steps per day on a pedometer . · Order or download the FREE \"Exercise & Physical Activity: Your Everyday Guide\" from [employer]. Call 5-731.379.4517 or search [employer] online. · You need 2453-2259 mg of calcium and 9580-6823 IU of vitamin D per day. It is possible to meet your calcium requirement with diet alone, but a vitamin D supplement is usually necessary to meet this goal.  · When exposed to the sun, use a sunscreen that protects against both UVA and UVB radiation with an SPF of 30 or greater. Reapply every 2 to 3 hours or after sweating, drying off with a towel, or swimming. · Always wear a seat belt when traveling in a car. Always wear a helmet when riding a bicycle or motorcycle. · Complete Advanced Care Directives (Living Will and [address]).   Advanced Care Planner with contact you. · Complete Colon Cancer screening (\"FIT kit\")  · Prescription for shingles vaccine.   CALL INSURANCE TO VERIFY COVERAGE  · Flu shot in October

## 2020-09-16 ENCOUNTER — TELEPHONE (OUTPATIENT)
Dept: SPIRITUAL SERVICES | Age: 50
End: 2020-09-16

## 2020-09-18 ENCOUNTER — TELEPHONE (OUTPATIENT)
Dept: CARE COORDINATION | Age: 50
End: 2020-09-18

## 2020-09-18 NOTE — TELEPHONE ENCOUNTER
This ACP Specialist spoke with Tricia today. Updated HC decision maker in [facility]. Tricia is scheduled for next Thursday at 9am to complete her AD's via docu sign.

## 2020-09-24 ENCOUNTER — TELEPHONE (OUTPATIENT)
Dept: CARE COORDINATION | Age: 50
End: 2020-09-24

## 2020-09-24 NOTE — ACP (ADVANCE CARE PLANNING)
Advance Care Planning     Advance Care Planning Clinical Specialist  Conversation Note      Date of ACP Conversation: 9/24/2020    Conversation Conducted with: Patient with Decision Making Capacity    ACP Clinical Specialist: ENRIQUE Sierra, [facility]:     Current Designated Health Care Decision Maker:   Primary Decision Maker: Lucie Guerra - Brother/Sister - [phone]    Supplemental (Other) Decision Maker: Victoriano Ervin - Child - [phone]                Care Preferences    Hospitalization: \"If your health worsens and it becomes clear that your chance of recovery is unlikely, what would your preference be regarding hospitalization? \"    Choice:  [] The patient wants hospitalization  [x] The patient prefers comfort-focused treatment without hospitalization. Ventilation: \"If you were in your present state of health and suddenly became very ill and were unable to breathe on your own, what would your preference be about the use of a ventilator (breathing machine) if it were available to you? \"      Would the patient desire the use of ventilator (breathing machine)?: yes  Rayne Savannah states, \"if i'm on it and my condition worsens I want to be taken off\"    \"If your health worsens and it becomes clear that your chance of recovery is unlikely, what would your preference be about the use of a ventilator (breathing machine) if it were available to you? \"     Would the patient desire the use of ventilator (breathing machine)?: No      Resuscitation  \"CPR works best to restart the heart when there is a sudden event, like a heart attack, in someone who is otherwise healthy. Unfortunately, CPR does not typically restart the heart for people who have serious health conditions or who are very sick. \"    \"In the event your heart stopped as a result of an underlying serious health condition, would you want attempts to be made to restart your heart (answer \"yes\" for attempt to resuscitate) or would you prefer a natural death (answer \"no\" for do not attempt to resuscitate)? \" yes       [x] Yes   [] No   Educated Patient / Paul Higgins regarding differences between Advance Directives and portable DNR orders. Length of ACP Conversation in minutes:  70 mins total  Conversation Outcomes:  [x] ACP discussion completed  [] Existing advance directive reviewed with patient; no changes to patient's previously recorded wishes  [x] New Advance Directive completed  [] Portable Do Not Rescitate prepared for Provider review and signature  [] POLST/POST/MOLST/MOST prepared for Provider review and signature      Follow-up plan:    [] Schedule follow-up conversation to continue planning  [x] Referred individual to Provider for additional questions/concerns   [x] Advised patient/agent/surrogate to review completed ACP document and update if needed with changes in condition, patient preferences or care setting    [x] This note routed to one or more involved healthcare providers      This ACP Specialist spoke with Christel Jain in depth today regarding Advance Directives and DNR. Christel Jain completed her AD's today and they will be uploaded to her EMR. She has selected to remain a Full Code at this time and understands that she can change that status at any time by speaking with her PCP.

## 2020-09-24 NOTE — CARE COORDINATION
Third attempt to initiate ACM referral. Unable to LVM, mailbox full. Final call.   ENRIQUE Albert, [facility]   [phone]

## 2020-12-10 ENCOUNTER — HOSPITAL ENCOUNTER (EMERGENCY)
Age: 50
Discharge: HOME | End: 2020-12-10
Attending: EMERGENCY MEDICINE
Payer: MEDICARE

## 2020-12-10 ENCOUNTER — APPOINTMENT (OUTPATIENT)
Dept: CT IMAGING | Age: 50
End: 2020-12-10
Payer: MEDICARE

## 2020-12-10 VITALS
SYSTOLIC BLOOD PRESSURE: 119 MMHG | WEIGHT: 176 LBS | DIASTOLIC BLOOD PRESSURE: 76 MMHG | RESPIRATION RATE: 20 BRPM | HEIGHT: 65 IN | HEART RATE: 55 BPM | BODY MASS INDEX: 29.32 KG/M2 | TEMPERATURE: 98 F | OXYGEN SATURATION: 99 %

## 2020-12-10 LAB
ALBUMIN SERPL-MCNC: 3.5 G/DL (ref 3.5–5.2)
ALP BLD-CCNC: 71 U/L (ref 35–104)
ALT SERPL-CCNC: 5 U/L (ref 0–32)
ANION GAP SERPL CALCULATED.3IONS-SCNC: 8 MMOL/L (ref 7–16)
AST SERPL-CCNC: 9 U/L (ref 0–31)
BACTERIA: ABNORMAL /HPF
BASOPHILS ABSOLUTE: 0.06 E9/L (ref 0–0.2)
BASOPHILS RELATIVE PERCENT: 1.3 % (ref 0–2)
BILIRUB SERPL-MCNC: <0.2 MG/DL (ref 0–1.2)
BILIRUBIN URINE: NEGATIVE
BLOOD, URINE: NEGATIVE
BUN BLDV-MCNC: 12 MG/DL (ref 6–20)
CALCIUM SERPL-MCNC: 9 MG/DL (ref 8.6–10.2)
CHLORIDE BLD-SCNC: 105 MMOL/L (ref 98–107)
CLARITY: ABNORMAL
CO2: 29 MMOL/L (ref 22–29)
COLOR: YELLOW
CREAT SERPL-MCNC: 0.7 MG/DL (ref 0.5–1)
CRYSTALS, UA: ABNORMAL /HPF
EKG ATRIAL RATE: 49 BPM
EKG P AXIS: 59 DEGREES
EKG P-R INTERVAL: 174 MS
EKG Q-T INTERVAL: 430 MS
EKG QRS DURATION: 88 MS
EKG QTC CALCULATION (BAZETT): 388 MS
EKG R AXIS: 40 DEGREES
EKG T AXIS: 43 DEGREES
EKG VENTRICULAR RATE: 49 BPM
EOSINOPHILS ABSOLUTE: 0.11 E9/L (ref 0.05–0.5)
EOSINOPHILS RELATIVE PERCENT: 2.4 % (ref 0–6)
EPITHELIAL CELLS, UA: ABNORMAL /HPF
GFR AFRICAN AMERICAN: >60
GFR NON-AFRICAN AMERICAN: >60 ML/MIN/1.73
GLUCOSE BLD-MCNC: 94 MG/DL (ref 74–99)
GLUCOSE URINE: NEGATIVE MG/DL
HCT VFR BLD CALC: 38.4 % (ref 34–48)
HEMOGLOBIN: 11.9 G/DL (ref 11.5–15.5)
IMMATURE GRANULOCYTES #: 0 E9/L
IMMATURE GRANULOCYTES %: 0 % (ref 0–5)
KETONES, URINE: NEGATIVE MG/DL
LACTIC ACID: 0.7 MMOL/L (ref 0.5–2.2)
LEUKOCYTE ESTERASE, URINE: NEGATIVE
LIPASE: 19 U/L (ref 13–60)
LYMPHOCYTES ABSOLUTE: 1.56 E9/L (ref 1.5–4)
LYMPHOCYTES RELATIVE PERCENT: 34.1 % (ref 20–42)
MAGNESIUM: 2.1 MG/DL (ref 1.6–2.6)
MCH RBC QN AUTO: 27.3 PG (ref 26–35)
MCHC RBC AUTO-ENTMCNC: 31 % (ref 32–34.5)
MCV RBC AUTO: 88.1 FL (ref 80–99.9)
MONOCYTES ABSOLUTE: 0.43 E9/L (ref 0.1–0.95)
MONOCYTES RELATIVE PERCENT: 9.4 % (ref 2–12)
NEUTROPHILS ABSOLUTE: 2.42 E9/L (ref 1.8–7.3)
NEUTROPHILS RELATIVE PERCENT: 52.8 % (ref 43–80)
NITRITE, URINE: NEGATIVE
PDW BLD-RTO: 12.7 FL (ref 11.5–15)
PH UA: 5 (ref 5–9)
PLATELET # BLD: 277 E9/L (ref 130–450)
PMV BLD AUTO: 10.4 FL (ref 7–12)
POTASSIUM SERPL-SCNC: 4.7 MMOL/L (ref 3.5–5)
PROTEIN UA: NEGATIVE MG/DL
RBC # BLD: 4.36 E12/L (ref 3.5–5.5)
RBC UA: ABNORMAL /HPF (ref 0–2)
SODIUM BLD-SCNC: 142 MMOL/L (ref 132–146)
SPECIFIC GRAVITY UA: >=1.03 (ref 1–1.03)
TOTAL PROTEIN: 7 G/DL (ref 6.4–8.3)
TROPONIN: <0.01 NG/ML (ref 0–0.03)
UROBILINOGEN, URINE: 0.2 E.U./DL
WBC # BLD: 4.6 E9/L (ref 4.5–11.5)
WBC UA: ABNORMAL /HPF (ref 0–5)

## 2020-12-10 PROCEDURE — 99284 EMERGENCY DEPT VISIT MOD MDM: CPT

## 2020-12-10 PROCEDURE — 83735 ASSAY OF MAGNESIUM: CPT

## 2020-12-10 PROCEDURE — 70450 CT HEAD/BRAIN W/O DYE: CPT

## 2020-12-10 PROCEDURE — 2500000003 HC RX 250 WO HCPCS: Performed by: EMERGENCY MEDICINE

## 2020-12-10 PROCEDURE — 83605 ASSAY OF LACTIC ACID: CPT

## 2020-12-10 PROCEDURE — 85025 COMPLETE CBC W/AUTO DIFF WBC: CPT

## 2020-12-10 PROCEDURE — 96375 TX/PRO/DX INJ NEW DRUG ADDON: CPT

## 2020-12-10 PROCEDURE — 83690 ASSAY OF LIPASE: CPT

## 2020-12-10 PROCEDURE — 96374 THER/PROPH/DIAG INJ IV PUSH: CPT

## 2020-12-10 PROCEDURE — 6360000002 HC RX W HCPCS: Performed by: EMERGENCY MEDICINE

## 2020-12-10 PROCEDURE — 80053 COMPREHEN METABOLIC PANEL: CPT

## 2020-12-10 PROCEDURE — 81001 URINALYSIS AUTO W/SCOPE: CPT

## 2020-12-10 PROCEDURE — 74177 CT ABD & PELVIS W/CONTRAST: CPT

## 2020-12-10 PROCEDURE — 93005 ELECTROCARDIOGRAM TRACING: CPT | Performed by: EMERGENCY MEDICINE

## 2020-12-10 PROCEDURE — 84484 ASSAY OF TROPONIN QUANT: CPT

## 2020-12-10 PROCEDURE — 6360000004 HC RX CONTRAST MEDICATION: Performed by: RADIOLOGY

## 2020-12-10 RX ORDER — ONDANSETRON 2 MG/ML
4 INJECTION INTRAMUSCULAR; INTRAVENOUS ONCE
Status: COMPLETED | OUTPATIENT
Start: 2020-12-10 | End: 2020-12-10

## 2020-12-10 RX ADMIN — FAMOTIDINE 20 MG: 10 INJECTION, SOLUTION INTRAVENOUS at 07:56

## 2020-12-10 RX ADMIN — IOPAMIDOL 75 ML: 755 INJECTION, SOLUTION INTRAVENOUS at 09:03

## 2020-12-10 RX ADMIN — ONDANSETRON 4 MG: 2 INJECTION INTRAMUSCULAR; INTRAVENOUS at 07:56

## 2020-12-10 ASSESSMENT — PAIN DESCRIPTION - DESCRIPTORS: DESCRIPTORS_2: HEADACHE

## 2020-12-10 ASSESSMENT — ENCOUNTER SYMPTOMS
RHINORRHEA: 0
COUGH: 0
SHORTNESS OF BREATH: 0
VOMITING: 1
BACK PAIN: 0
NAUSEA: 1
COLOR CHANGE: 0
ABDOMINAL PAIN: 1
BLOOD IN STOOL: 0

## 2020-12-10 ASSESSMENT — PAIN DESCRIPTION - LOCATION
LOCATION_2: HEAD
LOCATION: ABDOMEN
LOCATION: ABDOMEN

## 2020-12-10 ASSESSMENT — PAIN SCALES - GENERAL
PAINLEVEL_OUTOF10: 4
PAINLEVEL_OUTOF10: 0

## 2020-12-10 ASSESSMENT — PAIN DESCRIPTION - INTENSITY: RATING_2: 1

## 2020-12-10 ASSESSMENT — PAIN DESCRIPTION - PAIN TYPE: TYPE: ACUTE PAIN

## 2020-12-10 NOTE — ED PROVIDER NOTES
disorder ([facility]     Depression     Obesity     Osteoarthritis     bilateral knees    Thiamine deficiency     Vitamin D deficiency        Past Surgical History:   Past Surgical History:   Procedure Laterality Date    CHOLECYSTECTOMY      ENDOSCOPY, COLON, DIAGNOSTIC      GALLBLADDER SURGERY  1994    JOINT REPLACEMENT Right 06/03/2019    CT LAP GASTRIC BYPASS/KIERAN-EN-Y N/A 4/30/2018    GASTRIC BYPASS KIERAN-EN-Y LAPAROSCOPIC performed by Mich Quintanilla MD at [address] [facility] Right 6/3/2019    RIGHT KNEE TOTAL KNEE ARTHROPLASTY (Roylene Sermon & NEPHEW Marco Handy) performed by Bibiana Mak DO at [address] [facility] Left 6/24/2020    LEFT KNEE TOTAL ARTHROPLASTY [facility] & NEPHEW) performed by Bibiana Mak DO at [address] [facility] [address] 10/22/2018    EGD BIOPSY performed by Malcolm Mcarthur MD at [address] 2/21/2019    EGD ESOPHAGOGASTRODUODENOSCOPY performed by Mich Quintanilla MD at [address] N/A 9/16/2019    EGD DILATION BALLOON 14mm performed by Mich Quintanilla MD at [address] History:   Social History     Socioeconomic History    Marital status:      Spouse name: None    Number of children: None    Years of education: None    Highest education level: None   Occupational History    None   Social Needs    Financial resource strain: None    Food insecurity     Worry: None     Inability: None    Transportation needs     Medical: None     Non-medical: None   Tobacco Use    Smoking status: Never Smoker    Smokeless tobacco: Never Used   Substance and Sexual Activity    Alcohol use: No     Alcohol/week: 0.0 standard drinks    Drug use: No    Sexual activity: Never     Partners: Male     Comment:  since 2000.   Sleeps in separate bedrooms   Lifestyle    Physical activity     Days per week: None     Minutes per session: None  Stress: None   Relationships    Social connections     Talks on phone: None     Gets together: None     Attends Druze service: None     Active member of club or organization: None     Attends meetings of clubs or organizations: None     Relationship status: None    Intimate partner violence     Fear of current or ex partner: None     Emotionally abused: None     Physically abused: None     Forced sexual activity: None   Other Topics Concern    None   Social History Narrative    None       Family History:   Family History   Problem Relation Age of Onset    Breast Cancer Mother     Heart Disease Mother     Heart Disease Father     Parkinsonism Father     Heart Disease Maternal Grandmother     Cancer Maternal Grandmother     Heart Disease Paternal Grandmother        The patients home medications have been reviewed. Allergies:   No Known Allergies        ---------------------------------------------------PHYSICAL EXAM--------------------------------------    BP (!) 142/82   Pulse 88   Temp 96.6 °F (35.9 °C) (Infrared)   Resp 18   Ht 5' 5\" (1.651 m)   Wt 176 lb (79.8 kg)   LMP 10/01/2020 (Approximate)   SpO2 99%   BMI 29.29 kg/m²     Physical Exam  Constitutional:       General: She is not in acute distress. Appearance: She is not toxic-appearing. HENT:      Mouth/Throat:      Mouth: Mucous membranes are moist.   Eyes:      General: No scleral icterus. Extraocular Movements: Extraocular movements intact. Pupils: Pupils are equal, round, and reactive to light. Comments: No nystagmus   Neck:      Musculoskeletal: Normal range of motion and neck supple. Cardiovascular:      Rate and Rhythm: Normal rate and regular rhythm. Pulses: Normal pulses. Heart sounds: Normal heart sounds. No murmur. Pulmonary:      Effort: Pulmonary effort is normal. No respiratory distress. Breath sounds: Normal breath sounds. No wheezing or rales.    Abdominal:      General: There is no distension. Palpations: Abdomen is soft. Tenderness: There is abdominal tenderness (epigastric and LUQ). There is no guarding or rebound. Musculoskeletal: Normal range of motion. General: No swelling or tenderness. Skin:     General: Skin is warm and dry. Neurological:      Mental Status: She is alert and oriented to person, place, and time. Comments: PERRL, EOMI, FS, TM, facial sensation intact to light touch bilaterally, shoulder shrug intact bilaterally, Strength 5/5 and sensation grossly intact to light touch and equal bilaterally throughout all extremities, no dysmetria, no ataxia, normal gait.            -------------------------------------------------- RESULTS -------------------------------------------------  I have personally reviewed all laboratory and imaging results for this patient. Results are listed below. LABS:  Labs Reviewed   CBC WITH AUTO DIFFERENTIAL - Abnormal; Notable for the following components:       Result Value    MCHC 31.0 (*)     All other components within normal limits   URINALYSIS WITH MICROSCOPIC - Abnormal; Notable for the following components:    Bacteria, UA MANY (*)     Crystals, UA Many (*)     All other components within normal limits   COMPREHENSIVE METABOLIC PANEL   MAGNESIUM   LIPASE   TROPONIN   LACTIC ACID, PLASMA       RADIOLOGY:  Interpreted personally and by Radiologist.  CT Head WO Contrast   Final Result   No significant abnormal findings. CT ABDOMEN PELVIS W IV CONTRAST Additional Contrast? None   Final Result   1. No evidence of acute intra-abdominal or intrapelvic process. 2. Fatty infiltration of the liver. 3. Postsurgical changes including prior cholecystectomy and gastric bypass. No significant change. EKG: This EKG is signed and interpreted by the EP.     Sinus bradycardia, ventricular rate 49 bpm, normal axis and intervals, no acute injury pattern, no significant change compared with prior EKG except for decrease in ventricular rate.      ------------------------- NURSING NOTES AND VITALS REVIEWED ---------------------------   The nursing notes within the ED encounter and vital signs as below have been reviewed by myself. BP (!) 142/82   Pulse 88   Temp 96.6 °F (35.9 °C) (Infrared)   Resp 18   Ht 5' 5\" (1.651 m)   Wt 176 lb (79.8 kg)   LMP 10/01/2020 (Approximate)   SpO2 99%   BMI 29.29 kg/m²   Oxygen Saturation Interpretation: Normal    The patients available past medical records and past encounters were reviewed. ------------------------------ ED COURSE/MEDICAL DECISION MAKING----------------------  Medications   famotidine (PEPCID) injection 20 mg (20 mg Intravenous Given 12/10/20 0756)   ondansetron (ZOFRAN) injection 4 mg (4 mg Intravenous Given 12/10/20 0756)   iopamidol (ISOVUE-370) 76 % injection 75 mL (75 mLs Intravenous Given 12/10/20 0903)       Counseling: The emergency provider has spoken with the patient and discussed todays results, in addition to providing specific details for the plan of care and counseling regarding the diagnosis and prognosis. Questions are answered at this time and they are agreeable with the plan. ED Course/Medical Decision Makin y.o. female here with dizziness, vomiting, headache, abdominal pain. Sx intermittent over the past few days. Neuro intact throughout. Dizziness vertiginous by hx, not persistent, low suspicion for posterior CVA. Neuro intact throughout. No active headache at time of evaluation. Epigastric tenderness, no surgical abdomen. Suspect symptoms 2/2 gastritis vs marginal ulcer. No interval anemia or black/bloody stools to suggest GI bleed. Labs and imaging unrevealing. UA shows bacteriuria, likely due to contamination. On reevaluation reports symptomatic improvement. After discussion of findings and return precautions, patient agrees with plan for discharge and outpatient follow up with PCP.

## 2021-03-12 ENCOUNTER — HOSPITAL ENCOUNTER (EMERGENCY)
Age: 51
Discharge: HOME | End: 2021-03-12
Attending: EMERGENCY MEDICINE
Payer: MEDICARE

## 2021-03-12 ENCOUNTER — HOSPITAL ENCOUNTER (EMERGENCY)
Age: 51
Discharge: ANOTHER HOSPITAL | End: 2021-03-12
Payer: MEDICARE

## 2021-03-12 ENCOUNTER — APPOINTMENT (OUTPATIENT)
Dept: CT IMAGING | Age: 51
End: 2021-03-12
Payer: MEDICARE

## 2021-03-12 VITALS
DIASTOLIC BLOOD PRESSURE: 80 MMHG | HEART RATE: 72 BPM | WEIGHT: 176 LBS | RESPIRATION RATE: 20 BRPM | SYSTOLIC BLOOD PRESSURE: 140 MMHG | TEMPERATURE: 98.7 F | HEIGHT: 65 IN | OXYGEN SATURATION: 98 % | BODY MASS INDEX: 29.32 KG/M2

## 2021-03-12 VITALS
BODY MASS INDEX: 29.32 KG/M2 | WEIGHT: 176 LBS | RESPIRATION RATE: 18 BRPM | TEMPERATURE: 98.4 F | SYSTOLIC BLOOD PRESSURE: 145 MMHG | DIASTOLIC BLOOD PRESSURE: 84 MMHG | HEART RATE: 74 BPM | OXYGEN SATURATION: 100 % | HEIGHT: 65 IN

## 2021-03-12 DIAGNOSIS — K29.00 ACUTE GASTRITIS, PRESENCE OF BLEEDING UNSPECIFIED, UNSPECIFIED GASTRITIS TYPE: Primary | ICD-10-CM

## 2021-03-12 DIAGNOSIS — K29.00 OTHER ACUTE GASTRITIS WITHOUT HEMORRHAGE: ICD-10-CM

## 2021-03-12 DIAGNOSIS — K29.00 ACUTE SUPERFICIAL GASTRITIS WITHOUT HEMORRHAGE: ICD-10-CM

## 2021-03-12 DIAGNOSIS — R10.13 ABDOMINAL PAIN, EPIGASTRIC: Primary | ICD-10-CM

## 2021-03-12 LAB
ALBUMIN SERPL-MCNC: 4 G/DL (ref 3.5–5.2)
ALP BLD-CCNC: 75 U/L (ref 35–104)
ALT SERPL-CCNC: 8 U/L (ref 0–32)
ANION GAP SERPL CALCULATED.3IONS-SCNC: 10 MMOL/L (ref 7–16)
AST SERPL-CCNC: 14 U/L (ref 0–31)
BASOPHILS ABSOLUTE: 0.04 E9/L (ref 0–0.2)
BASOPHILS ABSOLUTE: 0.07 E9/L (ref 0–0.2)
BASOPHILS RELATIVE PERCENT: 0.8 % (ref 0–2)
BASOPHILS RELATIVE PERCENT: 1.2 % (ref 0–2)
BILIRUB SERPL-MCNC: 0.2 MG/DL (ref 0–1.2)
BUN BLDV-MCNC: 15 MG/DL (ref 6–20)
CALCIUM SERPL-MCNC: 9.1 MG/DL (ref 8.6–10.2)
CHLORIDE BLD-SCNC: 103 MMOL/L (ref 98–107)
CO2: 26 MMOL/L (ref 22–29)
CREAT SERPL-MCNC: 0.7 MG/DL (ref 0.5–1)
EOSINOPHILS ABSOLUTE: 0.02 E9/L (ref 0.05–0.5)
EOSINOPHILS ABSOLUTE: 0.09 E9/L (ref 0.05–0.5)
EOSINOPHILS RELATIVE PERCENT: 0.4 % (ref 0–6)
EOSINOPHILS RELATIVE PERCENT: 1.5 % (ref 0–6)
GFR AFRICAN AMERICAN: >60
GFR AFRICAN AMERICAN: >60
GFR NON-AFRICAN AMERICAN: >60 ML/MIN/1.73
GFR NON-AFRICAN AMERICAN: >60 ML/MIN/1.73
GLUCOSE BLD-MCNC: 118 MG/DL (ref 74–99)
GLUCOSE BLD-MCNC: 122 MG/DL (ref 74–99)
HCT VFR BLD CALC: 39.5 % (ref 34–48)
HCT VFR BLD CALC: 40.1 % (ref 34–48)
HEMOGLOBIN: 12.5 G/DL (ref 11.5–15.5)
HEMOGLOBIN: 12.9 G/DL (ref 11.5–15.5)
IMMATURE GRANULOCYTES #: 0.01 E9/L
IMMATURE GRANULOCYTES #: 0.03 E9/L
IMMATURE GRANULOCYTES %: 0.2 % (ref 0–5)
IMMATURE GRANULOCYTES %: 0.6 % (ref 0–5)
LIPASE: 13 U/L (ref 13–60)
LYMPHOCYTES ABSOLUTE: 0.91 E9/L (ref 1.5–4)
LYMPHOCYTES ABSOLUTE: 1.8 E9/L (ref 1.5–4)
LYMPHOCYTES RELATIVE PERCENT: 17.4 % (ref 20–42)
LYMPHOCYTES RELATIVE PERCENT: 30.8 % (ref 20–42)
MCH RBC QN AUTO: 27.6 PG (ref 26–35)
MCH RBC QN AUTO: 27.7 PG (ref 26–35)
MCHC RBC AUTO-ENTMCNC: 31.6 % (ref 32–34.5)
MCHC RBC AUTO-ENTMCNC: 32.2 % (ref 32–34.5)
MCV RBC AUTO: 85.7 FL (ref 80–99.9)
MCV RBC AUTO: 87.4 FL (ref 80–99.9)
MONOCYTES ABSOLUTE: 0.27 E9/L (ref 0.1–0.95)
MONOCYTES ABSOLUTE: 0.42 E9/L (ref 0.1–0.95)
MONOCYTES RELATIVE PERCENT: 5.2 % (ref 2–12)
MONOCYTES RELATIVE PERCENT: 7.2 % (ref 2–12)
NEUTROPHILS ABSOLUTE: 3.46 E9/L (ref 1.8–7.3)
NEUTROPHILS ABSOLUTE: 3.95 E9/L (ref 1.8–7.3)
NEUTROPHILS RELATIVE PERCENT: 59.1 % (ref 43–80)
NEUTROPHILS RELATIVE PERCENT: 75.6 % (ref 43–80)
PDW BLD-RTO: 12.5 FL (ref 11.5–15)
PDW BLD-RTO: 12.7 FL (ref 11.5–15)
PERFORMED ON: ABNORMAL
PLATELET # BLD: 261 E9/L (ref 130–450)
PLATELET # BLD: 321 E9/L (ref 130–450)
PMV BLD AUTO: 10.5 FL (ref 7–12)
PMV BLD AUTO: 11.3 FL (ref 7–12)
POC CHLORIDE: 104 MMOL/L (ref 100–108)
POC CREATININE: 0.7 MG/DL (ref 0.5–1)
POC POTASSIUM: 4.3 MMOL/L (ref 3.5–5)
POC SODIUM: 142 MMOL/L (ref 132–146)
POTASSIUM REFLEX MAGNESIUM: 4.2 MMOL/L (ref 3.5–5)
RBC # BLD: 4.52 E12/L (ref 3.5–5.5)
RBC # BLD: 4.68 E12/L (ref 3.5–5.5)
SODIUM BLD-SCNC: 139 MMOL/L (ref 132–146)
TOTAL PROTEIN: 7.3 G/DL (ref 6.4–8.3)
TROPONIN: <0.01 NG/ML (ref 0–0.03)
WBC # BLD: 5.2 E9/L (ref 4.5–11.5)
WBC # BLD: 5.9 E9/L (ref 4.5–11.5)

## 2021-03-12 PROCEDURE — 82565 ASSAY OF CREATININE: CPT

## 2021-03-12 PROCEDURE — 85025 COMPLETE CBC W/AUTO DIFF WBC: CPT

## 2021-03-12 PROCEDURE — 74176 CT ABD & PELVIS W/O CONTRAST: CPT

## 2021-03-12 PROCEDURE — 36415 COLL VENOUS BLD VENIPUNCTURE: CPT

## 2021-03-12 PROCEDURE — 99211 OFF/OP EST MAY X REQ PHY/QHP: CPT

## 2021-03-12 PROCEDURE — 84132 ASSAY OF SERUM POTASSIUM: CPT

## 2021-03-12 PROCEDURE — 84484 ASSAY OF TROPONIN QUANT: CPT

## 2021-03-12 PROCEDURE — 83690 ASSAY OF LIPASE: CPT

## 2021-03-12 PROCEDURE — 84295 ASSAY OF SERUM SODIUM: CPT

## 2021-03-12 PROCEDURE — 80053 COMPREHEN METABOLIC PANEL: CPT

## 2021-03-12 PROCEDURE — 6370000000 HC RX 637 (ALT 250 FOR IP): Performed by: PHYSICIAN ASSISTANT

## 2021-03-12 PROCEDURE — 82435 ASSAY OF BLOOD CHLORIDE: CPT

## 2021-03-12 PROCEDURE — 6370000000 HC RX 637 (ALT 250 FOR IP): Performed by: EMERGENCY MEDICINE

## 2021-03-12 PROCEDURE — 82947 ASSAY GLUCOSE BLOOD QUANT: CPT

## 2021-03-12 RX ORDER — SUCRALFATE 1 G/1
1 TABLET ORAL
Qty: 30 TABLET | Refills: 0 | Status: SHIPPED | OUTPATIENT
Start: 2021-03-12 | End: 2021-03-12

## 2021-03-12 RX ORDER — DICYCLOMINE HYDROCHLORIDE 10 MG/1
20 CAPSULE ORAL
Qty: 30 CAPSULE | Refills: 0 | Status: ON HOLD | OUTPATIENT
Start: 2021-03-12 | End: 2021-09-10 | Stop reason: ALTCHOICE

## 2021-03-12 RX ORDER — SUCRALFATE 1 G/1
1 TABLET ORAL 4 TIMES DAILY
Qty: 120 TABLET | Refills: 3 | Status: SHIPPED | OUTPATIENT
Start: 2021-03-12 | End: 2022-06-02 | Stop reason: SDUPTHER

## 2021-03-12 RX ORDER — LIDOCAINE HYDROCHLORIDE 20 MG/ML
15 SOLUTION OROPHARYNGEAL ONCE
Status: COMPLETED | OUTPATIENT
Start: 2021-03-12 | End: 2021-03-12

## 2021-03-12 RX ORDER — ONDANSETRON 4 MG/1
4 TABLET, ORALLY DISINTEGRATING ORAL EVERY 8 HOURS PRN
Qty: 10 TABLET | Refills: 0 | Status: ON HOLD | OUTPATIENT
Start: 2021-03-12 | End: 2021-09-10 | Stop reason: ALTCHOICE

## 2021-03-12 RX ORDER — TRAMADOL HYDROCHLORIDE 50 MG/1
50 TABLET ORAL EVERY 6 HOURS PRN
Qty: 12 TABLET | Refills: 0 | Status: SHIPPED | OUTPATIENT
Start: 2021-03-12 | End: 2021-03-15

## 2021-03-12 RX ORDER — SUCRALFATE 1 G/1
1 TABLET ORAL ONCE
Status: COMPLETED | OUTPATIENT
Start: 2021-03-12 | End: 2021-03-12

## 2021-03-12 RX ORDER — PANTOPRAZOLE SODIUM 20 MG/1
20 TABLET, DELAYED RELEASE ORAL DAILY
Qty: 30 TABLET | Refills: 0 | Status: ON HOLD | OUTPATIENT
Start: 2021-03-12 | End: 2021-09-10 | Stop reason: ALTCHOICE

## 2021-03-12 RX ORDER — MAGNESIUM HYDROXIDE/ALUMINUM HYDROXICE/SIMETHICONE 120; 1200; 1200 MG/30ML; MG/30ML; MG/30ML
30 SUSPENSION ORAL ONCE
Status: COMPLETED | OUTPATIENT
Start: 2021-03-12 | End: 2021-03-12

## 2021-03-12 RX ORDER — ONDANSETRON 4 MG/1
4 TABLET, ORALLY DISINTEGRATING ORAL ONCE
Status: COMPLETED | OUTPATIENT
Start: 2021-03-12 | End: 2021-03-12

## 2021-03-12 RX ADMIN — LIDOCAINE HYDROCHLORIDE 15 ML: 20 SOLUTION ORAL; TOPICAL at 14:11

## 2021-03-12 RX ADMIN — ONDANSETRON 4 MG: 4 TABLET, ORALLY DISINTEGRATING ORAL at 10:08

## 2021-03-12 RX ADMIN — ALUMINUM HYDROXIDE, MAGNESIUM HYDROXIDE, AND SIMETHICONE 30 ML: 200; 200; 20 SUSPENSION ORAL at 14:11

## 2021-03-12 RX ADMIN — SUCRALFATE 1 G: 1 TABLET ORAL at 14:11

## 2021-03-12 ASSESSMENT — PAIN DESCRIPTION - PROGRESSION
CLINICAL_PROGRESSION: GRADUALLY WORSENING
CLINICAL_PROGRESSION: NOT CHANGED

## 2021-03-12 ASSESSMENT — PAIN DESCRIPTION - PAIN TYPE
TYPE: ACUTE PAIN
TYPE: ACUTE PAIN

## 2021-03-12 ASSESSMENT — PAIN SCALES - GENERAL
PAINLEVEL_OUTOF10: 6
PAINLEVEL_OUTOF10: 1

## 2021-03-12 ASSESSMENT — PAIN DESCRIPTION - DESCRIPTORS: DESCRIPTORS: SHARP

## 2021-03-12 ASSESSMENT — PAIN DESCRIPTION - LOCATION: LOCATION: ABDOMEN

## 2021-03-12 NOTE — ED PROVIDER NOTES
[address] [facility]  Department of Emergency Medicine   ED  Encounter Note  Admit Date/RoomTime: 3/12/2021  9:50 AM  ED Room:     NAME: Noemí Jon  : 1970  MRN: [id number]     Chief Complaint:  Abdominal Pain (Pt states \"I've had an Ulcer & I've been vomiting at home & I threw Up my ulcer medicine this morning\" )    History of Present Illness       Noemí Jon is a 45 y.o. old female who presents to the emergency department with a complaint of nausea and vomiting. Patient states 2 other people at work have been out sick with vomiting and diarrhea issues. Patient herself got sick yesterday. Started with upper abdominal pain with nausea. Last night she was chilled and had body aches. This morning she started throwing up. States she is even unable to keep down her regular medicine, including her ulcer medicine. Patient denies any blood in the emesis. She denies diarrhea, states her last bowel movement was dark green in color. Patient states she generally just does not feel well. Nothing seems to make the symptoms any better or worse. ROS   Pertinent positives and negatives are stated within HPI, all other systems reviewed and are negative. Past Medical History:  has a past medical history of Bipolar 1 disorder ([address] Depression, Obesity, Osteoarthritis, Thiamine deficiency, and Vitamin D deficiency. Surgical History:  has a past surgical history that includes Gallbladder surgery (); Cholecystectomy; Endoscopy, colon, diagnostic; pr lap gastric bypass/luciano-en-y (N/A, 2018); Upper gastrointestinal endoscopy (N/A, 10/22/2018); Upper gastrointestinal endoscopy (N/A, 2019); joint replacement (Right, 2019); Total knee arthroplasty (Right, 6/3/2019); Upper gastrointestinal endoscopy (N/A, 2019); and Total knee arthroplasty (Left, 2020). Social History:  reports that she has never smoked.  She has never used smokeless tobacco. She reports that she does not drink alcohol or use drugs. Family History: family history includes Breast Cancer in her mother; Cancer in her maternal grandmother; Heart Disease in her father, maternal grandmother, mother, and paternal grandmother; Parkinsonism in her father. Allergies: Patient has no known allergies. Physical Exam   Oxygen Saturation Interpretation: Normal.        ED Triage Vitals [03/12/21 0951]   BP Temp Temp Source Pulse Resp SpO2 Height Weight   (!) 140/80 98.7 °F (37.1 °C) Temporal 72 20 98 % 5' 5\" (1.651 m) 176 lb (79.8 kg)       General:  NAD. Alert and Oriented. Well-appearing. Skin:  Warm, dry. No rashes. Head:  Normocephalic. Atraumatic. Eyes:  EOMI. Conjunctiva normal.  ENT:  Oral mucosa moist.  Airway patent. Minimal erythema to the tonsillar arches and uvula. Negative swelling, negative exudate. Bilateral TMs without erythema, without bulging. Neck:  Supple. Normal ROM. Respiratory:  No respiratory distress. No labored breathing. Lungs clear without rales, rhonchi or wheezing. Cardiovascular:  Regular rate. No Murmur. No peripheral edema. Extremities warm and good color. Chest:  Abdomen:  Soft, nondistended. Normal bowel sounds. Minimal tenderness to palpation to the epigastrium and left upper quadrant. Negative rebound, negative guarding. Rectal:  Gu: Bladder nontender and non distended. No CVA tenderness. Pelvic:  Extremities:  Normal ROM. Nontender to palpation. Atraumatic. Back:  Normal ROM. Nontender to palpation. Neuro:  Alert and Oriented to person, place, time and situation. Normal LOC. Moves all extremities. Speech fluent. Psych:  Calm and Cooperative. Normal thought process. Normal judgement.         Lab / Imaging Results   (All laboratory and radiology results have been personally reviewed by myself)  Labs:  Results for orders placed or performed during the hospital encounter of 03/12/21   CBC Auto Differential   Result Value Ref Range care.  She was able to keep water down. She would like to try to manage this at home with Zofran and her ulcer medicine. She understands that should the pain worsen, she has vomiting, vomiting blood then she is to go directly to the emergency room. 1115 patient still complaining of epigastric pain. I again offered to call an ambulance to bring her to the emergency room. Patient states she will call her mom to bring her to the ER. Plan of Care/Counseling:  I reviewed today's visit with the patient in addition to providing specific details for the plan of care and counseling regarding the diagnosis and prognosis. Questions are answered at this time and are agreeable with the plan. Assessment     1. Abdominal pain, epigastric New Problem   2. Acute superficial gastritis without hemorrhage New Problem     Plan   Discharge home. Patient condition is good    New Medications     New Prescriptions    DICYCLOMINE (BENTYL) 10 MG CAPSULE    Take 2 capsules by mouth 3 times daily (before meals) for 10 days    ONDANSETRON (ZOFRAN ODT) 4 MG DISINTEGRATING TABLET    Take 1 tablet by mouth every 8 hours as needed for Nausea or Vomiting    SUCRALFATE (CARAFATE) 1 GM TABLET    Take 1 tablet by mouth 3 times daily (before meals) for 10 days     Electronically signed by RAFFY Krueger   DD: 3/12/21  **This report was transcribed using voice recognition software. Every effort was made to ensure accuracy; however, inadvertent computerized transcription errors may be present.   END OF ED PROVIDER NOTE     Mumtaz Luu, [address]  03/12/21 [id number] , [address]  03/12/21 1112

## 2021-03-12 NOTE — ED PROVIDER NOTES
History of Present Illness     Patient Identification  Jose Ramon Gan is a 45 y.o. female. Patient information was obtained from patient. History/Exam limitations: none. Patient presented to the Emergency Department by private vehicle. Chief Complaint   Abdominal Pain (sent over from urgent care, epigastric pain with emesis, chills, bodyaches)      Patient presents for evaluation of abdominal pain. Onset of symptoms was gradual 3 days ago with gradually worsening course since that time. The pain is located epigastric without radiation. The pain is rated as moderate. The pain is made worse by food and is relieved by nothing. The patient also complains of anorexia. The patient denies belching, chills, constipation, diarrhea, fever, melena and sweats. The past workup has included GI consult. The pertinent past history includes gastric ulcer. The patient denies gallstones, Crohn's disease, ulcerative colitis. Home care consisted of carafate, zofran and antacids with minimal relief. Past Medical History:   Diagnosis Date    Bipolar 1 disorder ([facility] [address]     Depression     Obesity     Osteoarthritis     bilateral knees    Thiamine deficiency     Vitamin D deficiency      Family History   Problem Relation Age of Onset    Breast Cancer Mother     Heart Disease Mother     Heart Disease Father     Parkinsonism Father     Heart Disease Maternal Grandmother     Cancer Maternal Grandmother     Heart Disease Paternal Grandmother    ([id number])  No current facility-administered medications for this encounter.       Current Outpatient Medications   Medication Sig Dispense Refill    ondansetron (ZOFRAN ODT) 4 MG disintegrating tablet Take 1 tablet by mouth every 8 hours as needed for Nausea or Vomiting 10 tablet 0    sucralfate (CARAFATE) 1 GM tablet Take 1 tablet by mouth 3 times daily (before meals) for 10 days 30 tablet 0    QUEtiapine (SEROQUEL) 50 MG tablet Take 50 mg by mouth every evening  2    Cholecalciferol (VITAMIN D3) 5000 units TABS Take by mouth daily      lithium 300 MG capsule Take 1 capsule by mouth daily (Patient taking differently: Take 300 mg by mouth 4 times daily ) 30 capsule 5    FLUoxetine (PROZAC) 20 MG capsule Take 1 capsule by mouth daily 30 capsule 5    Ferrous Sulfate (IRON) 28 MG TABS Take 1 tablet by mouth daily       Multiple Vitamins-Minerals (THERAPEUTIC MULTIVITAMIN-MINERALS) tablet Take 1 tablet by mouth 2 times daily      calcium-cholecalciferol 500-200 MG-UNIT per tablet Take 1 tablet by mouth daily 60 tablet 5    dicyclomine (BENTYL) 10 MG capsule Take 2 capsules by mouth 3 times daily (before meals) for 10 days 30 capsule 0    famotidine (PEPCID) 20 MG tablet Take 1 tablet by mouth 2 times daily 60 tablet 3    omeprazole (PRILOSEC) 20 MG delayed release capsule Take 1 capsule by mouth 2 times daily (before meals) (Patient taking differently: Take 20 mg by mouth Daily ) 180 capsule 3    FLUoxetine (PROZAC) 10 MG capsule Take 1 capsule by mouth daily 30 capsule 5     No Known Allergies  Social History     Socioeconomic History    Marital status:      Spouse name: Not on file    Number of children: Not on file    Years of education: Not on file    Highest education level: Not on file   Occupational History    Not on file   Social Needs    Financial resource strain: Not on file    Food insecurity     Worry: Not on file     Inability: Not on file    Transportation needs     Medical: Not on file     Non-medical: Not on file   Tobacco Use    Smoking status: Never Smoker    Smokeless tobacco: Never Used   Substance and Sexual Activity    Alcohol use: No     Alcohol/week: 0.0 standard drinks    Drug use: No    Sexual activity: Never     Partners: Male     Comment:  since 2000.   Sleeps in separate bedrooms   Lifestyle    Physical activity     Days per week: Not on file     Minutes per session: Not on file    Stress: Not on file   Relationships  Social connections     Talks on phone: Not on file     Gets together: Not on file     Attends Judaism service: Not on file     Active member of club or organization: Not on file     Attends meetings of clubs or organizations: Not on file     Relationship status: Not on file    Intimate partner violence     Fear of current or ex partner: Not on file     Emotionally abused: Not on file     Physically abused: Not on file     Forced sexual activity: Not on file   Other Topics Concern    Not on file   Social History Narrative    Not on file     Review of Systems  Pertinent items are noted in HPI. Physical Exam     BP (!) 173/74   Pulse 61   Temp 98.4 °F (36.9 °C)   Resp 18   Ht 5' 5\" (1.651 m)   Wt 176 lb (79.8 kg)   SpO2 98%   BMI 29.29 kg/m²   BP (!) 173/74   Pulse 61   Temp 98.4 °F (36.9 °C)   Resp 18   Ht 5' 5\" (1.651 m)   Wt 176 lb (79.8 kg)   SpO2 98%   BMI 29.29 kg/m²   General appearance: alert, appears stated age and cooperative  Head: Normocephalic, without obvious abnormality, atraumatic  Nose: Nares normal. Septum midline. Mucosa normal. No drainage or sinus tenderness. Throat: lips, mucosa, and tongue normal; teeth and gums normal  Lungs: clear to auscultation bilaterally  Heart: regular rate and rhythm, S1, S2 normal, no murmur, click, rub or gallop  Abdomen: Moderate epigastric tenderness without guarding mass or rebound  Extremities: extremities normal, atraumatic, no cyanosis or edema  Skin: Skin color, texture, turgor normal. No rashes or lesions  Lymph nodes: Cervical, supraclavicular, and axillary nodes normal.    ED Course          --------------------------------------------- PAST HISTORY ---------------------------------------------  Past Medical History:  has a past medical history of Bipolar 1 disorder ([facility] [address] Depression, Obesity, Osteoarthritis, Thiamine deficiency, and Vitamin D deficiency.     Past Surgical History:  has a past surgical history that includes Gallbladder surgery (1994); Cholecystectomy; Endoscopy, colon, diagnostic; pr lap gastric bypass/luciano-en-y (N/A, 4/30/2018); Upper gastrointestinal endoscopy (N/A, 10/22/2018); Upper gastrointestinal endoscopy (N/A, 2/21/2019); joint replacement (Right, 06/03/2019); Total knee arthroplasty (Right, 6/3/2019); Upper gastrointestinal endoscopy (N/A, 9/16/2019); and Total knee arthroplasty (Left, 6/24/2020). Social History:  reports that she has never smoked. She has never used smokeless tobacco. She reports that she does not drink alcohol or use drugs. Family History: family history includes Breast Cancer in her mother; Cancer in her maternal grandmother; Heart Disease in her father, maternal grandmother, mother, and paternal grandmother; Parkinsonism in her father. The patients home medications have been reviewed. Allergies: Patient has no known allergies.     -------------------------------------------------- RESULTS -------------------------------------------------  Labs:  Results for orders placed or performed during the hospital encounter of 03/12/21   CBC Auto Differential   Result Value Ref Range    WBC 5.2 4.5 - 11.5 E9/L    RBC 4.52 3.50 - 5.50 E12/L    Hemoglobin 12.5 11.5 - 15.5 g/dL    Hematocrit 39.5 34.0 - 48.0 %    MCV 87.4 80.0 - 99.9 fL    MCH 27.7 26.0 - 35.0 pg    MCHC 31.6 (L) 32.0 - 34.5 %    RDW 12.5 11.5 - 15.0 fL    Platelets 409 867 - 951 E9/L    MPV 11.3 7.0 - 12.0 fL    Neutrophils % 75.6 43.0 - 80.0 %    Immature Granulocytes % 0.6 0.0 - 5.0 %    Lymphocytes % 17.4 (L) 20.0 - 42.0 %    Monocytes % 5.2 2.0 - 12.0 %    Eosinophils % 0.4 0.0 - 6.0 %    Basophils % 0.8 0.0 - 2.0 %    Neutrophils Absolute 3.95 1.80 - 7.30 E9/L    Immature Granulocytes # 0.03 E9/L    Lymphocytes Absolute 0.91 (L) 1.50 - 4.00 E9/L    Monocytes Absolute 0.27 0.10 - 0.95 E9/L    Eosinophils Absolute 0.02 (L) 0.05 - 0.50 E9/L    Basophils Absolute 0.04 0.00 - 0.20 E9/L   Comprehensive Metabolic Panel w/ Reflex to MG   Result Value Ref Range    Sodium 139 132 - 146 mmol/L    Potassium reflex Magnesium 4.2 3.5 - 5.0 mmol/L    Chloride 103 98 - 107 mmol/L    CO2 26 22 - 29 mmol/L    Anion Gap 10 7 - 16 mmol/L    Glucose 122 (H) 74 - 99 mg/dL    BUN 15 6 - 20 mg/dL    CREATININE 0.7 0.5 - 1.0 mg/dL    GFR Non-African American >60 >=60 mL/min/1.73    GFR African American >60     Calcium 9.1 8.6 - 10.2 mg/dL    Total Protein 7.3 6.4 - 8.3 g/dL    Albumin 4.0 3.5 - 5.2 g/dL    Total Bilirubin 0.2 0.0 - 1.2 mg/dL    Alkaline Phosphatase 75 35 - 104 U/L    ALT 8 0 - 32 U/L    AST 14 0 - 31 U/L   Lipase   Result Value Ref Range    Lipase 13 13 - 60 U/L   Troponin   Result Value Ref Range    Troponin <0.01 0.00 - 0.03 ng/mL       Radiology:  CT ABDOMEN PELVIS WO CONTRAST Additional Contrast? None   Final Result   No evidence of acute intra-abdominal or pelvic inflammatory process. No   evidence of obstructive uropathy. Status post gastric bypass surgery and cholecystectomy. ------------------------- NURSING NOTES AND VITALS REVIEWED ---------------------------  Date / Time Roomed:  3/12/2021  1:29 PM  ED Bed Assignment:  [id number]    The nursing notes within the ED encounter and vital signs as below have been reviewed. BP (!) 173/74   Pulse 61   Temp 98.4 °F (36.9 °C)   Resp 18   Ht 5' 5\" (1.651 m)   Wt 176 lb (79.8 kg)   SpO2 98%   BMI 29.29 kg/m²   Oxygen Saturation Interpretation: Normal      ------------------------------------------ PROGRESS NOTES ------------------------------------------  I have spoken with the patient and discussed todays results, in addition to providing specific details for the plan of care and counseling regarding the diagnosis and prognosis. Their questions are answered at this time and they are agreeable with the plan. I discussed at length with them reasons for immediate return here for re evaluation.  They will followup with primary care by calling their office tomorrow. --------------------------------- ADDITIONAL PROVIDER NOTES ---------------------------------  At this time the patient is without objective evidence of an acute process requiring hospitalization or inpatient management. They have remained hemodynamically stable throughout their entire ED visit and are stable for discharge with outpatient follow-up. The plan has been discussed in detail and they are aware of the specific conditions for emergent return, as well as the importance of follow-up. New Prescriptions    PANTOPRAZOLE (PROTONIX) 20 MG TABLET    Take 1 tablet by mouth daily    SUCRALFATE (CARAFATE) 1 GM TABLET    Take 1 tablet by mouth 4 times daily    TRAMADOL (ULTRAM) 50 MG TABLET    Take 1 tablet by mouth every 6 hours as needed for Pain for up to 3 days. Intended supply: 3 days. Take lowest dose possible to manage pain       Diagnosis:  1. Acute gastritis, presence of bleeding unspecified, unspecified gastritis type    2. Other acute gastritis without hemorrhage        Disposition:  Patient's disposition: Discharge to home  Patient's condition is stable.          Teofilo Shetty MD  03/12/21 [id number]

## 2021-04-06 ENCOUNTER — TELEPHONE (OUTPATIENT)
Dept: BARIATRICS/WEIGHT MGMT | Age: 51
End: 2021-04-06

## 2021-06-02 ENCOUNTER — HOSPITAL ENCOUNTER (OUTPATIENT)
Age: 51
Discharge: HOME | End: 2021-06-02
Payer: MEDICARE

## 2021-06-02 LAB
BUN BLDV-MCNC: 10 MG/DL (ref 6–20)
CREAT SERPL-MCNC: 0.7 MG/DL (ref 0.5–1)
GFR AFRICAN AMERICAN: >60
GFR NON-AFRICAN AMERICAN: >60 ML/MIN/1.73
LITHIUM DOSE AMOUNT: NORMAL
LITHIUM LEVEL: 0.98 MMOL/L (ref 0.5–1.5)
TSH SERPL DL<=0.05 MIU/L-ACNC: 0.94 UIU/ML (ref 0.27–4.2)

## 2021-06-02 PROCEDURE — 36415 COLL VENOUS BLD VENIPUNCTURE: CPT

## 2021-06-02 PROCEDURE — 84520 ASSAY OF UREA NITROGEN: CPT

## 2021-06-02 PROCEDURE — 84443 ASSAY THYROID STIM HORMONE: CPT

## 2021-06-02 PROCEDURE — 82565 ASSAY OF CREATININE: CPT

## 2021-06-02 PROCEDURE — 80178 ASSAY OF LITHIUM: CPT

## 2021-06-08 ENCOUNTER — TELEPHONE (OUTPATIENT)
Dept: BARIATRICS/WEIGHT MGMT | Age: 51
End: 2021-06-08

## 2021-06-08 NOTE — LETTER
[facility]   [address] [facility] [place]  Phone: [phone]  Fax: [phone]    Eddie Calixto MD        June 8, 2021    [address]  [facility] [address]      Dear Carlos Manuel Hopper:    Yovana Ospina are now due for your 3 year appointment as well as bariatric lab work. We hope you are doing well, and would like to schedule a follow up appointment [facility]. It is very important that you maintain follow up care with your surgeon, and have your routine lab work completed after weight loss surgery. Failure to maintain follow up places you at  increased risk of sub-optimal weight loss or complications. The routine schedule of follow up appointments and labwork after bariatric surgery is as follows:  2 weeks, 6 weeks, 3 months, 6 months, 1 year, 18 months, 2 years and then annually thereafter. We are here to work with you and guide you to weight loss success. Please contact our office to schedule your follow up appointment. We have appointments available on Wednesday afternoon with Dr. Ewing Galeazzi, Thursday with Dr. Lucy Rosario, and Friday morning with Dr. John Weeks. We will work with you to obtain at date and time that is convenient for you. If you are receiving your bariatric care elsewhere, please notify our office so that we may raul your records accordingly. We look forward to hearing from you soon. If you have any questions or concerns, please don't hesitate to call.     Sincerely,        Eddie Calixto MD

## 2021-09-04 ENCOUNTER — HOSPITAL ENCOUNTER (EMERGENCY)
Age: 51
Discharge: HOME | End: 2021-09-04
Attending: EMERGENCY MEDICINE
Payer: COMMERCIAL

## 2021-09-04 ENCOUNTER — APPOINTMENT (OUTPATIENT)
Dept: CT IMAGING | Age: 51
End: 2021-09-04
Payer: COMMERCIAL

## 2021-09-04 VITALS
OXYGEN SATURATION: 98 % | DIASTOLIC BLOOD PRESSURE: 64 MMHG | HEART RATE: 85 BPM | SYSTOLIC BLOOD PRESSURE: 138 MMHG | TEMPERATURE: 97.7 F | RESPIRATION RATE: 18 BRPM

## 2021-09-04 DIAGNOSIS — K76.0 FATTY LIVER: ICD-10-CM

## 2021-09-04 DIAGNOSIS — R10.13 ABDOMINAL PAIN, EPIGASTRIC: Primary | ICD-10-CM

## 2021-09-04 LAB
ALBUMIN SERPL-MCNC: 4 G/DL (ref 3.5–5.2)
ALP BLD-CCNC: 99 U/L (ref 35–104)
ALT SERPL-CCNC: 9 U/L (ref 0–32)
ANION GAP SERPL CALCULATED.3IONS-SCNC: 9 MMOL/L (ref 7–16)
AST SERPL-CCNC: 16 U/L (ref 0–31)
BASOPHILS ABSOLUTE: 0.04 E9/L (ref 0–0.2)
BASOPHILS RELATIVE PERCENT: 0.6 % (ref 0–2)
BILIRUB SERPL-MCNC: 0.3 MG/DL (ref 0–1.2)
BILIRUBIN URINE: NEGATIVE
BLOOD, URINE: NEGATIVE
BUN BLDV-MCNC: 11 MG/DL (ref 6–20)
CALCIUM SERPL-MCNC: 9.4 MG/DL (ref 8.6–10.2)
CHLORIDE BLD-SCNC: 101 MMOL/L (ref 98–107)
CLARITY: CLEAR
CO2: 28 MMOL/L (ref 22–29)
COLOR: YELLOW
CREAT SERPL-MCNC: 0.8 MG/DL (ref 0.5–1)
EOSINOPHILS ABSOLUTE: 0.07 E9/L (ref 0.05–0.5)
EOSINOPHILS RELATIVE PERCENT: 1.1 % (ref 0–6)
GFR AFRICAN AMERICAN: >60
GFR NON-AFRICAN AMERICAN: >60 ML/MIN/1.73
GLUCOSE BLD-MCNC: 91 MG/DL (ref 74–99)
GLUCOSE URINE: NEGATIVE MG/DL
HCT VFR BLD CALC: 39.6 % (ref 34–48)
HEMOGLOBIN: 12.9 G/DL (ref 11.5–15.5)
IMMATURE GRANULOCYTES #: 0.01 E9/L
IMMATURE GRANULOCYTES %: 0.2 % (ref 0–5)
KETONES, URINE: NEGATIVE MG/DL
LEUKOCYTE ESTERASE, URINE: NEGATIVE
LIPASE: 19 U/L (ref 13–60)
LYMPHOCYTES ABSOLUTE: 1.38 E9/L (ref 1.5–4)
LYMPHOCYTES RELATIVE PERCENT: 21 % (ref 20–42)
MCH RBC QN AUTO: 28.4 PG (ref 26–35)
MCHC RBC AUTO-ENTMCNC: 32.6 % (ref 32–34.5)
MCV RBC AUTO: 87 FL (ref 80–99.9)
MONOCYTES ABSOLUTE: 0.63 E9/L (ref 0.1–0.95)
MONOCYTES RELATIVE PERCENT: 9.6 % (ref 2–12)
NEUTROPHILS ABSOLUTE: 4.45 E9/L (ref 1.8–7.3)
NEUTROPHILS RELATIVE PERCENT: 67.5 % (ref 43–80)
NITRITE, URINE: NEGATIVE
PDW BLD-RTO: 12.8 FL (ref 11.5–15)
PH UA: 5 (ref 5–9)
PLATELET # BLD: 189 E9/L (ref 130–450)
PMV BLD AUTO: 10.2 FL (ref 7–12)
POTASSIUM REFLEX MAGNESIUM: 5.2 MMOL/L (ref 3.5–5)
PROTEIN UA: NEGATIVE MG/DL
RBC # BLD: 4.55 E12/L (ref 3.5–5.5)
REASON FOR REJECTION: NORMAL
REJECTED TEST: NORMAL
SODIUM BLD-SCNC: 138 MMOL/L (ref 132–146)
SPECIFIC GRAVITY UA: 1.02 (ref 1–1.03)
TOTAL PROTEIN: 7.8 G/DL (ref 6.4–8.3)
UROBILINOGEN, URINE: 0.2 E.U./DL
WBC # BLD: 6.6 E9/L (ref 4.5–11.5)

## 2021-09-04 PROCEDURE — 6360000002 HC RX W HCPCS: Performed by: EMERGENCY MEDICINE

## 2021-09-04 PROCEDURE — 96375 TX/PRO/DX INJ NEW DRUG ADDON: CPT

## 2021-09-04 PROCEDURE — 6360000004 HC RX CONTRAST MEDICATION: Performed by: RADIOLOGY

## 2021-09-04 PROCEDURE — 85025 COMPLETE CBC W/AUTO DIFF WBC: CPT

## 2021-09-04 PROCEDURE — 87088 URINE BACTERIA CULTURE: CPT

## 2021-09-04 PROCEDURE — 6370000000 HC RX 637 (ALT 250 FOR IP): Performed by: EMERGENCY MEDICINE

## 2021-09-04 PROCEDURE — 2580000003 HC RX 258: Performed by: EMERGENCY MEDICINE

## 2021-09-04 PROCEDURE — 80053 COMPREHEN METABOLIC PANEL: CPT

## 2021-09-04 PROCEDURE — 2500000003 HC RX 250 WO HCPCS: Performed by: EMERGENCY MEDICINE

## 2021-09-04 PROCEDURE — 74177 CT ABD & PELVIS W/CONTRAST: CPT

## 2021-09-04 PROCEDURE — 96374 THER/PROPH/DIAG INJ IV PUSH: CPT

## 2021-09-04 PROCEDURE — 36415 COLL VENOUS BLD VENIPUNCTURE: CPT

## 2021-09-04 PROCEDURE — 83690 ASSAY OF LIPASE: CPT

## 2021-09-04 PROCEDURE — 81003 URINALYSIS AUTO W/O SCOPE: CPT

## 2021-09-04 PROCEDURE — 99283 EMERGENCY DEPT VISIT LOW MDM: CPT

## 2021-09-04 RX ORDER — 0.9 % SODIUM CHLORIDE 0.9 %
1000 INTRAVENOUS SOLUTION INTRAVENOUS ONCE
Status: COMPLETED | OUTPATIENT
Start: 2021-09-04 | End: 2021-09-04

## 2021-09-04 RX ORDER — ONDANSETRON 2 MG/ML
4 INJECTION INTRAMUSCULAR; INTRAVENOUS ONCE
Status: COMPLETED | OUTPATIENT
Start: 2021-09-04 | End: 2021-09-04

## 2021-09-04 RX ADMIN — ONDANSETRON 4 MG: 2 INJECTION INTRAMUSCULAR; INTRAVENOUS at 09:31

## 2021-09-04 RX ADMIN — FAMOTIDINE 20 MG: 10 INJECTION INTRAVENOUS at 09:35

## 2021-09-04 RX ADMIN — ALUMINUM HYDROXIDE, MAGNESIUM HYDROXIDE, AND SIMETHICONE: 200; 200; 20 SUSPENSION ORAL at 09:36

## 2021-09-04 RX ADMIN — SODIUM CHLORIDE 1000 ML: 9 INJECTION, SOLUTION INTRAVENOUS at 09:30

## 2021-09-04 RX ADMIN — IOPAMIDOL 75 ML: 755 INJECTION, SOLUTION INTRAVENOUS at 10:41

## 2021-09-04 ASSESSMENT — PAIN DESCRIPTION - LOCATION: LOCATION: ABDOMEN;BACK

## 2021-09-04 ASSESSMENT — PAIN SCALES - GENERAL: PAINLEVEL_OUTOF10: 3

## 2021-09-04 ASSESSMENT — ENCOUNTER SYMPTOMS
CONSTIPATION: 0
BLOOD IN STOOL: 1
RHINORRHEA: 0
ABDOMINAL DISTENTION: 0
DIARRHEA: 0
VOMITING: 1
NAUSEA: 1
ABDOMINAL PAIN: 1
COLOR CHANGE: 0
BACK PAIN: 1
SHORTNESS OF BREATH: 0
COUGH: 0

## 2021-09-04 ASSESSMENT — PAIN DESCRIPTION - DESCRIPTORS: DESCRIPTORS: ACHING

## 2021-09-04 ASSESSMENT — PAIN DESCRIPTION - FREQUENCY: FREQUENCY: INTERMITTENT

## 2021-09-04 ASSESSMENT — PAIN DESCRIPTION - ORIENTATION: ORIENTATION: LOWER;RIGHT

## 2021-09-04 NOTE — ED PROVIDER NOTES
Patient presents to the ED with complaint of nausea, vomiting, abdominal pain, headache. Symptoms have been present for the past 3 days. They have been persistent and moderate in severity. She states that she has taken Carafate which does occasionally provide relief of the abdominal discomfort. She has history of peptic ulcers. Previous gastric bypass surgery. Has not seen her surgeon recently. No fever or chills. States the pain is in the upper abdomen and she also has some mild associated right lower back pain which is new. Denies any incontinence of stool or urine. Denies any numbness in her groin. No recent injections or procedures of her back. States the headache is located frontally. Describes as a tightness and ache. No change in vision. No associated weakness or numbness. No difficulties ambulating. No associated pain rating into her chest.  No associated shortness of breath. Currently rates her level of discomfort in her abdomen a 3 out of 10. Review of Systems   Constitutional: Negative for chills, diaphoresis, fatigue and fever. HENT: Negative for congestion and rhinorrhea. Eyes: Negative for visual disturbance. Respiratory: Negative for cough and shortness of breath. Cardiovascular: Negative for chest pain and palpitations. Gastrointestinal: Positive for abdominal pain, blood in stool ( She noticed a very small amount of blood in the stool this morning. No black stools), nausea and vomiting. Negative for abdominal distention, constipation and diarrhea. Genitourinary: Negative for difficulty urinating, dysuria, flank pain and hematuria. Musculoskeletal: Positive for back pain. Negative for myalgias, neck pain and neck stiffness. Skin: Negative for color change and pallor. Neurological: Positive for headaches. Negative for dizziness, syncope, weakness, light-headedness and numbness. Hematological: Does not bruise/bleed easily.    All other systems reviewed and are negative. Physical Exam  Vitals and nursing note reviewed. Constitutional:       General: She is not in acute distress. Appearance: She is well-developed. She is not diaphoretic. HENT:      Head: Normocephalic and atraumatic. Mouth/Throat:      Mouth: Mucous membranes are moist.   Eyes:      General: No scleral icterus. Conjunctiva/sclera: Conjunctivae normal.   Cardiovascular:      Rate and Rhythm: Normal rate and regular rhythm. Heart sounds: Normal heart sounds. No murmur heard. Pulmonary:      Effort: Pulmonary effort is normal. No respiratory distress. Breath sounds: Normal breath sounds. No wheezing or rales. Chest:      Chest wall: No tenderness. Abdominal:      General: Bowel sounds are normal.      Palpations: Abdomen is soft. Tenderness: There is abdominal tenderness in the epigastric area. There is no right CVA tenderness, left CVA tenderness, guarding or rebound. Negative signs include Martinez's sign and McBurney's sign. Musculoskeletal:      Cervical back: Normal range of motion and neck supple. Comments: Mild tenderness to palpation in the right back region below the right CVA area. No overlying erythema or ecchymosis. Pain is elicited with even superficial palpation. No midline back tenderness. Skin:     General: Skin is warm and dry. Coloration: Skin is not jaundiced or pale. Neurological:      Mental Status: She is alert and oriented to person, place, and time. Coordination: Coordination normal.      Comments: Sensation grossly intact. No focal neurological deficits. No ataxia with finger-to-nose. Procedures     MDM   Patient presents to the ED for evaluation of upper abdominal pain with previous history of ulcers. Patient has history of gastric bypass surgery. Symptoms have been present for the past several days. No melanotic or tarry stools.   Labs were assessed today CBC showed no notes of leukocytosis or anemia. CMP showed no electrolyte abnormalities or evidence of renal insufficiency. Patient's potassium on the CMP was 5.2 but was moderate hemolyzed. Urinalysis was devoid of any evidence of infection or blood. Lipase was normal at 19. CT of the abdomen pelvis was obtained which showed only mild fatty infiltration of liver but did not show any other acute abdominal or pelvic process. Discussed that I would like her to follow-up with her surgeon and she most likely will need an EGD to assess for reoccurring marginal ulcer given her history of bypass surgery. ED Course as of Sep 04 1205   Sat Sep 04, 2021   1101 Patient resting in bed no acute distress. States she is feeling better after being medicated. [MS]   1200 Patient resting in bed in no distress. Discussed results of remaining labs and imaging with her. She is comfortable being discharged home and will follow up with Dr. Pierre Jordan who did her gastric bypass surgery. Discussed that she will most likely need an EGD to assess for marginal ulcer. Advised her to continue using her Carafate and her antiacid that she is prescribed. She understands that if she has worsening symptoms or new concerns that she can return to the ED for further evaluation. [MS]      ED Course User Index  [MS] Aleah Martines, DO       --------------------------------------------- PAST HISTORY ---------------------------------------------  Past Medical History:  has a past medical history of Bipolar 1 disorder ([facility] [address] Depression, Obesity, Osteoarthritis, Thiamine deficiency, and Vitamin D deficiency. Past Surgical History:  has a past surgical history that includes Gallbladder surgery (1994); Cholecystectomy; Endoscopy, colon, diagnostic; pr lap gastric bypass/luciano-en-y (N/A, 4/30/2018); Upper gastrointestinal endoscopy (N/A, 10/22/2018); Upper gastrointestinal endoscopy (N/A, 2/21/2019); joint replacement (Right, 06/03/2019);  Total knee arthroplasty (Right, 6/3/2019); Upper gastrointestinal endoscopy (N/A, 9/16/2019); and Total knee arthroplasty (Left, 6/24/2020). Social History:  reports that she has never smoked. She has never used smokeless tobacco. She reports that she does not drink alcohol and does not use drugs. Family History: family history includes Breast Cancer in her mother; Cancer in her maternal grandmother; Heart Disease in her father, maternal grandmother, mother, and paternal grandmother; Parkinsonism in her father. The patients home medications have been reviewed. Allergies: Patient has no known allergies.     -------------------------------------------------- RESULTS -------------------------------------------------  Labs:  Results for orders placed or performed during the hospital encounter of 09/04/21   Comprehensive Metabolic Panel w/ Reflex to MG   Result Value Ref Range    Sodium 138 132 - 146 mmol/L    Potassium reflex Magnesium 5.2 (H) 3.5 - 5.0 mmol/L    Chloride 101 98 - 107 mmol/L    CO2 28 22 - 29 mmol/L    Anion Gap 9 7 - 16 mmol/L    Glucose 91 74 - 99 mg/dL    BUN 11 6 - 20 mg/dL    CREATININE 0.8 0.5 - 1.0 mg/dL    GFR Non-African American >60 >=60 mL/min/1.73    GFR African American >60     Calcium 9.4 8.6 - 10.2 mg/dL    Total Protein 7.8 6.4 - 8.3 g/dL    Albumin 4.0 3.5 - 5.2 g/dL    Total Bilirubin 0.3 0.0 - 1.2 mg/dL    Alkaline Phosphatase 99 35 - 104 U/L    ALT 9 0 - 32 U/L    AST 16 0 - 31 U/L   Lipase   Result Value Ref Range    Lipase 19 13 - 60 U/L   Urinalysis, reflex to microscopic   Result Value Ref Range    Color, UA Yellow Straw/Yellow    Clarity, UA Clear Clear    Glucose, Ur Negative Negative mg/dL    Bilirubin Urine Negative Negative    Ketones, Urine Negative Negative mg/dL    Specific Gravity, UA 1.020 1.005 - 1.030    Blood, Urine Negative Negative    pH, UA 5.0 5.0 - 9.0    Protein, UA Negative Negative mg/dL    Urobilinogen, Urine 0.2 <2.0 E.U./dL    Nitrite, Urine Negative Negative Leukocyte Esterase, Urine Negative Negative   SPECIMEN REJECTION   Result Value Ref Range    Rejected Test CBCWD     Reason for Rejection see below    CBC auto differential   Result Value Ref Range    WBC 6.6 4.5 - 11.5 E9/L    RBC 4.55 3.50 - 5.50 E12/L    Hemoglobin 12.9 11.5 - 15.5 g/dL    Hematocrit 39.6 34.0 - 48.0 %    MCV 87.0 80.0 - 99.9 fL    MCH 28.4 26.0 - 35.0 pg    MCHC 32.6 32.0 - 34.5 %    RDW 12.8 11.5 - 15.0 fL    Platelets 624 643 - 764 E9/L    MPV 10.2 7.0 - 12.0 fL    Neutrophils % 67.5 43.0 - 80.0 %    Immature Granulocytes % 0.2 0.0 - 5.0 %    Lymphocytes % 21.0 20.0 - 42.0 %    Monocytes % 9.6 2.0 - 12.0 %    Eosinophils % 1.1 0.0 - 6.0 %    Basophils % 0.6 0.0 - 2.0 %    Neutrophils Absolute 4.45 1.80 - 7.30 E9/L    Immature Granulocytes # 0.01 E9/L    Lymphocytes Absolute 1.38 (L) 1.50 - 4.00 E9/L    Monocytes Absolute 0.63 0.10 - 0.95 E9/L    Eosinophils Absolute 0.07 0.05 - 0.50 E9/L    Basophils Absolute 0.04 0.00 - 0.20 E9/L       Radiology:  CT ABDOMEN PELVIS W IV CONTRAST Additional Contrast? None   Final Result   Mild fatty infiltration liver. No acute abdominal or pelvic process is   noted. There is evidence of prior gastric bypass             ------------------------- NURSING NOTES AND VITALS REVIEWED ---------------------------  Date / Time Roomed:  9/4/2021  8:15 AM  ED Bed Assignment:  24/24    The nursing notes within the ED encounter and vital signs as below have been reviewed. /64   Pulse 85   Temp 97.7 °F (36.5 °C) (Temporal)   Resp 18   SpO2 98%   Oxygen Saturation Interpretation: Normal      ------------------------------------------ PROGRESS NOTES ------------------------------------------  I have spoken with the patient and discussed todays results, in addition to providing specific details for the plan of care and counseling regarding the diagnosis and prognosis. Their questions are answered at this time and they are agreeable with the plan.  I discussed at length with them reasons for immediate return here for re evaluation. They will followup with primary care by calling their office tomorrow. --------------------------------- ADDITIONAL PROVIDER NOTES ---------------------------------  At this time the patient is without objective evidence of an acute process requiring hospitalization or inpatient management. They have remained hemodynamically stable throughout their entire ED visit and are stable for discharge with outpatient follow-up. The plan has been discussed in detail and they are aware of the specific conditions for emergent return, as well as the importance of follow-up. New Prescriptions    No medications on file       Diagnosis:  1. Abdominal pain, epigastric    2. Fatty liver        Disposition:  Patient's disposition: Discharge to home  Patient's condition is stable.            Abram Dennis DO  09/04/21 1979

## 2021-09-06 LAB — URINE CULTURE, ROUTINE: NORMAL

## 2021-09-10 ENCOUNTER — HOSPITAL ENCOUNTER (INPATIENT)
Age: 51
LOS: 1 days | Discharge: HOME | DRG: 382 | End: 2021-09-11
Attending: EMERGENCY MEDICINE | Admitting: SURGERY
Payer: COMMERCIAL

## 2021-09-10 ENCOUNTER — APPOINTMENT (OUTPATIENT)
Dept: CT IMAGING | Age: 51
DRG: 382 | End: 2021-09-10
Payer: COMMERCIAL

## 2021-09-10 DIAGNOSIS — R10.9 INTRACTABLE ABDOMINAL PAIN: Primary | ICD-10-CM

## 2021-09-10 LAB
ALBUMIN SERPL-MCNC: 3.9 G/DL (ref 3.5–5.2)
ALP BLD-CCNC: 98 U/L (ref 35–104)
ALT SERPL-CCNC: 8 U/L (ref 0–32)
ANION GAP SERPL CALCULATED.3IONS-SCNC: 9 MMOL/L (ref 7–16)
AST SERPL-CCNC: 11 U/L (ref 0–31)
BASOPHILS ABSOLUTE: 0.05 E9/L (ref 0–0.2)
BASOPHILS RELATIVE PERCENT: 0.8 % (ref 0–2)
BILIRUB SERPL-MCNC: 0.2 MG/DL (ref 0–1.2)
BUN BLDV-MCNC: 13 MG/DL (ref 6–20)
CALCIUM SERPL-MCNC: 9.4 MG/DL (ref 8.6–10.2)
CHLORIDE BLD-SCNC: 101 MMOL/L (ref 98–107)
CO2: 29 MMOL/L (ref 22–29)
CREAT SERPL-MCNC: 0.8 MG/DL (ref 0.5–1)
EOSINOPHILS ABSOLUTE: 0.06 E9/L (ref 0.05–0.5)
EOSINOPHILS RELATIVE PERCENT: 0.9 % (ref 0–6)
GFR AFRICAN AMERICAN: >60
GFR NON-AFRICAN AMERICAN: >60 ML/MIN/1.73
GLUCOSE BLD-MCNC: 119 MG/DL (ref 74–99)
HCG QUALITATIVE: NEGATIVE
HCT VFR BLD CALC: 43.5 % (ref 34–48)
HEMOGLOBIN: 14 G/DL (ref 11.5–15.5)
IMMATURE GRANULOCYTES #: 0.02 E9/L
IMMATURE GRANULOCYTES %: 0.3 % (ref 0–5)
LACTIC ACID: 0.8 MMOL/L (ref 0.5–2.2)
LIPASE: 16 U/L (ref 13–60)
LYMPHOCYTES ABSOLUTE: 1.01 E9/L (ref 1.5–4)
LYMPHOCYTES RELATIVE PERCENT: 15.4 % (ref 20–42)
MCH RBC QN AUTO: 28 PG (ref 26–35)
MCHC RBC AUTO-ENTMCNC: 32.2 % (ref 32–34.5)
MCV RBC AUTO: 87 FL (ref 80–99.9)
MONOCYTES ABSOLUTE: 0.5 E9/L (ref 0.1–0.95)
MONOCYTES RELATIVE PERCENT: 7.6 % (ref 2–12)
NEUTROPHILS ABSOLUTE: 4.93 E9/L (ref 1.8–7.3)
NEUTROPHILS RELATIVE PERCENT: 75 % (ref 43–80)
PDW BLD-RTO: 12.5 FL (ref 11.5–15)
PLATELET # BLD: 355 E9/L (ref 130–450)
PMV BLD AUTO: 10.6 FL (ref 7–12)
POTASSIUM SERPL-SCNC: 4.1 MMOL/L (ref 3.5–5)
RBC # BLD: 5 E12/L (ref 3.5–5.5)
SODIUM BLD-SCNC: 139 MMOL/L (ref 132–146)
TOTAL PROTEIN: 7.7 G/DL (ref 6.4–8.3)
WBC # BLD: 6.6 E9/L (ref 4.5–11.5)

## 2021-09-10 PROCEDURE — 6360000004 HC RX CONTRAST MEDICATION: Performed by: RADIOLOGY

## 2021-09-10 PROCEDURE — 1200000000 HC SEMI PRIVATE

## 2021-09-10 PROCEDURE — 6370000000 HC RX 637 (ALT 250 FOR IP): Performed by: EMERGENCY MEDICINE

## 2021-09-10 PROCEDURE — 2580000003 HC RX 258: Performed by: EMERGENCY MEDICINE

## 2021-09-10 PROCEDURE — 96372 THER/PROPH/DIAG INJ SC/IM: CPT

## 2021-09-10 PROCEDURE — 83605 ASSAY OF LACTIC ACID: CPT

## 2021-09-10 PROCEDURE — 74177 CT ABD & PELVIS W/CONTRAST: CPT

## 2021-09-10 PROCEDURE — 99223 1ST HOSP IP/OBS HIGH 75: CPT | Performed by: SURGERY

## 2021-09-10 PROCEDURE — 96375 TX/PRO/DX INJ NEW DRUG ADDON: CPT

## 2021-09-10 PROCEDURE — 2580000003 HC RX 258: Performed by: SURGERY

## 2021-09-10 PROCEDURE — 96376 TX/PRO/DX INJ SAME DRUG ADON: CPT

## 2021-09-10 PROCEDURE — 83690 ASSAY OF LIPASE: CPT

## 2021-09-10 PROCEDURE — G0378 HOSPITAL OBSERVATION PER HR: HCPCS

## 2021-09-10 PROCEDURE — 85025 COMPLETE CBC W/AUTO DIFF WBC: CPT

## 2021-09-10 PROCEDURE — 6360000002 HC RX W HCPCS: Performed by: SURGERY

## 2021-09-10 PROCEDURE — 6360000002 HC RX W HCPCS: Performed by: EMERGENCY MEDICINE

## 2021-09-10 PROCEDURE — 96365 THER/PROPH/DIAG IV INF INIT: CPT

## 2021-09-10 PROCEDURE — C9113 INJ PANTOPRAZOLE SODIUM, VIA: HCPCS | Performed by: SURGERY

## 2021-09-10 PROCEDURE — 84703 CHORIONIC GONADOTROPIN ASSAY: CPT

## 2021-09-10 PROCEDURE — 99284 EMERGENCY DEPT VISIT MOD MDM: CPT

## 2021-09-10 PROCEDURE — 80053 COMPREHEN METABOLIC PANEL: CPT

## 2021-09-10 PROCEDURE — 6370000000 HC RX 637 (ALT 250 FOR IP): Performed by: SURGERY

## 2021-09-10 PROCEDURE — C9113 INJ PANTOPRAZOLE SODIUM, VIA: HCPCS | Performed by: EMERGENCY MEDICINE

## 2021-09-10 RX ORDER — LITHIUM CARBONATE 300 MG/1
900 CAPSULE ORAL NIGHTLY
COMMUNITY
End: 2021-12-22 | Stop reason: DRUGHIGH

## 2021-09-10 RX ORDER — ONDANSETRON 2 MG/ML
4 INJECTION INTRAMUSCULAR; INTRAVENOUS EVERY 6 HOURS PRN
Status: DISCONTINUED | OUTPATIENT
Start: 2021-09-10 | End: 2021-09-10 | Stop reason: SDUPTHER

## 2021-09-10 RX ORDER — SENNA AND DOCUSATE SODIUM 50; 8.6 MG/1; MG/1
2 TABLET, FILM COATED ORAL DAILY
Status: DISCONTINUED | OUTPATIENT
Start: 2021-09-10 | End: 2021-09-11 | Stop reason: HOSPADM

## 2021-09-10 RX ORDER — OXYCODONE HYDROCHLORIDE 5 MG/1
5 TABLET ORAL EVERY 4 HOURS PRN
Status: DISCONTINUED | OUTPATIENT
Start: 2021-09-10 | End: 2021-09-11 | Stop reason: HOSPADM

## 2021-09-10 RX ORDER — FLUOXETINE HYDROCHLORIDE 20 MG/1
20 CAPSULE ORAL DAILY
COMMUNITY
End: 2021-12-22 | Stop reason: ALTCHOICE

## 2021-09-10 RX ORDER — HYDROMORPHONE HYDROCHLORIDE 1 MG/ML
0.25 INJECTION, SOLUTION INTRAMUSCULAR; INTRAVENOUS; SUBCUTANEOUS
Status: DISCONTINUED | OUTPATIENT
Start: 2021-09-10 | End: 2021-09-11 | Stop reason: HOSPADM

## 2021-09-10 RX ORDER — POLYETHYLENE GLYCOL 3350 17 G/17G
17 POWDER, FOR SOLUTION ORAL DAILY
Status: DISCONTINUED | OUTPATIENT
Start: 2021-09-10 | End: 2021-09-11 | Stop reason: HOSPADM

## 2021-09-10 RX ORDER — SODIUM CHLORIDE, SODIUM LACTATE, POTASSIUM CHLORIDE, CALCIUM CHLORIDE 600; 310; 30; 20 MG/100ML; MG/100ML; MG/100ML; MG/100ML
INJECTION, SOLUTION INTRAVENOUS
Status: DISPENSED
Start: 2021-09-10 | End: 2021-09-11

## 2021-09-10 RX ORDER — HYDROMORPHONE HYDROCHLORIDE 1 MG/ML
0.5 INJECTION, SOLUTION INTRAMUSCULAR; INTRAVENOUS; SUBCUTANEOUS
Status: DISCONTINUED | OUTPATIENT
Start: 2021-09-10 | End: 2021-09-11 | Stop reason: HOSPADM

## 2021-09-10 RX ORDER — PANTOPRAZOLE SODIUM 40 MG/10ML
40 INJECTION, POWDER, LYOPHILIZED, FOR SOLUTION INTRAVENOUS 2 TIMES DAILY
Status: DISCONTINUED | OUTPATIENT
Start: 2021-09-10 | End: 2021-09-10 | Stop reason: SDUPTHER

## 2021-09-10 RX ORDER — FLUOXETINE HYDROCHLORIDE 20 MG/1
20 CAPSULE ORAL DAILY
Status: DISCONTINUED | OUTPATIENT
Start: 2021-09-11 | End: 2021-09-10

## 2021-09-10 RX ORDER — METOCLOPRAMIDE 10 MG/1
10 TABLET ORAL ONCE
Status: COMPLETED | OUTPATIENT
Start: 2021-09-10 | End: 2021-09-10

## 2021-09-10 RX ORDER — LIDOCAINE HYDROCHLORIDE 20 MG/ML
15 SOLUTION OROPHARYNGEAL
Status: DISCONTINUED | OUTPATIENT
Start: 2021-09-10 | End: 2021-09-11 | Stop reason: HOSPADM

## 2021-09-10 RX ORDER — FLUOXETINE HYDROCHLORIDE 20 MG/1
60 CAPSULE ORAL DAILY
Status: DISCONTINUED | OUTPATIENT
Start: 2021-09-11 | End: 2021-09-11 | Stop reason: HOSPADM

## 2021-09-10 RX ORDER — SODIUM CHLORIDE 9 MG/ML
25 INJECTION, SOLUTION INTRAVENOUS PRN
Status: DISCONTINUED | OUTPATIENT
Start: 2021-09-10 | End: 2021-09-11 | Stop reason: HOSPADM

## 2021-09-10 RX ORDER — SODIUM CHLORIDE 9 MG/ML
10 INJECTION INTRAVENOUS DAILY
Status: DISCONTINUED | OUTPATIENT
Start: 2021-09-10 | End: 2021-09-10 | Stop reason: SDUPTHER

## 2021-09-10 RX ORDER — ACETAMINOPHEN 325 MG/1
650 TABLET ORAL EVERY 4 HOURS PRN
Status: DISCONTINUED | OUTPATIENT
Start: 2021-09-10 | End: 2021-09-11 | Stop reason: HOSPADM

## 2021-09-10 RX ORDER — ONDANSETRON 4 MG/1
4 TABLET, ORALLY DISINTEGRATING ORAL EVERY 8 HOURS PRN
Status: DISCONTINUED | OUTPATIENT
Start: 2021-09-10 | End: 2021-09-11 | Stop reason: HOSPADM

## 2021-09-10 RX ORDER — LITHIUM CARBONATE 300 MG/1
300 CAPSULE ORAL EVERY MORNING
Status: DISCONTINUED | OUTPATIENT
Start: 2021-09-11 | End: 2021-09-11 | Stop reason: HOSPADM

## 2021-09-10 RX ORDER — ONDANSETRON 2 MG/ML
4 INJECTION INTRAMUSCULAR; INTRAVENOUS EVERY 6 HOURS PRN
Status: DISCONTINUED | OUTPATIENT
Start: 2021-09-10 | End: 2021-09-11 | Stop reason: HOSPADM

## 2021-09-10 RX ORDER — ACETAMINOPHEN 500 MG
1000 TABLET ORAL EVERY 6 HOURS PRN
COMMUNITY

## 2021-09-10 RX ORDER — SUCRALFATE 1 G/1
1 TABLET ORAL EVERY 6 HOURS SCHEDULED
Status: DISCONTINUED | OUTPATIENT
Start: 2021-09-10 | End: 2021-09-11 | Stop reason: HOSPADM

## 2021-09-10 RX ORDER — SODIUM CHLORIDE, SODIUM LACTATE, POTASSIUM CHLORIDE, CALCIUM CHLORIDE 600; 310; 30; 20 MG/100ML; MG/100ML; MG/100ML; MG/100ML
INJECTION, SOLUTION INTRAVENOUS CONTINUOUS
Status: DISCONTINUED | OUTPATIENT
Start: 2021-09-10 | End: 2021-09-11 | Stop reason: HOSPADM

## 2021-09-10 RX ORDER — SODIUM CHLORIDE 9 MG/ML
10 INJECTION INTRAVENOUS 2 TIMES DAILY
Status: DISCONTINUED | OUTPATIENT
Start: 2021-09-10 | End: 2021-09-11 | Stop reason: HOSPADM

## 2021-09-10 RX ORDER — SODIUM CHLORIDE, SODIUM LACTATE, POTASSIUM CHLORIDE, CALCIUM CHLORIDE 600; 310; 30; 20 MG/100ML; MG/100ML; MG/100ML; MG/100ML
INJECTION, SOLUTION INTRAVENOUS CONTINUOUS
Status: DISCONTINUED | OUTPATIENT
Start: 2021-09-10 | End: 2021-09-10

## 2021-09-10 RX ORDER — FENTANYL CITRATE 50 UG/ML
25 INJECTION, SOLUTION INTRAMUSCULAR; INTRAVENOUS ONCE
Status: COMPLETED | OUTPATIENT
Start: 2021-09-10 | End: 2021-09-10

## 2021-09-10 RX ORDER — ONDANSETRON 2 MG/ML
4 INJECTION INTRAMUSCULAR; INTRAVENOUS ONCE
Status: COMPLETED | OUTPATIENT
Start: 2021-09-10 | End: 2021-09-10

## 2021-09-10 RX ORDER — 0.9 % SODIUM CHLORIDE 0.9 %
1000 INTRAVENOUS SOLUTION INTRAVENOUS ONCE
Status: COMPLETED | OUTPATIENT
Start: 2021-09-10 | End: 2021-09-10

## 2021-09-10 RX ORDER — SODIUM CHLORIDE 0.9 % (FLUSH) 0.9 %
10 SYRINGE (ML) INJECTION PRN
Status: DISCONTINUED | OUTPATIENT
Start: 2021-09-10 | End: 2021-09-11 | Stop reason: HOSPADM

## 2021-09-10 RX ORDER — MORPHINE SULFATE 5 MG/ML
5 INJECTION, SOLUTION INTRAMUSCULAR; INTRAVENOUS ONCE
Status: COMPLETED | OUTPATIENT
Start: 2021-09-10 | End: 2021-09-10

## 2021-09-10 RX ORDER — LITHIUM CARBONATE 300 MG/1
900 CAPSULE ORAL NIGHTLY
Status: DISCONTINUED | OUTPATIENT
Start: 2021-09-10 | End: 2021-09-11 | Stop reason: HOSPADM

## 2021-09-10 RX ORDER — LITHIUM CARBONATE 300 MG/1
300 CAPSULE ORAL 2 TIMES DAILY WITH MEALS
COMMUNITY

## 2021-09-10 RX ORDER — SODIUM CHLORIDE 0.9 % (FLUSH) 0.9 %
10 SYRINGE (ML) INJECTION EVERY 12 HOURS SCHEDULED
Status: DISCONTINUED | OUTPATIENT
Start: 2021-09-10 | End: 2021-09-11 | Stop reason: HOSPADM

## 2021-09-10 RX ORDER — HYDROMORPHONE HYDROCHLORIDE 1 MG/ML
0.5 INJECTION, SOLUTION INTRAMUSCULAR; INTRAVENOUS; SUBCUTANEOUS EVERY 4 HOURS PRN
Status: DISCONTINUED | OUTPATIENT
Start: 2021-09-10 | End: 2021-09-10 | Stop reason: DRUGHIGH

## 2021-09-10 RX ORDER — DIPHENHYDRAMINE HYDROCHLORIDE 50 MG/ML
25 INJECTION INTRAMUSCULAR; INTRAVENOUS ONCE
Status: COMPLETED | OUTPATIENT
Start: 2021-09-10 | End: 2021-09-10

## 2021-09-10 RX ORDER — FLUOXETINE HYDROCHLORIDE 40 MG/1
40 CAPSULE ORAL DAILY
COMMUNITY
End: 2021-12-22 | Stop reason: ALTCHOICE

## 2021-09-10 RX ORDER — PANTOPRAZOLE SODIUM 40 MG/10ML
40 INJECTION, POWDER, LYOPHILIZED, FOR SOLUTION INTRAVENOUS 2 TIMES DAILY
Status: DISCONTINUED | OUTPATIENT
Start: 2021-09-10 | End: 2021-09-11 | Stop reason: HOSPADM

## 2021-09-10 RX ADMIN — DIPHENHYDRAMINE HYDROCHLORIDE 25 MG: 50 INJECTION, SOLUTION INTRAMUSCULAR; INTRAVENOUS at 13:24

## 2021-09-10 RX ADMIN — SODIUM CHLORIDE, PRESERVATIVE FREE 10 ML: 5 INJECTION INTRAVENOUS at 22:44

## 2021-09-10 RX ADMIN — METOCLOPRAMIDE 10 MG: 10 TABLET ORAL at 13:24

## 2021-09-10 RX ADMIN — SODIUM CHLORIDE 80 MG: 9 INJECTION, SOLUTION INTRAVENOUS at 10:50

## 2021-09-10 RX ADMIN — ALUMINUM HYDROXIDE, MAGNESIUM HYDROXIDE, AND SIMETHICONE: 200; 200; 20 SUSPENSION ORAL at 10:25

## 2021-09-10 RX ADMIN — ONDANSETRON 4 MG: 2 INJECTION INTRAMUSCULAR; INTRAVENOUS at 10:25

## 2021-09-10 RX ADMIN — IOPAMIDOL 75 ML: 755 INJECTION, SOLUTION INTRAVENOUS at 12:11

## 2021-09-10 RX ADMIN — SODIUM CHLORIDE 1000 ML: 9 INJECTION, SOLUTION INTRAVENOUS at 10:22

## 2021-09-10 RX ADMIN — PANTOPRAZOLE SODIUM 40 MG: 40 INJECTION, POWDER, FOR SOLUTION INTRAVENOUS at 22:44

## 2021-09-10 RX ADMIN — FENTANYL CITRATE 25 MCG: 0.05 INJECTION, SOLUTION INTRAMUSCULAR; INTRAVENOUS at 10:25

## 2021-09-10 RX ADMIN — IOHEXOL 50 ML: 240 INJECTION, SOLUTION INTRATHECAL; INTRAVASCULAR; INTRAVENOUS; ORAL at 12:11

## 2021-09-10 RX ADMIN — SUCRALFATE 1 G: 1 TABLET ORAL at 18:47

## 2021-09-10 RX ADMIN — MORPHINE SULFATE 5 MG: 5 INJECTION, SOLUTION INTRAMUSCULAR; INTRAVENOUS at 13:24

## 2021-09-10 RX ADMIN — SODIUM CHLORIDE, POTASSIUM CHLORIDE, SODIUM LACTATE AND CALCIUM CHLORIDE: 600; 310; 30; 20 INJECTION, SOLUTION INTRAVENOUS at 18:48

## 2021-09-10 RX ADMIN — ENOXAPARIN SODIUM 40 MG: 40 INJECTION SUBCUTANEOUS at 18:47

## 2021-09-10 RX ADMIN — ONDANSETRON 4 MG: 2 INJECTION INTRAMUSCULAR; INTRAVENOUS at 18:47

## 2021-09-10 ASSESSMENT — PAIN DESCRIPTION - FREQUENCY
FREQUENCY: CONTINUOUS
FREQUENCY: CONTINUOUS

## 2021-09-10 ASSESSMENT — PAIN SCALES - GENERAL
PAINLEVEL_OUTOF10: 5
PAINLEVEL_OUTOF10: 3
PAINLEVEL_OUTOF10: 5
PAINLEVEL_OUTOF10: 5

## 2021-09-10 ASSESSMENT — ENCOUNTER SYMPTOMS
WHEEZING: 0
BACK PAIN: 0
SORE THROAT: 0
NAUSEA: 1
COUGH: 0
CHEST TIGHTNESS: 0
SHORTNESS OF BREATH: 0
ABDOMINAL DISTENTION: 0
ABDOMINAL PAIN: 1
VOMITING: 1
DIARRHEA: 0

## 2021-09-10 ASSESSMENT — PAIN DESCRIPTION - DESCRIPTORS: DESCRIPTORS: ACHING;DISCOMFORT;SORE

## 2021-09-10 ASSESSMENT — PAIN DESCRIPTION - ORIENTATION
ORIENTATION: MID;UPPER
ORIENTATION: MID;UPPER

## 2021-09-10 ASSESSMENT — PAIN DESCRIPTION - PAIN TYPE
TYPE: ACUTE PAIN
TYPE: ACUTE PAIN

## 2021-09-10 ASSESSMENT — PAIN DESCRIPTION - LOCATION
LOCATION: ABDOMEN
LOCATION: ABDOMEN;BACK

## 2021-09-10 ASSESSMENT — PAIN DESCRIPTION - PROGRESSION: CLINICAL_PROGRESSION: NOT CHANGED

## 2021-09-10 ASSESSMENT — PAIN DESCRIPTION - ONSET: ONSET: ON-GOING

## 2021-09-10 NOTE — ED NOTES
While pushing morphine patient stated her body did not feel right. Stopped giving morphine. Checked vital signs. States she still feels the same. Benadryl and Reglan administered.       Cheryle London, RN  09/10/21 [address], RN  09/10/21 [id number]

## 2021-09-10 NOTE — ED PROVIDER NOTES
Chief complaint:  Abdominal pain    HPI history provided by the patient  Patient presents with a history of gastric bypass surgery, is on omeprazole at home. States he has a history of ulcers. States she started today with mid abdominal epigastric abdominal pain worsened with any attempt at eating or drinking with some nausea and vomiting when she eats and drinks. No diarrhea. No chest pain, palpitations or shortness of breath. No migration or radiation of pain. No abdominal distention. No treatment prior to arrival.  Nothing makes it better. Review of Systems   Constitutional: Negative for chills, diaphoresis, fatigue and fever. HENT: Negative for congestion and sore throat. Respiratory: Negative for cough, chest tightness, shortness of breath and wheezing. Cardiovascular: Negative for chest pain, palpitations and leg swelling. Gastrointestinal: Positive for abdominal pain, nausea and vomiting. Negative for abdominal distention and diarrhea. Genitourinary: Negative for dysuria, flank pain, frequency and urgency. Musculoskeletal: Negative for arthralgias, back pain, gait problem, joint swelling, myalgias, neck pain and neck stiffness. Skin: Negative for rash and wound. Neurological: Negative for dizziness, seizures, syncope, weakness, light-headedness, numbness and headaches. Hematological: Negative for adenopathy. All other systems reviewed and are negative. Physical Exam  Vitals and nursing note reviewed. Constitutional:       General: She is not in acute distress. Appearance: She is well-developed. She is not ill-appearing, toxic-appearing or diaphoretic. HENT:      Head: Normocephalic and atraumatic. Eyes:      General: No scleral icterus. Extraocular Movements: Extraocular movements intact. Conjunctiva/sclera: Conjunctivae normal.      Pupils: Pupils are equal, round, and reactive to light.    Cardiovascular:      Rate and Rhythm: Normal rate and regular rhythm. Heart sounds: Normal heart sounds. No murmur heard. Pulmonary:      Effort: Pulmonary effort is normal. No respiratory distress. Breath sounds: Normal breath sounds. No stridor, decreased air movement or transmitted upper airway sounds. No decreased breath sounds, wheezing, rhonchi or rales. Chest:      Chest wall: No tenderness. Abdominal:      General: Bowel sounds are normal. There is no distension. Palpations: Abdomen is soft. There is no pulsatile mass. Tenderness: There is abdominal tenderness in the epigastric area. There is no right CVA tenderness, left CVA tenderness, guarding or rebound. Comments: Abdomen soft with mild epigastric abdominal tenderness on palpation with no gross distention. No guarding, rebound tenderness or rigidity. The rest of the abdomen is nontender. Nonsurgical abdomen. Musculoskeletal:         General: No swelling, tenderness, deformity or signs of injury. Cervical back: Normal range of motion and neck supple. Right lower leg: No edema. Left lower leg: No edema. Skin:     General: Skin is warm and dry. Coloration: Skin is not cyanotic, jaundiced, mottled or pale. Findings: No erythema or rash. Neurological:      General: No focal deficit present. Mental Status: She is alert and oriented to person, place, and time. GCS: GCS eye subscore is 4. GCS verbal subscore is 5. GCS motor subscore is 6. Cranial Nerves: No cranial nerve deficit. Coordination: Coordination normal.          Procedures     MDM     ED Course as of Sep 10 1431   Fri Sep 10, 2021   1314 Patient laying the bed in no distress complaining of persistent nausea and abdominal pain. States it is not any better. She is updated on work-up results. Her physical exam is pretty much unchanged.   We are remedicating her.    [NC]      ED Course User Index  [NC] Kalpesh Jarvis DO        ED Course as of Sep 10 1431   Fri Sep 10, 2021 1314 Patient laying the bed in no distress complaining of persistent nausea and abdominal pain. States it is not any better. She is updated on work-up results. Her physical exam is pretty much unchanged. We are remedicating her.    [NC]      ED Course User Index  [NC] Josue Medel, DO       --------------------------------------------- PAST HISTORY ---------------------------------------------  Past Medical History:  has a past medical history of [facility] Depression, Obesity, Osteoarthritis, Thiamine deficiency, and Vitamin D deficiency. Past Surgical History:  has a past surgical history that includes Gallbladder surgery (1994); Cholecystectomy; Endoscopy, colon, diagnostic; pr lap gastric bypass/luciano-en-y (N/A, 4/30/2018); Upper gastrointestinal endoscopy (N/A, 10/22/2018); Upper gastrointestinal endoscopy (N/A, 2/21/2019); joint replacement (Right, 06/03/2019); Total knee arthroplasty (Right, 6/3/2019); Upper gastrointestinal endoscopy (N/A, 9/16/2019); and Total knee arthroplasty (Left, 6/24/2020). Social History:  reports that she has never smoked. She has never used smokeless tobacco. She reports that she does not drink alcohol and does not use drugs. Family History: family history includes Breast Cancer in her mother; Cancer in her maternal grandmother; Heart Disease in her father, maternal grandmother, mother, and paternal grandmother; Parkinsonism in her father. The patients home medications have been reviewed. Allergies: Patient has no known allergies.     -------------------------------------------------- RESULTS -------------------------------------------------    LABS:  Results for orders placed or performed during the hospital encounter of 09/10/21   CBC Auto Differential   Result Value Ref Range    WBC 6.6 4.5 - 11.5 E9/L    RBC 5.00 3.50 - 5.50 E12/L    Hemoglobin 14.0 11.5 - 15.5 g/dL    Hematocrit 43.5 34.0 - 48.0 %    MCV 87.0 80.0 - 99.9 fL    MCH 28.0 26.0 - 35.0 pg    MCHC 32.2 32.0 - 34.5 %    RDW 12.5 11.5 - 15.0 fL    Platelets 779 168 - 365 E9/L    MPV 10.6 7.0 - 12.0 fL    Neutrophils % 75.0 43.0 - 80.0 %    Immature Granulocytes % 0.3 0.0 - 5.0 %    Lymphocytes % 15.4 (L) 20.0 - 42.0 %    Monocytes % 7.6 2.0 - 12.0 %    Eosinophils % 0.9 0.0 - 6.0 %    Basophils % 0.8 0.0 - 2.0 %    Neutrophils Absolute 4.93 1.80 - 7.30 E9/L    Immature Granulocytes # 0.02 E9/L    Lymphocytes Absolute 1.01 (L) 1.50 - 4.00 E9/L    Monocytes Absolute 0.50 0.10 - 0.95 E9/L    Eosinophils Absolute 0.06 0.05 - 0.50 E9/L    Basophils Absolute 0.05 0.00 - 0.20 E9/L   Comprehensive Metabolic Panel   Result Value Ref Range    Sodium 139 132 - 146 mmol/L    Potassium 4.1 3.5 - 5.0 mmol/L    Chloride 101 98 - 107 mmol/L    CO2 29 22 - 29 mmol/L    Anion Gap 9 7 - 16 mmol/L    Glucose 119 (H) 74 - 99 mg/dL    BUN 13 6 - 20 mg/dL    CREATININE 0.8 0.5 - 1.0 mg/dL    GFR Non-African American >60 >=60 mL/min/1.73    GFR African American >60     Calcium 9.4 8.6 - 10.2 mg/dL    Total Protein 7.7 6.4 - 8.3 g/dL    Albumin 3.9 3.5 - 5.2 g/dL    Total Bilirubin 0.2 0.0 - 1.2 mg/dL    Alkaline Phosphatase 98 35 - 104 U/L    ALT 8 0 - 32 U/L    AST 11 0 - 31 U/L   Lipase   Result Value Ref Range    Lipase 16 13 - 60 U/L   Lactic Acid, Plasma   Result Value Ref Range    Lactic Acid 0.8 0.5 - 2.2 mmol/L   HCG, SERUM, QUALITATIVE   Result Value Ref Range    hCG Qual NEGATIVE NEGATIVE       RADIOLOGY:  CT ABDOMEN PELVIS W IV CONTRAST Additional Contrast? Oral   Final Result   No acute abnormality.               ------------------------- NURSING NOTES AND VITALS REVIEWED ---------------------------  Date / Time Roomed:  9/10/2021  9:23 AM  ED Bed Assignment:  01/01    The nursing notes within the ED encounter and vital signs as below have been reviewed.      Patient Vitals for the past 24 hrs:   BP Temp Temp src Pulse Resp SpO2 Height Weight   09/10/21 1335 (!) 143/94 -- -- 71 18 98 % -- -- 09/10/21 1325 (!) 150/102 -- -- 74 -- 98 % -- --   09/10/21 0822 (!) 173/81 97.5 °F (36.4 °C) Tympanic 100 18 98 % 5' 5\" (1.651 m) 175 lb (79.4 kg)       Oxygen Saturation Interpretation: Normal    ---------------------------------------    Counseling:  I have spoken with the patient and discussed todays results, in addition to providing specific details for the plan of care and counseling regarding the diagnosis and prognosis. Their questions are answered at this time and they are agreeable with the plan of admission.    --------------------------------- ADDITIONAL PROVIDER NOTES ---------------------------------  Consultations: .   General surgery consulted, they place admission orders for further evaluation and treatment. This patient's ED course included: a personal history and physicial examination, re-evaluation prior to disposition, multiple bedside re-evaluations and IV medications    This patient has remained hemodynamically stable during their ED course. Diagnosis:  1. Intractable abdominal pain        Disposition:  Patient's disposition: Admit to med/surg floor  Patient's condition is stable.          Kalpesh Jarvis DO  09/10/21 1433

## 2021-09-10 NOTE — H&P
GENERAL SURGERY  CONSULT NOTE  9/10/2021    Physician Consulted: Dr. Tarah Russ  Reason for Consult: abdominal pain  Referring Physician: Dr. Medina Guidry    Chief Complaint   Patient presents with    Emesis     since last night, history of gastric ulcer         HPI  Shannan Eller is a 45 y.o. female who presents for evaluation of severe abdominal pain. PMH obesity s/p RYGB, marginal ulcer, arthritis. She complains of abdominal pain for the past week that has been intermittent and worse with eating. Also reports emesis. She has been able to tolerate liquids but difficulty tolerating solid foods. She states she continues to take her PPI daily and also has been taking Carafate which give minimal and temporary relief. Denies fevers or chills. No diarrhea, constipation or changes in bowel habits. She is passing flatus. Denies chest pain or palpitations. No other sick contacts. States that pain is similar to 2 years ago when she had a marginal ulcer found on EGD. States she has been voiding regularly and is able to take in enough liquids to avoid dehydration. Surgery consulted to evaluate abdominal pain. EGD 2019-stricture at GE junction, large marginal ulcer  2018 laparoscopic Kieran-en-Y gastric bypass by Dr. Berto Joy a/p reviewed, oral and IV contrast - no free air, no extrav of oral contrast, no evidence of bowel obstruction.   S/p RYGB and cholecystectomy         Past Medical History:   Diagnosis Date    Bipolar 1 disorder ([facility]     Depression     Obesity     Osteoarthritis     bilateral knees    Thiamine deficiency     Vitamin D deficiency        Past Surgical History:   Procedure Laterality Date    CHOLECYSTECTOMY      ENDOSCOPY, COLON, DIAGNOSTIC      GALLBLADDER SURGERY  1994    JOINT REPLACEMENT Right 06/03/2019    W[place] LAP GASTRIC BYPASS/KIERAN-EN-Y N/A 4/30/2018    GASTRIC BYPASS KIERAN-EN-Y LAPAROSCOPIC performed by Coni Blankenship MD at [facility] 6/3/2019    RIGHT KNEE TOTAL KNEE ARTHROPLASTY [facility] Celia Perez) performed by Yumi Gardner DO at [address] [facility] Left 6/24/2020    LEFT KNEE TOTAL ARTHROPLASTY [facility] & NEPHEW) performed by Yumi Gardner DO at [address] 10/22/2018    EGD [facility]OPSY [facility] 2/21[facility]2019    EGD ESOPHAGOGASTRODUODENOSCOPY performed by [facility]in [facility] MD at [facility] 9/16/2019    EGD DILATION BALLOON 14mm performed by Mervin May MD at [place] [address]       Medications Prior to Admission    Prior to Admission medications    Medication Sig Start Date End Date Taking?  Authorizing Provider   ondansetron (ZOFRAN ODT) 4 MG disintegrating tablet Take 1 tablet by mouth every 8 hours as needed for Nausea or Vomiting 3/12/21   RAFFY Bajwa   dicyclomine (BENTYL) 10 MG capsule Take 2 capsules by mouth 3 times daily (before meals) for 10 days 3/12/21 3/22/21  RAFFY Bajwa   sucralfate (CARAFATE) 1 GM tablet Take 1 tablet by mouth 4 times daily 3/12/21   Chip Medina MD   pantoprazole (PROTONIX) 20 MG tablet Take 1 tablet by mouth daily 3/12/21   Chip Medina MD   QUEtiapine (SEROQUEL) 50 MG tablet Take 50 mg by mouth every evening 9/19/19   Historical Provider, MD   Cholecalciferol (VITAMIN D3) 5000 units TABS Take by mouth daily    Historical Provider, MD   lithium 300 MG capsule Take 1 capsule by mouth daily  Patient taking differently: Take 300 mg by mouth 4 times daily  4/15/19   Liam Ortiz MD   FLUoxetine (PROZAC) 20 MG capsule Take 1 capsule by mouth daily 4/15/19   Liam Ortiz MD   Ferrous Sulfate (IRON) 28 MG TABS Take 1 tablet by mouth daily     Historical Provider, MD   Multiple Vitamins-Minerals (THERAPEUTIC MULTIVITAMIN-MINERALS) tablet Take 1 tablet by mouth 2 times daily Historical Provider, MD   calcium-cholecalciferol 500-200 MG-UNIT per tablet Take 1 tablet by mouth daily 1/5/18   Erna Bethea MD   FLUoxetine (PROZAC) 10 MG capsule Take 1 capsule by mouth daily 2/2/17   Ryann Evans MD       No Known Allergies    Family History   Problem Relation Age of Onset    Breast Cancer Mother     Heart Disease Mother     Heart Disease Father     Parkinsonism Father     Heart Disease Maternal Grandmother     Cancer Maternal Grandmother     Heart Disease Paternal Grandmother        Social History     Tobacco Use    Smoking status: Never Smoker    Smokeless tobacco: Never Used   Vaping Use    Vaping Use: Never used   Substance Use Topics    Alcohol use: No     Alcohol/week: 0.0 standard drinks    Drug use: No         Review of Systems:   General ROS: malaise  Hematological and Lymphatic ROS: negative  Respiratory ROS: no cough, shortness of breath, or wheezing  Cardiovascular ROS: no chest pain or dyspnea on exertion  Gastrointestinal ROS: see HPI  Genito-Urinary ROS: no dysuria, trouble voiding, or hematuria  Musculoskeletal ROS: negative      PHYSICAL EXAM:    Vitals:    09/10/21 1335   BP: (!) 143/94   Pulse: 71   Resp: 18   Temp:    SpO2: 98%       GENERAL:  Mild distress. A&Ox3. Ill feeling  HEAD:  Normocephalic. Atraumatic. EYES:   No scleral icterus. PERRL. LUNGS:  No increased work of breathing. CARDIOVASCULAR: Regular rate  ABDOMEN:  Soft, non-distended, TTP epigastric and LUQ. No guarding, rigidity, rebound. EXTREMITIES:   MAEx4. Atraumatic. No LE edema.   SKIN:  Warm and dry, no rashes or lesions  NEUROLOGIC:  no focal deficits      LABS:    CBC  Recent Labs     09/10/21  1015   WBC 6.6   HGB 14.0   HCT 43.5        BMP  Recent Labs     09/10/21  1015      K 4.1      CO2 29   BUN 13   CREATININE 0.8   CALCIUM 9.4     Liver Function  Recent Labs     09/10/21  1015   LIPASE 16   BILITOT 0.2   AST 11   ALT 8   ALKPHOS 98   PROT 7.7 LABALBU 3.9     No results for input(s): LACTATE in the last 72 hours. No results for input(s): INR, PTT in the last 72 hours. Invalid input(s): PT    RADIOLOGY    CT ABDOMEN PELVIS W IV CONTRAST Additional Contrast? Oral    Result Date: 9/10/2021  EXAMINATION: CT OF THE ABDOMEN AND PELVIS WITH CONTRAST 9/10/2021 12:02 pm TECHNIQUE: CT of the abdomen and pelvis was performed with the administration of intravenous contrast. Multiplanar reformatted images are provided for review. Dose modulation, iterative reconstruction, and/or weight based adjustment of the mA/kV was utilized to reduce the radiation dose to as low as reasonably achievable. COMPARISON: CT abdomen and pelvis 09/04/2021 HISTORY: ORDERING SYSTEM PROVIDED HISTORY: abd pain, gastric bypass, rule out free air, obstruction TECHNOLOGIST PROVIDED HISTORY: Additional Contrast?->Oral Reason for exam:->abd pain, gastric bypass, rule out free air, obstruction Decision Support Exception - unselect if not a suspected or confirmed emergency medical condition->Emergency Medical Condition (MA) FINDINGS: The patient is status post gastric bypass. There is no evidence of pneumoperitoneum. There is positive contrast within the bowel. There is no extraluminal extravasation contrast into the peritoneal cavity. The spleen, pancreas, and adrenal glands are unremarkable. There is possible fatty change about the liver. There is a cyst in the lower pole of the right kidney. Left kidney is unremarkable in appearance. The patient is status post cholecystectomy without evidence of biliary ductal dilatation. There is no evidence of bowel obstruction or ascites. The uterus and adnexa are unremarkable. There is sigmoid diverticulosis without evidence of diverticulitis. There are scattered mesenteric lymph nodes measuring up to 10 mm in short axis. These appears stable compared to prior examination. The lung bases are clear.   There are degenerative changes within the spine.     No acute abnormality. ASSESSMENT/PLAN:  45 y.o. female with epigastric pain, vomiting; hx of marginal ulcer, s/p RYGB    Admit  Sips of clears  PPI, carafate  IVFs  Supportive care  Will need EGD to evaluate for recurrence of marginal ulcer    Plan will be discussed with Dr. DAVENPORT [facility] for Dr Lorenza Meeks. Javi Villalobos DO  Resident, PGY-3  9/10/2021  1:54 PM      General Surgery Progress Note  [facility]    Patient's Name/Date of Birth: Aiden Davies / 1970    Date: September 10, 2021     Surgeon: [facility], MD    Chief Complaint: Epigastric pain, nausea, vomiting    Patient Active Problem List   Diagnosis    Bilateral primary osteoarthritis of knee    Bipolar 1 disorder ([address]    Gastroesophageal reflux disease with esophagitis    S/P bariatric surgery    Marginal ulcer    Vitamin D deficiency    Thiamine deficiency    Primary localized osteoarthritis of right knee    Primary osteoarthritis of right knee    Obesity    Depression    Status post total right knee replacement    Primary osteoarthritis of left knee    Primary osteoarthritis of one knee, left    S/P total knee arthroplasty, left    Acute gastritis    Abdominal pain       Subjective: Persistent epigastric pain, nausea, vomiting.   HPI otherwise as above    Objective:  /68   Pulse 68   Temp 98.4 °F (36.9 °C) (Oral)   Resp 16   Ht 5' 5\" (1.651 m)   Wt 175 lb (79.4 kg)   SpO2 98%   BMI 29.12 kg/m²   Labs:  Recent Labs     09/10/21  1015   WBC 6.6   HGB 14.0   HCT 43.5     Lab Results   Component Value Date    CREATININE 0.8 09/10/2021    BUN 13 09/10/2021     09/10/2021    K 4.1 09/10/2021     09/10/2021    CO2 29 09/10/2021     Recent Labs     09/10/21  1015   LIPASE 16     CBC with Differential:    Lab Results   Component Value Date    WBC 6.6 09/10/2021    RBC 5.00 09/10/2021    HGB 14.0 09/10/2021    HCT 43.5 09/10/2021     09/10/2021    MCV 87.0 09/10/2021    MCH 28.0 09/10/2021    MCHC 32.2 09/10/2021    RDW 12.5 09/10/2021    LYMPHOPCT 15.4 09/10/2021    MONOPCT 7.6 09/10/2021    BASOPCT 0.8 09/10/2021    MONOSABS 0.50 09/10/2021    LYMPHSABS 1.01 09/10/2021    EOSABS 0.06 09/10/2021    BASOSABS 0.05 09/10/2021     CMP:    Lab Results   Component Value Date     09/10/2021    K 4.1 09/10/2021    K 5.2 09/04/2021     09/10/2021    CO2 29 09/10/2021    BUN 13 09/10/2021    CREATININE 0.8 09/10/2021    GFRAA >60 09/10/2021    LABGLOM >60 09/10/2021    GLUCOSE 119 09/10/2021    GLUCOSE 95 10/20/2011    PROT 7.7 09/10/2021    LABALBU 3.9 09/10/2021    LABALBU 4.1 10/20/2011    CALCIUM 9.4 09/10/2021    BILITOT 0.2 09/10/2021    ALKPHOS 98 09/10/2021    AST 11 09/10/2021    ALT 8 09/10/2021       General appearance:  NAD  Head: NCAT, PERRLA, EOMI, red conjunctiva  Neck: supple, no masses  Lungs: CTAB, equal chest rise bilateral  Heart: Reg rate  Abdomen: soft, nondistended, tender epigastrium, no peritonitis  Skin; no lesions  Gu: no cva tenderness  Extremities: extremities normal, atraumatic, no cyanosis or edema      Assessment/Plan:  Dg Alarcon is a 45 y.o. female with epigastric pain, nausea, vomiting. History of large marginal ulcer. Patient reports that she recently started smoking again and her marginal ulcer is likely worsened. Antiemetics and pain control  Viscous lidocaine  Plan for EGD with possible biopsy, possible dilation. Patient had prior stricture of her GJ anastomosis in the past.  -The procedure, risks, benefits and alternatives were discussed with patient. she   agrees to proceed.     Bharati Edwards MD  9/10/2021  6:09 PM

## 2021-09-10 NOTE — ED NOTES
Report given to [facility] on 3rd floor     Formerly [facility]  09/10/21 3046

## 2021-09-11 ENCOUNTER — ANESTHESIA (OUTPATIENT)
Dept: ENDOSCOPY | Age: 51
DRG: 382 | End: 2021-09-11
Payer: COMMERCIAL

## 2021-09-11 ENCOUNTER — ANESTHESIA EVENT (OUTPATIENT)
Dept: ENDOSCOPY | Age: 51
DRG: 382 | End: 2021-09-11
Payer: COMMERCIAL

## 2021-09-11 VITALS
HEART RATE: 70 BPM | SYSTOLIC BLOOD PRESSURE: 90 MMHG | OXYGEN SATURATION: 97 % | WEIGHT: 175 LBS | HEIGHT: 65 IN | BODY MASS INDEX: 29.16 KG/M2 | DIASTOLIC BLOOD PRESSURE: 44 MMHG | RESPIRATION RATE: 18 BRPM | TEMPERATURE: 98.4 F

## 2021-09-11 VITALS — OXYGEN SATURATION: 97 % | SYSTOLIC BLOOD PRESSURE: 167 MMHG | DIASTOLIC BLOOD PRESSURE: 94 MMHG

## 2021-09-11 LAB
ALBUMIN SERPL-MCNC: 3.2 G/DL (ref 3.5–5.2)
ALP BLD-CCNC: 81 U/L (ref 35–104)
ALT SERPL-CCNC: 8 U/L (ref 0–32)
ANION GAP SERPL CALCULATED.3IONS-SCNC: 9 MMOL/L (ref 7–16)
AST SERPL-CCNC: 13 U/L (ref 0–31)
BASOPHILS ABSOLUTE: 0.04 E9/L (ref 0–0.2)
BASOPHILS RELATIVE PERCENT: 0.5 % (ref 0–2)
BILIRUB SERPL-MCNC: 0.2 MG/DL (ref 0–1.2)
BUN BLDV-MCNC: 9 MG/DL (ref 6–20)
CALCIUM SERPL-MCNC: 8.8 MG/DL (ref 8.6–10.2)
CHLORIDE BLD-SCNC: 96 MMOL/L (ref 98–107)
CO2: 26 MMOL/L (ref 22–29)
CREAT SERPL-MCNC: 0.6 MG/DL (ref 0.5–1)
EOSINOPHILS ABSOLUTE: 0.01 E9/L (ref 0.05–0.5)
EOSINOPHILS RELATIVE PERCENT: 0.1 % (ref 0–6)
GFR AFRICAN AMERICAN: >60
GFR NON-AFRICAN AMERICAN: >60 ML/MIN/1.73
GLUCOSE BLD-MCNC: 109 MG/DL (ref 74–99)
HCT VFR BLD CALC: 37.8 % (ref 34–48)
HEMOGLOBIN: 12.2 G/DL (ref 11.5–15.5)
IMMATURE GRANULOCYTES #: 0.01 E9/L
IMMATURE GRANULOCYTES %: 0.1 % (ref 0–5)
INR BLD: 1.1
LYMPHOCYTES ABSOLUTE: 1.45 E9/L (ref 1.5–4)
LYMPHOCYTES RELATIVE PERCENT: 19.6 % (ref 20–42)
MAGNESIUM: 1.7 MG/DL (ref 1.6–2.6)
MCH RBC QN AUTO: 27.9 PG (ref 26–35)
MCHC RBC AUTO-ENTMCNC: 32.3 % (ref 32–34.5)
MCV RBC AUTO: 86.3 FL (ref 80–99.9)
MONOCYTES ABSOLUTE: 0.63 E9/L (ref 0.1–0.95)
MONOCYTES RELATIVE PERCENT: 8.5 % (ref 2–12)
NEUTROPHILS ABSOLUTE: 5.26 E9/L (ref 1.8–7.3)
NEUTROPHILS RELATIVE PERCENT: 71.2 % (ref 43–80)
PDW BLD-RTO: 12.2 FL (ref 11.5–15)
PHOSPHORUS: 2.9 MG/DL (ref 2.5–4.5)
PLATELET # BLD: 291 E9/L (ref 130–450)
PMV BLD AUTO: 11.4 FL (ref 7–12)
POTASSIUM REFLEX MAGNESIUM: 4.1 MMOL/L (ref 3.5–5)
PROTHROMBIN TIME: 13.1 SEC (ref 9.3–12.4)
RBC # BLD: 4.38 E12/L (ref 3.5–5.5)
SODIUM BLD-SCNC: 131 MMOL/L (ref 132–146)
TOTAL PROTEIN: 6.5 G/DL (ref 6.4–8.3)
TSH SERPL DL<=0.05 MIU/L-ACNC: 0.64 UIU/ML (ref 0.27–4.2)
WBC # BLD: 7.4 E9/L (ref 4.5–11.5)

## 2021-09-11 PROCEDURE — 2580000003 HC RX 258: Performed by: NURSE ANESTHETIST, CERTIFIED REGISTERED

## 2021-09-11 PROCEDURE — 85025 COMPLETE CBC W/AUTO DIFF WBC: CPT

## 2021-09-11 PROCEDURE — C1726 CATH, BAL DIL, NON-VASCULAR: HCPCS | Performed by: SURGERY

## 2021-09-11 PROCEDURE — 3700000000 HC ANESTHESIA ATTENDED CARE: Performed by: SURGERY

## 2021-09-11 PROCEDURE — 6360000002 HC RX W HCPCS: Performed by: NURSE ANESTHETIST, CERTIFIED REGISTERED

## 2021-09-11 PROCEDURE — C9113 INJ PANTOPRAZOLE SODIUM, VIA: HCPCS | Performed by: SURGERY

## 2021-09-11 PROCEDURE — 84443 ASSAY THYROID STIM HORMONE: CPT

## 2021-09-11 PROCEDURE — 6370000000 HC RX 637 (ALT 250 FOR IP): Performed by: SURGERY

## 2021-09-11 PROCEDURE — 80053 COMPREHEN METABOLIC PANEL: CPT

## 2021-09-11 PROCEDURE — 6360000002 HC RX W HCPCS

## 2021-09-11 PROCEDURE — 0DB68ZX EXCISION OF STOMACH, VIA NATURAL OR ARTIFICIAL OPENING ENDOSCOPIC, DIAGNOSTIC: ICD-10-PCS | Performed by: SURGERY

## 2021-09-11 PROCEDURE — 3700000001 HC ADD 15 MINUTES (ANESTHESIA): Performed by: SURGERY

## 2021-09-11 PROCEDURE — 6360000002 HC RX W HCPCS: Performed by: SURGERY

## 2021-09-11 PROCEDURE — 7100000010 HC PHASE II RECOVERY - FIRST 15 MIN: Performed by: SURGERY

## 2021-09-11 PROCEDURE — 43249 ESOPH EGD DILATION <30 MM: CPT | Performed by: SURGERY

## 2021-09-11 PROCEDURE — 3609017700 HC EGD DILATION GASTRIC/DUODENAL STRICTURE: Performed by: SURGERY

## 2021-09-11 PROCEDURE — 7100000011 HC PHASE II RECOVERY - ADDTL 15 MIN: Performed by: SURGERY

## 2021-09-11 PROCEDURE — 84100 ASSAY OF PHOSPHORUS: CPT

## 2021-09-11 PROCEDURE — 0D7A8ZZ DILATION OF JEJUNUM, VIA NATURAL OR ARTIFICIAL OPENING ENDOSCOPIC: ICD-10-PCS | Performed by: SURGERY

## 2021-09-11 PROCEDURE — 83735 ASSAY OF MAGNESIUM: CPT

## 2021-09-11 PROCEDURE — G0378 HOSPITAL OBSERVATION PER HR: HCPCS

## 2021-09-11 PROCEDURE — 85610 PROTHROMBIN TIME: CPT

## 2021-09-11 PROCEDURE — 36415 COLL VENOUS BLD VENIPUNCTURE: CPT

## 2021-09-11 PROCEDURE — 2580000003 HC RX 258: Performed by: SURGERY

## 2021-09-11 PROCEDURE — 6360000002 HC RX W HCPCS: Performed by: ANESTHESIOLOGY

## 2021-09-11 PROCEDURE — 0D768ZZ DILATION OF STOMACH, VIA NATURAL OR ARTIFICIAL OPENING ENDOSCOPIC: ICD-10-PCS | Performed by: SURGERY

## 2021-09-11 PROCEDURE — 96376 TX/PRO/DX INJ SAME DRUG ADON: CPT

## 2021-09-11 PROCEDURE — 2709999900 HC NON-CHARGEABLE SUPPLY: Performed by: SURGERY

## 2021-09-11 RX ORDER — LIDOCAINE HYDROCHLORIDE 20 MG/ML
15 SOLUTION OROPHARYNGEAL PRN
Qty: 180 ML | Refills: 0 | Status: SHIPPED | OUTPATIENT
Start: 2021-09-11 | End: 2021-12-22

## 2021-09-11 RX ORDER — MEPERIDINE HYDROCHLORIDE 25 MG/ML
INJECTION INTRAMUSCULAR; INTRAVENOUS; SUBCUTANEOUS
Status: COMPLETED
Start: 2021-09-11 | End: 2021-09-11

## 2021-09-11 RX ORDER — HYDROCODONE BITARTRATE AND ACETAMINOPHEN 5; 325 MG/1; MG/1
2 TABLET ORAL PRN
Status: DISCONTINUED | OUTPATIENT
Start: 2021-09-11 | End: 2021-09-11 | Stop reason: HOSPADM

## 2021-09-11 RX ORDER — DIPHENHYDRAMINE HYDROCHLORIDE 50 MG/ML
12.5 INJECTION INTRAMUSCULAR; INTRAVENOUS
Status: DISCONTINUED | OUTPATIENT
Start: 2021-09-11 | End: 2021-09-11 | Stop reason: HOSPADM

## 2021-09-11 RX ORDER — SODIUM CHLORIDE 9 MG/ML
INJECTION, SOLUTION INTRAVENOUS CONTINUOUS PRN
Status: DISCONTINUED | OUTPATIENT
Start: 2021-09-11 | End: 2021-09-11 | Stop reason: SDUPTHER

## 2021-09-11 RX ORDER — LABETALOL HYDROCHLORIDE 5 MG/ML
5 INJECTION, SOLUTION INTRAVENOUS EVERY 10 MIN PRN
Status: DISCONTINUED | OUTPATIENT
Start: 2021-09-11 | End: 2021-09-11 | Stop reason: HOSPADM

## 2021-09-11 RX ORDER — PROCHLORPERAZINE EDISYLATE 5 MG/ML
5 INJECTION INTRAMUSCULAR; INTRAVENOUS
Status: DISCONTINUED | OUTPATIENT
Start: 2021-09-11 | End: 2021-09-11 | Stop reason: HOSPADM

## 2021-09-11 RX ORDER — MEPERIDINE HYDROCHLORIDE 25 MG/ML
12.5 INJECTION INTRAMUSCULAR; INTRAVENOUS; SUBCUTANEOUS EVERY 5 MIN PRN
Status: DISCONTINUED | OUTPATIENT
Start: 2021-09-11 | End: 2021-09-11 | Stop reason: HOSPADM

## 2021-09-11 RX ORDER — PROPOFOL 10 MG/ML
INJECTION, EMULSION INTRAVENOUS PRN
Status: DISCONTINUED | OUTPATIENT
Start: 2021-09-11 | End: 2021-09-11 | Stop reason: SDUPTHER

## 2021-09-11 RX ORDER — PROMETHAZINE HYDROCHLORIDE 25 MG/ML
6.25 INJECTION, SOLUTION INTRAMUSCULAR; INTRAVENOUS
Status: DISCONTINUED | OUTPATIENT
Start: 2021-09-11 | End: 2021-09-11 | Stop reason: HOSPADM

## 2021-09-11 RX ORDER — HYDROCODONE BITARTRATE AND ACETAMINOPHEN 5; 325 MG/1; MG/1
1 TABLET ORAL PRN
Status: DISCONTINUED | OUTPATIENT
Start: 2021-09-11 | End: 2021-09-11 | Stop reason: HOSPADM

## 2021-09-11 RX ORDER — PANTOPRAZOLE SODIUM 40 MG/1
40 TABLET, DELAYED RELEASE ORAL
Qty: 180 TABLET | Refills: 1 | Status: SHIPPED | OUTPATIENT
Start: 2021-09-11

## 2021-09-11 RX ADMIN — LITHIUM CARBONATE 300 MG: 300 CAPSULE, GELATIN COATED ORAL at 11:43

## 2021-09-11 RX ADMIN — PROPOFOL 130 MG: 10 INJECTION, EMULSION INTRAVENOUS at 08:01

## 2021-09-11 RX ADMIN — FLUOXETINE 60 MG: 20 CAPSULE ORAL at 11:43

## 2021-09-11 RX ADMIN — PROPOFOL 20 MG: 10 INJECTION, EMULSION INTRAVENOUS at 08:05

## 2021-09-11 RX ADMIN — SODIUM CHLORIDE: 9 INJECTION, SOLUTION INTRAVENOUS at 08:00

## 2021-09-11 RX ADMIN — SUCRALFATE 1 G: 1 TABLET ORAL at 11:44

## 2021-09-11 RX ADMIN — SODIUM CHLORIDE, PRESERVATIVE FREE 10 ML: 5 INJECTION INTRAVENOUS at 11:45

## 2021-09-11 RX ADMIN — MEPERIDINE HYDROCHLORIDE 12.5 MG: 25 INJECTION INTRAMUSCULAR; INTRAVENOUS; SUBCUTANEOUS at 08:21

## 2021-09-11 RX ADMIN — PANTOPRAZOLE SODIUM 40 MG: 40 INJECTION, POWDER, FOR SOLUTION INTRAVENOUS at 11:44

## 2021-09-11 RX ADMIN — POLYETHYLENE GLYCOL 3350 17 G: 17 POWDER, FOR SOLUTION ORAL at 11:45

## 2021-09-11 RX ADMIN — SUCRALFATE 1 G: 1 TABLET ORAL at 06:24

## 2021-09-11 RX ADMIN — MEPERIDINE HYDROCHLORIDE 12.5 MG: 25 INJECTION INTRAMUSCULAR; INTRAVENOUS; SUBCUTANEOUS at 08:26

## 2021-09-11 RX ADMIN — PROPOFOL 20 MG: 10 INJECTION, EMULSION INTRAVENOUS at 08:03

## 2021-09-11 RX ADMIN — SENNOSIDES AND DOCUSATE SODIUM 2 TABLET: 50; 8.6 TABLET ORAL at 11:44

## 2021-09-11 ASSESSMENT — LIFESTYLE VARIABLES: SMOKING_STATUS: 1

## 2021-09-11 ASSESSMENT — PAIN SCALES - GENERAL
PAINLEVEL_OUTOF10: 0
PAINLEVEL_OUTOF10: 8
PAINLEVEL_OUTOF10: 9

## 2021-09-11 NOTE — OP NOTE
[address] [place] [address] [facility]           Operative Report    DATE OF PROCEDURE: 9/11/2021    Kelly Hamm    SURGEON: Mino Hahn MD.     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 History and consent: This is a 45y.o. year old female who is having epigastric pain and vomiting. I have discussed with the patient the indication, alternatives, and the possible risks and/or complications of upper endoscopy and the conscious sedation anesthesia. The patient and/or family understands and agrees to proceed. Monitoring and Safety:    The patient was placed on a cardiac monitor and vital signs, pulse oximetry and level of consciousness were continuously evaluated throughout the procedure. The patient was closely monitored until recovery from the medications was complete and the patient had returned to baseline status. Anesthesia was present at all times during the procedure. OPERATIONS: The patient was placed on the table and sedated while blood pressure, pulse and pulse oximetry were continuously monitored by the anesthesia team. A bite block was placed prior to sedation and the patient was placed in the left lateral decubitus position. A lubricated scope was easily passed into the upper esophagus which looked normal. The distal esophagus looked normal. The scope was passed into the stomach and retroflexed. The gastroesophageal junction was at 39cm. There was no hiatal hernia. The scope was passed down toward the gastrojejunal anastomosis. The gastric mucosa looked normal. The scope was then passed through the anastamosis that was strictured 6 mm in diameter.  The scope was able to pass through after some pressure/maneuvering. The balloon was advanced into the [address] [place] [address] Avenue and dilated to 12mm and held for 30sec. The balloon was deflated and there was some oozing. The scope was then passed through the anastomosis into the jejenum which looked normal. There was a large marginal ulcer just past the anastomosis. Upon visualization of this the bowel was decompressed and the scope was then withdrawn. The patient tolerated the procedure well.          Diet: Bariatric full liquid diet    PLAN:  (1) PPI BID  (2) Carafate 0.5g q4hr   (3) Viscous lidocaine      Further Procedures:  None planned  Follow up in 2-4 weeks    Physician Signature: Electronically signed by Dr. Cholo Fried

## 2021-09-11 NOTE — PROGRESS NOTES
Department of Internal Medicine  Internal Medicine Progress Note    Primary Care Physician: Diane Perez MD   Admitting Physician:  Farhat Larson MD  Admission date and time: 9/10/2021  9:23 AM    Room:  [facility]0314-01  Admitting diagnosis: Abdominal pain [R10.9]  Intractable abdominal pain [R10.9]  Date of Service:9/10/2021    Patient Name: Adriel Watson  MRN: [id number]    Date of Service: 9/11/2021     Reason for consultation: Medical management      History of present illness:    Patient is a 65-year-old female presented to the ED due to abdominal pain. Patient states started about a week ago. Pain is worse with oral intake. Patient does admit to decreased oral intake. She does admit to nausea emesis. She denies any hematemesis or melena. She denies any fever or chills. She denies any shortness of breath or chest pain. 9/11/2020   Patient seen and examined on medical surgical floor. Patient taken to surgery today for an EGJ with balloon dilation of the  GJ. Denies any abdominal or chest pain at this time. Patient though is not really eating much since surgery. Patient states that she is not hungry right now and what they brought her is not what she normally eats. CMP is essentially normal as was the CBC. Temperature is 98.4 with heart rate of 70 and blood pressure was 125/80 and postop 90/44.       PAST MEDICAL Hx:  Past Medical History:   Diagnosis Date    Bipolar 1 disorder ([address]     Depression     Obesity     Osteoarthritis     bilateral knees    Thiamine deficiency     Vitamin D deficiency        PAST SURGICAL Hx:   Past Surgical History:   Procedure Laterality Date    CHOLECYSTECTOMY      ENDOSCOPY, COLON, DIAGNOSTIC      GALLBLADDER SURGERY  1994    JOINT REPLACEMENT Right 06/03/2019    [place] LAP GASTRIC BYPASS/KIERAN-EN-Y N/A 4/30/2018    GASTRIC BYPASS KIERAN-EN-Y LAPAROSCOPIC performed by Caffie Osgood, MD at [address] [place] [address] Right 6/3/2019 RIGHT KNEE TOTAL KNEE ARTHROPLASTY ([address]) performed by Arelis Morales DO at [address] Left 6/24/2020    LEFT KNEE TOTAL ARTHROPLASTY [facility]) performed by Arelis Morales DO at [address] [facility] [address] N/A 10/22/2018    EGD BIOPSY performed by Thomas Kerr MD at [facility] [place] N/A 2/21/2019    EGD ESOPHAGOGASTRODUODENOSCOPY performed by Roberto Landin MD at [facility] [place] N/A 9/16/2019    EGD DILATION BALLOON 14mm performed by Roberto Landin MD at [address] [facility] Hx:  Family History   Problem Relation Age of Onset    Breast Cancer Mother     Heart Disease Mother     Heart Disease Father     Parkinsonism Father     Heart Disease Maternal Grandmother     Cancer Maternal Grandmother     Heart Disease Paternal Grandmother        HOME MEDICATIONS:  Prior to Admission medications    Medication Sig Start Date End Date Taking? Authorizing Provider   pantoprazole (PROTONIX) 40 MG tablet Take 1 tablet by mouth 2 times daily (before meals) 9/11/21  Yes Wade Martins MD   lidocaine viscous hcl (XYLOCAINE) 2 % SOLN solution Take 15 mLs by mouth as needed for Irritation 9/11/21  Yes Wade Martins MD   FLUoxetine (PROZAC) 20 MG capsule Take 20 mg by mouth daily   Yes Historical Provider, MD   FLUoxetine (PROZAC) 40 MG capsule Take 40 mg by mouth daily   Yes Historical Provider, MD   lithium 300 MG capsule Take 300 mg by mouth every morning   Yes Historical Provider, MD   lithium 300 MG capsule Take 900 mg by mouth nightly   Yes Historical Provider, MD   acetaminophen (TYLENOL) 500 MG tablet Take 1,000 mg by mouth every 6 hours as needed for Pain   Yes Historical Provider, MD   sucralfate (CARAFATE) 1 GM tablet Take 1 tablet by mouth 4 times daily 3/12/21  Yes Susanne Michael MD       ALLERGIES:  Patient has no known allergies.     SOCIAL dyspnea. Respiratory:   Denies shortness of breath, coughing, sputum production, hemoptysis, or wheezing. No orthopnea. Gastrointestinal:   Denies nausea, vomiting, diarrhea, or constipation. Denies any abdominal pain. Denies change in bowel habits or stools. Genito-Urinary:    Denies any urgency, frequency, hematuria. Voiding without difficulty. Musculoskeletal:   Denies joint pain, joint stiffness, joint swelling or muscle pain    Neurology:    Denies any headache or focal neurological deficits. No weakness or paresthesia. Derm:    Denies any rashes, ulcers, or excoriations. Denies bruising. Extremities:   Denies any lower extremity swelling or edema. PHYSICAL EXAM: Abnormal findings noted  VITALS:  Vitals:    09/11/21 0900   BP: (!) 90/44   Pulse: 70   Resp: 18   Temp: 98.4 °F (36.9 °C)   SpO2: 97%         CONSTITUTIONAL:    Awake, alert, cooperative, no apparent distress, and appears stated age    EYES:     EOMI, sclera clear without icterus, conjunctiva normal    ENT:    Normocephalic, atraumatic,  External ears without lesions. NECK:    Supple, symmetrical, trachea midline, , no JVD    HEMATOLOGIC/LYMPHATICS:    No cervical lymphadenopathy and no supraclavicular lymphadenopathy    LUNGS:    Symmetric. No increased work of breathing, good air exchange, clear to auscultation bilaterally, no wheezes, rhonchi, or rales,     CARDIOVASCULAR:    Normal apical impulse, regular rate and rhythm, normal S1 and S2, no S3 or S4, and no murmur noted    ABDOMEN:     soft, non-distended, non-tender    MUSCULOSKELETAL:    There is no redness, warmth, or swelling of the joints. NEUROLOGIC:    Awake, alert, oriented to name, place and time. SKIN:    No bruising or bleeding. No redness, warmth, or swelling    EXTREMITIES:    Peripheral pulses present. No edema, cyanosis, or swelling.     LINES/CATHETERS     LABORATORY DATA:  CBC with Differential:    Lab Results   Component Value Date    WBC 7.4 09/11/2021    RBC 4.38 09/11/2021    HGB 12.2 09/11/2021    HCT 37.8 09/11/2021     09/11/2021    MCV 86.3 09/11/2021    MCH 27.9 09/11/2021    MCHC 32.3 09/11/2021    RDW 12.2 09/11/2021    LYMPHOPCT 19.6 09/11/2021    MONOPCT 8.5 09/11/2021    BASOPCT 0.5 09/11/2021    MONOSABS 0.63 09/11/2021    LYMPHSABS 1.45 09/11/2021    EOSABS 0.01 09/11/2021    BASOSABS 0.04 09/11/2021     CMP:    Lab Results   Component Value Date     09/11/2021    K 4.1 09/11/2021    CL 96 09/11/2021    CO2 26 09/11/2021    BUN 9 09/11/2021    CREATININE 0.6 09/11/2021    GFRAA >60 09/11/2021    LABGLOM >60 09/11/2021    GLUCOSE 109 09/11/2021    GLUCOSE 95 10/20/2011    PROT 6.5 09/11/2021    LABALBU 3.2 09/11/2021    LABALBU 4.1 10/20/2011    CALCIUM 8.8 09/11/2021    BILITOT 0.2 09/11/2021    ALKPHOS 81 09/11/2021    AST 13 09/11/2021    ALT 8 09/11/2021       ASSESSMENT/PLAN:  1. Abdominal pain - recurrent large marginal ulcer  2. Gastrojejunal stricture  3. History of Georgette-en-Y gastric bypass  4. Depression  5. Bipolar 1 disorder  6. Obesity  7. Current tobacco abuse    Patient admitted by general surgery. Patient placed on IV Protonix      Home medications reordered. Routine blood work ordered.     Jb Jorge DO  11:40 AM  9/11/2021

## 2021-09-11 NOTE — PLAN OF CARE
Problem: Pain:  Goal: Pain level will decrease  Description: Pain level will decrease  Outcome: Met This Shift  Goal: Control of acute pain  Description: Control of acute pain  Outcome: Met This Shift     Problem: Nausea/Vomiting:  Goal: Absence of nausea/vomiting  Description: Absence of nausea/vomiting  Outcome: Met This Shift  Goal: Able to drink  Description: Able to drink  Outcome: Met This Shift  Goal: Able to eat  Description: Able to eat  Outcome: Met This Shift

## 2021-09-11 NOTE — ANESTHESIA POSTPROCEDURE EVALUATION
Department of Anesthesiology  Postprocedure Note    Patient: Priscila Olea  MRN: [id number]  YOB: 1970  Date of evaluation: 9/11/2021  Time:  0843 AM        Procedure Summary     Date: 09/11/21 Room / Location: [address] [place] [address] / [address] [place], [place] [address]    Anesthesia Start: 0800 Anesthesia Stop: 0820    Procedure: EGD DILATION BALLOON (N/A ) Diagnosis: (emesis)    Surgeons: Robert Robertson MD Responsible Provider: Sarai Li MD    Anesthesia Type: MAC ASA Status: 3 - Emergent          Anesthesia Type: MAC    Luis Phase I:      Luis Phase II: Luis Score: 10    Last vitals: Reviewed and per EMR flowsheets.        Anesthesia Post Evaluation    Patient location during evaluation: PACU  Patient participation: complete - patient participated  Level of consciousness: awake  Pain score: 2  Airway patency: patent  Nausea & Vomiting: no nausea and no vomiting  Complications: no  Cardiovascular status: hemodynamically stable  Respiratory status: acceptable  Hydration status: euvolemic

## 2021-09-11 NOTE — ANESTHESIA PRE PROCEDURE
Department of Anesthesiology  Preprocedure Note       Name:  Priscila Olea   Age:  45 y.o.  :  1970                                          MRN:  [id number]         Date:  2021      Surgeon: Tasneem Rangel):  Robert Robertson MD    Procedure: Procedure(s):  LEFT KNEE TOTAL ARTHROPLASTY [facility])    Medications prior to admission:   Prior to Admission medications    Medication Sig Start Date End Date Taking? Authorizing Provider   FLUoxetine (PROZAC) 20 MG capsule Take 20 mg by mouth daily    Historical Provider, MD   FLUoxetine (PROZAC) 40 MG capsule Take 40 mg by mouth daily    Historical Provider, MD   lithium 300 MG capsule Take 300 mg by mouth every morning    Historical Provider, MD   lithium 300 MG capsule Take 900 mg by mouth nightly    Historical Provider, MD   acetaminophen (TYLENOL) 500 MG tablet Take 1,000 mg by mouth every 6 hours as needed for Pain    Historical Provider, MD   sucralfate (CARAFATE) 1 GM tablet Take 1 tablet by mouth 4 times daily 3/12/21   Chiquita Nino MD       Current medications:    No current facility-administered medications for this visit. No current outpatient medications on file.      Facility-Administered Medications Ordered in Other Visits   Medication Dose Route Frequency Provider Last Rate Last Admin    sodium chloride flush 0.9 % injection 10 mL  10 mL IntraVENous 2 times per day Artelia Pillion, DO        sodium chloride flush 0.9 % injection 10 mL  10 mL IntraVENous PRN Alyssia E Kaercher, DO        0.9 % sodium chloride infusion  25 mL IntraVENous PRN Alyssia E Kaercher, DO        ondansetron (ZOFRAN-ODT) disintegrating tablet 4 mg  4 mg Oral Q8H PRN Alyssia E Kaercher, DO        Or    ondansetron (ZOFRAN) injection 4 mg  4 mg IntraVENous Q6H PRN Alyssia E Kaercher, DO   4 mg at 09/10/21 1847    enoxaparin (LOVENOX) injection 40 mg  40 mg SubCUTAneous Daily Alyssia E Kaercher, DO   40 mg at 09/10/21 1847    acetaminophen (TYLENOL) osteoarthritis of right knee M17.11    Obesity E66.9    Depression F32.9    Status post total right knee replacement Z96.651    Primary osteoarthritis of left knee M17.12    Primary osteoarthritis of one knee, left M17.12    S/P total knee arthroplasty, left Z96.652    Acute gastritis K29.00    Abdominal pain R10.9       Past Medical History:        Diagnosis Date    Bipolar 1 disorder ([address]     Depression     Obesity     Osteoarthritis     bilateral knees    Thiamine deficiency     Vitamin D deficiency        Past Surgical History:        Procedure Laterality Date    CHOLECYSTECTOMY      ENDOSCOPY, COLON, DIAGNOSTIC      GALLBLADDER SURGERY  1994    JOINT REPLACEMENT Right 06/03/2019    [place] LAP GASTRIC BYPASS/KIERAN-EN-Y N/A 4/30/2018    GASTRIC BYPASS KIERAN-EN-Y LAPAROSCOPIC performed by Zak Reyes MD at [address] [facility] Right 6/3/2019    RIGHT KNEE TOTAL KNEE ARTHROPLASTY ([facility]) performed by Claudette Greenspan, DO at [address] [facility] Left 6/24/2020    LEFT KNEE TOTAL ARTHROPLASTY [facility] & NEPH) performed by Claudette Greenspan, DO at [address] 10/22/2018    EGD BIOPSY performed by Hermila Chan MD at [address] [facility] N/A 2/21/2019    EGD ESOPHAGOGASTRODUODENOSCOPY performed by Zak Reyes MD at [address] [facility] N/A 9/16/2019    EGD DILATION BALLOON 14mm performed by Zak Reyes MD at [address] [facility] History:    Social History     Tobacco Use    Smoking status: Current Every Day Smoker     Packs/day: 0.50    Smokeless tobacco: Never Used   Substance Use Topics    Alcohol use: No     Alcohol/week: 0.0 standard drinks                                Ready to quit: Not Answered  Counseling given: Not Answered      Vital Signs (Current): There were no vitals filed for this visit. BP Readings from Last 3 Encounters:   09/11/21 135/80   09/04/21 138/64   03/12/21 (!) 145/84       NPO Status:                                                                                 BMI:   Wt Readings from Last 3 Encounters:   09/10/21 175 lb (79.4 kg)   03/12/21 176 lb (79.8 kg)   03/12/21 176 lb (79.8 kg)     There is no height or weight on file to calculate BMI.    CBC:   Lab Results   Component Value Date    WBC 7.4 09/11/2021    RBC 4.38 09/11/2021    HGB 12.2 09/11/2021    HCT 37.8 09/11/2021    MCV 86.3 09/11/2021    RDW 12.2 09/11/2021     09/11/2021       CMP:   Lab Results   Component Value Date     09/11/2021    K 4.1 09/11/2021    CL 96 09/11/2021    CO2 26 09/11/2021    BUN 9 09/11/2021    CREATININE 0.6 09/11/2021    GFRAA >60 09/11/2021    LABGLOM >60 09/11/2021    GLUCOSE 109 09/11/2021    GLUCOSE 95 10/20/2011    PROT 6.5 09/11/2021    CALCIUM 8.8 09/11/2021    BILITOT 0.2 09/11/2021    ALKPHOS 81 09/11/2021    AST 13 09/11/2021    ALT 8 09/11/2021       POC Tests: No results for input(s): POCGLU, POCNA, POCK, POCCL, POCBUN, POCHEMO, POCHCT in the last 72 hours. Coags:   Lab Results   Component Value Date    PROTIME 13.1 09/11/2021    INR 1.1 09/11/2021    APTT 29.6 06/18/2020       HCG (If Applicable):   Lab Results   Component Value Date    PREGTESTUR NEGATIVE 06/24/2020        ABGs: No results found for: PHART, PO2ART, YUB2AHJ, ATP1WYS, BEART, N1JJGTET     Type & Screen (If Applicable):  No results found for: LABABO, LABRH    Drug/Infectious Status (If Applicable):  No results found for: HIV, HEPCAB    COVID-19 Screening (If Applicable):   Lab Results   Component Value Date    COVID19 Not Detected 06/18/2020         Anesthesia Evaluation  Patient summary reviewed  Airway: Mallampati: II  TM distance: >3 FB   Neck ROM: full  Mouth opening: > = 3 FB Dental:      Comment: Dentition intact, patient denies any loose teeth.     Pulmonary:Negative Pulmonary ROS

## 2021-09-11 NOTE — CONSULTS
Department of Internal Medicine  Internal Medicine Consultation Note    Primary Care Physician: Jeanine Galeano MD   Admitting Physician:  Lauri Echavarria MD  Admission date and time: 9/10/2021  9:23 AM    Room:  [address] [place], [place] [address]  Admitting diagnosis: Abdominal pain [R10.9]  Intractable abdominal pain [R10.9]  Date of Service:9/10/2021    Patient Name: Reese Francis  MRN: [id number]    Date of Service: 9/11/2021     Reason for consultation: Medical management      History of present illness:    Patient is a 29-year-old female presented to the ED due to abdominal pain. Patient states started about a week ago. Pain is worse with oral intake. Patient does admit to decreased oral intake. She does admit to nausea emesis. She denies any hematemesis or melena. She denies any fever or chills. She denies any shortness of breath or chest pain.     PAST MEDICAL Hx:  Past Medical History:   Diagnosis Date    Bipolar 1 disorder ([address]     Depression     Obesity     Osteoarthritis     bilateral knees    Thiamine deficiency     Vitamin D deficiency        PAST SURGICAL Hx:   Past Surgical History:   Procedure Laterality Date    CHOLECYSTECTOMY      ENDOSCOPY, COLON, DIAGNOSTIC      GALLBLADDER SURGERY  1994    JOINT REPLACEMENT Right 06/03/2019    [place] LAP GASTRIC BYPASS/KIERAN-EN-Y N/A 4/30/2018    GASTRIC BYPASS KIERAN-EN-Y LAPAROSCOPIC performed by Clovis Herzog MD at [address] [facility] Right 6/3/2019    RIGHT KNEE TOTAL KNEE ARTHROPLASTY ([facility] & NEPHE[facility]) performed by Howard Carver DO at [address] [facility] [address] Left 6/24/2020    LEFT KNEE TOTAL ARTHROPLASTY [facility] & [facility]) performed by Howard Carver DO at [address] [facility] [address] 10/22/2018    EGD BIOPSY performed by Megan Crane MD at [address] [place] [address] 2/21/2019    EGD ESOPHAGOGASTRODUODENOSCOPY performed by Clovis Herzog MD at [facility] Financial Resource Strain:     Difficulty of Paying Living Expenses:    Food Insecurity:     Worried About Running Out of Food in the Last Year:     [address] in the Last Year:    Transportation Needs:     Lack of Transportation (Medical):  Lack of Transportation (Non-Medical):    Physical Activity:     Days of Exercise per Week:     Minutes of Exercise per Session:    Stress:     Feeling of Stress :    Social Connections:     Frequency of Communication with Friends and Family:     Frequency of Social Gatherings with Friends and Family:     Attends [employer]ian[employer] Services:     Active Member of Clubs or Organizations:     Attends Club or Organization Meetings:     Marital Status:    Intimate Partner Violence:     Fear of Current or Ex-Partner:     Emotionally Abused:     Physically Abused:     Sexually Abused:        ROS: Positive in bold  General:   Denies chills, fatigue, fever, malaise, night sweats or weight loss    Psychological:   Denies anxiety, disorientation or hallucinations    ENT:    Denies epistaxis, headaches, vertigo or visual changes    Cardiovascular:   Denies any chest pain, irregular heartbeats, or palpitations. No paroxysmal nocturnal dyspnea. Respiratory:   Denies shortness of breath, coughing, sputum production, hemoptysis, or wheezing. No orthopnea. Gastrointestinal:   Denies nausea, vomiting, diarrhea, or constipation. Denies any abdominal pain. Denies change in bowel habits or stools. Genito-Urinary:    Denies any urgency, frequency, hematuria. Voiding without difficulty. Musculoskeletal:   Denies joint pain, joint stiffness, joint swelling or muscle pain    Neurology:    Denies any headache or focal neurological deficits. No weakness or paresthesia. Derm:    Denies any rashes, ulcers, or excoriations. Denies bruising. Extremities:   Denies any lower extremity swelling or edema.       PHYSICAL EXAM: Abnormal findings noted  VITALS:  Vitals: 09/10/21 1830   BP: (!) 147/68   Pulse: 66   Resp: 18   Temp: 99 °F (37.2 °C)   SpO2: 98%         CONSTITUTIONAL:    Awake, alert, cooperative, no apparent distress, and appears stated age    EYES:     EOMI, sclera clear without icterus, conjunctiva normal    ENT:    Normocephalic, atraumatic,  External ears without lesions. NECK:    Supple, symmetrical, trachea midline, , no JVD    HEMATOLOGIC/LYMPHATICS:    No cervical lymphadenopathy and no supraclavicular lymphadenopathy    LUNGS:    Symmetric. No increased work of breathing, good air exchange, clear to auscultation bilaterally, no wheezes, rhonchi, or rales,     CARDIOVASCULAR:    Normal apical impulse, regular rate and rhythm, normal S1 and S2, no S3 or S4, and no murmur noted    ABDOMEN:     soft, non-distended, non-tender    MUSCULOSKELETAL:    There is no redness, warmth, or swelling of the joints. NEUROLOGIC:    Awake, alert, oriented to name, place and time. SKIN:    No bruising or bleeding. No redness, warmth, or swelling    EXTREMITIES:    Peripheral pulses present. No edema, cyanosis, or swelling.     LINES/CATHETERS     LABORATORY DATA:  CBC with Differential:    Lab Results   Component Value Date    WBC 6.6 09/10/2021    RBC 5.00 09/10/2021    HGB 14.0 09/10/2021    HCT 43.5 09/10/2021     09/10/2021    MCV 87.0 09/10/2021    MCH 28.0 09/10/2021    MCHC 32.2 09/10/2021    RDW 12.5 09/10/2021    LYMPHOPCT 15.4 09/10/2021    MONOPCT 7.6 09/10/2021    BASOPCT 0.8 09/10/2021    MONOSABS 0.50 09/10/2021    LYMPHSABS 1.01 09/10/2021    EOSABS 0.06 09/10/2021    BASOSABS 0.05 09/10/2021     CMP:    Lab Results   Component Value Date     09/10/2021    K 4.1 09/10/2021    K 5.2 09/04/2021     09/10/2021    CO2 29 09/10/2021    BUN 13 09/10/2021    CREATININE 0.8 09/10/2021    GFRAA >60 09/10/2021    LABGLOM >60 09/10/2021    GLUCOSE 119 09/10/2021    GLUCOSE 95 10/20/2011    PROT 7.7 09/10/2021    LABALBU 3.9 09/10/2021 LABALBU 4.1 10/20/2011    CALCIUM 9.4 09/10/2021    BILITOT 0.2 09/10/2021    ALKPHOS 98 09/10/2021    AST 11 09/10/2021    ALT 8 09/10/2021       ASSESSMENT/PLAN:  1. Abdominal pain with concern for recurrent marginal ulcer  2. History of Georgette-en-Y gastric bypass  3. Depression  4. Bipolar 1 disorder  5. Obesity  6. Current tobacco abuse    Patient admitted by general surgery. Plan for EGD with possible biopsy and dilation. Patient placed on IV Protonix and IV fluids. Internal medicine consulted for medical management. Home medications reordered. Routine blood work ordered.     Sumanth Mccray  4:49 AM  9/11/2021    Electronically signed by Lucia Perales DO on 9/11/21 at 4:49 AM EDT

## 2021-09-21 ENCOUNTER — TELEPHONE (OUTPATIENT)
Dept: BARIATRICS/WEIGHT MGMT | Age: 51
End: 2021-09-21

## 2021-09-21 NOTE — TELEPHONE ENCOUNTER
Pt is lost to f/u, last seen 2019. Pt called stating she had recent visits to ER and wanted to make an appt to see Dr Larisa Arriaga. I offered her an appt for this Friday, 9/24, but pt refused stating she had to work. I then offered her next week, 10/1 and she made an appt and would try to get off from work. I told pt if her symptoms worsened and she wanted to schedule for 9/24, please call and we would fit her in. She understood.

## 2021-12-22 ENCOUNTER — OFFICE VISIT (OUTPATIENT)
Dept: FAMILY MEDICINE CLINIC | Age: 51
End: 2021-12-22
Payer: COMMERCIAL

## 2021-12-22 VITALS
OXYGEN SATURATION: 97 % | DIASTOLIC BLOOD PRESSURE: 80 MMHG | SYSTOLIC BLOOD PRESSURE: 130 MMHG | BODY MASS INDEX: 30.82 KG/M2 | TEMPERATURE: 98.1 F | HEART RATE: 57 BPM | RESPIRATION RATE: 18 BRPM | HEIGHT: 65 IN | WEIGHT: 185 LBS

## 2021-12-22 DIAGNOSIS — Z00.00 ENCOUNTER FOR WELL ADULT EXAM WITHOUT ABNORMAL FINDINGS: Primary | ICD-10-CM

## 2021-12-22 DIAGNOSIS — Z12.12 SCREENING FOR COLORECTAL CANCER: ICD-10-CM

## 2021-12-22 DIAGNOSIS — Z71.89 ACP (ADVANCE CARE PLANNING): ICD-10-CM

## 2021-12-22 DIAGNOSIS — Z23 NEED FOR INFLUENZA VACCINATION: ICD-10-CM

## 2021-12-22 DIAGNOSIS — E60 ZINC DEFICIENCY: ICD-10-CM

## 2021-12-22 DIAGNOSIS — R73.9 HYPERGLYCEMIA: ICD-10-CM

## 2021-12-22 DIAGNOSIS — Z87.891 PERSONAL HISTORY OF TOBACCO USE, PRESENTING HAZARDS TO HEALTH: ICD-10-CM

## 2021-12-22 DIAGNOSIS — Z12.11 SCREENING FOR COLORECTAL CANCER: ICD-10-CM

## 2021-12-22 DIAGNOSIS — Z12.31 ENCOUNTER FOR SCREENING MAMMOGRAM FOR BREAST CANCER: ICD-10-CM

## 2021-12-22 DIAGNOSIS — E78.49 OTHER HYPERLIPIDEMIA: ICD-10-CM

## 2021-12-22 DIAGNOSIS — F31.9 BIPOLAR 1 DISORDER (HCC): ICD-10-CM

## 2021-12-22 LAB
CHOLESTEROL, TOTAL: 217 MG/DL (ref 0–199)
HBA1C MFR BLD: 5.3 % (ref 4–5.6)
HDLC SERPL-MCNC: 58 MG/DL
LDL CHOLESTEROL CALCULATED: 144 MG/DL (ref 0–99)
TRIGL SERPL-MCNC: 74 MG/DL (ref 0–149)
VLDLC SERPL CALC-MCNC: 15 MG/DL

## 2021-12-22 PROCEDURE — G8482 FLU IMMUNIZE ORDER/ADMIN: HCPCS | Performed by: FAMILY MEDICINE

## 2021-12-22 PROCEDURE — G0008 ADMIN INFLUENZA VIRUS VAC: HCPCS | Performed by: FAMILY MEDICINE

## 2021-12-22 PROCEDURE — 90674 CCIIV4 VAC NO PRSV 0.5 ML IM: CPT | Performed by: FAMILY MEDICINE

## 2021-12-22 PROCEDURE — 99396 PREV VISIT EST AGE 40-64: CPT | Performed by: FAMILY MEDICINE

## 2021-12-22 RX ORDER — LURASIDONE HYDROCHLORIDE 40 MG/1
TABLET, FILM COATED ORAL
Qty: 30 TABLET | Refills: 11
Start: 2021-12-22 | End: 2022-12-22

## 2021-12-22 RX ORDER — LURASIDONE HYDROCHLORIDE 40 MG/1
TABLET, FILM COATED ORAL
COMMUNITY
Start: 2021-12-13 | End: 2021-12-22 | Stop reason: SDUPTHER

## 2021-12-22 SDOH — ECONOMIC STABILITY: FOOD INSECURITY: WITHIN THE PAST 12 MONTHS, YOU WORRIED THAT YOUR FOOD WOULD RUN OUT BEFORE YOU GOT MONEY TO BUY MORE.: NEVER TRUE

## 2021-12-22 SDOH — ECONOMIC STABILITY: FOOD INSECURITY: WITHIN THE PAST 12 MONTHS, THE FOOD YOU BOUGHT JUST DIDN'T LAST AND YOU DIDN'T HAVE MONEY TO GET MORE.: NEVER TRUE

## 2021-12-22 ASSESSMENT — ENCOUNTER SYMPTOMS
ABDOMINAL PAIN: 0
BLOOD IN STOOL: 0
COUGH: 0
SORE THROAT: 0
NAUSEA: 0
DIARRHEA: 0
BACK PAIN: 0
VOMITING: 0
CONSTIPATION: 0
WHEEZING: 0
SHORTNESS OF BREATH: 0

## 2021-12-22 ASSESSMENT — SOCIAL DETERMINANTS OF HEALTH (SDOH)
HOW HARD IS IT FOR YOU TO PAY FOR THE VERY BASICS LIKE FOOD, HOUSING, MEDICAL CARE, AND HEATING?: NOT VERY HARD
HOW HARD IS IT FOR YOU TO PAY FOR THE VERY BASICS LIKE FOOD, HOUSING, MEDICAL CARE, AND HEATING?: SOMEWHAT HARD

## 2021-12-22 NOTE — PATIENT INSTRUCTIONS
Learning About Healthy Weight  What is a healthy weight? A healthy weight is the weight at which you feel good about yourself and have energy for work and play. It's also one that lowers your risk for health problems. What can you do to stay at a healthy weight? It can be hard to stay at a healthy weight, especially when fast food, vending-machine snacks, and processed foods are so easy to find. And with your busy lifestyle, activity may be low on your list of things to do. But staying at a healthy weight may be easier than you think. Here are some dos and don'ts for staying at a healthy weight. Do eat healthy foods  The kinds of foods you eat have a big impact on both your weight and your health. Reaching and staying at a healthy weight is not about going on a diet. It's about making healthier food choices every day and changing your diet for good. Healthy eating means eating a variety of foods so that you get all the nutrients you need. Your body needs protein, carbohydrate, and fats for energy. They keep your heart beating, your brain active, and your muscles working. On most days, try to eat from each food group. This means eating a variety of:  · Whole grains, such as whole wheat breads and pastas. · Fruits and vegetables. · Dairy products, such as low-fat milk, yogurt, and cheese. · Lean proteins, such as all types of fish, chicken without the skin, and beans. Don't have too much or too little of one thing. All foods, if eaten in moderation, can be part of healthy eating. Even sweets can be okay. If your favorite foods are high in fat, salt, sugar, or calories, limit how often you eat them. Eat smaller servings, or look for healthy substitutes. Do watch what you eat  Many people eat more than their bodies need. Part of staying at a healthy weight means learning how much food you really need from day to day and not eating more than that.  Even with healthy foods, eating too much can make you gain weight. Having a well-balanced diet means that you eat enough, but not too much, and that your food gives you the nutrients you need to stay healthy. So listen to your body. Eat when you're hungry. Stop when you feel satisfied. It's a good idea to have healthy snacks ready for when you get hungry. Keep healthy snacks with you at work, in your car, and at home. If you have a healthy snack easily available, you'll be less likely to pick a candy bar or bag of chips from a vending machine instead. Some healthy snacks you might want to keep on hand are fruit, low-fat yogurt, string cheese, low-fat microwave popcorn, raisins and other dried fruit, nuts, whole wheat crackers, pretzels, carrots, celery sticks, and broccoli. Do some physical activity  A big part of reaching and staying at a healthy weight is being active. When you're active, you burn calories. This makes it easier to reach and stay at a healthy weight. When you're active on a regular basis, your body burns more calories, even when you're at rest. Being active helps you lose fat and build lean muscle. Try to be active for at least 1 hour every day. This may sound like a lot, but it's okay to be active in smaller blocks of time that add up to 1 hour a day. Any activity that makes your heart beat faster and keeps it there for a while counts. A brisk walk, run, or swim will get your heart beating faster. So will climbing stairs, shooting baskets, or cycling. Even some household chores like vacuuming and mowing the lawn will get your heart rate up. Pick activities that you enjoy--ones that make your heart beat faster, your muscles stronger, and your muscles and joints more flexible. If you find more than one thing you like doing, do them all. You don't have to do the same thing every day. Don't diet  Diets don't work. Diets are temporary. Because you give up so much when you diet, you may be hungry and think about food all the time.  And after you stop dieting, you also may overeat to make up for what you missed. Most people who diet end up gaining back the pounds they lost--and more. Remember that healthy bodies come in lots of shapes and sizes. Everyone can get healthier by eating better and being more active. Where can you learn more? Go to https://chpepiceweb.healthBVfon Telecommunication. org and sign in to your G[employer]sto account. Enter 106 [id number] in the Ready box to learn more about \"Learning About Healthy Weight. \"     If you do not have an account, please click on the \"Sign Up Now\" link. Current as of: March 17, 2021               Content Version: 13.1  © 2006-2021 Scripped. Care instructions adapted under license by [facility] ([facility]). If you have questions about a medical condition or this instruction, always ask your healthcare professional. [address] any warranty or liability for your use of this information. Eating Healthy Foods: Care Instructions  Your Care Instructions     Eating healthy foods can help lower your risk for disease. Healthy food gives you energy and keeps your heart strong, your brain active, your muscles working, and your bones strong. A healthy diet includes a variety of foods from the basic food groups: grains, vegetables, fruits, milk and milk products, and meat and beans. Some people may eat more of their favorite foods from only one food group and, as a result, miss getting the nutrients they need. So, it is important to pay attention not only to what you eat but also to what you are missing from your diet. You can eat a healthy, balanced diet by making a few small changes. Follow-up care is a key part of your treatment and safety. Be sure to make and go to all appointments, and call your doctor if you are having problems. It's also a good idea to know your test results and keep a list of the medicines you take. How can you care for yourself at home?   Look at what you eat  · Keep a food diary for a week or two and record everything you eat or drink. Track the number of servings you eat from each food group. · For a balanced diet every day, eat a variety of:  ? 6 or more ounce-equivalents of grains, such as cereals, breads, crackers, rice, or pasta, every day. An ounce-equivalent is 1 slice of bread, 1 cup of ready-to-eat cereal, or ½ cup of cooked rice, cooked pasta, or cooked cereal.  ? 2½ cups of vegetables, especially:  § Dark-green vegetables such as broccoli and spinach. § Orange vegetables such as carrots and sweet potatoes. § Dry beans (such as carreon and kidney beans) and peas (such as lentils). ? 2 cups of fresh, frozen, or canned fruit. A small apple or 1 banana or orange equals 1 cup. ? 3 cups of nonfat or low-fat milk, yogurt, or other milk products. ? 5½ ounces of meat and beans, such as chicken, fish, lean meat, beans, nuts, and seeds. One egg, 1 tablespoon of peanut butter, ½ ounce nuts or seeds, or ¼ cup of cooked beans equals 1 ounce of meat. · Learn how to read food labels for serving sizes and ingredients. Fast-food and convenience-food meals often contain few or no fruits or vegetables. Make sure you eat some fruits and vegetables to make the meal more nutritious. · Look at your food diary. For each food group, add up what you have eaten and then divide the total by the number of days. This will give you an idea of how much you are eating from each food group. See if you can find some ways to change your diet to make it more healthy. Start small  · Do not try to make dramatic changes to your diet all at once. You might feel that you are missing out on your favorite foods and then be more likely to fail. · Start slowly, and gradually change your habits. Try some of the following:  ? Use whole wheat bread instead of white bread. ? Use nonfat or low-fat milk instead of whole milk.   ? Eat brown rice instead of white rice, and eat whole wheat pasta instead of white-flour pasta. ? Try low-fat cheeses and low-fat yogurt. ? Add more fruits and vegetables to meals and have them for snacks. ? Add lettuce, tomato, cucumber, and onion to sandwiches. ? Add fruit to yogurt and cereal.  Enjoy food  · You can still eat your favorite foods. You just may need to eat less of them. If your favorite foods are high in fat, salt, and sugar, limit how often you eat them, but do not cut them out entirely. · Eat a wide variety of foods. Make healthy choices when eating out  · The type of restaurant you choose can help you make healthy choices. Even fast-food chains are now offering more low-fat or healthier choices on the menu. · Choose smaller portions, or take half of your meal home. · When eating out, try:  ? A veggie pizza with a whole wheat crust or grilled chicken (instead of sausage or pepperoni). ? Pasta with roasted vegetables, grilled chicken, or marinara sauce instead of cream sauce. ? A vegetable wrap or grilled chicken wrap. ? Broiled or poached food instead of fried or breaded items. Make healthy choices easy  · Buy packaged, prewashed, ready-to-eat fresh vegetables and fruits, such as baby carrots, salad mixes, and chopped or shredded broccoli and cauliflower. · Buy packaged, presliced fruits, such as melon or pineapple. · Choose 100% fruit or vegetable juice instead of soda. Limit juice intake to 4 to 6 oz (½ to ¾ cup) a day. · Blend low-fat yogurt, fruit juice, and canned or frozen fruit to make a smoothie for breakfast or a snack. Where can you learn more? Go to https://StarShooterpepiceweb.3Guppies. org and sign in to your Clipsure account. Enter B993 in the LoanHero box to learn more about \"Eating Healthy Foods: Care Instructions. \"     If you do not have an account, please click on the \"Sign Up Now\" link. Current as of: September 8, 2021               Content Version: 13.1  © 5342-8839 Healthwise, Incorporated.    Care instructions can you care for yourself at home? · Ask your family, friends, and coworkers for support. You have a better chance of quitting if you have help and support. · Join a support group, such as Nicotine Anonymous, for people who are trying to quit smoking. · Consider signing up for a smoking cessation program, such as the American Lung Association's Freedom from Smoking program.  · Get text messaging support. Go to the website at www.smokefree. gov to sign up for the [facility] program.  · Set a quit date. Pick your date carefully so that it is not right in the middle of a big deadline or stressful time. Once you quit, do not even take a puff. Get rid of all ashtrays and lighters after your last cigarette. Clean your house and your clothes so that they do not smell of smoke. · Learn how to be a nonsmoker. Think about ways you can avoid those things that make you reach for a cigarette. ? Avoid situations that put you at greatest risk for smoking. For some people, it is hard to have a drink with friends without smoking. For others, they might skip a coffee break with coworkers who smoke. ? Change your daily routine. Take a different route to work or eat a meal in a different place. · Cut down on stress. Calm yourself or release tension by doing an activity you enjoy, such as reading a book, taking a hot bath, or gardening. · Talk to your doctor or pharmacist about nicotine replacement therapy, which replaces the nicotine in your body. You still get nicotine but you do not use tobacco. Nicotine replacement products help you slowly reduce the amount of nicotine you need. These products come in several forms, many of them available over-the-counter:  ? Nicotine patches  ? Nicotine gum and lozenges  ? Nicotine inhaler  · Ask your doctor about bupropion (Wellbutrin) or varenicline (Chantix), which are prescription medicines. They do not contain nicotine.  They help you by reducing withdrawal symptoms, such as stress and anxiety. · Some people find hypnosis, acupuncture, and massage helpful for ending the smoking habit. · Eat a healthy diet and get regular exercise. Having healthy habits will help your body move past its craving for nicotine. · Be prepared to keep trying. Most people are not successful the first few times they try to quit. Do not get mad at yourself if you smoke again. Make a list of things you learned and think about when you want to try again, such as next week, next month, or next year. Where can you learn more? Go to https://MyMedLeads.comdes.Instant Information. org and sign in to your [employer] account. Enter S776 in the [employer] box to learn more about \"Stopping Smoking: Care Instructions. \"     If you do not have an account, please click on the \"Sign Up Now\" link. Current as of: February 11, 2021               Content Version: 13.1  © 2006-2021 NIMBOXX. Care instructions adapted under license by [facility] ([facility]). If you have questions about a medical condition or this instruction, always ask your healthcare professional. [place] [address] any warranty or liability for your use of this information. Learning About Benefits From Quitting Smoking  How does quitting smoking make you healthier? If you're thinking about quitting smoking, you may have a few reasons to be smoke-free. Your health may be one of them. · When you quit smoking, you lower your risks for cancer, lung disease, heart attack, stroke, blood vessel disease, and blindness from macular degeneration. · When you're smoke-free, you get sick less often, and you heal faster. You are less likely to get colds, flu, bronchitis, and pneumonia. · As a nonsmoker, you may find that your mood is better and you are less stressed. When and how will you feel healthier? Quitting has real health benefits that start from day 1 of being smoke-free.  And the longer you stay smoke-free, the healthier you get and the better you feel. The first hours  · After just 20 minutes, your blood pressure and heart rate go down. That means there's less stress on your heart and blood vessels. · Within 12 hours, the level of carbon monoxide in your blood drops back to normal. That makes room for more oxygen. With more oxygen in your body, you may notice that you have more energy than when you smoked. After 2 weeks  · Your lungs start to work better. · Your risk of heart attack starts to drop. After 1 month  · When your lungs are clear, you cough less and breathe deeper, so it's easier to be active. · Your sense of taste and smell return. That means you can enjoy food more than you have since you started smoking. Over the years  · Over the years, your risks of heart disease, heart attack, and stroke are lower. · After 10 years, your risk of dying from lung cancer is cut by about half. And your risk for many other types of cancer is lower too. How would quitting help others in your life? When you quit smoking, you improve the health of everyone who now breathes in your smoke. · Their heart, lung, and cancer risks drop, much like yours. · They are sick less. For babies and small children, living smoke-free means they're less likely to have ear infections, pneumonia, and bronchitis. · If you're a woman who is or will be pregnant someday, quitting smoking means a healthier . · Children who are close to you are less likely to become adult smokers. Where can you learn more? Go to https://Anyadir Educationdes.healthMisohoni. org and sign in to your WeShop account. Enter [id number] in the [facility] box to learn more about \"Learning About Benefits From Quitting Smoking. \"     If you do not have an account, please click on the \"Sign Up Now\" link. Current as of: 2021               Content Version: 13.1  © 5904-8502 Healthwise, Incorporated. Care instructions adapted under license by [facility] ([facility]).  If you have questions about a medical condition or this instruction, always ask your healthcare professional. [place] [address] any warranty or liability for your use of this information. Well Visit, Women 48 to 72: Care Instructions  Overview     Well visits can help you stay healthy. Your doctor has checked your overall health and may have suggested ways to take good care of yourself. Your doctor also may have recommended tests. At home, you can help prevent illness with healthy eating, regular exercise, and other steps. Follow-up care is a key part of your treatment and safety. Be sure to make and go to all appointments, and call your doctor if you are having problems. It's also a good idea to know your test results and keep a list of the medicines you take. How can you care for yourself at home? · Get screening tests that you and your doctor decide on. Screening helps find diseases before any symptoms appear. · Eat healthy foods. Choose fruits, vegetables, whole grains, protein, and low-fat dairy foods. Limit fat, especially saturated fat. Reduce salt in your diet. · Limit alcohol. Have no more than 1 drink a day or 7 drinks a week. · Get at least 30 minutes of exercise on most days of the week. Walking is a good choice. You also may want to do other activities, such as running, swimming, cycling, or playing tennis or team sports. · Reach and stay at a healthy weight. This will lower your risk for many problems, such as obesity, diabetes, heart disease, and high blood pressure. · Do not smoke. Smoking can make health problems worse. If you need help quitting, talk to your doctor about stop-smoking programs and medicines. These can increase your chances of quitting for good. · Care for your mental health. It is easy to get weighed down by worry and stress. Learn strategies to manage stress, like deep breathing and mindfulness, and stay connected with your family and community.  If you find you often feel sad or hopeless, talk with your doctor. Treatment can help. · Talk to your doctor about whether you have any risk factors for sexually transmitted infections (STIs). You can help prevent STIs if you wait to have sex with a new partner (or partners) until you've each been tested for STIs. It also helps if you use condoms (male or female condoms) and if you limit your sex partners to one person who only has sex with you. Vaccines are available for some STIs. · If you think you may have a problem with alcohol or drug use, talk to your doctor. This includes prescription medicines (such as amphetamines and opioids) and illegal drugs (such as cocaine and methamphetamine). Your doctor can help you figure out what type of treatment is best for you. · Protect your skin from too much sun. When you're outdoors from 10 a.m. to 4 p.m., stay in the shade or cover up with clothing and a hat with a wide brim. Wear sunglasses that block UV rays. Even when it's cloudy, put broad-spectrum sunscreen (SPF 30 or higher) on any exposed skin. · See a dentist one or two times a year for checkups and to have your teeth cleaned. · Wear a seat belt in the car. When should you call for help? Watch closely for changes in your health, and be sure to contact your doctor if you have any problems or symptoms that concern you. Where can you learn more? Go to https://Mimvides.healthLoopMepartners. org and sign in to your Ecommo account. Enter Q324 in the [facility] box to learn more about \"Well Visit, Women 50 to 72: Care Instructions. \"     If you do not have an account, please click on the \"Sign Up Now\" link. Current as of: October 6, 2021               Content Version: 13.1  © 2006-2021 Healthwise, Incorporated. Care instructions adapted under license by [facility] ([facility]).  If you have questions about a medical condition or this instruction, always ask your healthcare professional. Colten Higginbotham disclaims any warranty or liability for your use of this information. Learning About Changing a Habit by Setting Goals  How can you change a habit? If you've decided to change a habit--whether it's quitting smoking, lowering your blood pressure, becoming more active, or doing something else to improve your health--congratulations! Making that decision is the first step toward making a change. What happens next? Have a reason. Set goals you can reach. Prepare for slip-ups. And get support. What's your reason? Your reason for wanting to change a habit is really important. Maybe you want to quit smoking so that you can avoid future health problems. Or maybe you want to eat a healthier diet so you can lose weight. If you have high blood pressure, your reason may be clear: to lower your blood pressure. Maybe you smoke and want to save money on cigarettes. You need to feel ready to make a change. If you don't feel ready now, that's okay. You can still be thinking and planning. When you truly want to make changes, you're ready for the next step. It's not easy to change habits--but you can do it. Taking the time to really think about what will motivate or inspire you will help you reach your goals. How do you set goals? Setting goals can help a lot when you're trying to make a healthy change. · Focus on small goals. This will help you reach larger goals over time. With smaller goals, you'll have success more often, which will help you stay with it. For example, your large goal may be to lose 20 pounds. Your small goal could be to lose 5.  · Write down your goals. This will help you remember, and you'll have a clearer idea of what you want to achieve. Use a journal or notebook to record your goals. Hang up your plan where you will see it often as a reminder of what you're trying to do. · Make your goals specific. Specific goals help you measure your progress.  For example, setting a goal to eat one groups. · Encourage yourself. When you feel like giving up, don't waste energy feeling bad about yourself. Remember your reason for wanting to change, think about the progress you've made, and give yourself a pep talk and a pat on the back. · Get professional help. A dietitian can help you make your diet healthier while still allowing you to eat foods that you enjoy. A  or physical therapist can help design an exercise program that is fun and easy to stay on. A counselor, a , or your doctor can help you overcome hurdles, reduce stress, or quit smoking. Where can you learn more? Go to https://SANDOWpeappMobieb.TinyMob Games. org and sign in to your Codility account. Enter M326 in the CosmEthics box to learn more about \"Learning About Changing a Habit by Setting Goals. \"     If you do not have an account, please click on the \"Sign Up Now\" link. Current as of: June 16, 2021               Content Version: 13.1  © 2006-2021 Corpora. Care instructions adapted under license by [facility] ([facility]). If you have questions about a medical condition or this instruction, always ask your healthcare professional. [place] [address] any warranty or liability for your use of this information. A Healthy Lifestyle: Care Instructions  Your Care Instructions     A healthy lifestyle can help you feel good, stay at a healthy weight, and have plenty of energy for both work and play. A healthy lifestyle is something you can share with your whole family. A healthy lifestyle also can lower your risk for serious health problems, such as high blood pressure, heart disease, and diabetes. You can follow a few steps listed below to improve your health and the health of your family. Follow-up care is a key part of your treatment and safety. Be sure to make and go to all appointments, and call your doctor if you are having problems.  It's also a good idea to know your test results and keep a list of the medicines you take. How can you care for yourself at home? · Do not eat too much sugar, fat, or fast foods. You can still have dessert and treats now and then. The goal is moderation. · Start small to improve your eating habits. Pay attention to portion sizes, drink less juice and soda pop, and eat more fruits and vegetables. ? Eat a healthy amount of food. A 3-ounce serving of meat, for example, is about the size of a deck of cards. Fill the rest of your plate with vegetables and whole grains. ? Limit the amount of soda and sports drinks you have every day. Drink more water when you are thirsty. ? Eat plenty of fruits and vegetables every day. Have an apple or some carrot sticks as an afternoon snack instead of a candy bar. Try to have fruits and/or vegetables at every meal.  · Make exercise part of your daily routine. You may want to start with simple activities, such as walking, bicycling, or slow swimming. Try to be active 30 to 60 minutes every day. You do not need to do all 30 to 60 minutes all at once. For example, you can exercise 3 times a day for 10 or 20 minutes. Moderate exercise is safe for most people, but it is always a good idea to talk to your doctor before starting an exercise program.  · Keep moving. Caitlyn Keepers the lawn, work in the garden, or Anna Lozabai. Take the stairs instead of the elevator at work. · If you smoke, quit. People who smoke have an increased risk for heart attack, stroke, cancer, and other lung illnesses. Quitting is hard, but there are ways to boost your chance of quitting tobacco for good. ? Use nicotine gum, patches, or lozenges. ? Ask your doctor about stop-smoking programs and medicines. ? Keep trying.   In addition to reducing your risk of diseases in the future, you will notice some benefits soon after you stop using tobacco. If you have shortness of breath or asthma symptoms, they will likely get better within a few weeks after you quit.  · Limit how much alcohol you drink. Moderate amounts of alcohol (up to 2 drinks a day for men, 1 drink a day for women) are okay. But drinking too much can lead to liver problems, high blood pressure, and other health problems. Family health  If you have a family, there are many things you can do together to improve your health. · Eat meals together as a family as often as possible. · Eat healthy foods. This includes fruits, vegetables, lean meats and dairy, and whole grains. · Include your family in your fitness plan. Most people think of activities such as jogging or tennis as the way to fitness, but there are many ways you and your family can be more active. Anything that makes you breathe hard and gets your heart pumping is exercise. Here are some tips:  ? Walk to do errands or to take your child to school or the bus.  ? Go for a family bike ride after dinner instead of watching TV. Where can you learn more? Go to https://Seva CoffeepeSuperb.MeSixty. org and sign in to your [employer] account. Enter U866 in the E Ink box to learn more about \"A Healthy Lifestyle: Care Instructions. \"     If you do not have an account, please click on the \"Sign Up Now\" link. Current as of: June 16, 2021               Content Version: 13.1  © 1298-2423 Healthwise, Incorporated. Care instructions adapted under license by [facility] ([facility]). If you have questions about a medical condition or this instruction, always ask your healthcare professional. [place] [address] any warranty or liability for your use of this information.       WELL ADULT LIFESTYLE INSTRUCTIONS:    Nella Funk a day in the next week to spend an hour reviewing the information below then:     1) determine your health goals for the year   2) determine what changes you need to achieve those goals   3) design your daily routine, shopping habits etc to implement those changes        Default Right Action (no choices)       Make it EASY to do the RIGHT THINGS. 4) I invite you to send me your plans via [employer] so I can continue to help you       with them    Examine your lifestyle with an emphasis on BARRIERS to bad and good habits and how you can design your life to make better choices. If you want to feel better these are the FUNDAMENTAL PILLARS of Wellness:    1)  You can choose to Get 150 min/week of moderate exercise (can talk but can't        sing) or 75 min/week of vigorous exercise (can't talk). This will enhance your sense of well being (Exercise is as good as medicine for   depression.)    2)  You can choose to Get 7-9 hours of sleep per night    Detoxifies your brain, reduces risk of dementia    3)  You can choose to Strength Train 2 x a week on non-consecutive days   This will improve function and reduce risk of injury. Body weight type exercises   such as Yoga and Pilates are excellent choices. 4)  You can choose Good Nutrition. Only eat your goal weight (in lbs) x 10        calories/day and get 5 servings of Vegetables/day   Plant based diets reduce risk of heart attack/stroke and will help you feel full on   less food. Avoid highly processed foods and processed carbohydrates. 5)  You can choose Moderate alcohol intake < 1-2 drinks/day   Alcohol will disrupt your sleep and add calories to your day. 6)  You can choose to Develop a Charismatic/Supportive relationship. This will strengthen your resilience for the ups and downs. 7)  You can choose to Practice Mindfulness. An hour a day of prayer/meditation/gratitude will change your life! If you are trying to lose weight, here are some recommendations for weight loss:  Not every weight loss program is appropriate for everybody. ..  good online sources include Noom (more social with daily check ins), Lifesum (similar but less social) and Naturally slim, as well as Brandneu ($1500)    The GI Diet or \"Primal diet\", Intermittent fasting can also be effective choices. If you have diabetes treated with insulin be sure to ask for specific guidance around meals. Take your desired weight in pounds and multiply by 10 and that is your average daily calorie allowance. For example if you wish to weigh 170 lb x 10 = 1700 francia/day (this is how to gradually lose the weight and maintain your desired weight). Avoid soda/coke and all \"wet carbs\" => Drink ice water instead    Drink a large glass of ice water before meals and EAT SLOWLY (talk while you eat)! Rethink your hunger => it means your losing weight. Minimize highly processed carbohydrates as they stimulate your appetite:  Specifically cut back on Bread, Rice, Pasta and Potatoes    Avoid eating calories after 6 pm        Preventing Osteoporosis: After Your Visit  Your Care Instructions  Osteoporosis means the bones are weak and thin enough that they can break easily. The older you are, the more likely you are to get osteoporosis. But with plenty of calcium, vitamin D, and exercise, you can help prevent osteoporosis. The preteen and teen years are a key time for bone building. With the help of calcium, vitamin D, and exercise in those early years and beyond, the bones reach their peak density and strength by age 27. After age 27, your bones naturally start to thin and weaken. The stronger your bones are at around age 27, the lower your risk for osteoporosis. But no matter what your age and risk are, your bones still need calcium, vitamin D, and exercise to stay strong. Also avoid smoking, and limit alcohol. Smoking and heavy alcohol use can make your bones thinner. Talk to your doctor about any special risks you might have, such as having a close relative with osteoporosis or taking a medicine that can weaken bones. Your doctor can tell you the best ways to protect your bones from thinning. Follow-up care is a key part of your treatment and safety.  Be sure to make and go to all appointments, and call your doctor if you are having problems. It's also a good idea to know your test results and keep a list of the medicines you take. How can you care for yourself at home? · Get enough calcium and vitamin D. The [place] of Medicine recommends adults younger than age 46 need 1,000 mg of calcium and 600 IU of vitamin D each day. Women ages 46 to 79 need 1,200 mg of calcium and 600 IU of vitamin D each day. Men ages 46 to 79 need 1,000 mg of calcium and 600 IU of vitamin D each day. Adults 71 and older need 1,200 mg of calcium and 800 IU of vitamin D each day. ¨ Eat foods rich in calcium, like yogurt, cheese, milk, and dark green vegetables. ¨ Eat foods rich in vitamin D, like eggs, fatty fish, cereal, and fortified milk. ¨ Get some sunshine. Your body uses sunshine to make its own vitamin D. The safest time to be out in the sun is before 10 a.m. or after 3 p.m. Avoid getting sunburned. Sunburn can increase your risk of skin cancer. ¨ Talk to your doctor about taking a calcium plus vitamin D supplement. Ask about what type of calcium is right for you, and how much to take at a time. Adults ages 23 to 48 should not get more than 2,500 mg of calcium and 4,000 IU of vitamin D each day, whether it is from supplements and/or food. Adults ages 46 and older should not get more than 2,000 mg of calcium and 4,000 IU of vitamin D each day from supplements and/or food. · Get regular bone-building exercise. Weight-bearing and resistance exercises keep bones healthy by working the muscles and bones against gravity. Start out at an exercise level that feels right for you. Add a little at a time until you can do the following:  ¨ Do 30 minutes of weight-bearing exercise on most days of the week. Walking, jogging, stair climbing, and dancing are good choices. ¨ Do resistance exercises with weights or elastic bands 2 to 3 days a week. · Limit alcohol. Drink no more than 1 alcohol drink a day if you are a woman.  Drink no more than 2 alcohol drinks a day if you are a man. · Do not smoke. Smoking can make bones thin faster. If you need help quitting, talk to your doctor about stop-smoking programs and medicines. These can increase your chances of quitting for good. When should you call for help? Watch closely for changes in your health, and be sure to contact your doctor if:  · You need help with a healthy eating plan. · You need help with an exercise plan    © 9474-4656 [employer]. Care instructions adapted under license by [facility]. This care instruction is for use with your licensed healthcare professional. If you have questions about a medical condition or this instruction, always ask your healthcare professional. [place] [address] any warranty or liability for your use of this information. Content Version: 9.4.10981; Last Revised: June 20, 2011                  Keep Your Memory Ancil Guardian       Many factors can affect your ability to remembera hectic lifestyle, aging, stress, chronic disease, and certain medicines. But, there are steps you can take to sharpen your mind and help preserve your memory. Challenge Your Brain   Regularly challenging your mind may help keeps it in top shape. Good mental exercises include:   · Crossword puzzlesUse a dictionary if you need it; you will learn more that way. · Brainteasers Try some! · Crafts, such as wood working and sewing   · Hobbies, such as gardening and building model airplanes   · [facility] old friends or join groups to meet new ones.    · Reading   · Learning a new language   · Taking a class, whether it be art history or jay chi   · TravelingExperience the food, history, and culture of your destination   · Learning to use a computer   · Going to museums, the theater, or thought-provoking movies   · Changing things in your daily life, such as reversing your pattern in the grocery store or brushing your teeth using your nondominant hand Use Memory Aids   There is no need to remember every detail on your own. These memory aids can help:   · Calendars and day planners   · Electronic organizers to store all sorts of helpful informationThese devices can \"beep\" to remind you of appointments. · A book of days to record birthdays, anniversaries, and other occasions that occur on the same date every year   · Detailed \"to-do\" lists and strategically placed sticky notes   · Quick \"study\" sessionsBefore a gathering, review who will be there so their names will be fresh in your mind. · Establish routinesFor example, keep your keys, wallet, and umbrella in the same place all the time or take medicine with your 8:00 AM glass of juice   Live a Healthy Life   Many actions that will keep your body strong will do the same for your mind. For example:   Talk to Your Doctor About Herbs and Supplements    Malnutrition and vitamin deficiencies can impair your mental function. For example, vitamin B12 deficiency can cause a range of symptoms, including confusion. But, what if your nutritional needs are being met? Can herbs and supplements still offer a benefit? Researchers have investigated a range of natural remedies, such as ginkgo , ginseng , and the supplement phosphatidylserine (PS). So far, though, the evidence is inconsistent as to whether these products can improve memory or thinking. If you are interested in taking herbs and supplements, talk to your doctor first because they may interact with other medicines that you are taking. Exercise Regularly    Among the many benefits of regular exercise are increased blood flow to the brain and decreased risk of certain diseases that can interfere with memory function. One study found that even moderate exercise has a beneficial effect.  Examples of \"moderate\" exercise include:   · Playing 18 holes of golf once a week, without a cart   · Playing tennis twice a week   · Walking one mile per day   Manage Stress It can be tough to remember what is important when your mind is cluttered. Make time for relaxation. Choose activities that calm you down, and make it routine. Manage Chronic Conditions    Side effects of high blood pressure , diabetes, and heart disease can interfere with mental function. Many of the lifestyle steps discussed here can help manage these conditions. Strive to eat a healthy diet, exercise regularly, get stress under control, and follow your doctor's advice for your condition. Minimize Medications    Talk to your doctor about the medicines that you take. Some may be unnecessary. Also, healthy lifestyle habits may lower the need for certain drugs. Last Reviewed: April 2010 Maritza Adamson MD   Updated: 4/13/2010       Keeping [facility]       As we get older, changes in balance, gait, strength, vision, hearing, and cognition make even the most youthful senior more prone to accidents. Falls are one of the leading health risks for older people. This increased risk of falling is related to:   · Aging process (eg, decreased muscle strength, slowed reflexes)   · Higher incidence of chronic health problems (eg, arthritis, diabetes) that may limit mobility, agility or sensory awareness   · Side effects of medicine (eg, dizziness, blurred vision)especially medicines like prescription pain medicines and drugs used to treat mental health conditions   Depending on the brittleness of your bones, the consequences of a fall can be serious and long lasting. Home Life   Research by the Association of Aging [facility]) shows that some home accidents among older adults can be prevented by making simple lifestyle changes and basic modifications and repairs to the home environment. Here are some lifestyle changes that experts recommend:   · Have your hearing and vision checked regularly. Be sure to wear prescription glasses that are right for you.    · Speak to your doctor or pharmacist about the possible side effects of your medicines. A number of medicines can cause dizziness. · If you have problems with sleep, talk to your doctor. · Limit your intake of alcohol. · If necessary, use a cane or walker to help maintain your balance. · Wear supportive, rubber-soled shoes, even at home. If you live in a region that gets wintry weather, you may want to put special cleats on your shoes to prevent you from slipping on the snow and ice. · Exercise regularly to help maintain muscle tone, agility, and balance. · Always hold the banister when going up or down stairs. Also, use  bars when getting in or out of the bath or shower, or using the toilet. · To avoid dizziness, get up slowly from a lying down position. Sit up first, dangling your legs for a minute or two before rising to a standing position. Overall Home Safety Check   According to the Consumer Product Safety Commision's \"Older Consumer Home Safety Checklist,\" it is important to check for potential hazards in each room. And remember, proper lighting is an essential factor in home safety. If you cannot see clearly, you are more likely to fall. Important questions to ask yourself include:   · Are lamp, electric, extension, and telephone cords placed out of the flow of traffic and maintained in good condition? Have frayed cords been replaced? · Are all small rugs and runners slip resistant? If not, you can secure them to the floor with a special double-sided carpet tape. · Are smoke detectors properly locatedone on every floor of your home and one outside of every sleeping area? Are they in good working order? Are batteries replaced at least once a year? · Do you have a well-maintained carbon monoxide detector outside every sleeping are in your home? · Does your furniture layout leave plenty of space to maneuver between and around chairs, tables, beds, and sofas? · Are hallways, stairs and passages between rooms well lit?  Can you reach a lamp without getting out of bed? · Are floor surfaces well maintained? Shag rugs, high-pile carpeting, tile floors, and polished wood floors can be particularly slippery. Stairs should always have handrails and be carpeted or fitted with a non-skid tread. · Is your telephone easily reachable. Is the cord safely tucked away? Room by Room   According to the Association of Aging, bathrooms and mary are the two most potentially hazardous rooms in your home. In the Kitchen    · Be sure your stove is in proper working order and always make sure burners and the oven are off before you go out or go to sleep. · Keep pots on the back burners, turn handles away from the front of the stove, and keep stove clean and free of grease build-up. · Kitchen ventilation systems and range exhausts should be working properly. · Keep flammable objects such as towels and pot holders away from the cooking area except when in use. Make sure kitchen curtains are tied back. · Move cords and appliances away from the sink and hot surfaces. If extension cords are needed, install wiring guides so they do not hang over the sink, range, or working areas. · Look for coffee pots, kettles and toaster ovens with automatic shut-offs. · Keep a mop handy in the kitchen so you can wipe up spills instantly. You should also have a small fire extinguisher. · Arrange your kitchen with frequently used items on lower shelves to avoid the need to stand on a stepstool to reach them. · Make sure countertops are well-lit to avoid injuries while cutting and preparing food. In the Bathroom    · Use a non-slip mat or decals in the tub and shower, since wet, soapy tile or porcelain surfaces are extremely slippery. · Make sure bathroom rugs are non-skid or tape them firmly to the floor. Bathtubs should have at least one, preferably two, grab bars, firmly attached to structural supports in the wall.  (Do not use built-in soap holders or glass shower doors as grab bars.)    · Tub seats fitted with non-slip material on the legs allow you to wash sitting down. For people with limited mobility, bathtub transfer benches allow you to slide safely into the tub. · Raised toilet seats and toilet safety rails are helpful for those with knee or hip problems. In the Bedroom    · Make sure you use a nightlight and that the area around your bed is clear of potential obstacles. · Be careful with electric blankets and never go to sleep with a heating pad, which can cause serious burns even if on a low setting. · Use fire-resistant mattress covers and pillows, and NEVER smoke in bed. · Keep a phone next to the bed that is programmed to dial 911 at the push of a button. If you have a chronic condition, you may want to sign on with an automatic call-in service. Typically the system includes a small pendant that connects directly to an emergency medical voice-response system. You should also make arrangements to stay in contact with someonefriend, neighbor, family memberon a regular schedule. Fire Prevention   According to the [employer]. (Smoke Alarms for Every) [address] [place] [address], senior citizens are one of the two highest risk groups for death and serious injuries due to residential fires. · When cooking, wear short-sleeved items, never a bulky long-sleeved robe. · The [facility]'s Safety Checklist for Older Consumers emphasizes the importance of checking basements, garages, workshops and storage areas for fire hazards, such as volatile liquids, piles of old rags or clothing and overloaded circuits. · Never smoke in bed or when lying down on a couch or recliner chair. · Small portable electric or kerosene heaters are responsible for many home fires and should be used cautiously if at all. If you do use one, be sure to keep them away from flammable materials.     · In case of fire, make sure you have a pre-established emergency exit plan.    · Have a professional check your fireplace and other fuel-burning appliances yearly. Helping Hands   Baby boomers entering the dickerson years will continue to see the development of new products to help older adults live safely and independently in spite of age-related changes. Making Life More Livable  , by Varun Greene, lists over 1,000 products for \"living well in the mature years,\" such as bathing and mobility aids, household security devices, ergonomically designed knives and peelers, and faucet valves and knobs for temperature control. Medical supply stores and organizations are good sources of information about products that improve your quality of life and insure your safety.      Last Reviewed: November 2009 Juno Guerrero MD   Updated: 3/7/2011

## 2021-12-22 NOTE — PROGRESS NOTES
[facility] Adult Note  Name: Curt Yun Date: 2021   MRN: [id number] Sex: Female   Age: 45 y.o. Ethnicity: Non- / Non    : 1970 Race: White (non-)      Manuela Petersen is here for   Chief Complaint   Patient presents with    Annual Exam     no complaints, social security has been taken away.  Health Maintenance     flu vaccine       History:  Since last visit in , patient ended up have an Bipolar exacerbation requiring hospitalization x 8 days @ [facility]. Medications were changed/adjusted per Psychiatry; she reports that she is doing better. Actively working on the stressors that triggered the exacerbation (loss of SSI benefits)    Will get recurrent S/S PUD that, for the most part, is controlled with medication regimen      Review of Systems   HENT: Negative for congestion, ear pain and sore throat. Respiratory: Negative for cough, shortness of breath and wheezing. Cardiovascular: Negative for chest pain, palpitations and leg swelling. Gastrointestinal: Negative for abdominal pain, blood in stool, constipation, diarrhea, nausea and vomiting. Genitourinary: Negative for dysuria, frequency, hematuria and urgency. Musculoskeletal: Negative for back pain, myalgias and neck pain. Skin: Negative for rash. Neurological: Negative for dizziness, weakness and headaches. Psychiatric/Behavioral: The patient is not nervous/anxious. No Known Allergies    Prior to Visit Medications    Medication Sig Taking?  Authorizing Provider   LATUDA 40 MG TABS tablet Take 1 tablet once a day Yes Joslyn Jiménez MD   pantoprazole (PROTONIX) 40 MG tablet Take 1 tablet by mouth 2 times daily (before meals) Yes Jamila Mccallum MD   lithium 300 MG capsule Take 300 mg by mouth 2 times daily (with meals)  Yes Historical Provider, MD   acetaminophen (TYLENOL) 500 MG tablet Take 1,000 mg by mouth every 6 hours as needed for Pain Yes Historical Provider, MD sucralfate (CARAFATE) 1 GM tablet Take 1 tablet by mouth 4 times daily Yes Nathaly Leon MD       Past Medical History:   Diagnosis Date    Bipolar 1 disorder ([address]     Depression     Obesity     Osteoarthritis     bilateral knees    Thiamine deficiency     Vitamin D deficiency        Past Surgical History:   Procedure Laterality Date    CHOLECYSTECTOMY      ENDOSCOPY, COLON, DIAGNOSTIC      GALLBLADDER SURGERY  1994    JOINT REPLACEMENT Right 06/03/2019    [place] LAP GASTRIC BYPASS/KIERAN-EN-Y N/A 4/30/2018    GASTRIC BYPASS KIERAN-EN-Y LAPAROSCOPIC performed by Alma Babb MD at [address] Right 6/3/2019    RIGHT KNEE TOTAL KNEE ARTHROPLASTY (2 hospitals) performed by Dian Zhang DO at [address] Left 6/24/2020    LEFT KNEE TOTAL ARTHROPLASTY [facility]) performed by Dian Zhang DO at [address] [place] [address] N/A 10/22/2018    EGD BIOPSY performed by Mirna Murphy MD at [address] [place] [address] 2/21/2019    EGD ESOPHAGOGASTRODUODENOSCOPY performed by Alma Babb MD at [address] [place] [address] N/A 9/16/2019    EGD DILATION BALLOON 14mm performed by Alma Babb MD at [address] [place] [address] N/A 9/11/2021    EGD DILATION BALLOON performed by Abeba Lovelace MD at [address] [place] [address] History   Problem Relation Age of Onset    Breast Cancer Mother     Heart Disease Mother     Heart Disease Father     Parkinsonism Father     Heart Disease Maternal Grandmother     Cancer Maternal Grandmother     Heart Disease Paternal Grandmother        Social History     Tobacco Use    Smoking status: Current Every Day Smoker     Packs/day: 0.50    Smokeless tobacco: Never Used   Vaping Use    Vaping Use: Never used   Substance Use Topics    Alcohol use: No     Alcohol/week: 0.0 standard drinks    Drug FACE TO FACE, 1ST 30MIN [75000]    Personal history of tobacco use, presenting hazards to health  -     [place] TOBACCO USE CESSATION INTERMEDIATE 3-10 MINUTES [98963]    Need for influenza vaccination  -     INFLUENZA, MDCK QUADV, 2 YRS AND OLDER, IM, PF, PREFILL SYR OR SDV, 0.5ML (FLUCELVAX QUADV, PF)    Bipolar 1 disorder ([facility] [address]): Under care of psychiatry  -     LATUDA 40 MG TABS tablet; Take 1 tablet once a day    Other hyperlipidemia  -     Lipid Panel; Future    Hyperglycemia  -     Hemoglobin A1C; Future           Follow Up:  Return in 1 year (on 12/22/2022). Sue Mayen MD    Advance Care Planning   Advanced Care Planning: Discussed the patients choices for care and treatment in case of a health event that adversely affects decision-making abilities. Also discussed the patients long-term treatment options. Reviewed with the patient the [address] [place] [address] of [address] [place] [address] Declaration forms  Reviewed the process of designating a competent adult as an Agent (or -in-fact) that could take make health care decisions for the patient if incompetent. Patient was asked to complete the declaration forms, either acknowledge the forms by a public notary or an eligible witness and provide a signed copy to the practice office. Time spent (minutes): 5 minutes     Tobacco Cessation Counseling: Patient advised about behavior change, including information about personal health harms, usage of appropriate cessation measures and benefits of cessation.   Time spent (minutes): 2 minutes

## 2022-01-13 ENCOUNTER — HOSPITAL ENCOUNTER (OUTPATIENT)
Dept: MAMMOGRAPHY | Age: 52
Discharge: HOME | End: 2022-01-15

## 2022-01-13 DIAGNOSIS — Z00.00 ENCOUNTER FOR WELL ADULT EXAM WITHOUT ABNORMAL FINDINGS: ICD-10-CM

## 2022-01-13 DIAGNOSIS — Z12.31 ENCOUNTER FOR SCREENING MAMMOGRAM FOR BREAST CANCER: ICD-10-CM

## 2022-04-15 ENCOUNTER — TELEPHONE (OUTPATIENT)
Dept: BARIATRICS/WEIGHT MGMT | Age: 52
End: 2022-04-15

## 2022-04-15 NOTE — TELEPHONE ENCOUNTER
Patient did not return our call to schedule a follow-up appointment. A letter was sent as a reminder to schedule with our office.

## 2022-06-02 ENCOUNTER — OFFICE VISIT (OUTPATIENT)
Dept: FAMILY MEDICINE CLINIC | Age: 52
End: 2022-06-02
Payer: MEDICARE

## 2022-06-02 VITALS
DIASTOLIC BLOOD PRESSURE: 70 MMHG | HEIGHT: 65 IN | RESPIRATION RATE: 16 BRPM | BODY MASS INDEX: 32.89 KG/M2 | HEART RATE: 72 BPM | WEIGHT: 197.4 LBS | TEMPERATURE: 98.4 F | OXYGEN SATURATION: 97 % | SYSTOLIC BLOOD PRESSURE: 114 MMHG

## 2022-06-02 DIAGNOSIS — B34.9 VIRAL SYNDROME: ICD-10-CM

## 2022-06-02 DIAGNOSIS — R10.31 RLQ ABDOMINAL PAIN: ICD-10-CM

## 2022-06-02 DIAGNOSIS — R10.31 RLQ ABDOMINAL PAIN: Primary | ICD-10-CM

## 2022-06-02 DIAGNOSIS — R11.2 NON-INTRACTABLE VOMITING WITH NAUSEA, UNSPECIFIED VOMITING TYPE: ICD-10-CM

## 2022-06-02 LAB
ALBUMIN SERPL-MCNC: 3.7 G/DL (ref 3.5–5.2)
ALP BLD-CCNC: 73 U/L (ref 35–104)
ALT SERPL-CCNC: 9 U/L (ref 0–32)
ANION GAP SERPL CALCULATED.3IONS-SCNC: 11 MMOL/L (ref 7–16)
AST SERPL-CCNC: 14 U/L (ref 0–31)
BASOPHILS ABSOLUTE: 0.04 E9/L (ref 0–0.2)
BASOPHILS RELATIVE PERCENT: 0.6 % (ref 0–2)
BILIRUB SERPL-MCNC: <0.2 MG/DL (ref 0–1.2)
BILIRUBIN, POC: ABNORMAL
BLOOD URINE, POC: NEGATIVE
BUN BLDV-MCNC: 11 MG/DL (ref 6–20)
CALCIUM SERPL-MCNC: 9 MG/DL (ref 8.6–10.2)
CHLORIDE BLD-SCNC: 107 MMOL/L (ref 98–107)
CLARITY, POC: ABNORMAL
CO2: 26 MMOL/L (ref 22–29)
COLOR, POC: ABNORMAL
CREAT SERPL-MCNC: 1 MG/DL (ref 0.5–1)
EOSINOPHILS ABSOLUTE: 0.11 E9/L (ref 0.05–0.5)
EOSINOPHILS RELATIVE PERCENT: 1.7 % (ref 0–6)
GFR AFRICAN AMERICAN: >60
GFR NON-AFRICAN AMERICAN: 58 ML/MIN/1.73
GLUCOSE BLD-MCNC: 132 MG/DL (ref 74–99)
GLUCOSE URINE, POC: NEGATIVE
HCT VFR BLD CALC: 40.7 % (ref 34–48)
HEMOGLOBIN: 12.7 G/DL (ref 11.5–15.5)
IMMATURE GRANULOCYTES #: 0.02 E9/L
IMMATURE GRANULOCYTES %: 0.3 % (ref 0–5)
KETONES, POC: NEGATIVE
LEUKOCYTE EST, POC: NEGATIVE
LYMPHOCYTES ABSOLUTE: 1.76 E9/L (ref 1.5–4)
LYMPHOCYTES RELATIVE PERCENT: 27 % (ref 20–42)
Lab: NORMAL
MCH RBC QN AUTO: 27.3 PG (ref 26–35)
MCHC RBC AUTO-ENTMCNC: 31.2 % (ref 32–34.5)
MCV RBC AUTO: 87.5 FL (ref 80–99.9)
MONOCYTES ABSOLUTE: 0.49 E9/L (ref 0.1–0.95)
MONOCYTES RELATIVE PERCENT: 7.5 % (ref 2–12)
NEUTROPHILS ABSOLUTE: 4.09 E9/L (ref 1.8–7.3)
NEUTROPHILS RELATIVE PERCENT: 62.9 % (ref 43–80)
NITRITE, POC: NEGATIVE
PDW BLD-RTO: 13.4 FL (ref 11.5–15)
PERFORMING INSTRUMENT: NORMAL
PH, POC: 5.5
PLATELET # BLD: 321 E9/L (ref 130–450)
PMV BLD AUTO: 10.6 FL (ref 7–12)
POTASSIUM SERPL-SCNC: 4.9 MMOL/L (ref 3.5–5)
PROTEIN, POC: 30
QC PASS/FAIL: NORMAL
RBC # BLD: 4.65 E12/L (ref 3.5–5.5)
SARS-COV-2, POC: NORMAL
SODIUM BLD-SCNC: 144 MMOL/L (ref 132–146)
SPECIFIC GRAVITY, POC: >=1.03
TOTAL PROTEIN: 6.2 G/DL (ref 6.4–8.3)
UROBILINOGEN, POC: 0.2
WBC # BLD: 6.5 E9/L (ref 4.5–11.5)

## 2022-06-02 PROCEDURE — 99214 OFFICE O/P EST MOD 30 MIN: CPT | Performed by: NURSE PRACTITIONER

## 2022-06-02 PROCEDURE — 81002 URINALYSIS NONAUTO W/O SCOPE: CPT | Performed by: NURSE PRACTITIONER

## 2022-06-02 PROCEDURE — 87426 SARSCOV CORONAVIRUS AG IA: CPT | Performed by: NURSE PRACTITIONER

## 2022-06-02 RX ORDER — SUCRALFATE 1 G/1
1 TABLET ORAL 4 TIMES DAILY
Qty: 120 TABLET | Refills: 3 | Status: SHIPPED | OUTPATIENT
Start: 2022-06-02

## 2022-06-02 ASSESSMENT — PATIENT HEALTH QUESTIONNAIRE - PHQ9
6. FEELING BAD ABOUT YOURSELF - OR THAT YOU ARE A FAILURE OR HAVE LET YOURSELF OR YOUR FAMILY DOWN: 1
4. FEELING TIRED OR HAVING LITTLE ENERGY: 2
SUM OF ALL RESPONSES TO PHQ QUESTIONS 1-9: 10
SUM OF ALL RESPONSES TO PHQ QUESTIONS 1-9: 10
7. TROUBLE CONCENTRATING ON THINGS, SUCH AS READING THE NEWSPAPER OR WATCHING TELEVISION: 1
3. TROUBLE FALLING OR STAYING ASLEEP: 2
5. POOR APPETITE OR OVEREATING: 1
SUM OF ALL RESPONSES TO PHQ9 QUESTIONS 1 & 2: 2
8. MOVING OR SPEAKING SO SLOWLY THAT OTHER PEOPLE COULD HAVE NOTICED. OR THE OPPOSITE, BEING SO FIGETY OR RESTLESS THAT YOU HAVE BEEN MOVING AROUND A LOT MORE THAN USUAL: 1
2. FEELING DOWN, DEPRESSED OR HOPELESS: 2
SUM OF ALL RESPONSES TO PHQ QUESTIONS 1-9: 10
10. IF YOU CHECKED OFF ANY PROBLEMS, HOW DIFFICULT HAVE THESE PROBLEMS MADE IT FOR YOU TO DO YOUR WORK, TAKE CARE OF THINGS AT HOME, OR GET ALONG WITH OTHER PEOPLE: 1
9. THOUGHTS THAT YOU WOULD BE BETTER OFF DEAD, OR OF HURTING YOURSELF: 0
SUM OF ALL RESPONSES TO PHQ QUESTIONS 1-9: 10
1. LITTLE INTEREST OR PLEASURE IN DOING THINGS: 0

## 2022-06-02 ASSESSMENT — ENCOUNTER SYMPTOMS
CONSTIPATION: 0
DIARRHEA: 0
SINUS PAIN: 0
RHINORRHEA: 0
NAUSEA: 1
COUGH: 0
WHEEZING: 0
TROUBLE SWALLOWING: 0
SINUS PRESSURE: 0
SHORTNESS OF BREATH: 0
VOICE CHANGE: 0
FACIAL SWELLING: 0
SORE THROAT: 0
COLOR CHANGE: 0
BACK PAIN: 0
CHEST TIGHTNESS: 0
ABDOMINAL PAIN: 0
VOMITING: 1

## 2022-06-02 NOTE — ASSESSMENT & PLAN NOTE
the headache, N/V have subsided but the right lower quadrant pain is off and on. Discussed could be kidney stone, ovarian cyst sent for lab and KUB. Will send my chart message regarding labs. UA + ketones, bilirubin and protein. Increase fluids and cut down on coffee. Follow up with DR Jonathan Daily.  In 2 weeks

## 2022-06-02 NOTE — PROGRESS NOTES
OFFICE PROGRESS NOTE  [address]  [address] [facility] [place] [id number]  Dept: [phone]   Chief Complaint   Patient presents with    Abdominal Pain     x2 days ago was throwing up, terrible headach and now has low pain in groin area going into back    Health Maintenance     Due: pap, shingles, AWV       ASSESSMENT/PLAN   1. RLQ abdominal pain  Assessment & Plan:   Unclear control the headache, N/V have subsided but the right lower quadrant pain is off and on. Discussed could be kidney stone, ovarian cyst sent for lab and KUB   Follow up with DR Shoshana Brasher. In 2 weeks  Orders:  -     CBC with Auto Differential; Future  -     Comprehensive Metabolic Panel; Future  -     POCT Urinalysis no Micro  -     XR ABDOMEN (KUB) (SINGLE AP VIEW); Future  2. Non-intractable vomiting with nausea, unspecified vomiting type  Assessment & Plan:   the headache, N/V have subsided but the right lower quadrant pain is off and on. Discussed could be kidney stone, ovarian cyst sent for lab and KUB. Will send my chart message regarding labs. UA + ketones, bilirubin and protein. Increase fluids and cut down on coffee. Follow up with DR Shoshana Brasher. In 2 weeks    Orders:  -     POCT COVID-19, Antigen  3. Viral syndrome  Assessment & Plan:  Covid negative Unclear control the headache, N/V have subsided but the right lower quadrant pain is off and on. Discussed could be kidney stone, ovarian cyst sent for lab and KUB She may have had another virus. Follow up with DR Shoshana Brasher. In 2 weeks    Orders:  -     POCT COVID-19, Antigen           Discussed taking medications as directed and adverse effects    Return in about 2 weeks (around 6/16/2022) for with Dr Shoshana Brasher.      HPI:   Patient of DR Borges's hx bipolar 1, Depression, OA, gastric ulcer    She woke up with headache and chills started on Monday she took tylenol and it did help after a while, nausea and vomiting, fatigue started on Tuesday now subsided and then this morning she started with pain in the RLQ and radiating into her back occurs off and on. She denies any fever, diarrhea, constipation, SOB or CP. She drinks mainly coffee all day no water, no hx of kidney stones. Admits her urine is dark in the mornings but denies any symptoms of burning, urgency, no lower back pain. Needs refill on Carafate. Urinalysis indicates protein, bilirubin, ketones. Current Outpatient Medications:     sucralfate (CARAFATE) 1 GM tablet, Take 1 tablet by mouth 4 times daily, Disp: 120 tablet, Rfl: 3    LATUDA 40 MG TABS tablet, Take 1 tablet once a day (Patient taking differently: 60 mg Take 1 tablet once a day), Disp: 30 tablet, Rfl: 11    pantoprazole (PROTONIX) 40 MG tablet, Take 1 tablet by mouth 2 times daily (before meals), Disp: 180 tablet, Rfl: 1    lithium 300 MG capsule, Take 300 mg by mouth 2 times daily (with meals) , Disp: , Rfl:     acetaminophen (TYLENOL) 500 MG tablet, Take 1,000 mg by mouth every 6 hours as needed for Pain, Disp: , Rfl:       Surgical History:  has a past surgical history that includes Gallbladder surgery (1994); Cholecystectomy; Endoscopy, colon, diagnostic; pr lap gastric bypass/luciano-en-y (N/A, 4/30/2018); Upper gastrointestinal endoscopy (N/A, 10/22/2018); Upper gastrointestinal endoscopy (N/A, 2/21/2019); joint replacement (Right, 06/03/2019); Total knee arthroplasty (Right, 6/3/2019); Upper gastrointestinal endoscopy (N/A, 9/16/2019); Total knee arthroplasty (Left, 6/24/2020); and Upper gastrointestinal endoscopy (N/A, 9/11/2021). Social History:  reports that she has been smoking cigarettes. She started smoking about a year ago. She has a 0.50 pack-year smoking history. She has never used smokeless tobacco. She reports that she does not drink alcohol and does not use drugs.   Family History: family history includes Breast Cancer in her mother; Cancer in her maternal grandmother; Heart Disease in her father, maternal grandmother, mother, and paternal grandmother; Parkinsonism in her father. I have reviewed Caitlyn's allergies, medications, problem list, medical, social and family history and have updated as needed in the electronic medical record    Review of Systems   Constitutional: Positive for chills and fatigue. Negative for activity change, appetite change, diaphoresis, fever and unexpected weight change. HENT: Negative for congestion, dental problem, drooling, ear discharge, ear pain, facial swelling, hearing loss, mouth sores, nosebleeds, postnasal drip, rhinorrhea, sinus pressure, sinus pain, sneezing, sore throat, tinnitus, trouble swallowing and voice change. Eyes: Negative for visual disturbance. Respiratory: Negative for cough, chest tightness, shortness of breath and wheezing. Cardiovascular: Negative for chest pain, palpitations and leg swelling. Gastrointestinal: Positive for nausea ( 2 days ago but not now) and vomiting ( 2 days ago but not now). Negative for abdominal pain, constipation and diarrhea. Endocrine: Negative for cold intolerance, heat intolerance, polydipsia, polyphagia and polyuria. Genitourinary: Negative for difficulty urinating, frequency and urgency. Musculoskeletal: Negative for arthralgias, back pain, gait problem, joint swelling, myalgias, neck pain and neck stiffness. Skin: Negative for color change, pallor, rash and wound. Allergic/Immunologic: Negative for environmental allergies, food allergies and immunocompromised state. Neurological: Negative for dizziness, tremors, seizures, syncope, facial asymmetry, speech difficulty, weakness, light-headedness, numbness and headaches. Hematological: Negative for adenopathy. Does not bruise/bleed easily. Psychiatric/Behavioral: Negative for agitation, behavioral problems, confusion, decreased concentration, dysphoric mood, hallucinations, self-injury, sleep disturbance and suicidal ideas.  The patient is not nervous/anxious and is not hyperactive. OBJECTIVE:     VS:  Wt Readings from Last 3 Encounters:   06/02/22 197 lb 6.4 oz (89.5 kg)   12/22/21 185 lb (83.9 kg)   09/10/21 175 lb (79.4 kg)                       Vitals:    06/02/22 1534   BP: 114/70   Site: Left Upper Arm   Position: Sitting   Cuff Size: Medium Adult   Pulse: 72   Resp: 16   Temp: 98.4 °F (36.9 °C)   TempSrc: Temporal   SpO2: 97%   Weight: 197 lb 6.4 oz (89.5 kg)   Height: 5' 5\" (1.651 m)       General: Alert and oriented to person, place, and time, well developed and well nourished, in no acute distress  SKIN: Warm and dry, intact without any rash, masses or lesions  HEAD: normocephalic, atraumatic  Eyes: sclera/conjunctiva clear, PERRLA, EOMI's intact  Neck: supple and non-tender without mass, trachea midline, no cervical lymphadenopathy, no bruit, no thyromegaly or nodules  Cardiovascular: regular rate and regular rhythm, Aortic murmur 1/6 noted over the right upper sternal border no radiation,  no  rubs, clicks, or gallop. Distal pulses intact, no carotid bruits. No edema  Pulmonary/Chest: clear to auscultation bilaterally, no wheezes, rales or rhonchi, normal air movement, no respiratory distress  Abdomen: soft, tender RLQ, non-distended, normal bowel sounds, no masses or hepatosplenomegaly, right CVA tenderness  Musculoskeletal: Normal ROM, no joint swelling, deformity or tenderness   Neurologic: gait, coordination and speech normal  Extremities: no clubbing, cyanosis, or edema. Psychiatric: Good eye contact, normal mood and affect, answers questions appropriately    I have reviewed my findings and recommendations with Reese Francis.     Estrella Schulz, APRN - CNP, NP-C, FNP-BC

## 2022-06-03 PROBLEM — B34.9 VIRAL SYNDROME: Status: ACTIVE | Noted: 2022-06-03

## 2022-06-03 PROBLEM — R10.31 RLQ ABDOMINAL PAIN: Status: ACTIVE | Noted: 2022-06-03

## 2022-06-03 NOTE — ASSESSMENT & PLAN NOTE
Unclear control the headache, N/V have subsided but the right lower quadrant pain is off and on. Discussed could be kidney stone, ovarian cyst sent for lab and KUB   Follow up with DR Enrique Barillas.  In 2 weeks

## 2022-06-03 NOTE — ASSESSMENT & PLAN NOTE
Covid negative Unclear control the headache, N/V have subsided but the right lower quadrant pain is off and on. Discussed could be kidney stone, ovarian cyst sent for lab and KUB She may have had another virus. Follow up with DR Indio Davis.  In 2 weeks

## 2022-06-17 ENCOUNTER — OFFICE VISIT (OUTPATIENT)
Dept: FAMILY MEDICINE CLINIC | Age: 52
End: 2022-06-17
Payer: MEDICARE

## 2022-06-17 VITALS
OXYGEN SATURATION: 97 % | HEIGHT: 65 IN | TEMPERATURE: 97.8 F | SYSTOLIC BLOOD PRESSURE: 110 MMHG | RESPIRATION RATE: 16 BRPM | WEIGHT: 198 LBS | DIASTOLIC BLOOD PRESSURE: 76 MMHG | BODY MASS INDEX: 32.99 KG/M2 | HEART RATE: 69 BPM

## 2022-06-17 DIAGNOSIS — R10.31 RLQ ABDOMINAL PAIN: ICD-10-CM

## 2022-06-17 DIAGNOSIS — M54.50 BILATERAL LOW BACK PAIN WITHOUT SCIATICA, UNSPECIFIED CHRONICITY: Primary | ICD-10-CM

## 2022-06-17 PROCEDURE — 99213 OFFICE O/P EST LOW 20 MIN: CPT | Performed by: FAMILY MEDICINE

## 2022-06-17 ASSESSMENT — ENCOUNTER SYMPTOMS
VOMITING: 1
DIARRHEA: 0
SHORTNESS OF BREATH: 0
SINUS PRESSURE: 0
NAUSEA: 1
COLOR CHANGE: 0
RHINORRHEA: 0
BACK PAIN: 0
TROUBLE SWALLOWING: 0
WHEEZING: 0
CONSTIPATION: 0
FACIAL SWELLING: 0
SINUS PAIN: 0
VOICE CHANGE: 0
CHEST TIGHTNESS: 0
ABDOMINAL PAIN: 0
COUGH: 0
SORE THROAT: 0

## 2022-06-17 NOTE — PROGRESS NOTES
OFFICE PROGRESS NOTE  [address]  [address] [facility] [address]  Dept: [phone]   Chief Complaint   Patient presents with    Follow-up     2 week follow up      ASSESSMENT/PLAN, 6/2/22; VISIT WITH HAN KIM-CNP:    1. RLQ abdominal pain  Assessment & Plan:   Unclear control the headache, N/V have subsided but the right lower quadrant pain is off and on. Discussed could be kidney stone, ovarian cyst sent for lab and KUB   Follow up with DR Shahana Zuluaga. In 2 weeks  Orders:  -     CBC with Auto Differential; Future  -     Comprehensive Metabolic Panel; Future  -     POCT Urinalysis no Micro  -     XR ABDOMEN (KUB) (SINGLE AP VIEW); Future  2. Non-intractable vomiting with nausea, unspecified vomiting type  Assessment & Plan:   the headache, N/V have subsided but the right lower quadrant pain is off and on. Discussed could be kidney stone, ovarian cyst sent for lab and KUB. Will send my chart message regarding labs. UA + ketones, bilirubin and protein. Increase fluids and cut down on coffee. Follow up with DR Shahana Zuluaga. In 2 weeks    Orders:  -     POCT COVID-19, Antigen  3. Viral syndrome  Assessment & Plan:  Covid negative Unclear control the headache, N/V have subsided but the right lower quadrant pain is off and on. Discussed could be kidney stone, ovarian cyst sent for lab and KUB She may have had another virus. Follow up with DR Shahana Zuluaga. In 2 weeks    Orders:  -     POCT COVID-19, Antigen           Discussed taking medications as directed and adverse effects    Return in about 2 weeks (around 6/16/2022) for with Dr Shahana Zuluaga. CURRENT STATUS (06/17/22): Labs and imaging reviewed; all WDL. Reports residual RLQ pain that radiates to back. No precipitating/exacerbating factors, although further history suggests worsening symptoms with prolonged sitting or standing. Gets temporary relief with baths and acetaminophen. Works as . cigarettes. She started smoking about 12 months ago. She has a 0.50 pack-year smoking history. She has never used smokeless tobacco. She reports that she does not drink alcohol and does not use drugs. Family History: family history includes Breast Cancer in her mother; Cancer in her maternal grandmother; Heart Disease in her father, maternal grandmother, mother, and paternal grandmother; Parkinsonism in her father. I have reviewed Caitlyn's allergies, medications, problem list, medical, social and family history and have updated as needed in the electronic medical record    Review of Systems   Constitutional: Positive for chills and fatigue. Negative for activity change, appetite change, diaphoresis, fever and unexpected weight change. HENT: Negative for congestion, dental problem, drooling, ear discharge, ear pain, facial swelling, hearing loss, mouth sores, nosebleeds, postnasal drip, rhinorrhea, sinus pressure, sinus pain, sneezing, sore throat, tinnitus, trouble swallowing and voice change. Eyes: Negative for visual disturbance. Respiratory: Negative for cough, chest tightness, shortness of breath and wheezing. Cardiovascular: Negative for chest pain, palpitations and leg swelling. Gastrointestinal: Positive for nausea ( 2 days ago but not now) and vomiting ( 2 days ago but not now). Negative for abdominal pain, constipation and diarrhea. Endocrine: Negative for cold intolerance, heat intolerance, polydipsia, polyphagia and polyuria. Genitourinary: Negative for difficulty urinating, frequency and urgency. Musculoskeletal: Negative for arthralgias, back pain, gait problem, joint swelling, myalgias, neck pain and neck stiffness. Skin: Negative for color change, pallor, rash and wound. Allergic/Immunologic: Negative for environmental allergies, food allergies and immunocompromised state.    Neurological: Negative for dizziness, tremors, seizures, syncope, facial asymmetry, speech difficulty, weakness, light-headedness, numbness and headaches. Hematological: Negative for adenopathy. Does not bruise/bleed easily. Psychiatric/Behavioral: Negative for agitation, behavioral problems, confusion, decreased concentration, dysphoric mood, hallucinations, self-injury, sleep disturbance and suicidal ideas. The patient is not nervous/anxious and is not hyperactive. OBJECTIVE:     VS:  Wt Readings from Last 3 Encounters:   06/17/22 198 lb (89.8 kg)   06/02/22 197 lb 6.4 oz (89.5 kg)   12/22/21 185 lb (83.9 kg)                       Vitals:    06/17/22 0955   BP: 110/76   Pulse: 69   Resp: 16   Temp: 97.8 °F (36.6 °C)   TempSrc: Infrared   SpO2: 97%   Weight: 198 lb (89.8 kg)   Height: 5' 5\" (1.651 m)       General: Alert and oriented to person, place, and time, well developed and well nourished, in no acute distress  SKIN: Warm and dry, intact without any rash, masses or lesions  HEAD: normocephalic, atraumatic  Eyes: sclera/conjunctiva clear, PERRLA, EOMI's intact  Neck: supple and non-tender without mass, trachea midline, no cervical lymphadenopathy, no bruit, no thyromegaly or nodules  Cardiovascular: regular rate and regular rhythm, Aortic murmur 1/6 noted over the right upper sternal border no radiation,  no  rubs, clicks, or gallop. Distal pulses intact, no carotid bruits. No edema  Pulmonary/Chest: clear to auscultation bilaterally, no wheezes, rales or rhonchi, normal air movement, no respiratory distress  Abdomen: soft, tender RLQ, non-distended, normal bowel sounds, no masses or hepatosplenomegaly, right CVA tenderness  Musculoskeletal: Normal ROM, no joint swelling, deformity or tenderness   Neurologic: gait, coordination and speech normal  Extremities: no clubbing, cyanosis, or edema. Psychiatric: Good eye contact, normal mood and affect, answers questions appropriately      Assessment / Plan:      Any Rose was seen today for follow-up.     Diagnoses and all orders for this visit:    Bilateral low back pain without sciatica, unspecified chronicity: Musculoskeletal in nature; now has become mild and intermediate        -     verbal and written information regarding low back care and regular exercise provided    RLQ abdominal pain: Resolved           Follow Up:  Return for Schedule Medicare AWV at earliest convenience. I have reviewed my findings and recommendations with Silver Rojo.     Val Holloway MD, NP-C, FNP-BC

## 2022-06-17 NOTE — PATIENT INSTRUCTIONS
Patient Education        Muscle Conditioning: Exercises  Introduction  Here are some examples of exercises for muscle conditioning. Start eachexercise slowly. Ease off the exercise if you start to have pain. Your doctor or physical therapist will tell you when you can start theseexercises and which ones will work best for you. How to do the exercises  Wall push-ups    When you can do this exercise against a wall comfortably (without your muscles feeling tired), you can try it against a counter. Start with 5 repetitions again and work up to 8 to 12. You can then slowly progress to the end of acouch or a sturdy chair, and finally to the floor. 1. Stand facing a wall, about 12 to 18 inches away. 2. Place your hands on the wall at shoulder height. 3. Slowly bend your elbows and bring your face toward the wall, moving your hips and shoulders forward together. 4. Push slowly back to the starting position. 5. Start with 5 repetitions and work up to 8 to 12. 6. Rest for a minute, and repeat the exercise. Knee extension    If this exercise becomes easy, you can add a light weight around your ankle ortie an elastic resistance band to a chair leg and one ankle. 1. While sitting in a chair, straighten one leg and hold while you slowly count to 5. Be sure you do not lock your knee. 2. Repeat 8 to 12 times. 3. Rest for a minute, and repeat the exercise. 4. Do the same exercise with the other leg. Side-lying leg lift    If this exercise becomes easy, you can add a light weight around your ankle ortie an elastic resistance band to both ankles. 1. Lie on your side, with your legs extended. Keep your hips straight up and down during this exercise. Do not let your top hip rock toward the back. Support your head with your hand, and place the other hand on the floor near your waist.  2. Slowly raise your upper leg until it is about in line with your shoulder. Keep your toes pointed forward.   3. Slowly lower your leg to the starting position. 4. Repeat 8 to 12 times. 5. Rest for a minute, and repeat the exercise. 6. Turn to your other side and do the same exercise with your other leg. Shallow standing knee bends    1. Stand with your hands lightly resting on a counter or chair in front of you with your feet shoulder-width apart. 2. Slowly bend your knees so that you squat down just like you were going to sit in a chair. Make sure your knees do not go in front of your toes. 3. Lower yourself about 6 inches. Your heels should remain on the floor at all times. 4. Rise slowly to a standing position. 5. Repeat 8 to 12 times. 6. Rest for a minute, and repeat the exercise. Follow-up care is a key part of your treatment and safety. Be sure to make and go to all appointments, and call your doctor if you are having problems. It's also a good idea to know your test results and keep alist of the medicines you take. Where can you learn more? Go to [email or web address] and sign in to your [employer] account. Enter O337 in the [facility] box to learn more about \"Muscle Conditioning: Exercises. \"     If you do not have an account, please click on the \"Sign Up Now\" link. Current as of: May 12, 2021               Content Version: 13.2  © 5161-7800 Healthwise, Incorporated. Care instructions adapted under license by [facility] ([facility]). If you have questions about a medical condition or this instruction, always ask your healthcare professional. [place] [address] any warranty or liability for your use of this information. Patient Education        Resistance Training With Free Weights: Exercises  Introduction  Here are some examples of exercises for resistance training. Start eachexercise slowly. Ease off the exercise if you start to have pain. Your doctor or physical therapist will tell you when you can start theseexercises and which ones will work best for you.   How to do the exercises  Chest fly    1. Lie on a bench or exercise ball, and hold the weights straight up over your chest. Do not lock your elbows. You can keep them slightly bent if that is comfortable for you. 2. Slowly lower your arms, keeping them extended, until the weights are level with your chest, or slightly lower. 3. Slowly raise your arms until you are in the starting position. 4. Repeat 8 to 12 times. 5. Rest for a minute, and repeat the exercise. Lateral raise for the outer part of the shoulder (deltoid)    1. Stand with your feet shoulder-width apart and your knees slightly bent. 2. Bend your arms 90 degrees with your elbows at hip level. With your palms facing in, hold the weights straight in front of you. 3. Slowly lift the weights and your elbows out to the sides to shoulder level, keeping your elbows bent. Keep your shoulders down and relaxed as you lift. If you find you are shrugging your shoulders up toward your ears, your weights may be too heavy. 4. Slowly lower the weights back to your sides. 5. Repeat 8 to 12 times. 6. Rest for a minute, and repeat the exercise. Biceps curls    1. Sit leaning forward with your legs slightly spread and your left hand on your left thigh. 2. Hold the weight in your right hand, and place your right elbow on your right thigh. 3. Slowly curl the weight up and toward your chest.  4. Slowly lower the weight to the original position. 5. Repeat 8 to 12 times. 6. Rest for a minute, and repeat the exercise. 7. Do the same exercise with your other arm. Follow-up care is a key part of your treatment and safety. Be sure to make and go to all appointments, and call your doctor if you are having problems. It's also a good idea to know your test results and keep alist of the medicines you take. Where can you learn more? Go to https://kiley.Melon. org and sign in to your [employer] account.  Enter W763 in the [employer] box to learn more about \"Resistance Training With [employer]: Exercises. \"     If you do not have an account, please click on the \"Sign Up Now\" link. Current as of: May 12, 2021               Content Version: 13.2  © 2006-2022 Healthwise, Incorporated. Care instructions adapted under license by [facility] ([facility]). If you have questions about a medical condition or this instruction, always ask your healthcare professional. [address] any warranty or liability for your use of this information. Patient Education        Resistance Training With Surgical Tubing: Exercises  Introduction  Here are some examples of exercises for resistance training. Start eachexercise slowly. Ease off the exercise if you start to have pain. Your doctor or physical therapist will tell you when you can start theseexercises and which ones will work best for you. How to do the exercises  Side pull    7. Raise both arms overhead, palms of your hands facing forward. 8. Pull one arm down and to the side, bending your elbow as shown, and hold. 9. Slowly reach up again. Repeat with the other arm. 10. Repeat 8 to 12 times with each hand. 11. Rest for a minute, and repeat the exercise. Overhead pull    5. Raise both arms overhead, palms of your hands facing forward. 6. Tighten the tubing by slowly pulling both arms away from center, and hold. 7. Slowly return to the starting position with your arms straight up. 8. Repeat 8 to 12 times. 9. Rest for a minute, and repeat the exercise. Up-down pull    7. Raise both arms overhead. 8. Bend your elbows so that they are shoulder height, and hold the stretched tubing behind or in front of your head. 9. Slowly return to the starting position with your arms straight up. 10. Repeat 8 to 12 times. 11. Rest for a minute, and repeat the exercise. Chest-level pull    7. Raise your arms in front of you to chest level. Your elbows will be bent and held up at about shoulder height.   8. Pull your hands apart, stretching the tubing, and hold. Try to keep your hands up at your chest level, and do not pull your shoulders up toward your ears. 9. Slowly return to your starting position. 10. Repeat 8 to 12 times. 11. Rest for a minute, and repeat the exercise. Hip-level pull    1. Hold your hands at the level of your hips, or near your lap if you are sitting down. 2. Pull your hands apart, stretching the tubing, and hold. 3. Slowly return to your starting position. 4. Repeat 8 to 12 times. 5. Rest for a minute, and repeat the exercise. Follow-up care is a key part of your treatment and safety. Be sure to make and go to all appointments, and call your doctor if you are having problems. It's also a good idea to know your test results and keep alist of the medicines you take. Where can you learn more? Go to https://NeoSystems.Socialcam. org and sign in to your [email or web address] account. Enter A995 in the [employer] box to learn more about \"Resistance Training With Surgical Tubing: Exercises. \"     If you do not have an account, please click on the \"Sign Up Now\" link. Current as of: May 12, 2021               Content Version: 13.2  © 2006-2022 Healthwise, Incorporated. Care instructions adapted under license by [facility] ([facility]). If you have questions about a medical condition or this instruction, always ask your healthcare professional. [place] [address] any warranty or liability for your use of this information. Patient Education        Low Back Pain: Exercises  Introduction  Here are some examples of exercises for you to try. The exercises may be suggested for a condition or for rehabilitation. Start each exercise slowly. Ease off the exercises if you start to have pain. You will be told when to start these exercises and which ones will work bestfor you. How to do the exercises  Press-up    1. Lie on your stomach, supporting your body with your forearms.   2. Press your elbows down into the floor to raise your upper back. As you do this, relax your stomach muscles and allow your back to arch without using your back muscles. As your press up, do not let your hips or pelvis come off the floor. 3. Hold for 15 to 30 seconds, then relax. 4. Repeat 2 to 4 times. Alternate arm and leg (bird dog) exercise    Do this exercise slowly. Try to keep your body straight at all times, and donot let one hip drop lower than the other. 1. Start on the floor, on your hands and knees. 2. Tighten your belly muscles. 3. Raise one leg off the floor, and hold it straight out behind you. Be careful not to let your hip drop down, because that will twist your trunk. 4. Hold for about 6 seconds, then lower your leg and switch to the other leg. 5. Repeat 8 to 12 times on each leg. 6. Over time, work up to holding for 10 to 30 seconds each time. 7. If you feel stable and secure with your leg raised, try raising the opposite arm straight out in front of you at the same time. Knee-to-chest exercise    1. Lie on your back with your knees bent and your feet flat on the floor. 2. Bring one knee to your chest, keeping the other foot flat on the floor (or keeping the other leg straight, whichever feels better on your lower back). 3. Keep your lower back pressed to the floor. Hold for at least 15 to 30 seconds. 4. Relax, and lower the knee to the starting position. 5. Repeat with the other leg. Repeat 2 to 4 times with each leg. 6. To get more stretch, put your other leg flat on the floor while pulling your knee to your chest.  Curl-ups    1. Lie on the floor on your back with your knees bent at a 90-degree angle. Your feet should be flat on the floor, about 12 inches from your buttocks. 2. Cross your arms over your chest. If this bothers your neck, try putting your hands behind your neck (not your head), with your elbows spread apart.   3. Slowly tighten your belly muscles and raise your shoulder blades off the floor. 4. Keep your head in line with your body, and do not press your chin to your chest.  5. Hold this position for 1 or 2 seconds, then slowly lower yourself back down to the floor. 6. Repeat 8 to 12 times. Pelvic tilt exercise    1. Lie on your back with your knees bent. 2. \"Brace\" your stomach. This means to tighten your muscles by pulling in and imagining your belly button moving toward your spine. You should feel like your back is pressing to the floor and your hips and pelvis are rocking back. 3. Hold for about 6 seconds while you breathe smoothly. 4. Repeat 8 to 12 times. Heel dig bridging    1. Lie on your back with both knees bent and your ankles bent so that only your heels are digging into the floor. Your knees should be bent about 90 degrees. 2. Then push your heels into the floor, squeeze your buttocks, and lift your hips off the floor until your shoulders, hips, and knees are all in a straight line. 3. Hold for about 6 seconds as you continue to breathe normally, and then slowly lower your hips back down to the floor and rest for up to 10 seconds. 4. Do 8 to 12 repetitions. Hamstring stretch in doorway    1. Lie on your back in a doorway, with one leg through the open door. 2. Slide your leg up the wall to straighten your knee. You should feel a gentle stretch down the back of your leg. 3. Hold the stretch for at least 15 to 30 seconds. Do not arch your back, point your toes, or bend either knee. Keep one heel touching the floor and the other heel touching the wall. 4. Repeat with your other leg. 5. Do 2 to 4 times for each leg. Hip flexor stretch    1. Kneel on the floor with one knee bent and one leg behind you. Place your forward knee over your foot. Keep your other knee touching the floor. 2. Slowly push your hips forward until you feel a stretch in the upper thigh of your rear leg. 3. Hold the stretch for at least 15 to 30 seconds. Repeat with your other leg.   4. Do 2 to 4 times on each side. Wall sit    1. Stand with your back 10 to 12 inches away from a wall. 2. Lean into the wall until your back is flat against it. 3. Slowly slide down until your knees are slightly bent, pressing your lower back into the wall. 4. Hold for about 6 seconds, then slide back up the wall. 5. Repeat 8 to 12 times. Follow-up care is a key part of your treatment and safety. Be sure to make and go to all appointments, and call your doctor if you are having problems. It's also a good idea to know your test results and keep alist of the medicines you take. Where can you learn more? Go to https://Ludesi.Textingly. org and sign in to your [employer] account. Enter D723 in the [employer] box to learn more about \"Low Back Pain: Exercises. \"     If you do not have an account, please click on the \"Sign Up Now\" link. Current as of: July 1, 2021               Content Version: 13.2  © 2006-2022 [employer]. Care instructions adapted under license by [facility] ([facility]). If you have questions about a medical condition or this instruction, always ask your healthcare professional. [place] [address] any warranty or liability for your use of this information. Patient Education        Stretching: Exercises  Introduction  Here are some examples of exercises for stretching. Start each exercise slowly. Ease off the exercise if you start to have pain. Your doctor or physical therapist will tell you when you can start theseexercises and which ones will work best for you. How to do the exercises  Latissimus stretch    1. Stand with your back straight and your feet shoulder-width apart. You can do this stretch sitting down if you are not steady on your feet. 2. Hold your arms above your head, and hold one hand with the other. 3. Pull upward while leaning straight over toward your right side. Keep your lower body straight.  You should feel the stretch along your left side. 4. Hold 15 to 30 seconds, and then switch sides. 5. Repeat 2 to 4 times for each side. Triceps stretch    1. Stand with your back straight and your feet shoulder-width apart. You can do this stretch sitting down if you are not steady on your feet. 2. Bring your left elbow straight up while bending your arm. 3. Grab your left elbow with your right hand, and pull your left elbow toward your head with light pressure. If you are more flexible, you may pull your arm slightly behind your head. You will feel the stretch along the back of your arm. 4. Hold 15 to 30 seconds, and then switch elbows. 5. Repeat 2 to 4 times for each arm. Calf stretch    1. Place your hands on a wall for balance. You can also do this with your hands on the back of a chair, a countertop, or a tree. 2. Step back with your left leg. Keep the leg straight, and press your left heel into the floor. 3. Press your hips forward, bending your right leg slightly. You will feel the stretch in your left calf. 4. Hold the stretch 15 to 30 seconds. 5. Repeat 2 to 4 times for each leg. Quadriceps stretch    1. Lie on your side with one hand supporting your head. 2. Bend your upper leg back and grab your ankle with your other hand. 3. Stretch your leg back by pulling your foot toward your buttocks. You will feel the stretch in the front of your thigh. If this causes stress on your knees, do not do this stretch. 4. Hold the stretch 15 to 30 seconds. 5. Repeat 2 to 4 times for each leg. Groin stretch    1. Sit on the floor and put the soles of your feet together. Do not slump your back. 2. Grab your ankles and gently pull your legs toward you. 3. Press your knees toward the floor. You will feel the stretch in your inner thighs. 4. Hold 15 to 30 seconds. 5. Repeat 2 to 4 times. Hamstring stretch in doorway    1. Lie on the floor near a doorway, with your buttocks close to the wall.   2. Let the leg you are not stretching extend through the doorway. 3. Put the leg you want to stretch up on the wall, and straighten your knee to feel a gentle stretch at the back of your leg. 4. Hold the stretch for at least 15 to 30 seconds. Repeat 2 to 4 times. Follow-up care is a key part of your treatment and safety. Be sure to make and go to all appointments, and call your doctor if you are having problems. It's also a good idea to know your test results and keep alist of the medicines you take. Where can you learn more? Go to https://SnapUppepicewMWI.[email or web address] org and sign in to your [employer] account. Enter 599 [id number] in the bubl box to learn more about \"Stretching: Exercises. \"     If you do not have an account, please click on the \"Sign Up Now\" link. Current as of: May 12, 2021               Content Version: 13.2  © 7604-0707 Healthwise, Incorporated. Care instructions adapted under license by [facility] ([facility]). If you have questions about a medical condition or this instruction, always ask your healthcare professional. [place] [address] any warranty or liability for your use of this information.

## 2022-09-02 ENCOUNTER — HOSPITAL ENCOUNTER (OUTPATIENT)
Age: 52
Discharge: HOME | End: 2022-09-02
Payer: MEDICARE

## 2022-09-02 LAB
ALBUMIN SERPL-MCNC: 4.3 G/DL (ref 3.5–5.2)
ALP BLD-CCNC: 79 U/L (ref 35–104)
ALT SERPL-CCNC: 9 U/L (ref 0–32)
ANION GAP SERPL CALCULATED.3IONS-SCNC: 7 MMOL/L (ref 7–16)
AST SERPL-CCNC: 15 U/L (ref 0–31)
BASOPHILS ABSOLUTE: 0.05 E9/L (ref 0–0.2)
BASOPHILS RELATIVE PERCENT: 1.1 % (ref 0–2)
BILIRUB SERPL-MCNC: <0.2 MG/DL (ref 0–1.2)
BILIRUBIN DIRECT: <0.2 MG/DL (ref 0–0.3)
BILIRUBIN, INDIRECT: NORMAL MG/DL (ref 0–1)
BUN BLDV-MCNC: 8 MG/DL (ref 6–20)
CALCIUM SERPL-MCNC: 9.8 MG/DL (ref 8.6–10.2)
CHLORIDE BLD-SCNC: 105 MMOL/L (ref 98–107)
CHOLESTEROL, FASTING: 202 MG/DL (ref 0–199)
CO2: 29 MMOL/L (ref 22–29)
CREAT SERPL-MCNC: 0.8 MG/DL (ref 0.5–1)
EOSINOPHILS ABSOLUTE: 0.2 E9/L (ref 0.05–0.5)
EOSINOPHILS RELATIVE PERCENT: 4.3 % (ref 0–6)
GFR AFRICAN AMERICAN: >60
GFR NON-AFRICAN AMERICAN: >60 ML/MIN/1.73
GLUCOSE FASTING: 90 MG/DL (ref 74–99)
HCT VFR BLD CALC: 43.6 % (ref 34–48)
HDLC SERPL-MCNC: 48 MG/DL
HEMOGLOBIN: 13.8 G/DL (ref 11.5–15.5)
IMMATURE GRANULOCYTES #: 0.02 E9/L
IMMATURE GRANULOCYTES %: 0.4 % (ref 0–5)
LDL CHOLESTEROL CALCULATED: 123 MG/DL (ref 0–99)
LITHIUM DOSE AMOUNT: NORMAL
LITHIUM LEVEL: 0.53 MMOL/L (ref 0.5–1.5)
LYMPHOCYTES ABSOLUTE: 1.28 E9/L (ref 1.5–4)
LYMPHOCYTES RELATIVE PERCENT: 27.8 % (ref 20–42)
MCH RBC QN AUTO: 27.7 PG (ref 26–35)
MCHC RBC AUTO-ENTMCNC: 31.7 % (ref 32–34.5)
MCV RBC AUTO: 87.6 FL (ref 80–99.9)
MONOCYTES ABSOLUTE: 0.33 E9/L (ref 0.1–0.95)
MONOCYTES RELATIVE PERCENT: 7.2 % (ref 2–12)
NEUTROPHILS ABSOLUTE: 2.72 E9/L (ref 1.8–7.3)
NEUTROPHILS RELATIVE PERCENT: 59.2 % (ref 43–80)
PDW BLD-RTO: 12.8 FL (ref 11.5–15)
PLATELET # BLD: 288 E9/L (ref 130–450)
PMV BLD AUTO: 10.5 FL (ref 7–12)
POTASSIUM SERPL-SCNC: 4.5 MMOL/L (ref 3.5–5)
RBC # BLD: 4.98 E12/L (ref 3.5–5.5)
SODIUM BLD-SCNC: 141 MMOL/L (ref 132–146)
T4 TOTAL: 8.1 MCG/DL (ref 4.5–11.7)
TOTAL PROTEIN: 7.2 G/DL (ref 6.4–8.3)
TRIGLYCERIDE, FASTING: 157 MG/DL (ref 0–149)
TSH SERPL DL<=0.05 MIU/L-ACNC: 1.48 UIU/ML (ref 0.27–4.2)
VLDLC SERPL CALC-MCNC: 31 MG/DL
WBC # BLD: 4.6 E9/L (ref 4.5–11.5)

## 2022-09-02 PROCEDURE — 84443 ASSAY THYROID STIM HORMONE: CPT

## 2022-09-02 PROCEDURE — 85025 COMPLETE CBC W/AUTO DIFF WBC: CPT

## 2022-09-02 PROCEDURE — 80053 COMPREHEN METABOLIC PANEL: CPT

## 2022-09-02 PROCEDURE — 80076 HEPATIC FUNCTION PANEL: CPT

## 2022-09-02 PROCEDURE — 84436 ASSAY OF TOTAL THYROXINE: CPT

## 2022-09-02 PROCEDURE — 36415 COLL VENOUS BLD VENIPUNCTURE: CPT

## 2022-09-02 PROCEDURE — 80178 ASSAY OF LITHIUM: CPT

## 2022-09-02 PROCEDURE — 80061 LIPID PANEL: CPT

## 2022-10-05 RX ORDER — SUCRALFATE 1 G/1
TABLET ORAL
Qty: 120 TABLET | Refills: 0 | OUTPATIENT
Start: 2022-10-05

## 2022-10-15 ENCOUNTER — APPOINTMENT (OUTPATIENT)
Dept: CT IMAGING | Age: 52
End: 2022-10-15
Payer: MEDICARE

## 2022-10-15 ENCOUNTER — HOSPITAL ENCOUNTER (EMERGENCY)
Age: 52
Discharge: HOME | End: 2022-10-16
Attending: STUDENT IN AN ORGANIZED HEALTH CARE EDUCATION/TRAINING PROGRAM
Payer: MEDICARE

## 2022-10-15 ENCOUNTER — APPOINTMENT (OUTPATIENT)
Dept: GENERAL RADIOLOGY | Age: 52
End: 2022-10-15
Payer: MEDICARE

## 2022-10-15 DIAGNOSIS — R10.9 ABDOMINAL PAIN, UNSPECIFIED ABDOMINAL LOCATION: Primary | ICD-10-CM

## 2022-10-15 LAB
ALBUMIN SERPL-MCNC: 3.2 G/DL (ref 3.5–5.2)
ALP BLD-CCNC: 58 U/L (ref 35–104)
ALT SERPL-CCNC: 10 U/L (ref 0–32)
ANION GAP SERPL CALCULATED.3IONS-SCNC: 9 MMOL/L (ref 7–16)
AST SERPL-CCNC: 15 U/L (ref 0–31)
BASOPHILS ABSOLUTE: 0.07 E9/L (ref 0–0.2)
BASOPHILS RELATIVE PERCENT: 0.9 % (ref 0–2)
BILIRUB SERPL-MCNC: 0.3 MG/DL (ref 0–1.2)
BUN BLDV-MCNC: 14 MG/DL (ref 6–20)
CALCIUM SERPL-MCNC: 9 MG/DL (ref 8.6–10.2)
CHLORIDE BLD-SCNC: 105 MMOL/L (ref 98–107)
CO2: 26 MMOL/L (ref 22–29)
CREAT SERPL-MCNC: 0.8 MG/DL (ref 0.5–1)
EOSINOPHILS ABSOLUTE: 0.15 E9/L (ref 0.05–0.5)
EOSINOPHILS RELATIVE PERCENT: 1.9 % (ref 0–6)
GFR AFRICAN AMERICAN: >60
GFR NON-AFRICAN AMERICAN: >60 ML/MIN/1.73
GLUCOSE BLD-MCNC: 104 MG/DL (ref 74–99)
HCT VFR BLD CALC: 43 % (ref 34–48)
HEMOGLOBIN: 14.1 G/DL (ref 11.5–15.5)
IMMATURE GRANULOCYTES #: 0.03 E9/L
IMMATURE GRANULOCYTES %: 0.4 % (ref 0–5)
LACTIC ACID, SEPSIS: 0.8 MMOL/L (ref 0.5–1.9)
LIPASE: 17 U/L (ref 13–60)
LYMPHOCYTES ABSOLUTE: 1.96 E9/L (ref 1.5–4)
LYMPHOCYTES RELATIVE PERCENT: 25.1 % (ref 20–42)
MCH RBC QN AUTO: 27.9 PG (ref 26–35)
MCHC RBC AUTO-ENTMCNC: 32.8 % (ref 32–34.5)
MCV RBC AUTO: 85.1 FL (ref 80–99.9)
MONOCYTES ABSOLUTE: 0.53 E9/L (ref 0.1–0.95)
MONOCYTES RELATIVE PERCENT: 6.8 % (ref 2–12)
NEUTROPHILS ABSOLUTE: 5.07 E9/L (ref 1.8–7.3)
NEUTROPHILS RELATIVE PERCENT: 64.9 % (ref 43–80)
PDW BLD-RTO: 13.5 FL (ref 11.5–15)
PLATELET # BLD: 288 E9/L (ref 130–450)
PMV BLD AUTO: 9.7 FL (ref 7–12)
POTASSIUM REFLEX MAGNESIUM: 4.7 MMOL/L (ref 3.5–5)
RBC # BLD: 5.05 E12/L (ref 3.5–5.5)
SODIUM BLD-SCNC: 140 MMOL/L (ref 132–146)
TOTAL PROTEIN: 5.6 G/DL (ref 6.4–8.3)
TROPONIN, HIGH SENSITIVITY: <6 NG/L (ref 0–9)
WBC # BLD: 7.8 E9/L (ref 4.5–11.5)

## 2022-10-15 PROCEDURE — 84484 ASSAY OF TROPONIN QUANT: CPT

## 2022-10-15 PROCEDURE — 83690 ASSAY OF LIPASE: CPT

## 2022-10-15 PROCEDURE — 99285 EMERGENCY DEPT VISIT HI MDM: CPT

## 2022-10-15 PROCEDURE — 96374 THER/PROPH/DIAG INJ IV PUSH: CPT

## 2022-10-15 PROCEDURE — 74177 CT ABD & PELVIS W/CONTRAST: CPT

## 2022-10-15 PROCEDURE — 6360000004 HC RX CONTRAST MEDICATION: Performed by: RADIOLOGY

## 2022-10-15 PROCEDURE — 36415 COLL VENOUS BLD VENIPUNCTURE: CPT

## 2022-10-15 PROCEDURE — 71045 X-RAY EXAM CHEST 1 VIEW: CPT

## 2022-10-15 PROCEDURE — 80053 COMPREHEN METABOLIC PANEL: CPT

## 2022-10-15 PROCEDURE — 93005 ELECTROCARDIOGRAM TRACING: CPT | Performed by: STUDENT IN AN ORGANIZED HEALTH CARE EDUCATION/TRAINING PROGRAM

## 2022-10-15 PROCEDURE — 83605 ASSAY OF LACTIC ACID: CPT

## 2022-10-15 PROCEDURE — 85025 COMPLETE CBC W/AUTO DIFF WBC: CPT

## 2022-10-15 PROCEDURE — 6360000002 HC RX W HCPCS: Performed by: STUDENT IN AN ORGANIZED HEALTH CARE EDUCATION/TRAINING PROGRAM

## 2022-10-15 RX ORDER — ONDANSETRON 2 MG/ML
4 INJECTION INTRAMUSCULAR; INTRAVENOUS EVERY 6 HOURS PRN
Status: DISCONTINUED | OUTPATIENT
Start: 2022-10-15 | End: 2022-10-16 | Stop reason: HOSPADM

## 2022-10-15 RX ADMIN — IOPAMIDOL 50 ML: 755 INJECTION, SOLUTION INTRAVENOUS at 23:51

## 2022-10-15 RX ADMIN — ONDANSETRON 4 MG: 2 INJECTION INTRAMUSCULAR; INTRAVENOUS at 23:06

## 2022-10-15 ASSESSMENT — PAIN SCALES - GENERAL: PAINLEVEL_OUTOF10: 5

## 2022-10-15 ASSESSMENT — PAIN DESCRIPTION - LOCATION: LOCATION: CHEST

## 2022-10-15 ASSESSMENT — PAIN - FUNCTIONAL ASSESSMENT: PAIN_FUNCTIONAL_ASSESSMENT: 0-10

## 2022-10-16 VITALS
TEMPERATURE: 98.1 F | BODY MASS INDEX: 32.82 KG/M2 | HEART RATE: 62 BPM | OXYGEN SATURATION: 96 % | HEIGHT: 65 IN | RESPIRATION RATE: 16 BRPM | DIASTOLIC BLOOD PRESSURE: 79 MMHG | SYSTOLIC BLOOD PRESSURE: 133 MMHG | WEIGHT: 197 LBS

## 2022-10-16 LAB — SARS-COV-2, NAAT: NOT DETECTED

## 2022-10-16 PROCEDURE — 87635 SARS-COV-2 COVID-19 AMP PRB: CPT

## 2022-10-16 ASSESSMENT — PAIN DESCRIPTION - ONSET: ONSET: ON-GOING

## 2022-10-16 ASSESSMENT — PAIN DESCRIPTION - ORIENTATION: ORIENTATION: RIGHT;LEFT;LOWER

## 2022-10-16 ASSESSMENT — PAIN - FUNCTIONAL ASSESSMENT
PAIN_FUNCTIONAL_ASSESSMENT: NONE - DENIES PAIN
PAIN_FUNCTIONAL_ASSESSMENT: 0-10
PAIN_FUNCTIONAL_ASSESSMENT: PREVENTS OR INTERFERES SOME ACTIVE ACTIVITIES AND ADLS

## 2022-10-16 ASSESSMENT — PAIN DESCRIPTION - DESCRIPTORS: DESCRIPTORS: ACHING

## 2022-10-16 ASSESSMENT — PAIN DESCRIPTION - FREQUENCY: FREQUENCY: CONTINUOUS

## 2022-10-16 ASSESSMENT — PAIN SCALES - GENERAL: PAINLEVEL_OUTOF10: 3

## 2022-10-16 ASSESSMENT — PAIN DESCRIPTION - LOCATION: LOCATION: ABDOMEN;BACK

## 2022-10-16 NOTE — ED PROVIDER NOTES
ED  Provider Note  Admit Date/RoomTime: 10/15/2022  9:06 PM  ED Room: 12/12      History of Present Illness:  10/15/22, Time: 9:38 PM EDT  Chief Complaint   Patient presents with    Chest Pain     Left chest pain into left back; has been vomiting for a few days; Анна Ordoñez is a 47 y.o. female presenting to the ED for L flank pain, abd pain, radiates to back, no catalino CP, no SOB, no dysuria or hematuria, nausea and vomiting Wed. No c/c/r. No sore throat. No loss taste or smell. Worse with none, relieved wth none. No fall or trauma. No f/c. No exertional sx. Did water aerobics and concerned pulled something. No nausea now. Pain 3/10, taking none at home. Reports mostly compliant with carafate. Review of Systems:   A complete review of systems was performed and pertinent positives and negatives are stated within HPI, all other systems reviewed and are negative.        --------------------------------------------- PATIENT HISTORY--------------------------------------------  Past Medical History:  has a past medical history of Bilateral low back pain without sciatica, Bipolar 1 disorder ([facility] [address] Depression, Obesity, Osteoarthritis, Thiamine deficiency, and Vitamin D deficiency. Past Surgical History:  has a past surgical history that includes Gallbladder surgery (1994); Cholecystectomy; Endoscopy, colon, diagnostic; pr lap gastric bypass/luciano-en-y (N/A, 4/30/2018); Upper gastrointestinal endoscopy (N/A, 10/22/2018); Upper gastrointestinal endoscopy (N/A, 2/21/2019); joint replacement (Right, 06/03/2019); Total knee arthroplasty (Right, 6/3/2019); Upper gastrointestinal endoscopy (N/A, 9/16/2019); Total knee arthroplasty (Left, 6/24/2020); and Upper gastrointestinal endoscopy (N/A, 9/11/2021).     Family History: family history includes Breast Cancer in her mother; Cancer in her maternal grandmother; Heart Disease in her father, maternal grandmother, mother, and paternal grandmother; Parkinsonism in her father. . Unless otherwise noted, family history is non contributory    Social History:  reports that she has been smoking cigarettes. She started smoking about 16 months ago. She has a 0.50 pack-year smoking history. She has never used smokeless tobacco. She reports that she does not drink alcohol and does not use drugs. The patients home medications have been reviewed. Allergies: Patient has no known allergies. I have reviewed the past medical history, past surgical history, social history, and family history    ---------------------------------------------------PHYSICAL EXAM--------------------------------------    Constitutional/General: Alert and oriented x3  Head: Normocephalic and atraumatic  Eyes: PERRL, EOMI, sclera non icteric  ENT: Oropharynx clear, handling secretions, no trismus  Neck: Supple, full ROM, no stridor, no meningismus  Respiratory: lctab  Cardiovascular: RRR, no R/G/M, 2+ peripheral pulses  Chest: No chest wall tenderness, equal chest rise  Gastrointestinal:  soft, TTP LUQ no rebund or guarding   Musculoskeletal: Extremities warm and well perfused, moving all extremities  Skin: skin warm and dry. No rashes. Neurologic: No focal deficits, strength and sensation grossly intact   Psychiatric: Normal Affect, behavior normal           -------------------------------------------------- RESULTS -------------------------------------------------  I have personally reviewed all laboratory and imaging results for this patient. Results are listed below.      LABS: (Lab results interpreted by me)  Results for orders placed or performed during the hospital encounter of 10/15/22   COVID-19, Rapid    Specimen: Nasopharyngeal Swab   Result Value Ref Range    SARS-CoV-2, NAAT Not Detected Not Detected   CBC with Auto Differential   Result Value Ref Range    WBC 7.8 4.5 - 11.5 E9/L    RBC 5.05 3.50 - 5.50 E12/L    Hemoglobin 14.1 11.5 - 15.5 g/dL    Hematocrit 43.0 34.0 - 48.0 %    MCV 85.1 80.0 - 99.9 fL    MCH 27.9 26.0 - 35.0 pg    MCHC 32.8 32.0 - 34.5 %    RDW 13.5 11.5 - 15.0 fL    Platelets 543 292 - 969 E9/L    MPV 9.7 7.0 - 12.0 fL    Neutrophils % 64.9 43.0 - 80.0 %    Immature Granulocytes % 0.4 0.0 - 5.0 %    Lymphocytes % 25.1 20.0 - 42.0 %    Monocytes % 6.8 2.0 - 12.0 %    Eosinophils % 1.9 0.0 - 6.0 %    Basophils % 0.9 0.0 - 2.0 %    Neutrophils Absolute 5.07 1.80 - 7.30 E9/L    Immature Granulocytes # 0.03 E9/L    Lymphocytes Absolute 1.96 1.50 - 4.00 E9/L    Monocytes Absolute 0.53 0.10 - 0.95 E9/L    Eosinophils Absolute 0.15 0.05 - 0.50 E9/L    Basophils Absolute 0.07 0.00 - 0.20 E9/L   Comprehensive Metabolic Panel w/ Reflex to MG   Result Value Ref Range    Sodium 140 132 - 146 mmol/L    Potassium reflex Magnesium 4.7 3.5 - 5.0 mmol/L    Chloride 105 98 - 107 mmol/L    CO2 26 22 - 29 mmol/L    Anion Gap 9 7 - 16 mmol/L    Glucose 104 (H) 74 - 99 mg/dL    BUN 14 6 - 20 mg/dL    Creatinine 0.8 0.5 - 1.0 mg/dL    GFR Non-African American >60 >=60 mL/min/1.73    GFR African American >60     Calcium 9.0 8.6 - 10.2 mg/dL    Total Protein 5.6 (L) 6.4 - 8.3 g/dL    Albumin 3.2 (L) 3.5 - 5.2 g/dL    Total Bilirubin 0.3 0.0 - 1.2 mg/dL    Alkaline Phosphatase 58 35 - 104 U/L    ALT 10 0 - 32 U/L    AST 15 0 - 31 U/L   Lipase   Result Value Ref Range    Lipase 17 13 - 60 U/L   Lactate, Sepsis   Result Value Ref Range    Lactic Acid, Sepsis 0.8 0.5 - 1.9 mmol/L   Troponin   Result Value Ref Range    Troponin, High Sensitivity <6 0 - 9 ng/L   EKG 12 Lead   Result Value Ref Range    Ventricular Rate 63 BPM    Atrial Rate 63 BPM    P-R Interval 178 ms    QRS Duration 88 ms    Q-T Interval 422 ms    QTc Calculation (Bazett) 431 ms    P Axis 65 degrees    R Axis 58 degrees    T Axis 61 degrees   ,       RADIOLOGY:  Imaging interpreted by Radiologist unless otherwise specified  CT ABDOMEN PELVIS W IV CONTRAST Additional Contrast? None   Final Result   No acute disease.       RECOMMENDATIONS:   Careful clinical correlation and follow up recommended. XR CHEST PORTABLE   Final Result   No acute disease. RECOMMENDATION:   Careful clinical correlation and follow up recommended. ------------------------- NURSING NOTES AND VITALS REVIEWED ---------------------------  The nursing notes within the ED encounter and vital signs as below have been reviewed by myself  /79   Pulse 62   Temp 98.1 °F (36.7 °C)   Resp 16   Ht 5' 5\" (1.651 m)   Wt 197 lb (89.4 kg)   SpO2 96%   BMI 32.78 kg/m²      The patients available past medical records and past encounters were reviewed. ------------------------------ ED COURSE/MEDICAL DECISION MAKING----------------------  Medications   ondansetron (ZOFRAN) injection 4 mg (4 mg IntraVENous Given 10/15/22 7926)   iopamidol (ISOVUE-370) 76 % injection 50 mL (50 mLs IntraVENous Given 10/15/22 4474)       I, Dr. Janusz Tee, am the primary provider of record    Medical Decision Making:   Abdominal pain and flank pain. Suspect PUD. Nausea relieved with zofran. Doubt ACS/MI. No covid. Vitals stable. No sx to suggest UTI. No kidney stone on CT. Trop negative. ED Counseling: This emergency provider has spoken with the patient and any family present to discuss clinical status, results, plan of care, diagnosis and prognosis as able to be determined at this time. Any questions were answered and patient and/or family/POA are agreeable with the plan.       --------------------------------- IMPRESSION AND DISPOSITION ---------------------------------    IMPRESSION  1. Abdominal pain, unspecified abdominal location        DISPOSITION  Disposition: Discharge to home  Patient condition is good      This report was transcribed using voice recognition software. Every effort was made to ensure accuracy; however, transcription errors may be present.          Jj Tse MD  10/16/22 0733

## 2022-10-17 LAB
EKG ATRIAL RATE: 60 BPM
EKG ATRIAL RATE: 63 BPM
EKG P AXIS: 65 DEGREES
EKG P AXIS: 77 DEGREES
EKG P-R INTERVAL: 166 MS
EKG P-R INTERVAL: 178 MS
EKG Q-T INTERVAL: 394 MS
EKG Q-T INTERVAL: 422 MS
EKG QRS DURATION: 88 MS
EKG QRS DURATION: 90 MS
EKG QTC CALCULATION (BAZETT): 394 MS
EKG QTC CALCULATION (BAZETT): 431 MS
EKG R AXIS: 58 DEGREES
EKG R AXIS: 73 DEGREES
EKG T AXIS: 61 DEGREES
EKG T AXIS: 63 DEGREES
EKG VENTRICULAR RATE: 60 BPM
EKG VENTRICULAR RATE: 63 BPM

## 2022-11-18 ENCOUNTER — HOSPITAL ENCOUNTER (EMERGENCY)
Age: 52
Discharge: HOME | End: 2022-11-18
Payer: MEDICARE

## 2022-11-18 ENCOUNTER — APPOINTMENT (OUTPATIENT)
Dept: CT IMAGING | Age: 52
End: 2022-11-18
Payer: MEDICARE

## 2022-11-18 VITALS
OXYGEN SATURATION: 98 % | DIASTOLIC BLOOD PRESSURE: 79 MMHG | HEART RATE: 68 BPM | TEMPERATURE: 98.8 F | RESPIRATION RATE: 18 BRPM | SYSTOLIC BLOOD PRESSURE: 131 MMHG

## 2022-11-18 DIAGNOSIS — R11.2 NAUSEA AND VOMITING, UNSPECIFIED VOMITING TYPE: ICD-10-CM

## 2022-11-18 DIAGNOSIS — N28.1 RENAL CYST: ICD-10-CM

## 2022-11-18 DIAGNOSIS — R10.84 GENERALIZED ABDOMINAL PAIN: Primary | ICD-10-CM

## 2022-11-18 LAB
ALBUMIN SERPL-MCNC: 3.7 G/DL (ref 3.5–5.2)
ALP BLD-CCNC: 86 U/L (ref 35–104)
ALT SERPL-CCNC: <5 U/L (ref 0–32)
ANION GAP SERPL CALCULATED.3IONS-SCNC: 7 MMOL/L (ref 7–16)
AST SERPL-CCNC: 12 U/L (ref 0–31)
BACTERIA: ABNORMAL /HPF
BASOPHILS ABSOLUTE: 0.05 E9/L (ref 0–0.2)
BASOPHILS RELATIVE PERCENT: 0.6 % (ref 0–2)
BILIRUB SERPL-MCNC: 0.2 MG/DL (ref 0–1.2)
BILIRUBIN URINE: NEGATIVE
BLOOD, URINE: NEGATIVE
BUN BLDV-MCNC: 9 MG/DL (ref 6–20)
CALCIUM SERPL-MCNC: 9.7 MG/DL (ref 8.6–10.2)
CHLORIDE BLD-SCNC: 98 MMOL/L (ref 98–107)
CLARITY: CLEAR
CO2: 29 MMOL/L (ref 22–29)
COLOR: YELLOW
CREAT SERPL-MCNC: 0.8 MG/DL (ref 0.5–1)
EOSINOPHILS ABSOLUTE: 0.03 E9/L (ref 0.05–0.5)
EOSINOPHILS RELATIVE PERCENT: 0.3 % (ref 0–6)
EPITHELIAL CELLS, UA: ABNORMAL /HPF
GFR SERPL CREATININE-BSD FRML MDRD: >60 ML/MIN/1.73
GLUCOSE BLD-MCNC: 121 MG/DL (ref 74–99)
GLUCOSE URINE: NEGATIVE MG/DL
HCG, URINE, POC: NEGATIVE
HCT VFR BLD CALC: 42.5 % (ref 34–48)
HEMOGLOBIN: 13.6 G/DL (ref 11.5–15.5)
IMMATURE GRANULOCYTES #: 0.04 E9/L
IMMATURE GRANULOCYTES %: 0.5 % (ref 0–5)
KETONES, URINE: ABNORMAL MG/DL
LACTIC ACID: 0.9 MMOL/L (ref 0.5–2.2)
LEUKOCYTE ESTERASE, URINE: NEGATIVE
LIPASE: 18 U/L (ref 13–60)
LYMPHOCYTES ABSOLUTE: 1.47 E9/L (ref 1.5–4)
LYMPHOCYTES RELATIVE PERCENT: 16.7 % (ref 20–42)
Lab: NORMAL
MCH RBC QN AUTO: 27.5 PG (ref 26–35)
MCHC RBC AUTO-ENTMCNC: 32 % (ref 32–34.5)
MCV RBC AUTO: 85.9 FL (ref 80–99.9)
MONOCYTES ABSOLUTE: 0.62 E9/L (ref 0.1–0.95)
MONOCYTES RELATIVE PERCENT: 7.1 % (ref 2–12)
NEGATIVE QC PASS/FAIL: NORMAL
NEUTROPHILS ABSOLUTE: 6.57 E9/L (ref 1.8–7.3)
NEUTROPHILS RELATIVE PERCENT: 74.8 % (ref 43–80)
NITRITE, URINE: NEGATIVE
PDW BLD-RTO: 12.9 FL (ref 11.5–15)
PH UA: 5.5 (ref 5–9)
PLATELET # BLD: 367 E9/L (ref 130–450)
PMV BLD AUTO: 9.8 FL (ref 7–12)
POSITIVE QC PASS/FAIL: NORMAL
POTASSIUM REFLEX MAGNESIUM: 4.3 MMOL/L (ref 3.5–5)
PROTEIN UA: NEGATIVE MG/DL
RBC # BLD: 4.95 E12/L (ref 3.5–5.5)
RBC UA: ABNORMAL /HPF (ref 0–2)
SODIUM BLD-SCNC: 134 MMOL/L (ref 132–146)
SPECIFIC GRAVITY UA: >=1.03 (ref 1–1.03)
TOTAL PROTEIN: 6.8 G/DL (ref 6.4–8.3)
UROBILINOGEN, URINE: 0.2 E.U./DL
WBC # BLD: 8.8 E9/L (ref 4.5–11.5)
WBC UA: ABNORMAL /HPF (ref 0–5)

## 2022-11-18 PROCEDURE — 80053 COMPREHEN METABOLIC PANEL: CPT

## 2022-11-18 PROCEDURE — 2580000003 HC RX 258: Performed by: PHYSICIAN ASSISTANT

## 2022-11-18 PROCEDURE — 6360000004 HC RX CONTRAST MEDICATION: Performed by: RADIOLOGY

## 2022-11-18 PROCEDURE — 83690 ASSAY OF LIPASE: CPT

## 2022-11-18 PROCEDURE — 96374 THER/PROPH/DIAG INJ IV PUSH: CPT

## 2022-11-18 PROCEDURE — 81001 URINALYSIS AUTO W/SCOPE: CPT

## 2022-11-18 PROCEDURE — 85025 COMPLETE CBC W/AUTO DIFF WBC: CPT

## 2022-11-18 PROCEDURE — 96375 TX/PRO/DX INJ NEW DRUG ADDON: CPT

## 2022-11-18 PROCEDURE — 6360000002 HC RX W HCPCS: Performed by: PHYSICIAN ASSISTANT

## 2022-11-18 PROCEDURE — 74177 CT ABD & PELVIS W/CONTRAST: CPT

## 2022-11-18 PROCEDURE — 99285 EMERGENCY DEPT VISIT HI MDM: CPT

## 2022-11-18 PROCEDURE — 83605 ASSAY OF LACTIC ACID: CPT

## 2022-11-18 PROCEDURE — 36415 COLL VENOUS BLD VENIPUNCTURE: CPT

## 2022-11-18 RX ORDER — ONDANSETRON 4 MG/1
4 TABLET, ORALLY DISINTEGRATING ORAL EVERY 8 HOURS PRN
Qty: 10 TABLET | Refills: 0 | Status: SHIPPED | OUTPATIENT
Start: 2022-11-18

## 2022-11-18 RX ORDER — SUCRALFATE 1 G/1
1 TABLET ORAL 4 TIMES DAILY
Qty: 120 TABLET | Refills: 3 | Status: SHIPPED | OUTPATIENT
Start: 2022-11-18

## 2022-11-18 RX ORDER — 0.9 % SODIUM CHLORIDE 0.9 %
1000 INTRAVENOUS SOLUTION INTRAVENOUS ONCE
Status: COMPLETED | OUTPATIENT
Start: 2022-11-18 | End: 2022-11-18

## 2022-11-18 RX ORDER — ONDANSETRON 2 MG/ML
4 INJECTION INTRAMUSCULAR; INTRAVENOUS ONCE
Status: COMPLETED | OUTPATIENT
Start: 2022-11-18 | End: 2022-11-18

## 2022-11-18 RX ORDER — MORPHINE SULFATE 4 MG/ML
4 INJECTION, SOLUTION INTRAMUSCULAR; INTRAVENOUS ONCE
Status: COMPLETED | OUTPATIENT
Start: 2022-11-18 | End: 2022-11-18

## 2022-11-18 RX ORDER — FENTANYL CITRATE 50 UG/ML
25 INJECTION, SOLUTION INTRAMUSCULAR; INTRAVENOUS ONCE
Status: COMPLETED | OUTPATIENT
Start: 2022-11-18 | End: 2022-11-18

## 2022-11-18 RX ADMIN — IOPAMIDOL 75 ML: 755 INJECTION, SOLUTION INTRAVENOUS at 15:11

## 2022-11-18 RX ADMIN — MORPHINE SULFATE 4 MG: 4 INJECTION, SOLUTION INTRAMUSCULAR; INTRAVENOUS at 14:06

## 2022-11-18 RX ADMIN — SODIUM CHLORIDE 1000 ML: 9 INJECTION, SOLUTION INTRAVENOUS at 14:03

## 2022-11-18 RX ADMIN — FENTANYL CITRATE 25 MCG: 50 INJECTION, SOLUTION INTRAMUSCULAR; INTRAVENOUS at 15:02

## 2022-11-18 RX ADMIN — ONDANSETRON 4 MG: 2 INJECTION INTRAMUSCULAR; INTRAVENOUS at 14:04

## 2022-11-18 ASSESSMENT — LIFESTYLE VARIABLES: HOW OFTEN DO YOU HAVE A DRINK CONTAINING ALCOHOL: NEVER

## 2022-11-18 ASSESSMENT — PAIN DESCRIPTION - LOCATION
LOCATION: ABDOMEN
LOCATION: ABDOMEN;BACK

## 2022-11-18 ASSESSMENT — PAIN DESCRIPTION - ORIENTATION
ORIENTATION: UPPER
ORIENTATION: MID

## 2022-11-18 ASSESSMENT — PAIN SCALES - GENERAL
PAINLEVEL_OUTOF10: 5
PAINLEVEL_OUTOF10: 10

## 2022-11-18 ASSESSMENT — PAIN DESCRIPTION - DESCRIPTORS: DESCRIPTORS: DISCOMFORT;ACHING

## 2022-11-18 NOTE — ED PROVIDER NOTES
Independent [facility]      HPI:  11/18/22, Time: 1:21 PM JULIETTE Dominguez is a 47 y.o. female presenting to the ED for abdominal pain , beginning 3 days  ago. The complaint has been persistent, moderate in severity, and worsened by eating or drinking . Patient comes in with complaint of generalized abdominal pain with nausea vomiting and constipation. Pain is intermittent sharp crampy pain but does radiate towards her back. That is worse with attempting to move her bowels. Patient's denies any hematemesis no black tarry or bloody appearing stools. She did take Colace for constipation had decreased bowel movements. She denies any urinary frequency urgency burning. No fever chills    Review of Systems:   A complete review of systems was performed and pertinent positives and negatives are stated within HPI, all other systems reviewed and are negative.          --------------------------------------------- PAST HISTORY ---------------------------------------------  Past Medical History:  has a past medical history of Bilateral low back pain without sciatica, Bipolar 1 disorder ([facility] [address] Depression, Obesity, Osteoarthritis, Thiamine deficiency, and Vitamin D deficiency. Past Surgical History:  has a past surgical history that includes Gallbladder surgery (1994); Cholecystectomy; Endoscopy, colon, diagnostic; pr lap gastric bypass/luciano-en-y (N/A, 4/30/2018); Upper gastrointestinal endoscopy (N/A, 10/22/2018); Upper gastrointestinal endoscopy (N/A, 2/21/2019); joint replacement (Right, 06/03/2019); Total knee arthroplasty (Right, 6/3/2019); Upper gastrointestinal endoscopy (N/A, 9/16/2019); Total knee arthroplasty (Left, 6/24/2020); and Upper gastrointestinal endoscopy (N/A, 9/11/2021). Social History:  reports that she has been smoking cigarettes. She started smoking about 17 months ago. She has a 0.50 pack-year smoking history.  She has never used smokeless tobacco. She reports that she does not drink alcohol and does not use drugs. Family History: family history includes Breast Cancer in her mother; Cancer in her maternal grandmother; Heart Disease in her father, maternal grandmother, mother, and paternal grandmother; Parkinsonism in her father. The patients home medications have been reviewed. Allergies: Patient has no known allergies.     -------------------------------------------------- RESULTS -------------------------------------------------  All laboratory and radiology results have been personally reviewed by myself   LABS:  Results for orders placed or performed during the hospital encounter of 11/18/22   CBC with Auto Differential   Result Value Ref Range    WBC 8.8 4.5 - 11.5 E9/L    RBC 4.95 3.50 - 5.50 E12/L    Hemoglobin 13.6 11.5 - 15.5 g/dL    Hematocrit 42.5 34.0 - 48.0 %    MCV 85.9 80.0 - 99.9 fL    MCH 27.5 26.0 - 35.0 pg    MCHC 32.0 32.0 - 34.5 %    RDW 12.9 11.5 - 15.0 fL    Platelets 507 961 - 503 E9/L    MPV 9.8 7.0 - 12.0 fL    Neutrophils % 74.8 43.0 - 80.0 %    Immature Granulocytes % 0.5 0.0 - 5.0 %    Lymphocytes % 16.7 (L) 20.0 - 42.0 %    Monocytes % 7.1 2.0 - 12.0 %    Eosinophils % 0.3 0.0 - 6.0 %    Basophils % 0.6 0.0 - 2.0 %    Neutrophils Absolute 6.57 1.80 - 7.30 E9/L    Immature Granulocytes # 0.04 E9/L    Lymphocytes Absolute 1.47 (L) 1.50 - 4.00 E9/L    Monocytes Absolute 0.62 0.10 - 0.95 E9/L    Eosinophils Absolute 0.03 (L) 0.05 - 0.50 E9/L    Basophils Absolute 0.05 0.00 - 0.20 E9/L   Comprehensive Metabolic Panel w/ Reflex to MG   Result Value Ref Range    Sodium 134 132 - 146 mmol/L    Potassium reflex Magnesium 4.3 3.5 - 5.0 mmol/L    Chloride 98 98 - 107 mmol/L    CO2 29 22 - 29 mmol/L    Anion Gap 7 7 - 16 mmol/L    Glucose 121 (H) 74 - 99 mg/dL    BUN 9 6 - 20 mg/dL    Creatinine 0.8 0.5 - 1.0 mg/dL    Est, Glom Filt Rate >60 >=60 mL/min/1.73    Calcium 9.7 8.6 - 10.2 mg/dL    Total Protein 6.8 6.4 - 8.3 g/dL    Albumin 3.7 3.5 - 5.2 g/dL    Total Bilirubin 0.2 0.0 - 1.2 mg/dL    Alkaline Phosphatase 86 35 - 104 U/L    ALT <5 0 - 32 U/L    AST 12 0 - 31 U/L   Lipase   Result Value Ref Range    Lipase 18 13 - 60 U/L   Lactic Acid   Result Value Ref Range    Lactic Acid 0.9 0.5 - 2.2 mmol/L   Urinalysis with Microscopic   Result Value Ref Range    Color, UA Yellow Straw/Yellow    Clarity, UA Clear Clear    Glucose, Ur Negative Negative mg/dL    Bilirubin Urine Negative Negative    Ketones, Urine TRACE (A) Negative mg/dL    Specific Gravity, UA >=1.030 1.005 - 1.030    Blood, Urine Negative Negative    pH, UA 5.5 5.0 - 9.0    Protein, UA Negative Negative mg/dL    Urobilinogen, Urine 0.2 <2.0 E.U./dL    Nitrite, Urine Negative Negative    Leukocyte Esterase, Urine Negative Negative    WBC, UA 0-1 0 - 5 /HPF    RBC, UA 0-1 0 - 2 /HPF    Epithelial Cells, UA RARE /HPF    Bacteria, UA MODERATE (A) None Seen /HPF   POC Pregnancy Urine Qual   Result Value Ref Range    HCG, Urine, POC Negative Negative    Lot Number [id number]     Positive QC Pass/Fail Pass     Negative QC Pass/Fail Pass        RADIOLOGY:  Interpreted by Radiologist.  CT ABDOMEN PELVIS W IV CONTRAST Additional Contrast? None   Final Result   No acute process in the abdomen and pelvis. Status post cholecystectomy and gastric bypass surgery. 3.2 cm right renal cyst.             ------------------------- NURSING NOTES AND VITALS REVIEWED ---------------------------   The nursing notes within the ED encounter and vital signs as below have been reviewed.    BP (!) 168/100   Pulse 98   Temp 98.8 °F (37.1 °C)   Resp 22   SpO2 99%   Oxygen Saturation Interpretation: Normal      ---------------------------------------------------PHYSICAL EXAM--------------------------------------      Constitutional/General: Alert and oriented x3, well appearing, non toxic in NAD  Head: Normocephalic and atraumatic  Eyes: PERRL, EOMI  Mouth: Oropharynx clear, handling secretions, no trismus  Neck: Supple, full ROM,   Pulmonary: Lungs clear to auscultation bilaterally, no wheezes, rales, or rhonchi. Not in respiratory distress  Cardiovascular:  Regular rate and rhythm, no murmurs, gallops, or rubs. 2+ distal pulses  Abdomen: Soft, tender midepigastric right lower quad  slight distention distended, no CVA tenderness mild guarding diarrhea  Extremities: Moves all extremities x 4. Warm and well perfused  Skin: warm and dry without rash  Neurologic: GCS 15,  Psych: Normal Affect      ------------------------------ ED COURSE/MEDICAL DECISION MAKING----------------------  Medications   morphine injection 4 mg (4 mg IntraVENous Given 11/18/22 1406)   ondansetron (ZOFRAN) injection 4 mg (4 mg IntraVENous Given 11/18/22 1404)   0.9 % sodium chloride bolus (0 mLs IntraVENous Stopped 11/18/22 1613)   fentaNYL (SUBLIMAZE) injection 25 mcg (25 mcg IntraVENous Given 11/18/22 1502)   iopamidol (ISOVUE-370) 76 % injection 75 mL (75 mLs IntraVENous Given 11/18/22 1511)         ED COURSE:     0250 patient is pain is persistent nausea has improved at this time      0420 patient tolerated p.o. challenge patient's pain nausea improved at this time we will just    Medical Decision Making:    Patient came in with complaint of generalized abdominal pain with nausea vomiting and constipation CT was obtained there is no bowel obstruction, no appendicitis, colitis or diverticulitis present present she had no complaint of dysuria urinalysis no urinary tract infection no hematuria. Labs no leukocytosis normal kidney function lactic acid is normal.  Patient tolerated p.o. challenge here in the ER. She is to follow-up outpatient with primary with referral to GI for possible EGD colonoscopy. Patient is presently on Protonix Carafate was added as well as Zofran. Patient understands if she has any worsening symptoms she needs to return to the ER    Counseling:    The emergency provider has spoken with the patient and discussed todays results, in addition to providing specific details for the plan of care and counseling regarding the diagnosis and prognosis. Questions are answered at this time and they are agreeable with the plan.      --------------------------------- IMPRESSION AND DISPOSITION ---------------------------------    IMPRESSION  1. Generalized abdominal pain    2. Renal cyst    3. Nausea and vomiting, unspecified vomiting type        DISPOSITION  Disposition: Discharged to home  Patient condition is good      NOTE: This report was transcribed using voice recognition software.  Every effort was made to ensure accuracy; however, inadvertent computerized transcription errors may be present      Minus Arely Garzon  11/18/22 4305

## 2022-12-11 ENCOUNTER — HOSPITAL ENCOUNTER (EMERGENCY)
Age: 52
Discharge: HOME | End: 2022-12-11
Attending: EMERGENCY MEDICINE
Payer: MEDICARE

## 2022-12-11 ENCOUNTER — APPOINTMENT (OUTPATIENT)
Dept: CT IMAGING | Age: 52
End: 2022-12-11
Payer: MEDICARE

## 2022-12-11 VITALS
BODY MASS INDEX: 32.78 KG/M2 | TEMPERATURE: 97.8 F | WEIGHT: 197 LBS | DIASTOLIC BLOOD PRESSURE: 79 MMHG | HEART RATE: 98 BPM | RESPIRATION RATE: 20 BRPM | OXYGEN SATURATION: 100 % | SYSTOLIC BLOOD PRESSURE: 116 MMHG

## 2022-12-11 DIAGNOSIS — K59.09 OTHER CONSTIPATION: ICD-10-CM

## 2022-12-11 DIAGNOSIS — R10.84 GENERALIZED ABDOMINAL PAIN: Primary | ICD-10-CM

## 2022-12-11 LAB
ABO/RH: NORMAL
ALBUMIN SERPL-MCNC: 3 G/DL (ref 3.5–5.2)
ALP BLD-CCNC: 77 U/L (ref 35–104)
ALT SERPL-CCNC: 7 U/L (ref 0–32)
ANION GAP SERPL CALCULATED.3IONS-SCNC: 8 MMOL/L (ref 7–16)
ANTIBODY SCREEN: NORMAL
AST SERPL-CCNC: 11 U/L (ref 0–31)
BASOPHILS ABSOLUTE: 0.06 E9/L (ref 0–0.2)
BASOPHILS RELATIVE PERCENT: 1 % (ref 0–2)
BILIRUB SERPL-MCNC: <0.2 MG/DL (ref 0–1.2)
BILIRUBIN URINE: NEGATIVE
BLOOD, URINE: NEGATIVE
BUN BLDV-MCNC: 9 MG/DL (ref 6–20)
CALCIUM SERPL-MCNC: 8.4 MG/DL (ref 8.6–10.2)
CHLORIDE BLD-SCNC: 103 MMOL/L (ref 98–107)
CLARITY: CLEAR
CO2: 28 MMOL/L (ref 22–29)
COLOR: YELLOW
CREAT SERPL-MCNC: 0.8 MG/DL (ref 0.5–1)
EOSINOPHILS ABSOLUTE: 0.2 E9/L (ref 0.05–0.5)
EOSINOPHILS RELATIVE PERCENT: 3.3 % (ref 0–6)
GFR SERPL CREATININE-BSD FRML MDRD: >60 ML/MIN/1.73
GLUCOSE BLD-MCNC: 95 MG/DL (ref 74–99)
GLUCOSE URINE: NEGATIVE MG/DL
HCT VFR BLD CALC: 39.4 % (ref 34–48)
HEMOGLOBIN: 12.5 G/DL (ref 11.5–15.5)
IMMATURE GRANULOCYTES #: 0.02 E9/L
IMMATURE GRANULOCYTES %: 0.3 % (ref 0–5)
INR BLD: 1
KETONES, URINE: NEGATIVE MG/DL
LACTIC ACID: 0.7 MMOL/L (ref 0.5–2.2)
LEUKOCYTE ESTERASE, URINE: NEGATIVE
LIPASE: 15 U/L (ref 13–60)
LYMPHOCYTES ABSOLUTE: 1.69 E9/L (ref 1.5–4)
LYMPHOCYTES RELATIVE PERCENT: 28 % (ref 20–42)
MCH RBC QN AUTO: 27.8 PG (ref 26–35)
MCHC RBC AUTO-ENTMCNC: 31.7 % (ref 32–34.5)
MCV RBC AUTO: 87.8 FL (ref 80–99.9)
MONOCYTES ABSOLUTE: 0.72 E9/L (ref 0.1–0.95)
MONOCYTES RELATIVE PERCENT: 11.9 % (ref 2–12)
NEUTROPHILS ABSOLUTE: 3.34 E9/L (ref 1.8–7.3)
NEUTROPHILS RELATIVE PERCENT: 55.5 % (ref 43–80)
NITRITE, URINE: NEGATIVE
PDW BLD-RTO: 13.1 FL (ref 11.5–15)
PH UA: 5.5 (ref 5–9)
PLATELET # BLD: 293 E9/L (ref 130–450)
PMV BLD AUTO: 10.5 FL (ref 7–12)
POTASSIUM SERPL-SCNC: 4.3 MMOL/L (ref 3.5–5)
PROTEIN UA: NEGATIVE MG/DL
PROTHROMBIN TIME: 11.4 SEC (ref 9.3–12.4)
RBC # BLD: 4.49 E12/L (ref 3.5–5.5)
SODIUM BLD-SCNC: 139 MMOL/L (ref 132–146)
SPECIFIC GRAVITY UA: 1.01 (ref 1–1.03)
T4 FREE: 1 NG/DL (ref 0.93–1.7)
TOTAL PROTEIN: 5.4 G/DL (ref 6.4–8.3)
TROPONIN, HIGH SENSITIVITY: 7 NG/L (ref 0–9)
TSH SERPL DL<=0.05 MIU/L-ACNC: 0.84 UIU/ML (ref 0.27–4.2)
UROBILINOGEN, URINE: 0.2 E.U./DL
WBC # BLD: 6 E9/L (ref 4.5–11.5)

## 2022-12-11 PROCEDURE — 6370000000 HC RX 637 (ALT 250 FOR IP)

## 2022-12-11 PROCEDURE — 6360000002 HC RX W HCPCS

## 2022-12-11 PROCEDURE — 99285 EMERGENCY DEPT VISIT HI MDM: CPT

## 2022-12-11 PROCEDURE — 80053 COMPREHEN METABOLIC PANEL: CPT

## 2022-12-11 PROCEDURE — 96374 THER/PROPH/DIAG INJ IV PUSH: CPT

## 2022-12-11 PROCEDURE — 83690 ASSAY OF LIPASE: CPT

## 2022-12-11 PROCEDURE — 6360000004 HC RX CONTRAST MEDICATION: Performed by: RADIOLOGY

## 2022-12-11 PROCEDURE — A4216 STERILE WATER/SALINE, 10 ML: HCPCS

## 2022-12-11 PROCEDURE — 74177 CT ABD & PELVIS W/CONTRAST: CPT

## 2022-12-11 PROCEDURE — 86900 BLOOD TYPING SEROLOGIC ABO: CPT

## 2022-12-11 PROCEDURE — 81003 URINALYSIS AUTO W/O SCOPE: CPT

## 2022-12-11 PROCEDURE — 36415 COLL VENOUS BLD VENIPUNCTURE: CPT

## 2022-12-11 PROCEDURE — 85610 PROTHROMBIN TIME: CPT

## 2022-12-11 PROCEDURE — 86850 RBC ANTIBODY SCREEN: CPT

## 2022-12-11 PROCEDURE — 83605 ASSAY OF LACTIC ACID: CPT

## 2022-12-11 PROCEDURE — 85025 COMPLETE CBC W/AUTO DIFF WBC: CPT

## 2022-12-11 PROCEDURE — 84439 ASSAY OF FREE THYROXINE: CPT

## 2022-12-11 PROCEDURE — 84443 ASSAY THYROID STIM HORMONE: CPT

## 2022-12-11 PROCEDURE — 86901 BLOOD TYPING SEROLOGIC RH(D): CPT

## 2022-12-11 PROCEDURE — 2580000003 HC RX 258

## 2022-12-11 PROCEDURE — 84484 ASSAY OF TROPONIN QUANT: CPT

## 2022-12-11 PROCEDURE — C9113 INJ PANTOPRAZOLE SODIUM, VIA: HCPCS

## 2022-12-11 RX ORDER — PANTOPRAZOLE SODIUM 40 MG/1
40 TABLET, DELAYED RELEASE ORAL
Qty: 30 TABLET | Refills: 0 | Status: SHIPPED | OUTPATIENT
Start: 2022-12-11

## 2022-12-11 RX ORDER — SUCRALFATE 1 G/1
1 TABLET ORAL 4 TIMES DAILY
Qty: 120 TABLET | Refills: 3 | Status: SHIPPED | OUTPATIENT
Start: 2022-12-11

## 2022-12-11 RX ORDER — 0.9 % SODIUM CHLORIDE 0.9 %
1000 INTRAVENOUS SOLUTION INTRAVENOUS ONCE
Status: COMPLETED | OUTPATIENT
Start: 2022-12-11 | End: 2022-12-11

## 2022-12-11 RX ORDER — FAMOTIDINE 20 MG/1
20 TABLET, FILM COATED ORAL 2 TIMES DAILY
Qty: 60 TABLET | Refills: 0 | Status: SHIPPED | OUTPATIENT
Start: 2022-12-11

## 2022-12-11 RX ADMIN — IOPAMIDOL 75 ML: 755 INJECTION, SOLUTION INTRAVENOUS at 11:33

## 2022-12-11 RX ADMIN — SODIUM CHLORIDE 40 MG: 9 INJECTION, SOLUTION INTRAMUSCULAR; INTRAVENOUS; SUBCUTANEOUS at 09:48

## 2022-12-11 RX ADMIN — ALUMINUM HYDROXIDE, MAGNESIUM HYDROXIDE, AND SIMETHICONE: 200; 200; 20 SUSPENSION ORAL at 12:11

## 2022-12-11 RX ADMIN — SODIUM CHLORIDE 1000 ML: 9 INJECTION, SOLUTION INTRAVENOUS at 09:48

## 2022-12-11 ASSESSMENT — ENCOUNTER SYMPTOMS
NAUSEA: 0
VOMITING: 0
ABDOMINAL PAIN: 1
BACK PAIN: 0
APNEA: 0
EYE ITCHING: 0
DIARRHEA: 0
CHEST TIGHTNESS: 0
BLOOD IN STOOL: 1
CONSTIPATION: 1
ABDOMINAL DISTENTION: 0
EYE DISCHARGE: 0

## 2022-12-11 ASSESSMENT — PAIN DESCRIPTION - LOCATION: LOCATION: ABDOMEN;BACK

## 2022-12-11 ASSESSMENT — PAIN DESCRIPTION - ORIENTATION
ORIENTATION: LOWER
ORIENTATION: RIGHT;LEFT;LOWER

## 2022-12-11 ASSESSMENT — PAIN SCALES - GENERAL: PAINLEVEL_OUTOF10: 5

## 2022-12-11 ASSESSMENT — PAIN - FUNCTIONAL ASSESSMENT: PAIN_FUNCTIONAL_ASSESSMENT: 0-10

## 2022-12-11 NOTE — Clinical Note
Rigoberto Dodge was seen and treated in our emergency department on 12/11/2022. She may return to work on 12/12/2022. If you have any questions or concerns, please don't hesitate to call.       Jolly Dhaliwal, DO

## 2022-12-11 NOTE — ED PROVIDER NOTES
Jamey Veloz 47 y.o. female PHMx of obesity, arthritis, PUD, vitamin D/thiamine deficiency, gastritis, hyperlipidemia presents to the ED c/o blood in her stool. Onset: This morning. Location/Radiation: Abdominal pain in the lower region. Duration: Just 1 time of seeing blood in the stool. Characterization: Bright blood. Aggravating Factors: Constipation. Relieving Factors: None. Severity: Mild to moderate. Assx Sxs: Blood in stool and abdominal pain. She/He Denies: Chest pain/shortness of breath, palpitations, numbness/tingling, nausea/vomiting, hematemesis. Review of Systems   Constitutional:  Negative for activity change and appetite change. Eyes:  Negative for discharge and itching. Respiratory:  Negative for apnea and chest tightness. Cardiovascular:  Negative for chest pain and leg swelling. Gastrointestinal:  Positive for abdominal pain, blood in stool and constipation. Negative for abdominal distention, diarrhea, nausea and vomiting. Endocrine: Negative for cold intolerance and heat intolerance. Genitourinary:  Negative for dysuria and enuresis. Musculoskeletal:  Negative for arthralgias and back pain. Allergic/Immunologic: Negative for environmental allergies and food allergies. Neurological:  Negative for dizziness and facial asymmetry. Hematological:  Negative for adenopathy. Does not bruise/bleed easily. Psychiatric/Behavioral:  Negative for agitation and behavioral problems. Physical Exam  Constitutional:       General: She is not in acute distress. Appearance: Normal appearance. She is obese. She is not ill-appearing, toxic-appearing or diaphoretic. HENT:      Head: Normocephalic and atraumatic. Right Ear: External ear normal.      Left Ear: External ear normal.      Nose: Nose normal.      Mouth/Throat:      Mouth: Mucous membranes are moist.      Pharynx: Oropharynx is clear. Eyes:      Extraocular Movements: Extraocular movements intact. Pupils: Pupils are equal, round, and reactive to light. Cardiovascular:      Rate and Rhythm: Normal rate and regular rhythm. Pulses: Normal pulses. Heart sounds: Normal heart sounds. Pulmonary:      Effort: Pulmonary effort is normal. No respiratory distress. Breath sounds: Normal breath sounds. No wheezing or rales. Abdominal:      General: There is no distension. Palpations: Abdomen is soft. Tenderness: There is no abdominal tenderness. There is no guarding or rebound. Hernia: No hernia is present. Comments: Several small external nonbleeding hemorrhoid   Musculoskeletal:         General: Normal range of motion. Cervical back: Normal range of motion and neck supple. Right lower leg: No edema. Left lower leg: No edema. Skin:     General: Skin is warm and dry. Capillary Refill: Capillary refill takes less than 2 seconds. Coloration: Skin is not jaundiced. Findings: No bruising or lesion. Neurological:      General: No focal deficit present. Mental Status: She is alert and oriented to person, place, and time. Cranial Nerves: No cranial nerve deficit. Motor: No weakness. Gait: Gait normal.   Psychiatric:         Mood and Affect: Mood normal.         Thought Content: Thought content normal.        Procedures     MDM  Number of Diagnoses or Management Options  Generalized abdominal pain  Other constipation  Diagnosis management comments: Is a 76-year-old female that presented to the ER via private vehicle complaining of abdominal pain and she was scared that she might of had bright blood in her stools. Upon evaluation of the patient She was Aox4, cooperative, pleasant, non-toxic appearing, completely neurovascularly intact, and remained hemodynamically stable all throughout their ER stay. Abdominal exam was soft, nontender to palpation, no rigidity no guarding. Negative Heeltap.   Laboratory evaluation was unremarkable and within acceptable limits. CT Imaging showed no acute intra-abdominal/pelvic abnormality or pathology. Bedside fecal occult negative. No leukocytosis, normal H&H, normal electrolytes. Patient counseled on all testing results  Patient is medically clear for discharge home. She has established GI follow-up and has appointment already made for colonoscopy. She was counseled on the portance of follow-up with her GI doctor and completing the bowel prep as prescribed by her doctor. She was prescribed Pepcid, Protonix, and Carafate. All patient's questions and concerns answered. She verbally states agreed to the plan. Return precautions to the ER given. Patient stable at time of discharge. ED Course as of 12/12/22 1339   Sun Dec 11, 2022   1216 Bedside fecal occult negative. Female chaperone, MARY ANNE Aparicio, present. [JR]   5738   ATTENDING PROVIDER ATTESTATION:     I have personally performed and/or participated in the history, exam, medical decision making, and procedures and agree with all pertinent clinical information unless otherwise noted. I have also reviewed and agree with the past medical, family and social history unless otherwise noted. I have discussed this patient in detail with the resident and provided the instruction and education regarding the evidence-based evaluation and treatment of abd pain. Any EKG that may have been performed has been personally reviewed by me and I agree with the documentation as noted by the resident. History: Patient presents to the ED with a complaint of blood in her stool. Patient states that she has been seen here recently and was diagnosed with a fecal impaction. She was placed on medication to help with her bowel movements. She states today she had an episode of diarrhea and then afterwards had a small amount of red blood in the stool. She is scheduled to see a GI and have an endoscopy and an EGD at the end of the month.   She reports upper abdominal discomfort. No associated nausea or vomiting. My findings: Dale Chavira is a 47 y.o. female whom is in no distress. Physical exam reveals patient sitting in bed comfortably. She does have tenderness in the epigastric region. No rebound or guarding. No abdominal distention or tympany noted. No diaphoresis or pallor. Heart regular rate and rhythm. Lungs clear to auscultation. My plan: Symptomatic and supportive care. Appropriate labs and imaging. Electronically signed by May Molina DO on 12/11/22 at 9:19 AM EST      [MS]   1309 Reevaluated patient. She was playing on her phone and said she feels much better. She denies any new complaints or issues. Her belly was soft nontender to palpation again. She passed a p.o. challenge and said that GI cocktail also helped. She has a colonoscopy scheduled on 12/23/22, and EGD scheduled on 12/30/2022. [JR]      ED Course User Index  [JR] Jolly Dhaliwal DO  [MS] May Molina DO         ED Course as of 12/12/22 1339   Sun Dec 11, 2022   5746 Bedside fecal occult negative. Female chaperone, RN Alessandra, present. [JR]   3493   ATTENDING PROVIDER ATTESTATION:     I have personally performed and/or participated in the history, exam, medical decision making, and procedures and agree with all pertinent clinical information unless otherwise noted. I have also reviewed and agree with the past medical, family and social history unless otherwise noted. I have discussed this patient in detail with the resident and provided the instruction and education regarding the evidence-based evaluation and treatment of abd pain. Any EKG that may have been performed has been personally reviewed by me and I agree with the documentation as noted by the resident. History: Patient presents to the ED with a complaint of blood in her stool. Patient states that she has been seen here recently and was diagnosed with a fecal impaction.   She was placed on medication to help with her bowel movements. She states today she had an episode of diarrhea and then afterwards had a small amount of red blood in the stool. She is scheduled to see a GI and have an endoscopy and an EGD at the end of the month. She reports upper abdominal discomfort. No associated nausea or vomiting. My findings: Neo Connelly is a 47 y.o. female whom is in no distress. Physical exam reveals patient sitting in bed comfortably. She does have tenderness in the epigastric region. No rebound or guarding. No abdominal distention or tympany noted. No diaphoresis or pallor. Heart regular rate and rhythm. Lungs clear to auscultation. My plan: Symptomatic and supportive care. Appropriate labs and imaging. Electronically signed by Liseth Farrell DO on 12/11/22 at 9:19 AM EST      [MS]   1089 Reevaluated patient. She was playing on her phone and said she feels much better. She denies any new complaints or issues. Her belly was soft nontender to palpation again. She passed a p.o. challenge and said that GI cocktail also helped. She has a colonoscopy scheduled on 12/23/22, and EGD scheduled on 12/30/2022. [JR]      ED Course User Index  [JR] Kitty Hyman DO  [MS] Liseth Farrell DO       --------------------------------------------- PAST HISTORY ---------------------------------------------  Past Medical History:  has a past medical history of Bilateral low back pain without sciatica, Bipolar 1 disorder ([facility] [address] Depression, Obesity, Osteoarthritis, Thiamine deficiency, and Vitamin D deficiency. Past Surgical History:  has a past surgical history that includes Gallbladder surgery (1994); Cholecystectomy; Endoscopy, colon, diagnostic; pr lap gastric bypass/luciano-en-y (N/A, 4/30/2018); Upper gastrointestinal endoscopy (N/A, 10/22/2018); Upper gastrointestinal endoscopy (N/A, 2/21/2019); joint replacement (Right, 06/03/2019); Total knee arthroplasty (Right, 6/3/2019);  Upper gastrointestinal endoscopy (N/A, 9/16/2019); Total knee arthroplasty (Left, 6/24/2020); and Upper gastrointestinal endoscopy (N/A, 9/11/2021). Social History:  reports that she has been smoking cigarettes. She started smoking about 18 months ago. She has a 0.50 pack-year smoking history. She has never used smokeless tobacco. She reports that she does not drink alcohol and does not use drugs. Family History: family history includes Breast Cancer in her mother; Cancer in her maternal grandmother; Heart Disease in her father, maternal grandmother, mother, and paternal grandmother; Parkinsonism in her father. The patients home medications have been reviewed. Allergies: Patient has no known allergies.     -------------------------------------------------- RESULTS -------------------------------------------------  Labs:  Results for orders placed or performed during the hospital encounter of 12/11/22   CBC with Auto Differential   Result Value Ref Range    WBC 6.0 4.5 - 11.5 E9/L    RBC 4.49 3.50 - 5.50 E12/L    Hemoglobin 12.5 11.5 - 15.5 g/dL    Hematocrit 39.4 34.0 - 48.0 %    MCV 87.8 80.0 - 99.9 fL    MCH 27.8 26.0 - 35.0 pg    MCHC 31.7 (L) 32.0 - 34.5 %    RDW 13.1 11.5 - 15.0 fL    Platelets 606 895 - 552 E9/L    MPV 10.5 7.0 - 12.0 fL    Neutrophils % 55.5 43.0 - 80.0 %    Immature Granulocytes % 0.3 0.0 - 5.0 %    Lymphocytes % 28.0 20.0 - 42.0 %    Monocytes % 11.9 2.0 - 12.0 %    Eosinophils % 3.3 0.0 - 6.0 %    Basophils % 1.0 0.0 - 2.0 %    Neutrophils Absolute 3.34 1.80 - 7.30 E9/L    Immature Granulocytes # 0.02 E9/L    Lymphocytes Absolute 1.69 1.50 - 4.00 E9/L    Monocytes Absolute 0.72 0.10 - 0.95 E9/L    Eosinophils Absolute 0.20 0.05 - 0.50 E9/L    Basophils Absolute 0.06 0.00 - 0.20 E9/L   CMP   Result Value Ref Range    Sodium 139 132 - 146 mmol/L    Potassium 4.3 3.5 - 5.0 mmol/L    Chloride 103 98 - 107 mmol/L    CO2 28 22 - 29 mmol/L    Anion Gap 8 7 - 16 mmol/L    Glucose 95 74 - 99 mg/dL    BUN 9 6 - 20 mg/dL    Creatinine 0.8 0.5 - 1.0 mg/dL    Est, Glom Filt Rate >60 >=60 mL/min/1.73    Calcium 8.4 (L) 8.6 - 10.2 mg/dL    Total Protein 5.4 (L) 6.4 - 8.3 g/dL    Albumin 3.0 (L) 3.5 - 5.2 g/dL    Total Bilirubin <0.2 0.0 - 1.2 mg/dL    Alkaline Phosphatase 77 35 - 104 U/L    ALT 7 0 - 32 U/L    AST 11 0 - 31 U/L   Troponin   Result Value Ref Range    Troponin, High Sensitivity 7 0 - 9 ng/L   TSH   Result Value Ref Range    TSH 0.838 0.270 - 4.200 uIU/mL   T4, Free   Result Value Ref Range    T4 Free 1.00 0.93 - 1.70 ng/dL   Urinalysis   Result Value Ref Range    Color, UA Yellow Straw/Yellow    Clarity, UA Clear Clear    Glucose, Ur Negative Negative mg/dL    Bilirubin Urine Negative Negative    Ketones, Urine Negative Negative mg/dL    Specific Gravity, UA 1.015 1.005 - 1.030    Blood, Urine Negative Negative    pH, UA 5.5 5.0 - 9.0    Protein, UA Negative Negative mg/dL    Urobilinogen, Urine 0.2 <2.0 E.U./dL    Nitrite, Urine Negative Negative    Leukocyte Esterase, Urine Negative Negative   Protime-INR   Result Value Ref Range    Protime 11.4 9.3 - 12.4 sec    INR 1.0    Lactic Acid   Result Value Ref Range    Lactic Acid 0.7 0.5 - 2.2 mmol/L   Lipase   Result Value Ref Range    Lipase 15 13 - 60 U/L   TYPE AND SCREEN   Result Value Ref Range    ABO/Rh A POS     Antibody Screen NEG        Radiology:  CT ABDOMEN PELVIS W IV CONTRAST Additional Contrast? None   Final Result   Stable postoperative changes in the abdomen and pelvis with dilated small   bowel loops likely ileus. There is no acute inflammation or bowel   obstruction.             ------------------------- NURSING NOTES AND VITALS REVIEWED ---------------------------  Date / Time Roomed:  12/11/2022  8:34 AM  ED Bed Assignment:  [facility]/H1    The nursing notes within the ED encounter and vital signs as below have been reviewed.    /79   Pulse 98   Temp 97.8 °F (36.6 °C)   Resp 20   Wt 197 lb (89.4 kg)   SpO2 100%   BMI 32.78 kg/m²   Oxygen Saturation Interpretation: Normal      ------------------------------------------ PROGRESS NOTES ------------------------------------------  1:12 PM EST  I have spoken with the patient and discussed todays results, in addition to providing specific details for the plan of care and counseling regarding the diagnosis and prognosis. Their questions are answered at this time and they are agreeable with the plan. I discussed at length with them reasons for immediate return here for re evaluation. They will followup with their primary care physician by calling their office on Monday.      --------------------------------- ADDITIONAL PROVIDER NOTES ---------------------------------  At this time the patient is without objective evidence of an acute process requiring hospitalization or inpatient management. They have remained hemodynamically stable throughout their entire ED visit and are stable for discharge with outpatient follow-up. The plan has been discussed in detail and they are aware of the specific conditions for emergent return, as well as the importance of follow-up. Discharge Medication List as of 12/11/2022  1:22 PM        START taking these medications    Details   famotidine (PEPCID) 20 MG tablet Take 1 tablet by mouth 2 times daily, Disp-60 tablet, R-0Normal      !! pantoprazole (PROTONIX) 40 MG tablet Take 1 tablet by mouth every morning (before breakfast), Disp-30 tablet, R-0Normal      !! sucralfate (CARAFATE) 1 GM tablet Take 1 tablet by mouth 4 times daily, Disp-120 tablet, R-3Normal       !! - Potential duplicate medications found. Please discuss with provider. Diagnosis:  1. Generalized abdominal pain    2. Other constipation        Disposition:  Patient's disposition: Discharge to home  Patient's condition is stable.       Kitty Hyman DO  Resident  12/12/22 [id number]

## 2023-02-16 ENCOUNTER — TELEPHONE (OUTPATIENT)
Dept: FAMILY MEDICINE CLINIC | Age: 53
End: 2023-02-16

## 2023-02-16 DIAGNOSIS — Z12.31 ENCOUNTER FOR SCREENING MAMMOGRAM FOR BREAST CANCER: Primary | ICD-10-CM

## 2023-02-16 NOTE — TELEPHONE ENCOUNTER
Patient agreeable to mammogram. Order pended.    Last seen 6/17/2022  Next appt Visit date not found

## 2023-02-28 ENCOUNTER — TELEPHONE (OUTPATIENT)
Dept: BARIATRICS/WEIGHT MGMT | Age: 53
End: 2023-02-28

## 2023-03-24 ENCOUNTER — TELEPHONE (OUTPATIENT)
Dept: FAMILY MEDICINE CLINIC | Age: 53
End: 2023-03-24

## 2023-03-24 NOTE — TELEPHONE ENCOUNTER
I called patient and LMOM informing patient is eligible for Medicare AWV and requested patient call back to schedule.

## 2023-05-24 ENCOUNTER — HOSPITAL ENCOUNTER (EMERGENCY)
Age: 53
Discharge: HOME | End: 2023-05-24
Payer: MEDICARE

## 2023-05-24 ENCOUNTER — HOSPITAL ENCOUNTER (OUTPATIENT)
Age: 53
Discharge: HOME | End: 2023-05-24
Payer: MEDICARE

## 2023-05-24 VITALS
BODY MASS INDEX: 35.78 KG/M2 | HEART RATE: 70 BPM | SYSTOLIC BLOOD PRESSURE: 113 MMHG | RESPIRATION RATE: 20 BRPM | DIASTOLIC BLOOD PRESSURE: 72 MMHG | TEMPERATURE: 98.1 F | WEIGHT: 215 LBS | OXYGEN SATURATION: 100 %

## 2023-05-24 DIAGNOSIS — J06.9 ACUTE UPPER RESPIRATORY INFECTION: Primary | ICD-10-CM

## 2023-05-24 LAB
LITHIUM DOSE AMOUNT: ABNORMAL
LITHIUM SERPL-MCNC: 0.25 MMOL/L (ref 0.5–1.5)
STREP GRP A PCR: NEGATIVE

## 2023-05-24 PROCEDURE — 99211 OFF/OP EST MAY X REQ PHY/QHP: CPT

## 2023-05-24 PROCEDURE — 80178 ASSAY OF LITHIUM: CPT

## 2023-05-24 PROCEDURE — 87880 STREP A ASSAY W/OPTIC: CPT

## 2023-05-24 PROCEDURE — 36415 COLL VENOUS BLD VENIPUNCTURE: CPT

## 2023-05-24 RX ORDER — DEXTROMETHORPHAN HYDROBROMIDE, GUAIFENESIN, PHENYLEPHRINE HYDROCHLORIDE 200; 5; 10 MG/1; MG/1; MG/1
TABLET, COATED ORAL
Qty: 20 TABLET | Refills: 0 | Status: SHIPPED | OUTPATIENT
Start: 2023-05-24 | End: 2023-06-04

## 2023-05-24 ASSESSMENT — PAIN - FUNCTIONAL ASSESSMENT: PAIN_FUNCTIONAL_ASSESSMENT: NONE - DENIES PAIN

## 2023-06-04 ENCOUNTER — APPOINTMENT (OUTPATIENT)
Dept: GENERAL RADIOLOGY | Age: 53
End: 2023-06-04
Payer: MEDICARE

## 2023-06-04 ENCOUNTER — HOSPITAL ENCOUNTER (EMERGENCY)
Age: 53
Discharge: HOME | End: 2023-06-04
Payer: MEDICARE

## 2023-06-04 VITALS
HEART RATE: 73 BPM | BODY MASS INDEX: 35.94 KG/M2 | OXYGEN SATURATION: 97 % | RESPIRATION RATE: 20 BRPM | DIASTOLIC BLOOD PRESSURE: 83 MMHG | SYSTOLIC BLOOD PRESSURE: 132 MMHG | WEIGHT: 216 LBS | TEMPERATURE: 98.2 F

## 2023-06-04 DIAGNOSIS — B34.9 VIRAL ILLNESS: Primary | ICD-10-CM

## 2023-06-04 DIAGNOSIS — R19.7 NAUSEA VOMITING AND DIARRHEA: ICD-10-CM

## 2023-06-04 DIAGNOSIS — R11.2 NAUSEA VOMITING AND DIARRHEA: ICD-10-CM

## 2023-06-04 LAB
ALBUMIN SERPL-MCNC: 4.1 G/DL (ref 3.5–5.2)
ALP SERPL-CCNC: 94 U/L (ref 35–104)
ALT SERPL-CCNC: 9 U/L (ref 0–32)
ANION GAP SERPL CALCULATED.3IONS-SCNC: 13 MMOL/L (ref 7–16)
AST SERPL-CCNC: 15 U/L (ref 0–31)
BACTERIA URNS QL MICRO: ABNORMAL /HPF
BILIRUB SERPL-MCNC: <0.2 MG/DL (ref 0–1.2)
BILIRUB UR QL STRIP: NEGATIVE
BUN SERPL-MCNC: 14 MG/DL (ref 6–20)
CALCIUM SERPL-MCNC: 8.9 MG/DL (ref 8.6–10.2)
CHLORIDE SERPL-SCNC: 103 MMOL/L (ref 98–107)
CLARITY UR: CLEAR
CO2 SERPL-SCNC: 25 MMOL/L (ref 22–29)
COLOR UR: YELLOW
CREAT SERPL-MCNC: 0.9 MG/DL (ref 0.5–1)
EPI CELLS #/AREA URNS HPF: ABNORMAL /HPF
ERYTHROCYTE [DISTWIDTH] IN BLOOD BY AUTOMATED COUNT: 13.4 FL (ref 11.5–15)
GLUCOSE SERPL-MCNC: 97 MG/DL (ref 74–99)
GLUCOSE UR STRIP-MCNC: NEGATIVE MG/DL
HCT VFR BLD AUTO: 40.8 % (ref 34–48)
HGB BLD-MCNC: 13.2 G/DL (ref 11.5–15.5)
HGB UR QL STRIP: NEGATIVE
INFLUENZA A: NOT DETECTED
INFLUENZA B: NOT DETECTED
KETONES UR STRIP-MCNC: NEGATIVE MG/DL
LEUKOCYTE ESTERASE UR QL STRIP: NEGATIVE
LIPASE: 15 U/L (ref 13–60)
MCH RBC QN AUTO: 27 PG (ref 26–35)
MCHC RBC AUTO-ENTMCNC: 32.4 % (ref 32–34.5)
MCV RBC AUTO: 83.4 FL (ref 80–99.9)
NITRITE UR QL STRIP: NEGATIVE
PH UR STRIP: 5 [PH] (ref 5–9)
PLATELET # BLD AUTO: 277 E9/L (ref 130–450)
PMV BLD AUTO: 11.5 FL (ref 7–12)
POTASSIUM SERPL-SCNC: 4.2 MMOL/L (ref 3.5–5)
PROT SERPL-MCNC: 6.7 G/DL (ref 6.4–8.3)
PROT UR STRIP-MCNC: NEGATIVE MG/DL
RBC # BLD AUTO: 4.89 E12/L (ref 3.5–5.5)
RBC #/AREA URNS HPF: ABNORMAL /HPF (ref 0–2)
SARS-COV-2 RNA, RT PCR: NOT DETECTED
SODIUM SERPL-SCNC: 141 MMOL/L (ref 132–146)
SP GR UR STRIP: >=1.03 (ref 1–1.03)
STREP GRP A PCR: NEGATIVE
UROBILINOGEN UR STRIP-ACNC: 0.2 E.U./DL
WBC # BLD: 6.3 E9/L (ref 4.5–11.5)
WBC #/AREA URNS HPF: ABNORMAL /HPF (ref 0–5)

## 2023-06-04 PROCEDURE — 6360000002 HC RX W HCPCS: Performed by: NURSE PRACTITIONER

## 2023-06-04 PROCEDURE — 80053 COMPREHEN METABOLIC PANEL: CPT

## 2023-06-04 PROCEDURE — 87636 SARSCOV2 & INF A&B AMP PRB: CPT

## 2023-06-04 PROCEDURE — 85027 COMPLETE CBC AUTOMATED: CPT

## 2023-06-04 PROCEDURE — 96361 HYDRATE IV INFUSION ADD-ON: CPT

## 2023-06-04 PROCEDURE — 99284 EMERGENCY DEPT VISIT MOD MDM: CPT

## 2023-06-04 PROCEDURE — 83690 ASSAY OF LIPASE: CPT

## 2023-06-04 PROCEDURE — 2580000003 HC RX 258: Performed by: NURSE PRACTITIONER

## 2023-06-04 PROCEDURE — 71045 X-RAY EXAM CHEST 1 VIEW: CPT

## 2023-06-04 PROCEDURE — 96374 THER/PROPH/DIAG INJ IV PUSH: CPT

## 2023-06-04 PROCEDURE — 36415 COLL VENOUS BLD VENIPUNCTURE: CPT

## 2023-06-04 PROCEDURE — 81001 URINALYSIS AUTO W/SCOPE: CPT

## 2023-06-04 PROCEDURE — 87880 STREP A ASSAY W/OPTIC: CPT

## 2023-06-04 RX ORDER — ONDANSETRON 2 MG/ML
4 INJECTION INTRAMUSCULAR; INTRAVENOUS ONCE
Status: COMPLETED | OUTPATIENT
Start: 2023-06-04 | End: 2023-06-04

## 2023-06-04 RX ORDER — 0.9 % SODIUM CHLORIDE 0.9 %
1000 INTRAVENOUS SOLUTION INTRAVENOUS ONCE
Status: COMPLETED | OUTPATIENT
Start: 2023-06-04 | End: 2023-06-04

## 2023-06-04 RX ORDER — BENZONATATE 100 MG/1
100 CAPSULE ORAL 3 TIMES DAILY PRN
Qty: 30 CAPSULE | Refills: 0 | Status: SHIPPED | OUTPATIENT
Start: 2023-06-04 | End: 2023-06-14

## 2023-06-04 RX ADMIN — ONDANSETRON 4 MG: 2 INJECTION INTRAMUSCULAR; INTRAVENOUS at 21:10

## 2023-06-04 RX ADMIN — SODIUM CHLORIDE 1000 ML: 9 INJECTION, SOLUTION INTRAVENOUS at 21:10

## 2023-06-04 ASSESSMENT — PAIN DESCRIPTION - PAIN TYPE: TYPE: ACUTE PAIN

## 2023-06-04 ASSESSMENT — PAIN DESCRIPTION - FREQUENCY: FREQUENCY: INTERMITTENT

## 2023-06-04 ASSESSMENT — PAIN - FUNCTIONAL ASSESSMENT
PAIN_FUNCTIONAL_ASSESSMENT: PREVENTS OR INTERFERES SOME ACTIVE ACTIVITIES AND ADLS
PAIN_FUNCTIONAL_ASSESSMENT: 0-10

## 2023-06-04 ASSESSMENT — PAIN SCALES - GENERAL: PAINLEVEL_OUTOF10: 5

## 2023-06-04 ASSESSMENT — PAIN DESCRIPTION - LOCATION: LOCATION: GENERALIZED

## 2023-06-04 ASSESSMENT — PAIN DESCRIPTION - DESCRIPTORS: DESCRIPTORS: ACHING

## 2023-06-05 NOTE — ED PROVIDER NOTES
Independent C[facility] Visit. Department of Emergency Medicine   ED  Provider Note  Admit Date/RoomTime: 6/4/2023  8:19 PM  ED Room: CESAR/CESAR                6/4/23  8:53 PM EDT      HPI: Yadira Neely is a 46 y.o. female with a past medical history of  has a past medical history of Bilateral low back pain without sciatica, Bipolar 1 disorder ([address] Depression, Obesity, Osteoarthritis, Thiamine deficiency, and Vitamin D deficiency. presenting with complaints of a cough with nasal congestion, nausea, vomiting, diarrhea, sore throat, otalgia, dry cough, rhinitis, fatigue. The patient states that these symptoms began gradually over the last 2 weeks. The history is obtained from the patient as well as the patient's medical record. Patient does complain of a mild cough associated with it that is nonproductive. Patient denies exposure to mononucleosis. The patient also denies difficulty breathing, hemoptysis, neck pain/stiffness, or blurry vision, fever, chills, shortness of breath, difficulty breathing or chest pain. Denies ageusia or anosmia. Reports exposure to multiple people while at work. She was seen at an urgent care on 5/24/2023 for similar symptoms and was tested for strep which was negative at that time. She reports that her symptoms have not improved since her initial visit. States that she has had 1 episode of vomiting which was last night. She had a few episodes of diarrhea. Denies any hematemesis, rectal bleeding, bilious vomiting. patient reports tolerating p.o. fluids well. Denies any other complaints today. No medications tried for symptom control prior to arrival.  Patient states that she tried to take an at home COVID test but she did not know how to interpret the test so she decided to come to the ER for evaluation. Review of Systems:   Pertinent positives and negatives are stated within HPI, all other systems reviewed and are negative.

## 2023-09-19 ENCOUNTER — TELEPHONE (OUTPATIENT)
Dept: ORTHOPEDIC SURGERY | Age: 53
End: 2023-09-19

## 2023-09-19 DIAGNOSIS — Z96.653 HISTORY OF TOTAL BILATERAL KNEE REPLACEMENT: Primary | ICD-10-CM

## 2023-09-19 NOTE — TELEPHONE ENCOUNTER
Patient has 2 pending dental appointments      . Last appointment Visit date not found  Next appointment No future appointments.    Last refill  DOS:       Patient called in requesting refill of:      [facility] #[id number] - [facility],[address] [phone] - F [phone]   [address] [place] [address]

## 2023-09-22 RX ORDER — CEPHALEXIN 500 MG/1
2000 CAPSULE ORAL DAILY PRN
Qty: 4 CAPSULE | Refills: 5 | Status: SHIPPED | OUTPATIENT
Start: 2023-09-22

## 2024-01-19 ENCOUNTER — HOSPITAL ENCOUNTER (EMERGENCY)
Age: 54
Discharge: HOME | End: 2024-01-19
Payer: MEDICARE

## 2024-01-19 VITALS
RESPIRATION RATE: 18 BRPM | BODY MASS INDEX: 36.61 KG/M2 | DIASTOLIC BLOOD PRESSURE: 104 MMHG | TEMPERATURE: 97.3 F | WEIGHT: 220 LBS | OXYGEN SATURATION: 98 % | HEART RATE: 83 BPM | SYSTOLIC BLOOD PRESSURE: 133 MMHG

## 2024-01-19 DIAGNOSIS — R19.7 NAUSEA VOMITING AND DIARRHEA: ICD-10-CM

## 2024-01-19 DIAGNOSIS — R11.2 NAUSEA VOMITING AND DIARRHEA: ICD-10-CM

## 2024-01-19 DIAGNOSIS — B34.9 ACUTE VIRAL SYNDROME: Primary | ICD-10-CM

## 2024-01-19 LAB
BUN BLD-MCNC: 11 MG/DL (ref 6–20)
CHLORIDE BLD-SCNC: 107 MMOL/L (ref 100–108)
CO2 BLD CALC-SCNC: 25 MMOL/L (ref 22–29)
CREAT BLD-MCNC: 0.9 MG/DL (ref 0.5–1)
EGFR, POC: >60 ML/MIN/1.73M2
ERYTHROCYTE [DISTWIDTH] IN BLOOD BY AUTOMATED COUNT: 13.5 % (ref 11.5–15)
GLUCOSE BLD-MCNC: 93 MG/DL (ref 74–99)
HCT VFR BLD AUTO: 41.3 % (ref 34–48)
HGB BLD-MCNC: 13.4 G/DL (ref 11.5–15.5)
INFLUENZA A BY PCR: NOT DETECTED
INFLUENZA B BY PCR: NOT DETECTED
MCH RBC QN AUTO: 26.7 PG (ref 26–35)
MCHC RBC AUTO-ENTMCNC: 32.4 G/DL (ref 32–34.5)
MCV RBC AUTO: 82.3 FL (ref 80–99.9)
PLATELET # BLD AUTO: 241 K/UL (ref 130–450)
PMV BLD AUTO: 11.7 FL (ref 7–12)
POC ANION GAP: 7 MMOL/L (ref 7–16)
POTASSIUM BLD-SCNC: 4.8 MMOL/L (ref 3.5–5)
RBC # BLD AUTO: 5.02 M/UL (ref 3.5–5.5)
SARS-COV-2 RDRP RESP QL NAA+PROBE: NOT DETECTED
SODIUM BLD-SCNC: 139 MMOL/L (ref 132–146)
SPECIMEN DESCRIPTION: NORMAL
WBC OTHER # BLD: 5 K/UL (ref 4.5–11.5)

## 2024-01-19 PROCEDURE — 82947 ASSAY GLUCOSE BLOOD QUANT: CPT

## 2024-01-19 PROCEDURE — 82565 ASSAY OF CREATININE: CPT

## 2024-01-19 PROCEDURE — 80051 ELECTROLYTE PANEL: CPT

## 2024-01-19 PROCEDURE — 36415 COLL VENOUS BLD VENIPUNCTURE: CPT

## 2024-01-19 PROCEDURE — 85027 COMPLETE CBC AUTOMATED: CPT

## 2024-01-19 PROCEDURE — 87502 INFLUENZA DNA AMP PROBE: CPT

## 2024-01-19 PROCEDURE — 87635 SARS-COV-2 COVID-19 AMP PRB: CPT

## 2024-01-19 PROCEDURE — 84520 ASSAY OF UREA NITROGEN: CPT

## 2024-01-19 PROCEDURE — 99211 OFF/OP EST MAY X REQ PHY/QHP: CPT

## 2024-01-19 RX ORDER — ONDANSETRON 4 MG/1
4 TABLET, FILM COATED ORAL EVERY 8 HOURS PRN
Qty: 12 TABLET | Refills: 0 | Status: SHIPPED | OUTPATIENT
Start: 2024-01-19 | End: 2024-01-24

## 2024-01-19 RX ORDER — BROMPHENIRAMINE MALEATE, PSEUDOEPHEDRINE HYDROCHLORIDE, AND DEXTROMETHORPHAN HYDROBROMIDE 2; 30; 10 MG/5ML; MG/5ML; MG/5ML
5 SYRUP ORAL 4 TIMES DAILY PRN
Qty: 140 ML | Refills: 0 | Status: SHIPPED | OUTPATIENT
Start: 2024-01-19 | End: 2024-01-26

## 2024-01-19 NOTE — DISCHARGE INSTRUCTIONS
Your labs were all normal.  Your COVID and flu testing was negative.  You likely have another viral illness.  Rest and drink plenty of fluids.  You may use Tylenol or ibuprofen as needed for any body aches.  Zofran as needed for nausea.  Please follow-up your PCP in 1 week.  If your symptoms get significantly worsen and you cannot control your vomiting or diarrhea please go to the ER.

## 2024-01-19 NOTE — ED PROVIDER NOTES
[facility]  Department of Emergency Medicine     Encounter Note  Admit Date/RoomTime: 2024  3:14 PM   Room:     NAME: Caitlyn Crandall  : 1970  MRN: [id number]     Chief Complaint:  Emesis (Vomiting, sorethroat, headache, coughing. Took at home covid test which was negative. )    History of Present Illness       Caitlyn Crandall is a 53 y.o. old female who presents to the urgent care by private vehicle with a 3-day history of intermittent nausea and vomiting, sore throat, headache, cough, few episodes of diarrhea.  Patient's symptoms started with some mild nasal congestion and progressed.  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  She has not had any measurable fevers.  States that she has not had anything to eat or drink this morning    ROS   Pertinent positives and negatives are stated within HPI, all other systems reviewed and are negative.    Past Medical History:  has a past medical history of Bilateral low back pain without sciatica, Bipolar 1 disorder (H[facility]), Depression, Obesity, Osteoarthritis, Thiamine deficiency, and Vitamin D deficiency.    Surgical History:  has a past surgical history that includes Gallbladder surgery (); Cholecystectomy; Endoscopy, colon, diagnostic; pr laps gstr rstcv px w/byp luciano-en-y limb <150 cm (N/A, 2018); Upper gastrointestinal endoscopy (N/A, 10/22/2018); Upper gastrointestinal endoscopy (N/A, 2019); joint replacement (Right, 2019); Total knee arthroplasty (Right, 6/3/2019); Upper gastrointestinal endoscopy (N/A, 2019); Total knee arthroplasty (Left, 2020); and Upper gastrointestinal endoscopy (N/A, 2021).    Social History:  reports that she has been smoking cigarettes. She started smoking about 2 years ago. She has a 1.3 pack-year smoking history. She has never used smokeless tobacco. She reports that  Rapid Not Detected Not Detected   Influenza A+B, PCR    Specimen: Nasopharyngeal   Result Value Ref Range    Influenza A by PCR Not Detected Not Detected    Influenza B by PCR Not Detected Not Detected   CBC   Result Value Ref Range    WBC 5.0 4.5 - 11.5 k/uL    RBC 5.02 3.50 - 5.50 m/uL    Hemoglobin 13.4 11.5 - 15.5 g/dL    Hematocrit 41.3 34.0 - 48.0 %    MCV 82.3 80.0 - 99.9 fL    MCH 26.7 26.0 - 35.0 pg    MCHC 32.4 32.0 - 34.5 g/dL    RDW 13.5 11.5 - 15.0 %    Platelets 241 130 - 450 k/uL    MPV 11.7 7.0 - 12.0 fL   POC PANEL (C8)   Result Value Ref Range    POC Sodium 139 132 - 146 mmol/L    POC Potassium 4.8 3.5 - 5.0 mmol/L    POC Chloride 107 100 - 108 mmol/L    POC TCO2 25 22 - 29 mmol/L    POC Anion Gap 7 7 - 16 mmol/L    POC Glucose 93 74 - 99 mg/dL    POC BUN 11 6 - 20 mg/dL    POC Creatinine 0.9 0.5 - 1.0 mg/dL    eGFR, POC >60 mL/min/1.73m2       Imaging:  All Radiology results interpreted by Radiologist unless otherwise noted.  No orders to display       ED Course / Medical Decision Making   Medications - No data to display  ED Course as of 01/19/24 1912 Fri Jan 19, 2024   1617 POC PANEL (C8):    POC Sodium 139   POC Potassium 4.8   POC Chloride 107   POC TCO2 25   POC Anion Gap 7   POC Glucose 93   POC BUN 11   POC Creatinine 0.9   eGFR, POC >60  No electrolyte derangement, normal renal function. [CS]   1624 CBC:    WBC 5.0   RBC 5.02   Hemoglobin Quant 13.4   Hematocrit 41.3   MCV 82.3   MCH 26.7   MCHC 32.4   RDW 13.5   Platelet Count 241   MPV 11.7  No leukocytosis, stable H&H. [CS]   1636 Influenza A+B, PCR:    Influenza A by PCR Not Detected   Influenza B by PCR Not Detected  Influenza a and B are negative. [CS]   1636 COVID-19, Rapid:    Specimen Description .NASOPHARYNGEAL SWAB   SARS-CoV-2, Rapid Not Detected  COVID testing is negative. [CS]      ED Course User Index  [CS] Luisa Mishra, APRN - CNP       Consults:   None    Procedures:   none    Medical Decision Making: Briefly is a

## 2024-01-19 NOTE — ED PROVIDER NOTES
[address] ENCOUNTER        Pt Name: Je Trujillo  MRN: [id number]  [place] 1970  Date of evaluation: 5/24/2023  Provider: MICH Steward - JAMISON  PCP: Levi Wilcox MD  Note Started: 10:32 AM EDT 5/24/23    CHIEF COMPLAINT       Chief Complaint   Patient presents with    Sinusitis     Started to cough yesterday, ear ache, sinus drainage       HISTORY OF PRESENT ILLNESS: 1 or more Elements   History From: patient    Limitations to history : None    Je Trujillo is a 46 y.o. female who presents for evaluation. She developed a cough yesterday and some nasal congestion today. She said strep throat has been going around through her work so she thinks maybe she could have strep throat. She does not  have a fever chest pain shortness of breath or any other complaints today. Nursing Notes were all reviewed and agreed with or any disagreements were addressed in the HPI. REVIEW OF SYSTEMS :      Review of Systems    Positives and Pertinent negatives as per HPI.      SURGICAL HISTORY     Past Surgical History:   Procedure Laterality Date    CHOLECYSTECTOMY      ENDOSCOPY, COLON, DIAGNOSTIC      GALLBLADDER SURGERY  1994    JOINT REPLACEMENT Right 06/03/2019    SD LAPS GSTR RSTCV PX W/BYP KIERAN-EN-Y LIMB <150 CM N/A 4/30/2018    GASTRIC BYPASS KIERAN-EN-Y LAPAROSCOPIC performed by Maribeth Gómez MD at [address] Right 6/3/2019    RIGHT KNEE TOTAL KNEE ARTHROPLASTY [facility] [place] [facility]) performed by Zheng Little DO at [address] Left 6/24/2020    LEFT KNEE TOTAL ARTHROPLASTY [facility] NEPH) performed by Zheng Little DO at [address] [facility] 10/22/2018    EGD BIOPSY performed by Norma Garzon MD at [facility] 2/21/2019    EGD ESOPHAGOGASTRODUODENOSCOPY performed by Maribeth Gómez MD at [place] [address]
Yes

## 2024-08-28 ENCOUNTER — OFFICE VISIT (OUTPATIENT)
Dept: FAMILY MEDICINE CLINIC | Age: 54
End: 2024-08-28
Payer: MEDICARE

## 2024-08-28 VITALS
HEART RATE: 76 BPM | SYSTOLIC BLOOD PRESSURE: 138 MMHG | DIASTOLIC BLOOD PRESSURE: 82 MMHG | HEIGHT: 64 IN | WEIGHT: 213.2 LBS | TEMPERATURE: 97.8 F | OXYGEN SATURATION: 97 % | RESPIRATION RATE: 18 BRPM | BODY MASS INDEX: 36.4 KG/M2

## 2024-08-28 DIAGNOSIS — R10.84 GENERALIZED ABDOMINAL PAIN: ICD-10-CM

## 2024-08-28 DIAGNOSIS — Z12.31 SCREENING MAMMOGRAM FOR BREAST CANCER: ICD-10-CM

## 2024-08-28 DIAGNOSIS — R53.83 OTHER FATIGUE: ICD-10-CM

## 2024-08-28 DIAGNOSIS — K21.9 GASTROESOPHAGEAL REFLUX DISEASE WITHOUT ESOPHAGITIS: Primary | ICD-10-CM

## 2024-08-28 DIAGNOSIS — F31.9 BIPOLAR 1 DISORDER (HCC): ICD-10-CM

## 2024-08-28 DIAGNOSIS — E78.5 DYSLIPIDEMIA: ICD-10-CM

## 2024-08-28 DIAGNOSIS — Z98.84 S/P BARIATRIC SURGERY: ICD-10-CM

## 2024-08-28 DIAGNOSIS — R73.03 PREDIABETES: ICD-10-CM

## 2024-08-28 PROBLEM — K29.00 ACUTE GASTRITIS: Status: RESOLVED | Noted: 2021-03-12 | Resolved: 2024-08-28

## 2024-08-28 PROBLEM — K28.9 MARGINAL ULCER: Status: RESOLVED | Noted: 2018-10-22 | Resolved: 2024-08-28

## 2024-08-28 PROBLEM — B34.9 VIRAL SYNDROME: Status: RESOLVED | Noted: 2022-06-03 | Resolved: 2024-08-28

## 2024-08-28 PROBLEM — R11.2 NAUSEA AND VOMITING: Status: RESOLVED | Noted: 2022-06-02 | Resolved: 2024-08-28

## 2024-08-28 PROCEDURE — 4004F PT TOBACCO SCREEN RCVD TLK: CPT | Performed by: FAMILY MEDICINE

## 2024-08-28 PROCEDURE — G8427 DOCREV CUR MEDS BY ELIG CLIN: HCPCS | Performed by: FAMILY MEDICINE

## 2024-08-28 PROCEDURE — G8417 CALC BMI ABV UP PARAM F/U: HCPCS | Performed by: FAMILY MEDICINE

## 2024-08-28 PROCEDURE — 3017F COLORECTAL CA SCREEN DOC REV: CPT | Performed by: FAMILY MEDICINE

## 2024-08-28 PROCEDURE — 99213 OFFICE O/P EST LOW 20 MIN: CPT | Performed by: FAMILY MEDICINE

## 2024-08-28 RX ORDER — SUCRALFATE 1 G/1
1 TABLET ORAL 4 TIMES DAILY
Qty: 120 TABLET | Refills: 3 | Status: SHIPPED | OUTPATIENT
Start: 2024-08-28

## 2024-08-28 SDOH — ECONOMIC STABILITY: FOOD INSECURITY: WITHIN THE PAST 12 MONTHS, THE FOOD YOU BOUGHT JUST DIDN'T LAST AND YOU DIDN'T HAVE MONEY TO GET MORE.: NEVER TRUE

## 2024-08-28 SDOH — ECONOMIC STABILITY: FOOD INSECURITY: WITHIN THE PAST 12 MONTHS, YOU WORRIED THAT YOUR FOOD WOULD RUN OUT BEFORE YOU GOT MONEY TO BUY MORE.: NEVER TRUE

## 2024-08-28 SDOH — ECONOMIC STABILITY: INCOME INSECURITY: HOW HARD IS IT FOR YOU TO PAY FOR THE VERY BASICS LIKE FOOD, HOUSING, MEDICAL CARE, AND HEATING?: NOT HARD AT ALL

## 2024-08-28 ASSESSMENT — ENCOUNTER SYMPTOMS
FACIAL SWELLING: 0
APNEA: 0
BLOOD IN STOOL: 0
ORTHOPNEA: 0
RESPIRATORY NEGATIVE: 1
BLURRED VISION: 0
ABDOMINAL DISTENTION: 0
CHOKING: 0
SINUS PAIN: 0
EYE REDNESS: 0
ANAL BLEEDING: 0
EYE ITCHING: 0
VOICE CHANGE: 0
CHEST TIGHTNESS: 0
SINUS PRESSURE: 0
EYE PAIN: 0
COUGH: 0
STRIDOR: 0
VOMITING: 0
CONSTIPATION: 0
TROUBLE SWALLOWING: 0
ALLERGIC/IMMUNOLOGIC NEGATIVE: 1
ABDOMINAL PAIN: 1
PHOTOPHOBIA: 0
RECTAL PAIN: 0
DIARRHEA: 0
SHORTNESS OF BREATH: 0
BACK PAIN: 0
COLOR CHANGE: 0
NAUSEA: 1
WHEEZING: 0
SORE THROAT: 0
EYE DISCHARGE: 0
RHINORRHEA: 0

## 2024-08-28 ASSESSMENT — PATIENT HEALTH QUESTIONNAIRE - PHQ9
SUM OF ALL RESPONSES TO PHQ QUESTIONS 1-9: 13
7. TROUBLE CONCENTRATING ON THINGS, SUCH AS READING THE NEWSPAPER OR WATCHING TELEVISION: NOT AT ALL
SUM OF ALL RESPONSES TO PHQ QUESTIONS 1-9: 13
3. TROUBLE FALLING OR STAYING ASLEEP: SEVERAL DAYS
9. THOUGHTS THAT YOU WOULD BE BETTER OFF DEAD, OR OF HURTING YOURSELF: NOT AT ALL
5. POOR APPETITE OR OVEREATING: NEARLY EVERY DAY
SUM OF ALL RESPONSES TO PHQ QUESTIONS 1-9: 13
6. FEELING BAD ABOUT YOURSELF - OR THAT YOU ARE A FAILURE OR HAVE LET YOURSELF OR YOUR FAMILY DOWN: NOT AT ALL
SUM OF ALL RESPONSES TO PHQ9 QUESTIONS 1 & 2: 6
2. FEELING DOWN, DEPRESSED OR HOPELESS: NEARLY EVERY DAY
10. IF YOU CHECKED OFF ANY PROBLEMS, HOW DIFFICULT HAVE THESE PROBLEMS MADE IT FOR YOU TO DO YOUR WORK, TAKE CARE OF THINGS AT HOME, OR GET ALONG WITH OTHER PEOPLE: SOMEWHAT DIFFICULT
1. LITTLE INTEREST OR PLEASURE IN DOING THINGS: NEARLY EVERY DAY
4. FEELING TIRED OR HAVING LITTLE ENERGY: NEARLY EVERY DAY
8. MOVING OR SPEAKING SO SLOWLY THAT OTHER PEOPLE COULD HAVE NOTICED. OR THE OPPOSITE, BEING SO FIGETY OR RESTLESS THAT YOU HAVE BEEN MOVING AROUND A LOT MORE THAN USUAL: NOT AT ALL
SUM OF ALL RESPONSES TO PHQ QUESTIONS 1-9: 13

## 2024-08-28 NOTE — PROGRESS NOTES
SUBJECTIVE  Caitlyn Crandall is a 54 y.o. female.    HPI/Chief C/O:  Chief Complaint   Patient presents with   • Establish Care     Pt here to establish with Dr. Fortunato Ladd   • Pain     Pt has pain from the center of her back down, under left rib cage and has a sharp pain in her right lower abdomen at times   • Dizziness     Pt has dizzy episodes. Happens sometimes when she's in the shower, when going to stand. Pt has had them for a month. Her legs will go weak.    • Nicotine Dependence     Pt would like to quit smoking but doesn't know how      No Known Allergies  The patient is here for a medication list and treatment planning review  We will go over our care planning goals as well as take care of all refills  We will set up labs as well      C/O diffuse abdominal pain     Hypertension  Associated symptoms include anxiety and malaise/fatigue. Pertinent negatives include no blurred vision, chest pain, headaches, neck pain, orthopnea, palpitations, peripheral edema, PND, shortness of breath or sweats. Risk factors for coronary artery disease include obesity, stress and smoking/tobacco exposure. Past treatments include lifestyle changes. The current treatment provides significant improvement. Compliance problems include diet, exercise and psychosocial issues.  There is no history of angina, kidney disease, CAD/MI, CVA, heart failure, left ventricular hypertrophy, PVD or retinopathy. There is no history of chronic renal disease, coarctation of the aorta, hyperaldosteronism, hypercortisolism, hyperparathyroidism, a hypertension causing med, pheochromocytoma, renovascular disease, sleep apnea or a thyroid problem.       ROS:  Review of Systems   Constitutional:  Positive for fatigue and malaise/fatigue. Negative for activity change, appetite change, chills, diaphoresis, fever and unexpected weight change.   HENT: Negative.  Negative for congestion, dental problem, drooling, ear discharge, ear pain, facial  medications on file as of 8/28/2024.       Return in about 4 weeks (around 9/25/2024).        Reviewed recent labs related to Caitlyn's current problems      Discussed importance of regular Health Maintenance follow up  Health Maintenance   Topic   • Pneumococcal 0-64 years Vaccine (1 of 2 - PCV)   • Hepatitis B vaccine (1 of 3 - 19+ 3-dose series)   • Cervical cancer screen    • Breast cancer screen    • COVID-19 Vaccine (3 - 2023-24 season)   • Annual Wellness Visit (Medicare Advantage)    • Flu vaccine (1)   • DTaP/Tdap/Td vaccine (1 - Tdap)   • Depression Monitoring    • Lipids    • Colorectal Cancer Screen    • Shingles vaccine    • HIV screen    • Hepatitis A vaccine    • Hib vaccine    • Polio vaccine    • Meningococcal (ACWY) vaccine    • Diabetes screen    • Hepatitis C screen

## 2024-08-30 ENCOUNTER — APPOINTMENT (OUTPATIENT)
Dept: GENERAL RADIOLOGY | Age: 54
End: 2024-08-30
Payer: MEDICARE

## 2024-08-30 ENCOUNTER — APPOINTMENT (OUTPATIENT)
Dept: CT IMAGING | Age: 54
End: 2024-08-30
Payer: MEDICARE

## 2024-08-30 ENCOUNTER — HOSPITAL ENCOUNTER (EMERGENCY)
Age: 54
Discharge: HOME | End: 2024-08-30
Attending: EMERGENCY MEDICINE
Payer: MEDICARE

## 2024-08-30 VITALS
HEART RATE: 69 BPM | RESPIRATION RATE: 18 BRPM | SYSTOLIC BLOOD PRESSURE: 167 MMHG | OXYGEN SATURATION: 98 % | TEMPERATURE: 98 F | DIASTOLIC BLOOD PRESSURE: 82 MMHG

## 2024-08-30 DIAGNOSIS — R10.84 GENERALIZED ABDOMINAL PAIN: Primary | ICD-10-CM

## 2024-08-30 DIAGNOSIS — R11.2 NAUSEA AND VOMITING, UNSPECIFIED VOMITING TYPE: ICD-10-CM

## 2024-08-30 LAB
ALBUMIN SERPL-MCNC: 3.7 G/DL (ref 3.5–5.2)
ALP SERPL-CCNC: 73 U/L (ref 35–104)
ALT SERPL-CCNC: 7 U/L (ref 0–32)
ANION GAP SERPL CALCULATED.3IONS-SCNC: 12 MMOL/L (ref 7–16)
AST SERPL-CCNC: 12 U/L (ref 0–31)
BASOPHILS # BLD: 0.05 K/UL (ref 0–0.2)
BASOPHILS NFR BLD: 1 % (ref 0–2)
BILIRUB SERPL-MCNC: 0.2 MG/DL (ref 0–1.2)
BILIRUB UR QL STRIP: NEGATIVE
BUN SERPL-MCNC: 10 MG/DL (ref 6–20)
CALCIUM SERPL-MCNC: 9.4 MG/DL (ref 8.6–10.2)
CHLORIDE SERPL-SCNC: 105 MMOL/L (ref 98–107)
CLARITY UR: CLEAR
CO2 SERPL-SCNC: 24 MMOL/L (ref 22–29)
COLOR UR: YELLOW
CREAT SERPL-MCNC: 0.9 MG/DL (ref 0.5–1)
EOSINOPHIL # BLD: 0.11 K/UL (ref 0.05–0.5)
EOSINOPHILS RELATIVE PERCENT: 2 % (ref 0–6)
EPI CELLS #/AREA URNS HPF: ABNORMAL /HPF
ERYTHROCYTE [DISTWIDTH] IN BLOOD BY AUTOMATED COUNT: 13.5 % (ref 11.5–15)
GFR, ESTIMATED: 75 ML/MIN/1.73M2
GLUCOSE SERPL-MCNC: 106 MG/DL (ref 74–99)
GLUCOSE UR STRIP-MCNC: NEGATIVE MG/DL
HCT VFR BLD AUTO: 36.7 % (ref 34–48)
HGB BLD-MCNC: 11.7 G/DL (ref 11.5–15.5)
HGB UR QL STRIP.AUTO: NEGATIVE
IMM GRANULOCYTES # BLD AUTO: <0.03 K/UL (ref 0–0.58)
IMM GRANULOCYTES NFR BLD: 0 % (ref 0–5)
KETONES UR STRIP-MCNC: ABNORMAL MG/DL
LACTATE BLDV-SCNC: 1.4 MMOL/L (ref 0.5–2.2)
LEUKOCYTE ESTERASE UR QL STRIP: NEGATIVE
LIPASE SERPL-CCNC: 19 U/L (ref 13–60)
LYMPHOCYTES NFR BLD: 1.52 K/UL (ref 1.5–4)
LYMPHOCYTES RELATIVE PERCENT: 26 % (ref 20–42)
MCH RBC QN AUTO: 26.8 PG (ref 26–35)
MCHC RBC AUTO-ENTMCNC: 31.9 G/DL (ref 32–34.5)
MCV RBC AUTO: 84 FL (ref 80–99.9)
MONOCYTES NFR BLD: 0.46 K/UL (ref 0.1–0.95)
MONOCYTES NFR BLD: 8 % (ref 2–12)
NEUTROPHILS NFR BLD: 63 % (ref 43–80)
NEUTS SEG NFR BLD: 3.73 K/UL (ref 1.8–7.3)
NITRITE UR QL STRIP: NEGATIVE
PH UR STRIP: 5.5 [PH] (ref 5–9)
PLATELET # BLD AUTO: 148 K/UL (ref 130–450)
PLATELET CONFIRMATION: NORMAL
PMV BLD AUTO: 13.8 FL (ref 7–12)
POTASSIUM SERPL-SCNC: 4.6 MMOL/L (ref 3.5–5)
PROT SERPL-MCNC: 6.8 G/DL (ref 6.4–8.3)
PROT UR STRIP-MCNC: NEGATIVE MG/DL
RBC # BLD AUTO: 4.37 M/UL (ref 3.5–5.5)
RBC #/AREA URNS HPF: ABNORMAL /HPF
SODIUM SERPL-SCNC: 141 MMOL/L (ref 132–146)
SP GR UR STRIP: 1.02 (ref 1–1.03)
TROPONIN I SERPL HS-MCNC: 6 NG/L (ref 0–9)
UROBILINOGEN UR STRIP-ACNC: 0.2 EU/DL (ref 0–1)
WBC #/AREA URNS HPF: ABNORMAL /HPF
WBC OTHER # BLD: 5.9 K/UL (ref 4.5–11.5)

## 2024-08-30 PROCEDURE — 71046 X-RAY EXAM CHEST 2 VIEWS: CPT

## 2024-08-30 PROCEDURE — 81001 URINALYSIS AUTO W/SCOPE: CPT

## 2024-08-30 PROCEDURE — 80053 COMPREHEN METABOLIC PANEL: CPT

## 2024-08-30 PROCEDURE — 6360000002 HC RX W HCPCS

## 2024-08-30 PROCEDURE — 96374 THER/PROPH/DIAG INJ IV PUSH: CPT

## 2024-08-30 PROCEDURE — 99285 EMERGENCY DEPT VISIT HI MDM: CPT

## 2024-08-30 PROCEDURE — 74174 CTA ABD&PLVS W/CONTRAST: CPT

## 2024-08-30 PROCEDURE — 85025 COMPLETE CBC W/AUTO DIFF WBC: CPT

## 2024-08-30 PROCEDURE — 6360000004 HC RX CONTRAST MEDICATION: Performed by: RADIOLOGY

## 2024-08-30 PROCEDURE — 96375 TX/PRO/DX INJ NEW DRUG ADDON: CPT

## 2024-08-30 PROCEDURE — 71275 CT ANGIOGRAPHY CHEST: CPT

## 2024-08-30 PROCEDURE — 83690 ASSAY OF LIPASE: CPT

## 2024-08-30 PROCEDURE — 83605 ASSAY OF LACTIC ACID: CPT

## 2024-08-30 PROCEDURE — 84484 ASSAY OF TROPONIN QUANT: CPT

## 2024-08-30 RX ORDER — LURASIDONE HYDROCHLORIDE 80 MG/1
80 TABLET, FILM COATED ORAL
COMMUNITY

## 2024-08-30 RX ORDER — METHOCARBAMOL 750 MG/1
750 TABLET, FILM COATED ORAL 4 TIMES DAILY
Qty: 40 TABLET | Refills: 0 | Status: SHIPPED | OUTPATIENT
Start: 2024-08-30 | End: 2024-09-09

## 2024-08-30 RX ORDER — ONDANSETRON 4 MG/1
4 TABLET, ORALLY DISINTEGRATING ORAL 3 TIMES DAILY PRN
Qty: 21 TABLET | Refills: 0 | Status: SHIPPED | OUTPATIENT
Start: 2024-08-30

## 2024-08-30 RX ORDER — ONDANSETRON 2 MG/ML
4 INJECTION INTRAMUSCULAR; INTRAVENOUS ONCE
Status: COMPLETED | OUTPATIENT
Start: 2024-08-30 | End: 2024-08-30

## 2024-08-30 RX ORDER — FENTANYL CITRATE 50 UG/ML
50 INJECTION, SOLUTION INTRAMUSCULAR; INTRAVENOUS ONCE
Status: COMPLETED | OUTPATIENT
Start: 2024-08-30 | End: 2024-08-30

## 2024-08-30 RX ORDER — IOPAMIDOL 755 MG/ML
100 INJECTION, SOLUTION INTRAVASCULAR
Status: COMPLETED | OUTPATIENT
Start: 2024-08-30 | End: 2024-08-30

## 2024-08-30 RX ORDER — PROCHLORPERAZINE EDISYLATE 5 MG/ML
10 INJECTION INTRAMUSCULAR; INTRAVENOUS ONCE
Status: COMPLETED | OUTPATIENT
Start: 2024-08-30 | End: 2024-08-30

## 2024-08-30 RX ADMIN — FENTANYL CITRATE 50 MCG: 50 INJECTION INTRAMUSCULAR; INTRAVENOUS at 11:42

## 2024-08-30 RX ADMIN — ONDANSETRON HYDROCHLORIDE 4 MG: 2 INJECTION, SOLUTION INTRAMUSCULAR; INTRAVENOUS at 11:46

## 2024-08-30 RX ADMIN — IOPAMIDOL 100 ML: 755 INJECTION, SOLUTION INTRAVENOUS at 12:34

## 2024-08-30 RX ADMIN — PROCHLORPERAZINE EDISYLATE 10 MG: 5 INJECTION INTRAMUSCULAR; INTRAVENOUS at 13:03

## 2024-08-30 RX ADMIN — HYDROMORPHONE HYDROCHLORIDE 0.5 MG: 1 INJECTION, SOLUTION INTRAMUSCULAR; INTRAVENOUS; SUBCUTANEOUS at 13:03

## 2024-08-30 ASSESSMENT — PAIN DESCRIPTION - FREQUENCY: FREQUENCY: CONTINUOUS

## 2024-08-30 ASSESSMENT — PAIN DESCRIPTION - DESCRIPTORS
DESCRIPTORS: ACHING;SHARP;STABBING
DESCRIPTORS: DISCOMFORT;SHARP
DESCRIPTORS: SHARP;STABBING

## 2024-08-30 ASSESSMENT — PAIN DESCRIPTION - LOCATION
LOCATION: ABDOMEN
LOCATION: ABDOMEN;BACK;RIB CAGE
LOCATION: RIB CAGE;BACK

## 2024-08-30 ASSESSMENT — PAIN SCALES - GENERAL
PAINLEVEL_OUTOF10: 8
PAINLEVEL_OUTOF10: 5

## 2024-08-30 ASSESSMENT — PAIN DESCRIPTION - ORIENTATION
ORIENTATION: LEFT

## 2024-08-30 ASSESSMENT — PAIN - FUNCTIONAL ASSESSMENT: PAIN_FUNCTIONAL_ASSESSMENT: 0-10

## 2024-08-30 ASSESSMENT — PAIN DESCRIPTION - ONSET: ONSET: SUDDEN

## 2024-08-30 ASSESSMENT — PAIN DESCRIPTION - PAIN TYPE: TYPE: ACUTE PAIN

## 2024-08-30 NOTE — ED PROVIDER NOTES
[facility] EMERGENCY DEPARTMENT  EMERGENCY DEPARTMENT ENCOUNTER        Pt Name: Caitlyn Crandall  MRN: [id number]  Birthdate 1970  Date of evaluation: 8/30/2024  Provider: Pal Santiago MD  PCP: Fortunato Ladd DO  Note Started: 11:13 AM EDT 8/30/24    CHIEF COMPLAINT       Chief Complaint   Patient presents with    Abdominal Pain     Ab pain with vomiting.  Started last night.         HISTORY OF PRESENT ILLNESS: 1 or more Elements   History From: Patient    Limitations to history : None  Social Determinants : None    Caitlyn Crandall is a 54 y.o. female with a history of bipolar disorder, obesity, osteoarthritis, sciatica presents with complaints of left-sided abdominal pain and vomiting.  She mentions that it all started yesterday evening since when she has been having sharp pain on the left side of her abdomen radiating towards the back.  It has also been associated with multiple episodes of nausea and vomiting.  Nothing makes the pain better or worse.    Denies any fever, chills,  headache, dizziness, vision changes, neck tenderness or stiffness, weakness, chest pain, palpitations, leg swelling/tenderness, sob, cough, dysuria, hematuria, diarrhea, constipation, bloody stools.    Nursing Notes were all reviewed and agreed with or any disagreements were addressed in the HPI.    ROS:   Pertinent positives and negatives are stated within HPI, all other systems reviewed and are negative.      --------------------------------------------- PAST HISTORY ---------------------------------------------  Past Medical History:       Diagnosis Date    Bilateral low back pain without sciatica 6/17/2022    Bipolar 1 disorder (HCC)     Depression     Obesity     Osteoarthritis     bilateral knees    Thiamine deficiency     Vitamin D deficiency        Past Surgical History:       Procedure Laterality Date    CHOLECYSTECTOMY      ENDOSCOPY, COLON, DIAGNOSTIC      GALLBLADDER SURGERY  1994

## 2024-08-31 ENCOUNTER — HOSPITAL ENCOUNTER (OUTPATIENT)
Age: 54
Discharge: HOME | End: 2024-08-31
Payer: MEDICARE

## 2024-08-31 DIAGNOSIS — R53.83 OTHER FATIGUE: ICD-10-CM

## 2024-08-31 DIAGNOSIS — R10.84 GENERALIZED ABDOMINAL PAIN: ICD-10-CM

## 2024-08-31 DIAGNOSIS — R73.03 PREDIABETES: ICD-10-CM

## 2024-08-31 LAB
ALBUMIN SERPL-MCNC: 3.6 G/DL (ref 3.5–5.2)
ALBUMIN SERPL-MCNC: 3.7 G/DL (ref 3.5–5.2)
ALP SERPL-CCNC: 78 U/L (ref 35–104)
ALP SERPL-CCNC: 79 U/L (ref 35–104)
ALT SERPL-CCNC: 11 U/L (ref 0–32)
ALT SERPL-CCNC: 12 U/L (ref 0–32)
ANION GAP SERPL CALCULATED.3IONS-SCNC: 6 MMOL/L (ref 7–16)
ANION GAP SERPL CALCULATED.3IONS-SCNC: 7 MMOL/L (ref 7–16)
AST SERPL-CCNC: 16 U/L (ref 0–31)
AST SERPL-CCNC: 17 U/L (ref 0–31)
BASOPHILS # BLD: 0.04 K/UL (ref 0–0.2)
BASOPHILS NFR BLD: 1 % (ref 0–2)
BILIRUB DIRECT SERPL-MCNC: <0.2 MG/DL (ref 0–0.3)
BILIRUB INDIRECT SERPL-MCNC: NORMAL MG/DL (ref 0–1)
BILIRUB SERPL-MCNC: 0.2 MG/DL (ref 0–1.2)
BILIRUB SERPL-MCNC: 0.2 MG/DL (ref 0–1.2)
BUN SERPL-MCNC: 7 MG/DL (ref 6–20)
CALCIUM SERPL-MCNC: 9.2 MG/DL (ref 8.6–10.2)
CHLORIDE SERPL-SCNC: 103 MMOL/L (ref 98–107)
CHLORIDE SERPL-SCNC: 104 MMOL/L (ref 98–107)
CHOLEST SERPL-MCNC: 186 MG/DL
CO2 SERPL-SCNC: 26 MMOL/L (ref 22–29)
CO2 SERPL-SCNC: 27 MMOL/L (ref 22–29)
CREAT SERPL-MCNC: 0.9 MG/DL (ref 0.5–1)
EOSINOPHIL # BLD: 0.16 K/UL (ref 0.05–0.5)
EOSINOPHILS RELATIVE PERCENT: 3 % (ref 0–6)
ERYTHROCYTE [DISTWIDTH] IN BLOOD BY AUTOMATED COUNT: 13.6 % (ref 11.5–15)
GFR, ESTIMATED: 74 ML/MIN/1.73M2
GLUCOSE SERPL-MCNC: 98 MG/DL (ref 74–99)
HBA1C MFR BLD: 5.8 % (ref 4–5.6)
HCT VFR BLD AUTO: 38.2 % (ref 34–48)
HDLC SERPL-MCNC: 47 MG/DL
HGB BLD-MCNC: 12.1 G/DL (ref 11.5–15.5)
IMM GRANULOCYTES # BLD AUTO: <0.03 K/UL (ref 0–0.58)
IMM GRANULOCYTES NFR BLD: 0 % (ref 0–5)
LDLC SERPL CALC-MCNC: 118 MG/DL
LIPASE SERPL-CCNC: 15 U/L (ref 13–60)
LITHIUM DATE LAST DOSE: NORMAL
LITHIUM DOSE AMOUNT: NORMAL
LITHIUM DOSE TIME: NORMAL
LITHIUM LEVEL: 0.7 MMOL/L (ref 0.5–1.5)
LYMPHOCYTES NFR BLD: 1.19 K/UL (ref 1.5–4)
LYMPHOCYTES RELATIVE PERCENT: 23 % (ref 20–42)
MCH RBC QN AUTO: 27.7 PG (ref 26–35)
MCHC RBC AUTO-ENTMCNC: 31.7 G/DL (ref 32–34.5)
MCV RBC AUTO: 87.4 FL (ref 80–99.9)
MONOCYTES NFR BLD: 0.33 K/UL (ref 0.1–0.95)
MONOCYTES NFR BLD: 6 % (ref 2–12)
NEUTROPHILS NFR BLD: 67 % (ref 43–80)
NEUTS SEG NFR BLD: 3.49 K/UL (ref 1.8–7.3)
PLATELET # BLD AUTO: 312 K/UL (ref 130–450)
PMV BLD AUTO: 10.3 FL (ref 7–12)
POTASSIUM SERPL-SCNC: 4.3 MMOL/L (ref 3.5–5)
POTASSIUM SERPL-SCNC: 4.4 MMOL/L (ref 3.5–5)
PROT SERPL-MCNC: 6.7 G/DL (ref 6.4–8.3)
PROT SERPL-MCNC: 7 G/DL (ref 6.4–8.3)
RBC # BLD AUTO: 4.37 M/UL (ref 3.5–5.5)
SODIUM SERPL-SCNC: 136 MMOL/L (ref 132–146)
SODIUM SERPL-SCNC: 137 MMOL/L (ref 132–146)
T4 SERPL-MCNC: 8.8 UG/DL (ref 4.5–11.7)
TRIGL SERPL-MCNC: 105 MG/DL
TSH SERPL DL<=0.05 MIU/L-ACNC: 1.83 UIU/ML (ref 0.27–4.2)
URATE SERPL-MCNC: 4.4 MG/DL (ref 2.4–5.7)
VLDLC SERPL CALC-MCNC: 21 MG/DL
WBC OTHER # BLD: 5.2 K/UL (ref 4.5–11.5)

## 2024-08-31 PROCEDURE — 84443 ASSAY THYROID STIM HORMONE: CPT

## 2024-08-31 PROCEDURE — 80051 ELECTROLYTE PANEL: CPT

## 2024-08-31 PROCEDURE — 85025 COMPLETE CBC W/AUTO DIFF WBC: CPT

## 2024-08-31 PROCEDURE — 83690 ASSAY OF LIPASE: CPT

## 2024-08-31 PROCEDURE — 80178 ASSAY OF LITHIUM: CPT

## 2024-08-31 PROCEDURE — 80053 COMPREHEN METABOLIC PANEL: CPT

## 2024-08-31 PROCEDURE — 80076 HEPATIC FUNCTION PANEL: CPT

## 2024-08-31 PROCEDURE — 84436 ASSAY OF TOTAL THYROXINE: CPT

## 2024-08-31 PROCEDURE — 80061 LIPID PANEL: CPT

## 2024-08-31 PROCEDURE — 83036 HEMOGLOBIN GLYCOSYLATED A1C: CPT

## 2024-08-31 PROCEDURE — 84550 ASSAY OF BLOOD/URIC ACID: CPT

## 2024-08-31 PROCEDURE — 36415 COLL VENOUS BLD VENIPUNCTURE: CPT

## 2024-09-01 LAB
EKG ATRIAL RATE: 55 BPM
EKG P AXIS: 61 DEGREES
EKG P-R INTERVAL: 174 MS
EKG Q-T INTERVAL: 434 MS
EKG QRS DURATION: 86 MS
EKG QTC CALCULATION (BAZETT): 415 MS
EKG R AXIS: 29 DEGREES
EKG T AXIS: 33 DEGREES
EKG VENTRICULAR RATE: 55 BPM

## 2024-09-03 ENCOUNTER — HOSPITAL ENCOUNTER (OUTPATIENT)
Dept: GENERAL RADIOLOGY | Age: 54
Discharge: HOME | End: 2024-09-05
Payer: MEDICARE

## 2024-09-03 DIAGNOSIS — Z12.31 SCREENING MAMMOGRAM FOR BREAST CANCER: ICD-10-CM

## 2024-09-03 PROCEDURE — 77063 BREAST TOMOSYNTHESIS BI: CPT

## 2024-09-18 ENCOUNTER — HOSPITAL ENCOUNTER (OUTPATIENT)
Dept: CT IMAGING | Age: 54
Discharge: HOME | End: 2024-09-20
Attending: FAMILY MEDICINE
Payer: MEDICARE

## 2024-09-18 DIAGNOSIS — R10.84 GENERALIZED ABDOMINAL PAIN: ICD-10-CM

## 2024-09-18 PROCEDURE — 6360000004 HC RX CONTRAST MEDICATION: Performed by: RADIOLOGY

## 2024-09-18 PROCEDURE — 74178 CT ABD&PLV WO CNTR FLWD CNTR: CPT

## 2024-09-18 RX ORDER — IOPAMIDOL 755 MG/ML
75 INJECTION, SOLUTION INTRAVASCULAR
Status: COMPLETED | OUTPATIENT
Start: 2024-09-18 | End: 2024-09-18

## 2024-09-18 RX ADMIN — IOPAMIDOL 75 ML: 755 INJECTION, SOLUTION INTRAVENOUS at 09:43

## 2024-11-05 ENCOUNTER — PATIENT MESSAGE (OUTPATIENT)
Dept: FAMILY MEDICINE CLINIC | Age: 54
End: 2024-11-05

## 2024-11-05 ENCOUNTER — HOSPITAL ENCOUNTER (EMERGENCY)
Age: 54
Discharge: HOME | End: 2024-11-05
Payer: MEDICARE

## 2024-11-05 VITALS
WEIGHT: 212 LBS | TEMPERATURE: 98.6 F | OXYGEN SATURATION: 98 % | RESPIRATION RATE: 18 BRPM | BODY MASS INDEX: 36.19 KG/M2 | SYSTOLIC BLOOD PRESSURE: 148 MMHG | HEART RATE: 67 BPM | DIASTOLIC BLOOD PRESSURE: 97 MMHG | HEIGHT: 64 IN

## 2024-11-05 DIAGNOSIS — N30.00 ACUTE CYSTITIS WITHOUT HEMATURIA: Primary | ICD-10-CM

## 2024-11-05 DIAGNOSIS — R10.9 ABDOMINAL PAIN, UNSPECIFIED ABDOMINAL LOCATION: ICD-10-CM

## 2024-11-05 DIAGNOSIS — R11.0 NAUSEA: ICD-10-CM

## 2024-11-05 LAB
BACTERIA URNS QL MICRO: ABNORMAL
BILIRUB UR QL STRIP: ABNORMAL
CLARITY UR: ABNORMAL
COLOR UR: ABNORMAL
GLUCOSE UR STRIP-MCNC: NEGATIVE MG/DL
HGB UR QL STRIP.AUTO: NEGATIVE
KETONES UR STRIP-MCNC: NEGATIVE MG/DL
LEUKOCYTE ESTERASE UR QL STRIP: NEGATIVE
NITRITE UR QL STRIP: NEGATIVE
PH UR STRIP: 5.5 [PH] (ref 5–9)
PROT UR STRIP-MCNC: ABNORMAL MG/DL
RBC #/AREA URNS HPF: ABNORMAL /HPF
SP GR UR STRIP: >1.03 (ref 1–1.03)
UROBILINOGEN UR STRIP-ACNC: 0.2 EU/DL (ref 0–1)
WBC #/AREA URNS HPF: ABNORMAL /HPF

## 2024-11-05 PROCEDURE — 87086 URINE CULTURE/COLONY COUNT: CPT

## 2024-11-05 PROCEDURE — 99211 OFF/OP EST MAY X REQ PHY/QHP: CPT

## 2024-11-05 PROCEDURE — 81001 URINALYSIS AUTO W/SCOPE: CPT

## 2024-11-05 RX ORDER — ONDANSETRON 4 MG/1
4 TABLET, ORALLY DISINTEGRATING ORAL 3 TIMES DAILY PRN
Qty: 12 TABLET | Refills: 0 | Status: SHIPPED | OUTPATIENT
Start: 2024-11-05

## 2024-11-05 RX ORDER — CEFDINIR 300 MG/1
300 CAPSULE ORAL 2 TIMES DAILY
Qty: 14 CAPSULE | Refills: 0 | Status: ON HOLD | OUTPATIENT
Start: 2024-11-05 | End: 2024-11-07

## 2024-11-05 RX ORDER — OMEPRAZOLE 10 MG/1
10 CAPSULE, DELAYED RELEASE ORAL DAILY
Status: ON HOLD | COMMUNITY
End: 2024-11-09 | Stop reason: HOSPADM

## 2024-11-05 ASSESSMENT — PAIN SCALES - GENERAL: PAINLEVEL_OUTOF10: 0

## 2024-11-05 ASSESSMENT — PAIN - FUNCTIONAL ASSESSMENT: PAIN_FUNCTIONAL_ASSESSMENT: 0-10

## 2024-11-05 NOTE — TELEPHONE ENCOUNTER
Appt scheduled, pt notified via [employer].     Electronically signed by JAGDEEP ARAGON MA on 11/5/24 at 7:48 AM EST

## 2024-11-05 NOTE — ED PROVIDER NOTES
[facility] URGENT CARE  EMERGENCY DEPARTMENT ENCOUNTER        NAME: Caitlyn Crandall  :  1970  MRN:  [id number]  Date of evaluation: 2024  Provider: Eliel David PA-C  PCP: Fortunato Ladd DO  Note Started : 10:04 AM EST 24    Chief Complaint: Hematuria (States she had blood when she wiped yesterday ), Vomiting (Yesterday ), Headache, and Letter for School/Work      This is a 54-year-old female that presents urgent care complaining of hematuria and some abdominal pain that sharp and cramping.  Patient states that she has had some nausea and vomiting.  Some lower back pain.  The abdominal pain and back pain appear to be chronic in nature she states she has had workups for this in the past.  Does have history of gastric bypass several years ago.  She does complain of some bodyaches.  No chest pain or shortness of breath no cough.  On first contact patient she appears to be no acute distress.        Review of Systems  Pertinent positives and negatives are stated within HPI, all other systems reviewed and are negative.     Allergies: Patient has no known allergies.     --------------------------------------------- PAST HISTORY ---------------------------------------------  Past Medical History:  has a past medical history of Bilateral low back pain without sciatica, Bipolar 1 disorder (HCC), Depression, Obesity, Osteoarthritis, Thiamine deficiency, and Vitamin D deficiency.    Past Surgical History:  has a past surgical history that includes Gallbladder surgery (); Cholecystectomy; Endoscopy, colon, diagnostic; pr laps gstr rstcv px w/byp luciano-en-y limb <150 cm (N/A, 2018); Upper gastrointestinal endoscopy (N/A, 10/22/2018); Upper gastrointestinal endoscopy (N/A, 2019); joint replacement (Right, 2019); Total knee arthroplasty (Right, 2019); Upper gastrointestinal endoscopy (N/A, 2019); Total knee arthroplasty (Left, 2020); Upper  (4) no impairment Composite Graft Text: The defect edges were debeveled with a #15 scalpel blade.  Given the location of the defect, shape of the defect, the proximity to free margins and the fact the defect was full thickness a composite graft was deemed most appropriate.  The defect was outline and then transferred to the donor site.  A full thickness graft was then excised from the donor site. The graft was then placed in the primary defect, oriented appropriately and then sutured into place.  The secondary defect was then repaired using a primary closure.

## 2024-11-06 LAB
MICROORGANISM SPEC CULT: ABNORMAL
SPECIMEN DESCRIPTION: ABNORMAL

## 2024-11-07 ENCOUNTER — APPOINTMENT (OUTPATIENT)
Dept: MRI IMAGING | Age: 54
End: 2024-11-07
Attending: INTERNAL MEDICINE
Payer: MEDICARE

## 2024-11-07 ENCOUNTER — HOSPITAL ENCOUNTER (EMERGENCY)
Age: 54
Discharge: ANOTHER HOSPITAL | End: 2024-11-07
Attending: STUDENT IN AN ORGANIZED HEALTH CARE EDUCATION/TRAINING PROGRAM
Payer: MEDICAID

## 2024-11-07 ENCOUNTER — HOSPITAL ENCOUNTER (OUTPATIENT)
Age: 54
Setting detail: OBSERVATION
Discharge: HOME | End: 2024-11-09
Attending: INTERNAL MEDICINE | Admitting: INTERNAL MEDICINE
Payer: MEDICARE

## 2024-11-07 ENCOUNTER — ANESTHESIA EVENT (OUTPATIENT)
Dept: ENDOSCOPY | Age: 54
End: 2024-11-07
Payer: MEDICARE

## 2024-11-07 ENCOUNTER — APPOINTMENT (OUTPATIENT)
Dept: CT IMAGING | Age: 54
End: 2024-11-07
Payer: MEDICAID

## 2024-11-07 VITALS
TEMPERATURE: 97.8 F | DIASTOLIC BLOOD PRESSURE: 58 MMHG | HEART RATE: 51 BPM | RESPIRATION RATE: 18 BRPM | SYSTOLIC BLOOD PRESSURE: 112 MMHG | OXYGEN SATURATION: 97 %

## 2024-11-07 DIAGNOSIS — N12 PYELONEPHRITIS: Primary | ICD-10-CM

## 2024-11-07 DIAGNOSIS — R10.9 ABDOMINAL PAIN, UNSPECIFIED ABDOMINAL LOCATION: ICD-10-CM

## 2024-11-07 LAB
ALBUMIN SERPL-MCNC: 3.7 G/DL (ref 3.5–5.2)
ALP SERPL-CCNC: 78 U/L (ref 35–104)
ALT SERPL-CCNC: 7 U/L (ref 0–32)
ANION GAP SERPL CALCULATED.3IONS-SCNC: 12 MMOL/L (ref 7–16)
AST SERPL-CCNC: 14 U/L (ref 0–31)
BACTERIA URNS QL MICRO: NORMAL
BASOPHILS # BLD: 0.08 K/UL (ref 0–0.2)
BASOPHILS NFR BLD: 2 % (ref 0–2)
BILIRUB SERPL-MCNC: 0.4 MG/DL (ref 0–1.2)
BILIRUB UR QL STRIP: NEGATIVE
BUN SERPL-MCNC: 15 MG/DL (ref 6–20)
CALCIUM SERPL-MCNC: 9.4 MG/DL (ref 8.6–10.2)
CHLORIDE SERPL-SCNC: 103 MMOL/L (ref 98–107)
CLARITY UR: CLEAR
CO2 SERPL-SCNC: 25 MMOL/L (ref 22–29)
COLOR UR: YELLOW
CREAT SERPL-MCNC: 0.9 MG/DL (ref 0.5–1)
CRP SERPL HS-MCNC: <3 MG/L (ref 0–5)
EOSINOPHIL # BLD: 0.1 K/UL (ref 0.05–0.5)
EOSINOPHILS RELATIVE PERCENT: 2 % (ref 0–6)
EPI CELLS #/AREA URNS HPF: NORMAL /HPF (ref 0–5)
ERYTHROCYTE [DISTWIDTH] IN BLOOD BY AUTOMATED COUNT: 12.8 % (ref 11.5–15)
ERYTHROCYTE [SEDIMENTATION RATE] IN BLOOD BY WESTERGREN METHOD: 25 MM/HR (ref 0–20)
FLUAV RNA RESP QL NAA+PROBE: NOT DETECTED
FLUBV RNA RESP QL NAA+PROBE: NOT DETECTED
GFR, ESTIMATED: 79 ML/MIN/1.73M2
GLUCOSE SERPL-MCNC: 97 MG/DL (ref 74–99)
GLUCOSE UR STRIP-MCNC: NEGATIVE MG/DL
HCT VFR BLD AUTO: 37.9 % (ref 34–48)
HGB BLD-MCNC: 12.3 G/DL (ref 11.5–15.5)
HGB UR QL STRIP.AUTO: NEGATIVE
IMM GRANULOCYTES # BLD AUTO: <0.03 K/UL (ref 0–0.58)
IMM GRANULOCYTES NFR BLD: 0 % (ref 0–5)
KETONES UR STRIP-MCNC: NEGATIVE MG/DL
LACTATE BLDV-SCNC: 2 MMOL/L (ref 0.5–2.2)
LEUKOCYTE ESTERASE UR QL STRIP: NEGATIVE
LIPASE SERPL-CCNC: 14 U/L (ref 13–60)
LITHIUM DATE LAST DOSE: ABNORMAL
LITHIUM DOSE AMOUNT: ABNORMAL
LITHIUM DOSE TIME: ABNORMAL
LITHIUM LEVEL: <0.1 MMOL/L (ref 0.5–1.5)
LYMPHOCYTES NFR BLD: 2.15 K/UL (ref 1.5–4)
LYMPHOCYTES RELATIVE PERCENT: 39 % (ref 20–42)
MCH RBC QN AUTO: 27.2 PG (ref 26–35)
MCHC RBC AUTO-ENTMCNC: 32.5 G/DL (ref 32–34.5)
MCV RBC AUTO: 83.7 FL (ref 80–99.9)
MONOCYTES NFR BLD: 0.6 K/UL (ref 0.1–0.95)
MONOCYTES NFR BLD: 11 % (ref 2–12)
NEUTROPHILS NFR BLD: 46 % (ref 43–80)
NEUTS SEG NFR BLD: 2.52 K/UL (ref 1.8–7.3)
NITRITE UR QL STRIP: NEGATIVE
PH UR STRIP: 6 [PH] (ref 5–8)
PLATELET # BLD AUTO: 183 K/UL (ref 130–450)
PMV BLD AUTO: 12.5 FL (ref 7–12)
POTASSIUM SERPL-SCNC: 3.8 MMOL/L (ref 3.5–5)
PROT SERPL-MCNC: 6.9 G/DL (ref 6.4–8.3)
PROT UR STRIP-MCNC: NEGATIVE MG/DL
RBC # BLD AUTO: 4.53 M/UL (ref 3.5–5.5)
RBC #/AREA URNS HPF: NORMAL /HPF (ref 0–2)
SARS-COV-2 RNA RESP QL NAA+PROBE: NOT DETECTED
SODIUM SERPL-SCNC: 140 MMOL/L (ref 132–146)
SOURCE: NORMAL
SP GR UR STRIP: 1 (ref 1–1.03)
SPECIMEN DESCRIPTION: NORMAL
TROPONIN I SERPL HS-MCNC: 8 NG/L (ref 0–9)
UROBILINOGEN UR STRIP-ACNC: NORMAL EU/DL (ref 0–1)
WBC #/AREA URNS HPF: NORMAL /HPF (ref 0–5)
WBC OTHER # BLD: 5.5 K/UL (ref 4.5–11.5)

## 2024-11-07 PROCEDURE — 2580000003 HC RX 258: Performed by: EMERGENCY MEDICINE

## 2024-11-07 PROCEDURE — 6360000004 HC RX CONTRAST MEDICATION: Performed by: RADIOLOGY

## 2024-11-07 PROCEDURE — 87636 SARSCOV2 & INF A&B AMP PRB: CPT

## 2024-11-07 PROCEDURE — 74177 CT ABD & PELVIS W/CONTRAST: CPT

## 2024-11-07 PROCEDURE — 84484 ASSAY OF TROPONIN QUANT: CPT

## 2024-11-07 PROCEDURE — 85025 COMPLETE CBC W/AUTO DIFF WBC: CPT

## 2024-11-07 PROCEDURE — 96372 THER/PROPH/DIAG INJ SC/IM: CPT

## 2024-11-07 PROCEDURE — 2580000003 HC RX 258: Performed by: STUDENT IN AN ORGANIZED HEALTH CARE EDUCATION/TRAINING PROGRAM

## 2024-11-07 PROCEDURE — 6360000002 HC RX W HCPCS: Performed by: EMERGENCY MEDICINE

## 2024-11-07 PROCEDURE — 6360000002 HC RX W HCPCS

## 2024-11-07 PROCEDURE — 83690 ASSAY OF LIPASE: CPT

## 2024-11-07 PROCEDURE — 85652 RBC SED RATE AUTOMATED: CPT

## 2024-11-07 PROCEDURE — 99222 1ST HOSP IP/OBS MODERATE 55: CPT | Performed by: SURGERY

## 2024-11-07 PROCEDURE — 93005 ELECTROCARDIOGRAM TRACING: CPT | Performed by: STUDENT IN AN ORGANIZED HEALTH CARE EDUCATION/TRAINING PROGRAM

## 2024-11-07 PROCEDURE — 99285 EMERGENCY DEPT VISIT HI MDM: CPT

## 2024-11-07 PROCEDURE — G0378 HOSPITAL OBSERVATION PER HR: HCPCS

## 2024-11-07 PROCEDURE — 6370000000 HC RX 637 (ALT 250 FOR IP): Performed by: EMERGENCY MEDICINE

## 2024-11-07 PROCEDURE — 72148 MRI LUMBAR SPINE W/O DYE: CPT

## 2024-11-07 PROCEDURE — 87086 URINE CULTURE/COLONY COUNT: CPT

## 2024-11-07 PROCEDURE — 80178 ASSAY OF LITHIUM: CPT

## 2024-11-07 PROCEDURE — 96374 THER/PROPH/DIAG INJ IV PUSH: CPT

## 2024-11-07 PROCEDURE — 6370000000 HC RX 637 (ALT 250 FOR IP)

## 2024-11-07 PROCEDURE — 2580000003 HC RX 258

## 2024-11-07 PROCEDURE — 6360000002 HC RX W HCPCS: Performed by: STUDENT IN AN ORGANIZED HEALTH CARE EDUCATION/TRAINING PROGRAM

## 2024-11-07 PROCEDURE — G0379 DIRECT REFER HOSPITAL OBSERV: HCPCS

## 2024-11-07 PROCEDURE — 86140 C-REACTIVE PROTEIN: CPT

## 2024-11-07 PROCEDURE — 81001 URINALYSIS AUTO W/SCOPE: CPT

## 2024-11-07 PROCEDURE — 80053 COMPREHEN METABOLIC PANEL: CPT

## 2024-11-07 PROCEDURE — 83605 ASSAY OF LACTIC ACID: CPT

## 2024-11-07 PROCEDURE — 36415 COLL VENOUS BLD VENIPUNCTURE: CPT

## 2024-11-07 PROCEDURE — 96375 TX/PRO/DX INJ NEW DRUG ADDON: CPT

## 2024-11-07 RX ORDER — IOPAMIDOL 755 MG/ML
75 INJECTION, SOLUTION INTRAVASCULAR
Status: COMPLETED | OUTPATIENT
Start: 2024-11-07 | End: 2024-11-07

## 2024-11-07 RX ORDER — SUCRALFATE 1 G/1
1 TABLET ORAL
Status: DISCONTINUED | OUTPATIENT
Start: 2024-11-07 | End: 2024-11-08

## 2024-11-07 RX ORDER — LURASIDONE HYDROCHLORIDE 20 MG/1
80 TABLET, FILM COATED ORAL
Status: DISCONTINUED | OUTPATIENT
Start: 2024-11-07 | End: 2024-11-09 | Stop reason: HOSPADM

## 2024-11-07 RX ORDER — FENTANYL CITRATE 50 UG/ML
50 INJECTION, SOLUTION INTRAMUSCULAR; INTRAVENOUS ONCE
Status: COMPLETED | OUTPATIENT
Start: 2024-11-07 | End: 2024-11-07

## 2024-11-07 RX ORDER — LORAZEPAM 1 MG/1
1 TABLET ORAL ONCE
Status: COMPLETED | OUTPATIENT
Start: 2024-11-07 | End: 2024-11-07

## 2024-11-07 RX ORDER — LITHIUM CARBONATE 300 MG/1
300 CAPSULE ORAL 2 TIMES DAILY WITH MEALS
Status: DISCONTINUED | OUTPATIENT
Start: 2024-11-07 | End: 2024-11-09 | Stop reason: HOSPADM

## 2024-11-07 RX ORDER — ACETAMINOPHEN 325 MG/1
650 TABLET ORAL EVERY 4 HOURS PRN
Status: DISCONTINUED | OUTPATIENT
Start: 2024-11-07 | End: 2024-11-09 | Stop reason: HOSPADM

## 2024-11-07 RX ORDER — SODIUM CHLORIDE 9 MG/ML
INJECTION, SOLUTION INTRAVENOUS CONTINUOUS
Status: DISCONTINUED | OUTPATIENT
Start: 2024-11-07 | End: 2024-11-09 | Stop reason: HOSPADM

## 2024-11-07 RX ORDER — FENTANYL CITRATE 0.05 MG/ML
50 INJECTION, SOLUTION INTRAMUSCULAR; INTRAVENOUS
Status: DISCONTINUED | OUTPATIENT
Start: 2024-11-07 | End: 2024-11-09 | Stop reason: HOSPADM

## 2024-11-07 RX ORDER — ONDANSETRON 4 MG/1
4 TABLET, ORALLY DISINTEGRATING ORAL 3 TIMES DAILY PRN
Status: DISCONTINUED | OUTPATIENT
Start: 2024-11-07 | End: 2024-11-09 | Stop reason: HOSPADM

## 2024-11-07 RX ORDER — IOPAMIDOL 755 MG/ML
18 INJECTION, SOLUTION INTRAVASCULAR
Status: COMPLETED | OUTPATIENT
Start: 2024-11-07 | End: 2024-11-07

## 2024-11-07 RX ORDER — ONDANSETRON 2 MG/ML
4 INJECTION INTRAMUSCULAR; INTRAVENOUS ONCE
Status: COMPLETED | OUTPATIENT
Start: 2024-11-07 | End: 2024-11-07

## 2024-11-07 RX ORDER — KETOROLAC TROMETHAMINE 30 MG/ML
30 INJECTION, SOLUTION INTRAMUSCULAR; INTRAVENOUS EVERY 6 HOURS PRN
Status: DISCONTINUED | OUTPATIENT
Start: 2024-11-07 | End: 2024-11-09 | Stop reason: HOSPADM

## 2024-11-07 RX ORDER — PANTOPRAZOLE SODIUM 40 MG/1
40 TABLET, DELAYED RELEASE ORAL
Status: DISCONTINUED | OUTPATIENT
Start: 2024-11-08 | End: 2024-11-09 | Stop reason: HOSPADM

## 2024-11-07 RX ORDER — ENOXAPARIN SODIUM 100 MG/ML
40 INJECTION SUBCUTANEOUS DAILY
Status: DISCONTINUED | OUTPATIENT
Start: 2024-11-07 | End: 2024-11-09 | Stop reason: HOSPADM

## 2024-11-07 RX ORDER — 0.9 % SODIUM CHLORIDE 0.9 %
1000 INTRAVENOUS SOLUTION INTRAVENOUS ONCE
Status: COMPLETED | OUTPATIENT
Start: 2024-11-07 | End: 2024-11-07

## 2024-11-07 RX ORDER — PROCHLORPERAZINE EDISYLATE 5 MG/ML
10 INJECTION INTRAMUSCULAR; INTRAVENOUS EVERY 6 HOURS PRN
Status: DISCONTINUED | OUTPATIENT
Start: 2024-11-07 | End: 2024-11-09 | Stop reason: HOSPADM

## 2024-11-07 RX ADMIN — SODIUM CHLORIDE: 9 INJECTION, SOLUTION INTRAVENOUS at 13:30

## 2024-11-07 RX ADMIN — ONDANSETRON 4 MG: 4 TABLET, ORALLY DISINTEGRATING ORAL at 20:56

## 2024-11-07 RX ADMIN — ENOXAPARIN SODIUM 40 MG: 100 INJECTION SUBCUTANEOUS at 13:39

## 2024-11-07 RX ADMIN — IOPAMIDOL 75 ML: 755 INJECTION, SOLUTION INTRAVENOUS at 06:07

## 2024-11-07 RX ADMIN — SODIUM CHLORIDE 1000 ML: 9 INJECTION, SOLUTION INTRAVENOUS at 04:28

## 2024-11-07 RX ADMIN — LURASIDONE HYDROCHLORIDE 80 MG: 20 TABLET, FILM COATED ORAL at 18:10

## 2024-11-07 RX ADMIN — SUCRALFATE 1 G: 1 TABLET ORAL at 20:56

## 2024-11-07 RX ADMIN — ONDANSETRON 4 MG: 2 INJECTION INTRAMUSCULAR; INTRAVENOUS at 04:29

## 2024-11-07 RX ADMIN — IOPAMIDOL 18 ML: 755 INJECTION, SOLUTION INTRAVENOUS at 06:07

## 2024-11-07 RX ADMIN — FENTANYL CITRATE 50 MCG: 50 INJECTION INTRAMUSCULAR; INTRAVENOUS at 04:29

## 2024-11-07 RX ADMIN — LITHIUM CARBONATE 300 MG: 300 CAPSULE, GELATIN COATED ORAL at 18:10

## 2024-11-07 RX ADMIN — CEFTRIAXONE SODIUM 1000 MG: 1 INJECTION, POWDER, FOR SOLUTION INTRAMUSCULAR; INTRAVENOUS at 07:37

## 2024-11-07 RX ADMIN — SUCRALFATE 1 G: 1 TABLET ORAL at 13:39

## 2024-11-07 RX ADMIN — LORAZEPAM 1 MG: 1 TABLET ORAL at 07:38

## 2024-11-07 ASSESSMENT — ENCOUNTER SYMPTOMS
VOMITING: 0
BACK PAIN: 0
ABDOMINAL PAIN: 1
DIARRHEA: 1
COLOR CHANGE: 0
COUGH: 0
NAUSEA: 1
SHORTNESS OF BREATH: 0

## 2024-11-07 ASSESSMENT — LIFESTYLE VARIABLES
HOW OFTEN DO YOU HAVE A DRINK CONTAINING ALCOHOL: NEVER
HOW MANY STANDARD DRINKS CONTAINING ALCOHOL DO YOU HAVE ON A TYPICAL DAY: PATIENT DOES NOT DRINK

## 2024-11-07 ASSESSMENT — PAIN SCALES - GENERAL: PAINLEVEL_OUTOF10: 4

## 2024-11-07 NOTE — CONSULTS
GENERAL SURGERY  CONSULT NOTE  11/7/2024        HPI  Caitlyn Crandall is a 54 y.o. female who presents for evaluation of persistent abdominal pain with associated emesis and diarrhea.  Patient was evaluated a couple days ago in the urgent clear secondary to hematuria.  Patient states that prior to the hematuria patient has had severe vomiting at work.  Also endorses bilateral rib tenderness, as well as right lower quadrant tenderness that is usually exasperated by her persistent and chronic back tenderness.  Patient does complain of intermittent epigastric tenderness that has made her nauseated in the past.  Does have a history of a Kieran-en-Y gastric bypass done in 2018 with an EGD performed in 2019 and 2021 both showing a marginal ulcer and both requiring dilatation of the gastrojejunal anastomosis.  She states that she is able to tolerate food, but has this diffuse migratory abdominal tenderness.      Past Medical History:   Diagnosis Date    Bilateral low back pain without sciatica 6/17/2022    Bipolar 1 disorder (HCC)     Depression     Obesity     Osteoarthritis     bilateral knees    Thiamine deficiency     Vitamin D deficiency        Past Surgical History:   Procedure Laterality Date    CHOLECYSTECTOMY      ENDOSCOPY, COLON, DIAGNOSTIC      GALLBLADDER SURGERY  1994    JOINT REPLACEMENT Right 06/03/2019    [place] LAPS GSTR RSTCV PX W/BYP KIERAN-EN-Y LIMB <150 CM N/A 04/30/2018    GASTRIC BYPASS KIERAN-EN-Y LAPAROSCOPIC performed by Jamshid Balderas MD at [facility] OR    TOTAL KNEE ARTHROPLASTY Right 06/03/2019    RIGHT KNEE TOTAL KNEE ARTHROPLASTY (SMITH & NEPHEW VISIONAIRE) performed by Shen Cuadra DO at [facility] OR    TOTAL KNEE ARTHROPLASTY Left 06/24/2020    LEFT KNEE TOTAL ARTHROPLASTY (SMITH & NEPHEW) performed by Shen Cuadra DO at [facility] OR    UPPER GASTROINTESTINAL ENDOSCOPY N/A 10/22/2018    EGD BIOPSY performed by Philipp Alexander MD at [facility] ENDOSCOPY    UPPER GASTROINTESTINAL ENDOSCOPY N/A 02/21/2019     renal cyst. GI: Gastric bypass.  No evidence of bowel obstruction.  Appendix not identified however there is no significant pericecal inflammation.  Partially fluid-filled colon.  No pneumoperitoneum. LYMPH NODES: No significant lymphadenopathy. VESSELS: Abdominal aorta is normal in caliber. PELVIS: Grossly unremarkable. BONES: No aggressive osseous lesion. ADDITIONAL FINDINGS: None.     Mild caliceal fullness on the left.  No obstructive calculus identified. Correlate for any concern of recently passed calculus or ascending urinary tract infection. Partially fluid-filled colon which can be seen with diarrheal state.  Gastric bypass.  No bowel obstruction.         NOTE: This report, in part or full, may have been transcribed using voice recognition software. Every effort was made to ensure accuracy; however, inadvertent computerized transcription errors may be present. Please excuse any transcriptional grammatical or spelling errors that may have escaped my editorial review.    Physician Signature: Electronically signed by Dr. Balderas  [phone] (p)  11/8/2024  10:41 AM

## 2024-11-07 NOTE — H&P
Dictate [id number]    Caitlyn was counseled on smoking cessation. Caitlyn smokes approximately 10 cigarettes per day x 2 years. We have had extensive discussion regarding the need to quit smoking, the negative health implications of smoking overall, as well as the impact on Caitlyn's comorbid conditions. Caitlyn does  voice a willingness to quit smoking and we have not set a quit date.  We have discussed potential methods for smoking cessation including nicotine replacement via patch, gum or lozenge, behavioral and lifestyle modifications, and follow-up with primary care provider for consideration of medications for smoking cessation as well.  We have also discussed additional resources such as CDC site for additional information, quitassist.com, and Quitline [place].  We have discussed the arrangement of follow-up with primary care provider to discuss progress as well as the potential institution of medications if required/desired.  The amount of time spent counseling the patient directly regarding smoking cessation is greater than 10 minutes.

## 2024-11-07 NOTE — ED PROVIDER NOTES
HPI   54-year-old female patient presenting to emergency department for evaluation of abdominal pain, flank pain.  Ongoing for the last 2 days, worsening.  She was seen in urgent care diagnosed with a urinary tract infection has been on cefdinir for the last couple of days with no improvement. she states that she has been having runny diarrhea since yesterday.  She describes the abdominal pain as migratory, sometimes in the right lower quadrant sometimes in the epigastrium and sometimes in the left upper quadrant.  She denies any numbness or weakness anywhere no ripping or tearing sensation.  She does have a history of gastric bypass in 2018  Review of Systems   Constitutional:  Negative for chills and fever.   HENT:  Negative for congestion.    Respiratory:  Negative for cough and shortness of breath.    Cardiovascular:  Negative for chest pain.   Gastrointestinal:  Positive for abdominal pain, diarrhea and nausea. Negative for vomiting.   Genitourinary:  Positive for flank pain. Negative for difficulty urinating, dysuria and hematuria.   Musculoskeletal:  Negative for back pain.   Skin:  Negative for color change.   All other systems reviewed and are negative.       Physical Exam  Vitals and nursing note reviewed.   Constitutional:       Appearance: Normal appearance.   HENT:      Head: Normocephalic and atraumatic.      Nose: Nose normal. No congestion.      Mouth/Throat:      Mouth: Mucous membranes are moist.      Pharynx: Oropharynx is clear.   Eyes:      Conjunctiva/sclera: Conjunctivae normal.      Pupils: Pupils are equal, round, and reactive to light.   Cardiovascular:      Rate and Rhythm: Normal rate and regular rhythm.      Pulses: Normal pulses.      Heart sounds: Normal heart sounds.   Pulmonary:      Effort: Pulmonary effort is normal. No respiratory distress.      Breath sounds: Normal breath sounds.   Abdominal:      General: Bowel sounds are normal. There is no distension.      Comments: Right

## 2024-11-08 ENCOUNTER — ANESTHESIA (OUTPATIENT)
Dept: ENDOSCOPY | Age: 54
End: 2024-11-08
Payer: MEDICARE

## 2024-11-08 PROBLEM — R10.9 INTRACTABLE ABDOMINAL PAIN: Status: ACTIVE | Noted: 2024-11-08

## 2024-11-08 LAB
25(OH)D3 SERPL-MCNC: 12.9 NG/ML (ref 30–100)
ALBUMIN SERPL-MCNC: 3.5 G/DL (ref 3.5–5.2)
ALP SERPL-CCNC: 75 U/L (ref 35–104)
ALT SERPL-CCNC: 8 U/L (ref 0–32)
ANION GAP SERPL CALCULATED.3IONS-SCNC: 11 MMOL/L (ref 7–16)
AST SERPL-CCNC: 13 U/L (ref 0–31)
BASOPHILS # BLD: 0.05 K/UL (ref 0–0.2)
BASOPHILS NFR BLD: 1 % (ref 0–2)
BILIRUB SERPL-MCNC: 0.3 MG/DL (ref 0–1.2)
BUN SERPL-MCNC: 6 MG/DL (ref 6–20)
CALCIUM SERPL-MCNC: 8.7 MG/DL (ref 8.6–10.2)
CHLORIDE SERPL-SCNC: 103 MMOL/L (ref 98–107)
CHOLEST SERPL-MCNC: 192 MG/DL
CO2 SERPL-SCNC: 22 MMOL/L (ref 22–29)
CREAT SERPL-MCNC: 0.7 MG/DL (ref 0.5–1)
EKG ATRIAL RATE: 53 BPM
EKG ATRIAL RATE: 56 BPM
EKG P AXIS: 58 DEGREES
EKG P AXIS: 72 DEGREES
EKG P-R INTERVAL: 162 MS
EKG P-R INTERVAL: 188 MS
EKG Q-T INTERVAL: 434 MS
EKG Q-T INTERVAL: 452 MS
EKG QRS DURATION: 86 MS
EKG QRS DURATION: 90 MS
EKG QTC CALCULATION (BAZETT): 418 MS
EKG QTC CALCULATION (BAZETT): 424 MS
EKG R AXIS: 45 DEGREES
EKG R AXIS: 71 DEGREES
EKG T AXIS: 55 DEGREES
EKG T AXIS: 67 DEGREES
EKG VENTRICULAR RATE: 53 BPM
EKG VENTRICULAR RATE: 56 BPM
EOSINOPHIL # BLD: 0.01 K/UL (ref 0.05–0.5)
EOSINOPHILS RELATIVE PERCENT: 0 % (ref 0–6)
ERYTHROCYTE [DISTWIDTH] IN BLOOD BY AUTOMATED COUNT: 12.4 % (ref 11.5–15)
FOLATE SERPL-MCNC: 12.7 NG/ML (ref 4.8–24.2)
GFR, ESTIMATED: >90 ML/MIN/1.73M2
GLUCOSE SERPL-MCNC: 106 MG/DL (ref 74–99)
HCT VFR BLD AUTO: 37.9 % (ref 34–48)
HDLC SERPL-MCNC: 45 MG/DL
HGB BLD-MCNC: 12.5 G/DL (ref 11.5–15.5)
IMM GRANULOCYTES # BLD AUTO: <0.03 K/UL (ref 0–0.58)
IMM GRANULOCYTES NFR BLD: 0 % (ref 0–5)
IRON SATN MFR SERPL: 26 % (ref 15–50)
IRON SERPL-MCNC: 72 UG/DL (ref 37–145)
LDLC SERPL CALC-MCNC: 136 MG/DL
LYMPHOCYTES NFR BLD: 0.86 K/UL (ref 1.5–4)
LYMPHOCYTES RELATIVE PERCENT: 18 % (ref 20–42)
MCH RBC QN AUTO: 28 PG (ref 26–35)
MCHC RBC AUTO-ENTMCNC: 33 G/DL (ref 32–34.5)
MCV RBC AUTO: 84.8 FL (ref 80–99.9)
MICROORGANISM SPEC CULT: NO GROWTH
MONOCYTES NFR BLD: 0.28 K/UL (ref 0.1–0.95)
MONOCYTES NFR BLD: 6 % (ref 2–12)
NEUTROPHILS NFR BLD: 75 % (ref 43–80)
NEUTS SEG NFR BLD: 3.58 K/UL (ref 1.8–7.3)
PLATELET # BLD AUTO: 261 K/UL (ref 130–450)
PMV BLD AUTO: 10.6 FL (ref 7–12)
POTASSIUM SERPL-SCNC: 3.8 MMOL/L (ref 3.5–5)
PROT SERPL-MCNC: 6.3 G/DL (ref 6.4–8.3)
RBC # BLD AUTO: 4.47 M/UL (ref 3.5–5.5)
SERVICE CMNT-IMP: NORMAL
SODIUM SERPL-SCNC: 136 MMOL/L (ref 132–146)
SPECIMEN DESCRIPTION: NORMAL
T4 FREE SERPL-MCNC: 1.4 NG/DL (ref 0.9–1.7)
TIBC SERPL-MCNC: 275 UG/DL (ref 250–450)
TRIGL SERPL-MCNC: 55 MG/DL
TROPONIN I SERPL HS-MCNC: 10 NG/L (ref 0–9)
TSH SERPL DL<=0.05 MIU/L-ACNC: 0.45 UIU/ML (ref 0.27–4.2)
VIT B12 SERPL-MCNC: 284 PG/ML (ref 211–946)
VLDLC SERPL CALC-MCNC: 11 MG/DL
WBC OTHER # BLD: 4.8 K/UL (ref 4.5–11.5)

## 2024-11-08 PROCEDURE — 80061 LIPID PANEL: CPT

## 2024-11-08 PROCEDURE — 84443 ASSAY THYROID STIM HORMONE: CPT

## 2024-11-08 PROCEDURE — 7100000011 HC PHASE II RECOVERY - ADDTL 15 MIN: Performed by: SURGERY

## 2024-11-08 PROCEDURE — 2580000003 HC RX 258

## 2024-11-08 PROCEDURE — 6360000002 HC RX W HCPCS

## 2024-11-08 PROCEDURE — G0378 HOSPITAL OBSERVATION PER HR: HCPCS

## 2024-11-08 PROCEDURE — 82746 ASSAY OF FOLIC ACID SERUM: CPT

## 2024-11-08 PROCEDURE — 80053 COMPREHEN METABOLIC PANEL: CPT

## 2024-11-08 PROCEDURE — 93010 ELECTROCARDIOGRAM REPORT: CPT | Performed by: INTERNAL MEDICINE

## 2024-11-08 PROCEDURE — 96375 TX/PRO/DX INJ NEW DRUG ADDON: CPT

## 2024-11-08 PROCEDURE — 93005 ELECTROCARDIOGRAM TRACING: CPT

## 2024-11-08 PROCEDURE — 7100000010 HC PHASE II RECOVERY - FIRST 15 MIN: Performed by: SURGERY

## 2024-11-08 PROCEDURE — 6370000000 HC RX 637 (ALT 250 FOR IP): Performed by: SURGERY

## 2024-11-08 PROCEDURE — 96374 THER/PROPH/DIAG INJ IV PUSH: CPT

## 2024-11-08 PROCEDURE — 3700000000 HC ANESTHESIA ATTENDED CARE: Performed by: SURGERY

## 2024-11-08 PROCEDURE — 82306 VITAMIN D 25 HYDROXY: CPT

## 2024-11-08 PROCEDURE — 84484 ASSAY OF TROPONIN QUANT: CPT

## 2024-11-08 PROCEDURE — 2709999900 HC NON-CHARGEABLE SUPPLY: Performed by: SURGERY

## 2024-11-08 PROCEDURE — 36415 COLL VENOUS BLD VENIPUNCTURE: CPT

## 2024-11-08 PROCEDURE — 88305 TISSUE EXAM BY PATHOLOGIST: CPT

## 2024-11-08 PROCEDURE — 84439 ASSAY OF FREE THYROXINE: CPT

## 2024-11-08 PROCEDURE — 82607 VITAMIN B-12: CPT

## 2024-11-08 PROCEDURE — 6370000000 HC RX 637 (ALT 250 FOR IP)

## 2024-11-08 PROCEDURE — 83550 IRON BINDING TEST: CPT

## 2024-11-08 PROCEDURE — 2580000003 HC RX 258: Performed by: NURSE ANESTHETIST, CERTIFIED REGISTERED

## 2024-11-08 PROCEDURE — 3609012400 HC EGD TRANSORAL BIOPSY SINGLE/MULTIPLE: Performed by: SURGERY

## 2024-11-08 PROCEDURE — 85025 COMPLETE CBC W/AUTO DIFF WBC: CPT

## 2024-11-08 PROCEDURE — 6360000002 HC RX W HCPCS: Performed by: NURSE ANESTHETIST, CERTIFIED REGISTERED

## 2024-11-08 PROCEDURE — 3700000001 HC ADD 15 MINUTES (ANESTHESIA): Performed by: SURGERY

## 2024-11-08 PROCEDURE — 43239 EGD BIOPSY SINGLE/MULTIPLE: CPT | Performed by: SURGERY

## 2024-11-08 PROCEDURE — 83540 ASSAY OF IRON: CPT

## 2024-11-08 RX ORDER — SODIUM CHLORIDE 9 MG/ML
INJECTION, SOLUTION INTRAVENOUS
Status: DISCONTINUED | OUTPATIENT
Start: 2024-11-08 | End: 2024-11-08 | Stop reason: SDUPTHER

## 2024-11-08 RX ORDER — SUCRALFATE 1 G/1
0.5 TABLET ORAL EVERY 6 HOURS SCHEDULED
Status: DISCONTINUED | OUTPATIENT
Start: 2024-11-08 | End: 2024-11-09 | Stop reason: HOSPADM

## 2024-11-08 RX ORDER — PROPOFOL 10 MG/ML
INJECTION, EMULSION INTRAVENOUS
Status: DISCONTINUED | OUTPATIENT
Start: 2024-11-08 | End: 2024-11-08 | Stop reason: SDUPTHER

## 2024-11-08 RX ADMIN — SUCRALFATE 0.5 G: 1 TABLET ORAL at 23:06

## 2024-11-08 RX ADMIN — LITHIUM CARBONATE 300 MG: 300 CAPSULE, GELATIN COATED ORAL at 16:46

## 2024-11-08 RX ADMIN — WATER 1000 MG: 1 INJECTION INTRAMUSCULAR; INTRAVENOUS; SUBCUTANEOUS at 06:58

## 2024-11-08 RX ADMIN — SUCRALFATE 0.5 G: 1 TABLET ORAL at 16:47

## 2024-11-08 RX ADMIN — FENTANYL CITRATE 50 MCG: 0.05 INJECTION, SOLUTION INTRAMUSCULAR; INTRAVENOUS at 00:35

## 2024-11-08 RX ADMIN — PROCHLORPERAZINE EDISYLATE 10 MG: 5 INJECTION INTRAMUSCULAR; INTRAVENOUS at 02:08

## 2024-11-08 RX ADMIN — SODIUM CHLORIDE: 9 INJECTION, SOLUTION INTRAVENOUS at 12:04

## 2024-11-08 RX ADMIN — LITHIUM CARBONATE 300 MG: 300 CAPSULE, GELATIN COATED ORAL at 08:34

## 2024-11-08 RX ADMIN — PANTOPRAZOLE SODIUM 40 MG: 40 TABLET, DELAYED RELEASE ORAL at 08:30

## 2024-11-08 RX ADMIN — LURASIDONE HYDROCHLORIDE 80 MG: 20 TABLET, FILM COATED ORAL at 18:20

## 2024-11-08 RX ADMIN — PROPOFOL 150 MG: 10 INJECTION, EMULSION INTRAVENOUS at 12:14

## 2024-11-08 ASSESSMENT — PAIN DESCRIPTION - LOCATION
LOCATION: ABDOMEN

## 2024-11-08 ASSESSMENT — PAIN SCALES - WONG BAKER
WONGBAKER_NUMERICALRESPONSE: NO HURT
WONGBAKER_NUMERICALRESPONSE: NO HURT

## 2024-11-08 ASSESSMENT — PAIN SCALES - GENERAL
PAINLEVEL_OUTOF10: 5
PAINLEVEL_OUTOF10: 1
PAINLEVEL_OUTOF10: 7
PAINLEVEL_OUTOF10: 1
PAINLEVEL_OUTOF10: 0

## 2024-11-08 ASSESSMENT — LIFESTYLE VARIABLES: SMOKING_STATUS: 1

## 2024-11-08 ASSESSMENT — PAIN - FUNCTIONAL ASSESSMENT: PAIN_FUNCTIONAL_ASSESSMENT: ACTIVITIES ARE NOT PREVENTED

## 2024-11-08 ASSESSMENT — PAIN DESCRIPTION - ORIENTATION: ORIENTATION: UPPER

## 2024-11-08 NOTE — H&P
[address] [address]                           HISTORY & PHYSICAL      PATIENT NAME: KIRTI JORGE            : 1970  MED REC NO: [id number]                        ROOM: [id number]  ACCOUNT NO: [id number]                       ADMIT DATE: 2024  PROVIDER: Cisco Liu DO      REFERRING PHYSICIAN:  ALEXANDER SANTORO    CHIEF COMPLAINT AND HISTORY OF CHIEF COMPLAINT:  This is a 54-year-old white female who was admitted to [facility].  The patient presented to the hospital here from [facility].  The patient presented there with abdominal pain with associated emesis and diarrhea.  The patient states that it has been going on for several days.  The patient was seen and evaluated at the Urgent Care Center on 2024.  She was diagnosed having UTI and placed on Omnicef.  The patient at this time did not feel any better.  She had increased amount of pain and discomfort at which time she presented to [facility], was seen and evaluated.  She was transferred to our facility here because of persistent abdominal pain.  From a gastrointestinal standpoint of view, the patient does have a history of Georgette-en-Y gastric bypass in 2018 and EGD performed in  and  showing marginal ulcer, both requiring dilatation of the gastrojejunal anastomosis.  The patient is having some abdominal pain and states that most of this is confined to the left and right lower side.  The patient states the pain also got worse with a new type of job which she has been doing.  From a cardiac standpoint view, she currently denies any chest pain.  She denies any resting or exertional dyspnea, orthopnea, paroxysmal nocturnal dyspnea, intermittent claudication, or pretibial edema.  Respiratory wise, denies any productive cough, TB, or asthma.  Gastrointestinal wise, the patient does have a history of gastric

## 2024-11-08 NOTE — ANESTHESIA PRE PROCEDURE
07:45 AM         Anesthesia Evaluation  Patient summary reviewed  Airway: Mallampati: II  TM distance: >3 FB   Neck ROM: full  Mouth opening: > = 3 FB   Dental:      Comment: Dentition intact, patient denies any loose teeth.    Pulmonary:Negative Pulmonary ROS breath sounds clear to auscultation  (+)           current smoker ( 0.25 - 0.5 PPD x 8 months)                           Cardiovascular:Negative CV ROS  Exercise tolerance: good (>4 METS)        ECG reviewed  Rhythm: regular  Rate: normal                    Neuro/Psych:   (+) psychiatric history:depression/anxiety             GI/Hepatic/Renal: Neg GI/Hepatic/Renal ROS           ROS comment: H/O Ulcer disease, currently having dysphagia,  N/V, and abdominal pain following meals..   Endo/Other: Negative Endo/Other ROS                    Abdominal:             Vascular:          Other Findings:             Anesthesia Plan      MAC     ASA 3       Induction: intravenous.      Anesthetic plan and risks discussed with patient.      Plan discussed with CRNA.                    MICH Miner - GLADIS   11/8/2024

## 2024-11-08 NOTE — OP NOTE
[facility]           Operative Report    DATE OF PROCEDURE: 11/8/2024    Caitlyn Crandall    SURGEON: Jamshid Balderas MD.     ASSISTANT: none    PREOPERATIVE DIAGNOSES:  epigastric pain    POSTOPERATIVE DIAGNOSES:   (1) 3cm sliding Hiatal Hernia  (2) Mild grade A Esophagitis  (3) Mild Gastritis  (4) 200 cc gastric pouch  (5) large anterior marginal ulcer  (6) 2 cm anastomosis    OPERATION: Fadnusus-vfueoq-[id number] with gastric biopsies    ANESTHESIA: LMAC    EBL: None    COMPLICATIONS: None.     History and consent:  This is a 54 y.o. year old female who is having epigastric pain with a history of RNYGB.  I have discussed with the patient the indication, alternatives, and the possible risks and/or complications of upper endoscopy and the conscious sedation anesthesia. The patient and/or family understands and agrees to proceed.    Monitoring and Safety:    The patient was placed on a cardiac monitor and vital signs, pulse oximetry and level of consciousness were continuously evaluated throughout the procedure. The patient was closely monitored until recovery from the medications was complete and the patient had returned to baseline status. Anesthesia was present at all times during the procedure.    OPERATIONS: The patient was placed on the table and sedated while blood pressure, pulse and pulse oximetry were continuously monitored by the anesthesia team. A bite block was placed prior to sedation and the patient was placed in the left lateral decubitus position. A lubricated scope was easily passed into the upper esophagus which looked normal. The distal esophagus looked abnormal grade A esophagitis. The scope was passed into the stomach and retroflexed. Hill grade III.  The gastroesophageal junction was at 40cm. There was 3cm sliding hiatal hernia. The scope was passed down toward the gastrojejunal anastomosis. The gastric mucosa looked abnormal: gastritis. Biopsy

## 2024-11-08 NOTE — PLAN OF CARE
Problem: ABCDS Injury Assessment  Goal: Absence of physical injury  11/8/2024 0902 by Genie Amaral, RN  Outcome: Progressing     Problem: Skin/Tissue Integrity - Adult  Goal: Skin integrity remains intact  Outcome: Progressing     Problem: Gastrointestinal - Adult  Goal: Minimal or absence of nausea and vomiting  Outcome: Progressing     Problem: Gastrointestinal - Adult  Goal: Maintains or returns to baseline bowel function  Outcome: Progressing     Problem: Gastrointestinal - Adult  Goal: Maintains adequate nutritional intake  Outcome: Progressing

## 2024-11-08 NOTE — ANESTHESIA POSTPROCEDURE EVALUATION
Department of Anesthesiology  Postprocedure Note    Patient: Caitlyn Crandall  MRN: [id number]  YOB: 1970  Date of evaluation: 11/8/2024    Procedure Summary       Date: 11/08/24 Room / Location: [place] [address] / [facility]    Anesthesia Start: 1204 Anesthesia Stop: 1216    Procedure: ESOPHAGOGASTRODUODENOSCOPY BIOPSY Diagnosis:       Abdominal pain, unspecified abdominal location      (Abdominal pain, unspecified abdominal location [R10.9])    Surgeons: Jamshid Balderas MD Responsible Provider: Collins Gonsalez MD    Anesthesia Type: MAC ASA Status: 3            Anesthesia Type: No value filed.    Luis Phase I:      Luis Phase II: Luis Score: 10    Anesthesia Post Evaluation    Patient location during evaluation: bedside  Patient participation: complete - patient participated  Level of consciousness: awake  Pain score: 0  Airway patency: patent  Nausea & Vomiting: no vomiting and no nausea  Cardiovascular status: hemodynamically stable  Respiratory status: acceptable  Hydration status: stable  Pain management: adequate        No notable events documented.

## 2024-11-08 NOTE — INTERVAL H&P NOTE
Update History & Physical    The patient's History and Physical of November 7, It was reviewed with the patient and I examined the patient. There was no change. The surgical site was confirmed by the patient and me. Proceed to EGD poss dilation    Plan: The risks, benefits, expected outcome, and alternative to the recommended procedure have been discussed with the patient. Patient understands and wants to proceed with the procedure.     Electronically signed by Jamshid Balderas MD on 11/8/2024 at 12:10 PM

## 2024-11-08 NOTE — CARE COORDINATION
11-8-Cm note: pt back from EGD, she is in alot of pain and is not answering questions. Cm will follow for dc needs. Electronically signed by Kajal Bennett RN on 11/8/2024 at 4:40 PM

## 2024-11-09 VITALS
RESPIRATION RATE: 16 BRPM | SYSTOLIC BLOOD PRESSURE: 115 MMHG | OXYGEN SATURATION: 98 % | TEMPERATURE: 98.2 F | DIASTOLIC BLOOD PRESSURE: 57 MMHG | HEART RATE: 56 BPM

## 2024-11-09 LAB
ALBUMIN SERPL-MCNC: 3.1 G/DL (ref 3.5–5.2)
ALP SERPL-CCNC: 57 U/L (ref 35–104)
ALT SERPL-CCNC: 7 U/L (ref 0–32)
ANION GAP SERPL CALCULATED.3IONS-SCNC: 9 MMOL/L (ref 7–16)
AST SERPL-CCNC: 13 U/L (ref 0–31)
BASOPHILS # BLD: 0.04 K/UL (ref 0–0.2)
BASOPHILS NFR BLD: 1 % (ref 0–2)
BILIRUB SERPL-MCNC: 0.3 MG/DL (ref 0–1.2)
BUN SERPL-MCNC: 6 MG/DL (ref 6–20)
CALCIUM SERPL-MCNC: 8.3 MG/DL (ref 8.6–10.2)
CHLORIDE SERPL-SCNC: 106 MMOL/L (ref 98–107)
CO2 SERPL-SCNC: 24 MMOL/L (ref 22–29)
CREAT SERPL-MCNC: 0.7 MG/DL (ref 0.5–1)
EOSINOPHIL # BLD: 0.09 K/UL (ref 0.05–0.5)
EOSINOPHILS RELATIVE PERCENT: 2 % (ref 0–6)
ERYTHROCYTE [DISTWIDTH] IN BLOOD BY AUTOMATED COUNT: 12.7 % (ref 11.5–15)
GFR, ESTIMATED: >90 ML/MIN/1.73M2
GLUCOSE SERPL-MCNC: 87 MG/DL (ref 74–99)
HCT VFR BLD AUTO: 34 % (ref 34–48)
HGB BLD-MCNC: 10.9 G/DL (ref 11.5–15.5)
IMM GRANULOCYTES # BLD AUTO: <0.03 K/UL (ref 0–0.58)
IMM GRANULOCYTES NFR BLD: 0 % (ref 0–5)
LYMPHOCYTES NFR BLD: 1.61 K/UL (ref 1.5–4)
LYMPHOCYTES RELATIVE PERCENT: 37 % (ref 20–42)
MCH RBC QN AUTO: 27.7 PG (ref 26–35)
MCHC RBC AUTO-ENTMCNC: 32.1 G/DL (ref 32–34.5)
MCV RBC AUTO: 86.5 FL (ref 80–99.9)
MONOCYTES NFR BLD: 0.39 K/UL (ref 0.1–0.95)
MONOCYTES NFR BLD: 9 % (ref 2–12)
NEUTROPHILS NFR BLD: 52 % (ref 43–80)
NEUTS SEG NFR BLD: 2.27 K/UL (ref 1.8–7.3)
PLATELET # BLD AUTO: 137 K/UL (ref 130–450)
PMV BLD AUTO: 12.8 FL (ref 7–12)
POTASSIUM SERPL-SCNC: 3.6 MMOL/L (ref 3.5–5)
PROT SERPL-MCNC: 5.3 G/DL (ref 6.4–8.3)
RBC # BLD AUTO: 3.93 M/UL (ref 3.5–5.5)
SODIUM SERPL-SCNC: 139 MMOL/L (ref 132–146)
WBC OTHER # BLD: 4.4 K/UL (ref 4.5–11.5)

## 2024-11-09 PROCEDURE — G0378 HOSPITAL OBSERVATION PER HR: HCPCS

## 2024-11-09 PROCEDURE — 80053 COMPREHEN METABOLIC PANEL: CPT

## 2024-11-09 PROCEDURE — 85025 COMPLETE CBC W/AUTO DIFF WBC: CPT

## 2024-11-09 PROCEDURE — 2580000003 HC RX 258: Performed by: SURGERY

## 2024-11-09 PROCEDURE — 36415 COLL VENOUS BLD VENIPUNCTURE: CPT

## 2024-11-09 PROCEDURE — 96376 TX/PRO/DX INJ SAME DRUG ADON: CPT

## 2024-11-09 PROCEDURE — 96372 THER/PROPH/DIAG INJ SC/IM: CPT

## 2024-11-09 PROCEDURE — 6370000000 HC RX 637 (ALT 250 FOR IP): Performed by: SURGERY

## 2024-11-09 PROCEDURE — 99232 SBSQ HOSP IP/OBS MODERATE 35: CPT | Performed by: SURGERY

## 2024-11-09 PROCEDURE — 6360000002 HC RX W HCPCS: Performed by: SURGERY

## 2024-11-09 RX ORDER — PANTOPRAZOLE SODIUM 40 MG/1
40 TABLET, DELAYED RELEASE ORAL
Qty: 60 TABLET | Refills: 0 | Status: SHIPPED | OUTPATIENT
Start: 2024-11-09

## 2024-11-09 RX ADMIN — ENOXAPARIN SODIUM 40 MG: 100 INJECTION SUBCUTANEOUS at 08:47

## 2024-11-09 RX ADMIN — SUCRALFATE 0.5 G: 1 TABLET ORAL at 05:55

## 2024-11-09 RX ADMIN — WATER 1000 MG: 1 INJECTION INTRAMUSCULAR; INTRAVENOUS; SUBCUTANEOUS at 06:58

## 2024-11-09 RX ADMIN — LITHIUM CARBONATE 300 MG: 300 CAPSULE, GELATIN COATED ORAL at 08:48

## 2024-11-09 RX ADMIN — PANTOPRAZOLE SODIUM 40 MG: 40 TABLET, DELAYED RELEASE ORAL at 05:55

## 2024-11-09 NOTE — DISCHARGE INSTRUCTIONS
Your information:  Name: Caitlyn Crandall  : 1970    Your instructions:  Discharge Home    What to do after you leave the hospital:    Recommended diet: Bariatric Regular Texture     Recommended activity: activity as tolerated    The following personal items were collected during your admission and were returned to you:    Belongings  Dental Appliances: None  Vision - Corrective Lenses: Eyeglasses  Hearing Aid: None  Clothing: Pants, Shirt  Jewelry: None  Body Piercings Removed: N/A  Electronic Devices: Cell Phone  Weapons (Notify Protective Services/Security): None  Other Valuables: Purse  Home Medications: None  Valuables Given To: Security  Provide Name(s) of Who Valuable(s) Were Given To: [place]  Responsible person(s) in the waiting room: na  Patient approves for provider to speak to responsible person post operatively: Yes    Information obtained by:  By signing below, I understand that if any problems occur once I leave the hospital I am to contact PCP.  I understand and acknowledge receipt of the instructions indicated above.   When should you call for help?   Call 911  anytime you think you may need emergency care. For example, call if:    You have symptoms of a heart attack. These may include:  Chest pain or pressure, or a strange feeling in the chest.  Sweating.  Shortness of breath.  Nausea or vomiting.  Pain, pressure, or a strange feeling in the back, neck, jaw, or upper belly or in one or both shoulders or arms.  Lightheadedness or sudden weakness.  A fast or irregular heartbeat.     You have sudden, severe, continuous belly pain or vomiting.     You vomit bright red blood or what looks like coffee grounds.     You pass maroon or very bloody stools.   Call your doctor now or seek immediate medical care if:    You have new or worse belly pain.     Your stools are black and look like tar, or they have streaks of blood.     You vomit.   Watch closely for changes in your health, and be sure

## 2024-11-09 NOTE — PLAN OF CARE
Problem: Discharge Planning  Goal: Discharge to home or other facility with appropriate resources  11/8/2024 2129 by Jahaira Brown, RN  Outcome: Progressing     Problem: Skin/Tissue Integrity - Adult  Goal: Skin integrity remains intact  11/8/2024 2129 by Jahaira Brown RN  Outcome: Progressing     Problem: Gastrointestinal - Adult  Goal: Minimal or absence of nausea and vomiting  11/8/2024 2129 by Jahaira Brown, RN  Outcome: Progressing     Problem: Pain  Goal: Verbalizes/displays adequate comfort level or baseline comfort level  Outcome: Progressing

## 2024-11-09 NOTE — CARE COORDINATION
Case Management Assessment  Initial Evaluation    Date/Time of Evaluation: 11/9/2024 10:01 AM  Assessment Completed by: GIO Rollins    If patient is discharged prior to next notation, then this note serves as note for discharge by case management.    Patient Name: Caitlyn Crandall                   YOB: 1970  Diagnosis: Pyelonephritis [N12]                   Date / Time: 11/7/2024 11:55 AM    Patient Admission Status: Observation   Readmission Risk (Low < 19, Mod (19-27), High > 27): No data recorded  Current PCP: Fortunato Ladd, DO  PCP verified by CM? Yes    Chart Reviewed: Yes      History Provided by: Patient  Patient Orientation: Alert and Oriented, Person, Place, Situation    Patient Cognition: Alert    Hospitalization in the last 30 days (Readmission):    If yes, Readmission Assessment in  Navigator will be completed.    Advance Directives:      Code Status: Full Code   Patient's Primary Decision Maker is: Legal Next of Kin    Supplemental (Other) Decision Maker: Swetha Crandall - [facility] - [phone]    Discharge Planning:    Patient lives with:   alone Type of Home:    Primary Care Giver: Self  Patient Support Systems include: Children, Family Members  dtr and brother    Current Financial resources:    Current community resources:    Current services prior to admission:              Current DME:              Type of Home Care services:   hx of [facility] is 2018 after knee replacement.     ADLS  Prior functional level: Independent in ADLs/IADLs  Current functional level: Independent in ADLs/IADLs    PT AM-PAC:   /24  OT AM-PAC:   /24    Family can provide assistance at DC: No  Would you like Case Management to discuss the discharge plan with any other family members/significant others, and if so, who? No  Plans to Return to Present Housing: Yes  Other Identified Issues/Barriers to RETURNING to current housing:   Potential Assistance needed at discharge:              Potential DME:

## 2024-11-09 NOTE — PROGRESS NOTES
Caitlyn Crandall was reexamined preoperatively today, 11/8/24.  She is resting comfortably at the bedside.  There is no substantial change aside from some mild improvement in her symptoms.  She is fit for the proposed surgical procedure. \    Caitlyn Crandall has no further questions regarding the risks, benefits, nature and alternatives of surgery and wishes to proceed.    Electronically signed by Juan Alberto Hameed DO on 11/8/2024 at 7:29 AM     
GENERAL SURGERY  DAILY PROGRESS NOTE  11/9/2024    CC: Abdominal pain      Subjective:  Status post EGD yesterday.  Patient was seen to have esophagitis as well as a large anterior marginal ulcer.  Patient stating that she feels significantly improved from yesterday.  Patient tolerating her diet without any difficulties.    Objective:  BP (!) 115/57   Pulse 56   Temp 98.2 °F (36.8 °C) (Oral)   Resp 16   SpO2 98%     GENERAL:  Laying in bed, awake, alert, cooperative, no apparent distress  LUNGS:  No increased work of breathing  CARDIOVASCULAR:  RR  ABDOMEN:  Soft, very minimal tenderness on exam, nondistended  EXTREMITIES: No edema or swelling  SKIN: Warm and dry    Assessment/Plan:  54 y.o. female abdominal pain, EGD showing esophagitis as well as a large anterior marginal ulcer    Okay to continue diet  Continue PPI and Carafate outpatient  Will need to follow-up for biopsy results  Discussed about smoking cessation  Okay for discharge from a surgical point of view    Electronically signed by Juan Alberto Hameed DO on 11/9/2024 at 9:45 AM     General Surgery Progress Note  [facility]    Patient's Name/Date of Birth: Caitlyn Crandall / 1970    Date: November 9, 2024     Surgeon: MD Dontae    Chief Complaint: abdominal pain    Patient Active Problem List   Diagnosis    Bilateral primary osteoarthritis of knee    Bipolar 1 disorder (HCC)    S/P bariatric surgery    Vitamin D deficiency    Thiamine deficiency    Primary localized osteoarthritis of right knee    Primary osteoarthritis of right knee    Obesity    Depression    Status post total right knee replacement    Primary osteoarthritis of left knee    Primary osteoarthritis of one knee, left    S/P total knee arthroplasty, left    Abdominal pain    Personal history of tobacco use, presenting hazards to health    Other hyperlipidemia    Bilateral low back pain without sciatica    Pyelonephritis    Intractable abdominal pain 
SBAR form completed. Placed on chart. Chart with patient in transit to PACU. Nurse to Nurse report given.  
    Intake/Output Summary (Last 24 hours) at 11/8/2024 1433  Last data filed at 11/8/2024 1253  Gross per 24 hour   Intake 200 ml   Output --   Net 200 ml   No intake/output data recorded.  No data found.  Vital Signs:   Blood pressure (!) 160/71, pulse 52, temperature 97.4 °F (36.3 °C), temperature source Temporal, resp. rate 20, SpO2 98%, not currently breastfeeding.    General appearance:  Alert, responsive, oriented to person, place, and time. Well preserved, alert, no distress.  Head:  Normocephalic. No masses, lesions or tenderness.  Eyes:  PERRLA.  EOMI.  Sclera clear.  Buccal mucosa moist.  ENT:  Ears normal. Mucosa normal.  Neck:    Supple. Trachea midline. No thyromegaly. No JVD. No bruits.  Heart:    Rhythm regular. Rate controlled.  No murmurs.  Lungs:    Symmetrical. Clear to auscultation bilaterally.  No wheezes. No rhonchi. No rales.  Abdomen:   Complains of generalized abdominal pain  Extremities:    Peripheral pulses present.  No peripheral edema.  No ulcers. No cyanosis. No clubbing.  Neurologic:    Alert x 3.  No focal deficit.  Cranial nerves grossly intact. No focal weakness.  Psych:   Behavior is normal. Mood appears normal. Speech is not rapid and/or pressured.  Musculoskeletal:   Spine ROM normal. Muscular strength intact. Gait not assessed.  Integumentary:  No rashes  Skin normal color and texture.  Genitalia/Breast:  Deferred    Medication:  Scheduled Meds:   sucralfate  0.5 g Oral 4 times per day    pantoprazole  40 mg Oral QAM AC    enoxaparin  40 mg SubCUTAneous Daily    lurasidone  80 mg Oral Dinner    lithium  300 mg Oral BID WC    cefTRIAXone (ROCEPHIN) IV  1,000 mg IntraVENous Q24H     Continuous Infusions:   sodium chloride 75 mL/hr at 11/07/24 1330       Objective Data:  Recent Labs     11/07/24  0415 11/08/24  0454   WBC 5.5 4.8   RBC 4.53 4.47   HGB 12.3 12.5   HCT 37.9 37.9   MCV 83.7 84.8   MCH 27.2 28.0   MCHC 32.5 33.0   RDW 12.8 12.4    261   MPV 12.5* 10.6     Recent 
no

## 2024-11-09 NOTE — DISCHARGE SUMMARY
Internal Medicine Progress Note     [facility]     Cisco Liu D.O., PERLA.                         Enrique Yoon D.O., CRISTAL Ruiz D.O.     Caren Peña, MSN, APRN, NP-C  Kaleb Ramos, MSN, APRN-CNP  Bautista Venegas, MSN, APRN, NP-C  Anahi Wen, MSN, APRN-CNP  Kell Bruner, MSN, APRN, NP-C       Internal Medicine  Discharge Summary    NAME: Caitlyn Crandall  :  1970  MRN:  [id number]  PCP:Fortunato Ladd DO  ADMITTED: 2024      DISCHARGED: 24    ADMITTING PHYSICIAN: Cisco Liu DO    CONSULTANT(S):   IP CONSULT TO GENERAL SURGERY     ADMITTING DIAGNOSIS:   Pyelonephritis [N12]     DISCHARGE DIAGNOSES:   Intractable abdominal pain secondary to mild esophagitis, mild gastritis, and large anterior marginal ulcer  Morbid obesity with elevated BMI of 36.39 with status post Georgette-en-Y procedure on 2018.  Acute-on-chronic low back pain with mild spinal stenosis and stable disc protrusion L5/S1  History of nicotine use.  Bipolar 1 disorder.  Gastroesophageal reflux disease  Vitamin D deficiency    BRIEF HISTORY OF PRESENT ILLNESS:   This is a 54-year-old white female who was admitted to [facility]. The patient presented to the hospital here from [facility]. The patient presented there with abdominal pain with associated emesis and diarrhea. The patient states that it has been going on for several days. The patient was seen and evaluated at the Urgent Care Center on 2024. She was diagnosed having UTI and placed on Omnicef. The patient at this time did not feel any better. She had increased amount of pain and discomfort at which time she presented to [facility], was seen and evaluated. She was transferred to our facility here because of persistent abdominal pain. From a gastrointestinal standpoint of view, the patient does have a history of Georgette-en-Y gastric bypass in 2018  the plan for discharge to home    DISCHARGE MEDICATIONS:   Current Discharge Medication List             Details   pantoprazole (PROTONIX) 40 MG tablet Take 1 tablet by mouth 2 times daily (before meals)  Qty: 60 tablet, Refills: 0                Details   ondansetron (ZOFRAN-ODT) 4 MG disintegrating tablet Take 1 tablet by mouth 3 times daily as needed for Nausea or Vomiting  Qty: 12 tablet, Refills: 0    Associated Diagnoses: Acute cystitis without hematuria; Nausea      sucralfate (CARAFATE) 1 GM tablet Take 1 tablet by mouth 4 times daily  Qty: 120 tablet, Refills: 3      lithium 300 MG capsule Take 1 capsule by mouth 2 times daily (with meals)      acetaminophen (TYLENOL) 500 MG tablet Take 2 tablets by mouth every 6 hours as needed for Pain      lurasidone (LATUDA) 80 MG TABS tablet Take 1 tablet by mouth Daily with supper             FOLLOW UP/INSTRUCTIONS:  This patient is instructed to follow-up with her primary care physician.  Patient is instructed to follow-up with the consults listed above as directed by them.  she is instructed to resume home medications and take new medications as indicated in the list above.  If the patient has a recurrence of symptoms, she is instructed to go to the ED.    Preparing for this patient's discharge, including paperwork, orders, instructions, and meeting with patient did require > 40 minutes.    Enrique Yoon DO   11/9/2024  5:53 AM

## 2024-11-11 ENCOUNTER — CARE COORDINATION (OUTPATIENT)
Dept: CARE COORDINATION | Age: 54
End: 2024-11-11

## 2024-11-11 NOTE — TELEPHONE ENCOUNTER
Attempted to reach patient to schedule v.v for tcm. V/m not set up unable to leave message at this time

## 2024-11-11 NOTE — CARE COORDINATION
Care Transitions Note    Initial Call - Call within 2 business days of discharge: Yes    Care Transition Nurse attempted initial CT outreach leaving HIPAA VM, purpose of call, my contact and reminder to schedule appts. Topic routed to schedule 1 wk PCP appt.       Patient: Caitlyn Crandall      Patient : 1970   MRN: [id number]      Reason for Admission: 2024 - 2024 [facility] Obs. Intractable abdominal pain secondary to mild esophagitis, mild gastritis, and large anterior marginal ulcer   Discharge Date: 24    RURS: No data recorded      Schedule an appointment with Fortunato Ladd DO (Family Medicine) in 1 week (2024)  Schedule an appointment with Jamshid Balderas MD (Bariatrics) in 2 weeks (2024)    Start taking:  pantoprazole (PROTONIX)  Change how you take:  sucralfate (Carafate)  Stop taking:  omeprazole 10 MG delayed release capsule (PRILOSEC)  Last Discharge Facility       Date Complaint Diagnosis Description Type Department Provider    24  Abdominal pain, unspecified abdominal location Admission (Discharged) [facility] SURG Cisco Liu DO    24 Abdominal Pain; Back Pain Pyelonephritis ED (TRANSFER) [facility] Zaki Reed DO          Follow Up Appointment:     Future Appointments         Provider Specialty Dept Phone    2025 11:15 AM Fortunato Ladd DO Family Medicine [phone]            Radha Osei RN

## 2024-11-12 ENCOUNTER — CARE COORDINATION (OUTPATIENT)
Dept: CARE COORDINATION | Age: 54
End: 2024-11-12

## 2024-11-12 LAB — SURGICAL PATHOLOGY REPORT: NORMAL

## 2024-11-12 NOTE — CARE COORDINATION
Care Transitions Note    Initial Call - Call within 2 business days of discharge: Yes    Care Transition Nurse attempted second initial CT outreach leaving HIPAA VM, purpose of call, my contact info and reminder to schedule appts. CTN s/o.     Patient: Caitlyn Crandall      Patient : 1970   MRN: [id number]      Reason for Admission: 2024 - 2024 [facility] Obs. Intractable abdominal pain secondary to mild esophagitis, mild gastritis, and large anterior marginal ulcer   Discharge Date: 24    RURS: No data recorded         Schedule an appointment with Fortunato Ladd DO (Family Medicine) in 1 week (2024)  Schedule an appointment with Jamshid Balderas MD (Bariatrics) in 2 weeks (2024)     Start taking:  pantoprazole (PROTONIX)  Change how you take:  sucralfate (Carafate)  Stop taking:  omeprazole 10 MG delayed release capsule (PRILOSEC)      Last Discharge Facility       Date Complaint Diagnosis Description Type Department Provider    24  Abdominal pain, unspecified abdominal location Admission (Discharged) [facility] SURG Cisco Liu DO    24 Abdominal Pain; Back Pain Pyelonephritis ED (TRANSFER) [facility] Zaki Reed DO          Follow Up Appointment:     Future Appointments         Provider Specialty Dept Phone    2025 11:15 AM Fortunato Ladd DO Family Medicine [phone]            Radha Osei RN

## 2024-11-12 NOTE — TELEPHONE ENCOUNTER
Called patient, attempted to reach her to schedule v/m not set up unable to l/m . Will attempted to reach patient again to schedule

## 2024-11-13 ENCOUNTER — PATIENT MESSAGE (OUTPATIENT)
Dept: FAMILY MEDICINE CLINIC | Age: 54
End: 2024-11-13

## 2024-11-13 NOTE — TELEPHONE ENCOUNTER
Appt link sent to pt via [employer].     Electronically signed by JAGDEEP ARAGON MA on 11/13/24 at 8:10 AM EST

## 2024-11-25 ENCOUNTER — HOSPITAL ENCOUNTER (EMERGENCY)
Age: 54
Discharge: HOME | End: 2024-11-26
Attending: EMERGENCY MEDICINE
Payer: MEDICARE

## 2024-11-25 ENCOUNTER — APPOINTMENT (OUTPATIENT)
Dept: CT IMAGING | Age: 54
End: 2024-11-25
Payer: MEDICARE

## 2024-11-25 VITALS
SYSTOLIC BLOOD PRESSURE: 151 MMHG | BODY MASS INDEX: 36.39 KG/M2 | OXYGEN SATURATION: 96 % | HEART RATE: 74 BPM | WEIGHT: 212 LBS | RESPIRATION RATE: 20 BRPM | TEMPERATURE: 97.9 F | DIASTOLIC BLOOD PRESSURE: 90 MMHG

## 2024-11-25 DIAGNOSIS — R10.13 ABDOMINAL PAIN, EPIGASTRIC: Primary | ICD-10-CM

## 2024-11-25 LAB
ALBUMIN SERPL-MCNC: 4.2 G/DL (ref 3.5–5.2)
ALP SERPL-CCNC: 76 U/L (ref 35–104)
ALT SERPL-CCNC: 8 U/L (ref 0–32)
ANION GAP SERPL CALCULATED.3IONS-SCNC: 14 MMOL/L (ref 7–16)
AST SERPL-CCNC: 14 U/L (ref 0–31)
BASOPHILS # BLD: 0.04 K/UL (ref 0–0.2)
BASOPHILS NFR BLD: 1 % (ref 0–2)
BILIRUB SERPL-MCNC: 0.3 MG/DL (ref 0–1.2)
BUN SERPL-MCNC: 14 MG/DL (ref 6–20)
CALCIUM SERPL-MCNC: 9.5 MG/DL (ref 8.6–10.2)
CHLORIDE SERPL-SCNC: 100 MMOL/L (ref 98–107)
CO2 SERPL-SCNC: 24 MMOL/L (ref 22–29)
CREAT SERPL-MCNC: 0.9 MG/DL (ref 0.5–1)
EOSINOPHIL # BLD: 0.02 K/UL (ref 0.05–0.5)
EOSINOPHILS RELATIVE PERCENT: 0 % (ref 0–6)
ERYTHROCYTE [DISTWIDTH] IN BLOOD BY AUTOMATED COUNT: 13.6 % (ref 11.5–15)
GFR, ESTIMATED: 79 ML/MIN/1.73M2
GLUCOSE SERPL-MCNC: 139 MG/DL (ref 74–99)
HCT VFR BLD AUTO: 39.7 % (ref 34–48)
HGB BLD-MCNC: 13 G/DL (ref 11.5–15.5)
IMM GRANULOCYTES # BLD AUTO: <0.03 K/UL (ref 0–0.58)
IMM GRANULOCYTES NFR BLD: 0 % (ref 0–5)
LACTATE BLDV-SCNC: 1.1 MMOL/L (ref 0.5–2.2)
LIPASE SERPL-CCNC: 12 U/L (ref 13–60)
LYMPHOCYTES NFR BLD: 0.92 K/UL (ref 1.5–4)
LYMPHOCYTES RELATIVE PERCENT: 13 % (ref 20–42)
MAGNESIUM SERPL-MCNC: 2.1 MG/DL (ref 1.6–2.6)
MCH RBC QN AUTO: 28 PG (ref 26–35)
MCHC RBC AUTO-ENTMCNC: 32.7 G/DL (ref 32–34.5)
MCV RBC AUTO: 85.4 FL (ref 80–99.9)
MONOCYTES NFR BLD: 0.24 K/UL (ref 0.1–0.95)
MONOCYTES NFR BLD: 3 % (ref 2–12)
NEUTROPHILS NFR BLD: 83 % (ref 43–80)
NEUTS SEG NFR BLD: 5.98 K/UL (ref 1.8–7.3)
PLATELET # BLD AUTO: 302 K/UL (ref 130–450)
PMV BLD AUTO: 11.1 FL (ref 7–12)
POTASSIUM SERPL-SCNC: 4.2 MMOL/L (ref 3.5–5)
PROT SERPL-MCNC: 7.7 G/DL (ref 6.4–8.3)
RBC # BLD AUTO: 4.65 M/UL (ref 3.5–5.5)
SODIUM SERPL-SCNC: 138 MMOL/L (ref 132–146)
TROPONIN I SERPL HS-MCNC: 7 NG/L (ref 0–9)
WBC OTHER # BLD: 7.2 K/UL (ref 4.5–11.5)

## 2024-11-25 PROCEDURE — 74177 CT ABD & PELVIS W/CONTRAST: CPT

## 2024-11-25 PROCEDURE — 6360000002 HC RX W HCPCS

## 2024-11-25 PROCEDURE — 85025 COMPLETE CBC W/AUTO DIFF WBC: CPT

## 2024-11-25 PROCEDURE — 96374 THER/PROPH/DIAG INJ IV PUSH: CPT

## 2024-11-25 PROCEDURE — 6360000004 HC RX CONTRAST MEDICATION: Performed by: RADIOLOGY

## 2024-11-25 PROCEDURE — 80053 COMPREHEN METABOLIC PANEL: CPT

## 2024-11-25 PROCEDURE — 83735 ASSAY OF MAGNESIUM: CPT

## 2024-11-25 PROCEDURE — 83605 ASSAY OF LACTIC ACID: CPT

## 2024-11-25 PROCEDURE — 83690 ASSAY OF LIPASE: CPT

## 2024-11-25 PROCEDURE — 84484 ASSAY OF TROPONIN QUANT: CPT

## 2024-11-25 PROCEDURE — 96375 TX/PRO/DX INJ NEW DRUG ADDON: CPT

## 2024-11-25 PROCEDURE — 99285 EMERGENCY DEPT VISIT HI MDM: CPT

## 2024-11-25 PROCEDURE — 93005 ELECTROCARDIOGRAM TRACING: CPT

## 2024-11-25 RX ORDER — MORPHINE SULFATE 4 MG/ML
4 INJECTION, SOLUTION INTRAMUSCULAR; INTRAVENOUS ONCE
Status: COMPLETED | OUTPATIENT
Start: 2024-11-25 | End: 2024-11-25

## 2024-11-25 RX ORDER — IOPAMIDOL 755 MG/ML
75 INJECTION, SOLUTION INTRAVASCULAR
Status: COMPLETED | OUTPATIENT
Start: 2024-11-25 | End: 2024-11-25

## 2024-11-25 RX ORDER — ONDANSETRON 2 MG/ML
4 INJECTION INTRAMUSCULAR; INTRAVENOUS ONCE
Status: COMPLETED | OUTPATIENT
Start: 2024-11-25 | End: 2024-11-25

## 2024-11-25 RX ADMIN — MORPHINE SULFATE 4 MG: 4 INJECTION, SOLUTION INTRAMUSCULAR; INTRAVENOUS at 17:30

## 2024-11-25 RX ADMIN — IOPAMIDOL 75 ML: 755 INJECTION, SOLUTION INTRAVENOUS at 18:27

## 2024-11-25 RX ADMIN — ONDANSETRON 4 MG: 2 INJECTION INTRAMUSCULAR; INTRAVENOUS at 17:31

## 2024-11-25 ASSESSMENT — PAIN SCALES - GENERAL
PAINLEVEL_OUTOF10: 5
PAINLEVEL_OUTOF10: 9

## 2024-11-25 ASSESSMENT — PAIN DESCRIPTION - LOCATION
LOCATION: ABDOMEN
LOCATION: ABDOMEN

## 2024-11-25 ASSESSMENT — PAIN - FUNCTIONAL ASSESSMENT: PAIN_FUNCTIONAL_ASSESSMENT: 0-10

## 2024-11-26 NOTE — ED PROVIDER NOTES
Disposition:  Patient's disposition: Discharge to home  Patient's condition is stable.      Attending was present and available throughout encounter including all critical portions; See Attending Note/Attestation for Final Plan

## 2024-11-27 LAB
EKG ATRIAL RATE: 55 BPM
EKG P AXIS: 63 DEGREES
EKG P-R INTERVAL: 178 MS
EKG Q-T INTERVAL: 428 MS
EKG QRS DURATION: 86 MS
EKG QTC CALCULATION (BAZETT): 409 MS
EKG R AXIS: 45 DEGREES
EKG T AXIS: 52 DEGREES
EKG VENTRICULAR RATE: 55 BPM

## 2024-11-27 PROCEDURE — 93010 ELECTROCARDIOGRAM REPORT: CPT | Performed by: INTERNAL MEDICINE

## 2025-03-07 ENCOUNTER — TELEPHONE (OUTPATIENT)
Dept: FAMILY MEDICINE CLINIC | Age: 55
End: 2025-03-07

## 2025-03-07 NOTE — TELEPHONE ENCOUNTER
Left detailed  msg that Dr Stewart isn't taking new patients but Dr Meza is and to call back if interested in scheduling.    ----- Message from Ma. R sent at 3/7/2025  9:13 AM EST -----  Regarding: E[facility] Appointment Request  E[facility] Appointment Request    Patient needs appointment for E[facility] Appointment Type: New to Provider.    Patient Requested Dates(s): Earliest available   Patient Requested Time: Earliest available   Provider Name: Bautista Stewart DO    Reason for Appointment Request: Established Patient - Available appointments did not meet patient need  --------------------------------------------------------------------------------------------------------------------------    Relationship to Patient: Self     Call Back Information: OK to leave message on voicemail  Preferred Call Back Number: Phone [phone] (home)

## 2025-07-16 LAB
CLINICAL INFORMATION: NORMAL
COMMENT: NORMAL
DATE OF PREVIOUS BIOPSY: NORMAL
DATE OF PREVIOUS PAP: NORMAL
HB: COMMENT: NORMAL
HB: CYTOTECHNOLT: NORMAL
HB: INTERPT: NORMAL
HB: SOURCE: NORMAL
HPV MRNA E6/E7: NOT DETECTED
LAST MENSTRUAL PERIOD: NORMAL
SPECIMEN ADEQUACY:: NORMAL

## 2025-07-21 ENCOUNTER — HOSPITAL ENCOUNTER (EMERGENCY)
Age: 55
Discharge: HOME | End: 2025-07-21
Payer: COMMERCIAL

## 2025-07-21 VITALS
SYSTOLIC BLOOD PRESSURE: 165 MMHG | WEIGHT: 200 LBS | DIASTOLIC BLOOD PRESSURE: 73 MMHG | BODY MASS INDEX: 34.33 KG/M2 | OXYGEN SATURATION: 100 % | HEART RATE: 73 BPM | TEMPERATURE: 98.1 F | RESPIRATION RATE: 18 BRPM

## 2025-07-21 DIAGNOSIS — N30.01 ACUTE CYSTITIS WITH HEMATURIA: Primary | ICD-10-CM

## 2025-07-21 LAB
BACTERIA URNS QL MICRO: ABNORMAL
BILIRUB UR QL STRIP: NEGATIVE
CLARITY UR: ABNORMAL
COLOR UR: YELLOW
GLUCOSE UR STRIP-MCNC: NEGATIVE MG/DL
HGB UR QL STRIP.AUTO: ABNORMAL
KETONES UR STRIP-MCNC: NEGATIVE MG/DL
LEUKOCYTE ESTERASE UR QL STRIP: ABNORMAL
NITRITE UR QL STRIP: NEGATIVE
PH UR STRIP: 5.5 [PH] (ref 5–8)
PROT UR STRIP-MCNC: NEGATIVE MG/DL
RBC #/AREA URNS HPF: ABNORMAL /HPF
SP GR UR STRIP: 1.01 (ref 1–1.03)
UROBILINOGEN UR STRIP-ACNC: 0.2 EU/DL (ref 0–1)
WBC #/AREA URNS HPF: ABNORMAL /HPF

## 2025-07-21 PROCEDURE — 87086 URINE CULTURE/COLONY COUNT: CPT

## 2025-07-21 PROCEDURE — 99211 OFF/OP EST MAY X REQ PHY/QHP: CPT

## 2025-07-21 PROCEDURE — 81001 URINALYSIS AUTO W/SCOPE: CPT

## 2025-07-21 RX ORDER — NITROFURANTOIN 25; 75 MG/1; MG/1
100 CAPSULE ORAL 2 TIMES DAILY
Qty: 14 CAPSULE | Refills: 0 | Status: SHIPPED | OUTPATIENT
Start: 2025-07-21 | End: 2025-07-28

## 2025-07-21 RX ORDER — OMEPRAZOLE 20 MG/1
20 CAPSULE, DELAYED RELEASE ORAL DAILY
COMMUNITY

## 2025-07-21 NOTE — ED PROVIDER NOTES
Department of Emergency Medicine  ED Provider Note  Admit Date: 7/21/2025  Room: 02/02 7/21/25  8:55 AM EDT      HISTORY OF PRESENT ILLNESS:  (Nurses Notes Reviewed)    Chief Complaint:   Dysuria (Dysuria & burning for 1 day) and Letter for School/Work          Caitlyn Crandall is a 55 y.o. old female presenting to the urgent care for concerns for urinary tract infection, which occurred 1 day prior to arrival.  Since onset the symptoms have been constant and mild-moderate in severity.  Symptoms are associated with dysuria and urinary frequency and denies any fever chills flank or abdominal pain.      Review of Systems:   Pertinent positives and negatives are stated within HPI, all other systems reviewed and are negative.          --------------------------------------------- PAST HISTORY ---------------------------------------------  Past Medical History:  has a past medical history of Bilateral low back pain without sciatica, Bipolar 1 disorder (HCC), Depression, Obesity, Osteoarthritis, Thiamine deficiency, and Vitamin D deficiency.    Past Surgical History:  has a past surgical history that includes Gallbladder surgery (1994); Cholecystectomy; Endoscopy, colon, diagnostic; pr laps gstr rstcv px w/byp luciano-en-y limb <150 cm (N/A, 04/30/2018); Upper gastrointestinal endoscopy (N/A, 10/22/2018); Upper gastrointestinal endoscopy (N/A, 02/21/2019); joint replacement (Right, 06/03/2019); Total knee arthroplasty (Right, 06/03/2019); Upper gastrointestinal endoscopy (N/A, 09/16/2019); Total knee arthroplasty (Left, 06/24/2020); Upper gastrointestinal endoscopy (N/A, 09/11/2021); Upper gastrointestinal endoscopy (9/11/2021); and Upper gastrointestinal endoscopy (N/A, 11/8/2024).    Social History:  reports that she has been smoking cigarettes. She started smoking about 4 years ago. She has a 2.1 pack-year smoking history. She has never used smokeless tobacco. She reports that she does not drink alcohol and

## 2025-07-22 LAB
MICROORGANISM SPEC CULT: ABNORMAL
SERVICE CMNT-IMP: ABNORMAL
SPECIMEN DESCRIPTION: ABNORMAL

## 2025-08-16 ENCOUNTER — HOSPITAL ENCOUNTER (OUTPATIENT)
Age: 55
Discharge: HOME | End: 2025-08-16
Payer: COMMERCIAL

## 2025-08-16 LAB
ALBUMIN SERPL-MCNC: 3.9 G/DL (ref 3.5–5.2)
ALP SERPL-CCNC: 81 U/L (ref 35–104)
ALT SERPL-CCNC: 7 U/L (ref 0–35)
ANION GAP SERPL CALCULATED.3IONS-SCNC: 9 MMOL/L (ref 7–16)
AST SERPL-CCNC: 13 U/L (ref 0–35)
BASOPHILS # BLD: 0.03 K/UL (ref 0–0.2)
BASOPHILS NFR BLD: 1 % (ref 0–2)
BILIRUB DIRECT SERPL-MCNC: 0.2 MG/DL (ref 0–0.2)
BILIRUB SERPL-MCNC: 0.4 MG/DL (ref 0–1.2)
BUN SERPL-MCNC: 9 MG/DL (ref 6–20)
CALCIUM SERPL-MCNC: 9.5 MG/DL (ref 8.6–10)
CHLORIDE SERPL-SCNC: 104 MMOL/L (ref 98–107)
CO2 SERPL-SCNC: 27 MMOL/L (ref 22–29)
CREAT SERPL-MCNC: 0.9 MG/DL (ref 0.5–1)
EOSINOPHIL # BLD: 0.14 K/UL (ref 0.05–0.5)
EOSINOPHILS RELATIVE PERCENT: 3 % (ref 0–6)
ERYTHROCYTE [DISTWIDTH] IN BLOOD BY AUTOMATED COUNT: 14.4 % (ref 11.5–15)
GFR, ESTIMATED: 72 ML/MIN/1.73M2
GLUCOSE SERPL-MCNC: 94 MG/DL (ref 74–99)
HCT VFR BLD AUTO: 38.8 % (ref 34–48)
HGB BLD-MCNC: 12.4 G/DL (ref 11.5–15.5)
IMM GRANULOCYTES # BLD AUTO: <0.03 K/UL (ref 0–0.58)
IMM GRANULOCYTES NFR BLD: 0 % (ref 0–5)
LITHIUM DATE LAST DOSE: 8
LITHIUM DOSE AMOUNT: 8
LITHIUM DOSE TIME: 8
LITHIUM LEVEL: 1.2 MMOL/L (ref 0.6–1.2)
LYMPHOCYTES NFR BLD: 1.6 K/UL (ref 1.5–4)
LYMPHOCYTES RELATIVE PERCENT: 35 % (ref 20–42)
MCH RBC QN AUTO: 28 PG (ref 26–35)
MCHC RBC AUTO-ENTMCNC: 32 G/DL (ref 32–34.5)
MCV RBC AUTO: 87.6 FL (ref 80–99.9)
MONOCYTES NFR BLD: 0.39 K/UL (ref 0.1–0.95)
MONOCYTES NFR BLD: 9 % (ref 2–12)
NEUTROPHILS NFR BLD: 53 % (ref 43–80)
NEUTS SEG NFR BLD: 2.43 K/UL (ref 1.8–7.3)
PLATELET # BLD AUTO: 158 K/UL (ref 130–450)
PMV BLD AUTO: 12.7 FL (ref 7–12)
POTASSIUM SERPL-SCNC: 4.3 MMOL/L (ref 3.5–5.1)
PROT SERPL-MCNC: 6.9 G/DL (ref 6.4–8.3)
RBC # BLD AUTO: 4.43 M/UL (ref 3.5–5.5)
SODIUM SERPL-SCNC: 140 MMOL/L (ref 136–145)
T4 SERPL-MCNC: 9.1 UG/DL (ref 4.5–11.7)
TSH SERPL DL<=0.05 MIU/L-ACNC: 1.95 UIU/ML (ref 0.27–4.2)
WBC OTHER # BLD: 4.6 K/UL (ref 4.5–11.5)

## 2025-08-16 PROCEDURE — 80053 COMPREHEN METABOLIC PANEL: CPT

## 2025-08-16 PROCEDURE — 80178 ASSAY OF LITHIUM: CPT

## 2025-08-16 PROCEDURE — 84443 ASSAY THYROID STIM HORMONE: CPT

## 2025-08-16 PROCEDURE — 85025 COMPLETE CBC W/AUTO DIFF WBC: CPT

## 2025-08-16 PROCEDURE — 84436 ASSAY OF TOTAL THYROXINE: CPT

## 2025-08-16 PROCEDURE — 82248 BILIRUBIN DIRECT: CPT

## 2025-08-16 PROCEDURE — 36415 COLL VENOUS BLD VENIPUNCTURE: CPT

## (undated) DEVICE — NEEDLE FLTR 18GA L1.5IN MEM THK5UM BLNT DISP

## (undated) DEVICE — COVER,LIGHT HANDLE,FLX,1/PK: Brand: MEDLINE INDUSTRIES, INC.

## (undated) DEVICE — BANDAGE COMPR L W4INXL11YD 100% COT WVN E DBL LEN CLP CLSR

## (undated) DEVICE — NDLCTR: FOAM/MAG 40CT 64/CS: Brand: MEDICAL ACTION INDUSTRIES

## (undated) DEVICE — CAMERA STRYKER 1488 HD GEN

## (undated) DEVICE — KIT BEDSIDE REVITAL OX 500ML

## (undated) DEVICE — GOWN,SIRUS,POLYRNF,BRTHSLV,XLN/XL,20/CS: Brand: MEDLINE

## (undated) DEVICE — GOWN,SIRUS,FABRNF,XL,20/CS: Brand: MEDLINE

## (undated) DEVICE — BLOCK BITE 60FR CAREGUARD

## (undated) DEVICE — YANKAUER,BULB TIP,W/O VENT,RIGID,STERILE: Brand: MEDLINE

## (undated) DEVICE — IRON INTERN

## (undated) DEVICE — HANDPIECE SET WITH BONE CLEANING TIP: Brand: INTERPULSE

## (undated) DEVICE — GARMENT,MEDLINE,DVT,INT,CALF,MED, GEN2: Brand: MEDLINE

## (undated) DEVICE — SPONGE LAP W18XL18IN WHT COT 4 PLY FLD STRUNG RADPQ DISP ST

## (undated) DEVICE — ADAPTER CLEANING PORPOISE CLEANING

## (undated) DEVICE — INTENDED FOR TISSUE SEPARATION, AND OTHER PROCEDURES THAT REQUIRE A SHARP SURGICAL BLADE TO PUNCTURE OR CUT.: Brand: BARD-PARKER ® STAINLESS STEEL BLADES

## (undated) DEVICE — SET MAJOR INSTR ORTHO

## (undated) DEVICE — SOLUTION IV IRRIG POUR BRL 0.9% SODIUM CHL 2F7124

## (undated) DEVICE — AIRLIFE™ ADULT OXYGEN MASK VINYL, UNDER THE CHIN STYLE, 3 IN 1 MASK WITH SAFETY VENT, WITH 7 FEET (2.1 M) CRUSH RESISTANT OXYGEN TUBING: Brand: AIRLIFE™

## (undated) DEVICE — TUBING, SUCTION, 1/4" X 10', STRAIGHT: Brand: MEDLINE

## (undated) DEVICE — MARKER,SKIN,WI/RULER AND LABELS: Brand: MEDLINE

## (undated) DEVICE — LUBRICANT SURG JELLY ST BACTER TUBE 4.25OZ

## (undated) DEVICE — CONTAINER SPEC COLL 960ML POLYPR TRIANG GRAD INTAKE/OUTPUT

## (undated) DEVICE — 3M™ STERI-DRAPE™ U-DRAPE 1015: Brand: STERI-DRAPE™

## (undated) DEVICE — DOUBLE BASIN SET: Brand: MEDLINE INDUSTRIES, INC.

## (undated) DEVICE — MASK,FACE,MAXFLUIDPROTECT,SHIELD/ERLPS: Brand: MEDLINE

## (undated) DEVICE — COVER,TABLE,60X90,STERILE: Brand: MEDLINE

## (undated) DEVICE — SYRINGE INFL 60ML DISP ALLIANCE II

## (undated) DEVICE — PATIENT RETURN ELECTRODE, SINGLE-USE, CONTACT QUALITY MONITORING, ADULT, WITH 9FT CORD, FOR PATIENTS WEIGING OVER 33LBS. (15KG): Brand: MEGADYNE

## (undated) DEVICE — Z INACTIVE USE 2660664 SOLUTION IRRIG 3000ML 0.9% SOD CHL USP UROMATIC PLAS CONT

## (undated) DEVICE — Device: Brand: DEFENDO VALVE AND CONNECTOR KIT

## (undated) DEVICE — ESOPHAGEAL/PYLORIC/COLONIC/BILIARY WIREGUIDED BALLOON DILATATION CATHETER: Brand: CRE™ PRO

## (undated) DEVICE — SYRINGE IRRIG 60ML SFT PLIABLE BLB EZ TO GRP 1 HND USE W/

## (undated) DEVICE — T4 HOOD

## (undated) DEVICE — GOWN ISOLATN REG YEL M WT MULTIPLY SIDETIE LEV 2

## (undated) DEVICE — FORCEPS BX L240CM JAW DIA2.8MM L CAP W/ NDL MIC MESH TOOTH

## (undated) DEVICE — 6 X 9  1.75MIL 4-WALL LABGUARD: Brand: MINIGRIP COMMERCIAL LLC

## (undated) DEVICE — MEDI-VAC YANKAUER SUCTION HANDLE: Brand: CARDINAL HEALTH

## (undated) DEVICE — APPLIER CLP L SHFT DIA12MM 20 ROT MULT LIGACLP

## (undated) DEVICE — MEDI-VAC NON-CONDUCTIVE SUCTION TUBING: Brand: CARDINAL HEALTH

## (undated) DEVICE — GAUZE,SPONGE,4"X4",16PLY,STRL,LF,10/TRAY: Brand: MEDLINE

## (undated) DEVICE — PLUMEPORT LAPAROSCOPIC SMOKE FILTRATION DEVICE: Brand: PLUMEPORT ACTIV

## (undated) DEVICE — PADDING,UNDERCAST,COTTON, 4"X4YD STERILE: Brand: MEDLINE

## (undated) DEVICE — CLOSURE SKIN FLX NONINVASIVE PRELOC TECHNOLOGY FOR 24IN

## (undated) DEVICE — SPONGE GZ 4IN 4IN 4 PLY N WVN AVANT

## (undated) DEVICE — GRADUATE

## (undated) DEVICE — Device

## (undated) DEVICE — NDL CNTR 40CT FM MAG: Brand: MEDLINE INDUSTRIES, INC.

## (undated) DEVICE — PITCHER PT 1200ML W HNDL CSR WRP

## (undated) DEVICE — BANDAGE COMPR W6INXL12FT SMOOTH FOR LIMB EXSANG ESMARCH

## (undated) DEVICE — ELECTRODE PT RET AD L9FT HI MOIST COND ADH HYDRGEL CORDED

## (undated) DEVICE — SUTURE STRATAFIX SYMMETRIC SZ 1 L18IN ABSRB VLT CT1 L36CM SXPP1A404

## (undated) DEVICE — PACK,EXTREMITY,ORTHOMAX,5/CS: Brand: MEDLINE

## (undated) DEVICE — SHEET SUPPORT

## (undated) DEVICE — SOLUTION IV IRRIG 500ML 0.9% SODIUM CHL 2F7123

## (undated) DEVICE — SUTURE ABSRB L6IN L37MM 0 GS-21 GRN 1/2 CIR TAPR PNT NDL VLOCL0306

## (undated) DEVICE — 3M™ IOBAN™ 2 ANTIMICROBIAL INCISE DRAPE 6640EZ: Brand: IOBAN™ 2

## (undated) DEVICE — Z DISCONTINUED USE 2275686 GLOVE SURG SZ 8 L12IN FNGR THK13MIL WHT ISOLEX POLYISOPRENE

## (undated) DEVICE — KENDALL 450 SERIES MONITORING FOAM ELECTRODE - RECTANGULAR SHAPE ( 3/PK): Brand: KENDALL

## (undated) DEVICE — 2108 SERIES SAGITTAL BLADE (28.9 X 0.64 X 58.7MM)

## (undated) DEVICE — PACK SURG LAP CHOLE CUSTOM

## (undated) DEVICE — Z DISCONTINUED USE 2516375 APPLICATOR MEDICATED 3 CC CLR STRL CHLORAPREP

## (undated) DEVICE — TOWEL,OR,DSP,ST,BLUE,STD,6/PK,12PK/CS: Brand: MEDLINE

## (undated) DEVICE — SOLUTION SCRB 32OZ 7.5% POVIDONE IOD BTL GENTLE EFFECTIVE

## (undated) DEVICE — MEDI-VAC YANKAUER SUCTION HANDLE W/BULBOUS TIP: Brand: CARDINAL HEALTH

## (undated) DEVICE — STANDARD HYPODERMIC NEEDLE,POLYPROPYLENE HUB: Brand: MONOJECT

## (undated) DEVICE — SYRINGE MED 50ML LUERLOCK TIP

## (undated) DEVICE — CANNULA IV 18GA L15IN BLNT FILL LUERLOCK HUB MJCT

## (undated) DEVICE — APPLICATOR MEDICATED 26 CC SOLUTION HI LT ORNG CHLORAPREP

## (undated) DEVICE — Z DISCONTINUED PER MEDLINE USE 2741944 DRESSING AQUACEL 12 IN SURG W9XL30CM SIL CVR WTRPRF VIR BACT BARR ANTIMIC

## (undated) DEVICE — PAD,ABDOMINAL,8"X10",ST,LF: Brand: MEDLINE

## (undated) DEVICE — SYRINGE MED 20ML STD CLR PLAS LUERLOCK TIP N CTRL DISP

## (undated) DEVICE — CONTAINER SPEC 480ML CLR POLYSTYR 10% NEUT BUFF FRMLN ZN

## (undated) DEVICE — COVER,TABLE,44X90,STERILE: Brand: MEDLINE

## (undated) DEVICE — PMI PTFE COATED LAPAROSCOPIC WIRE L-HOOK 44 CM: Brand: PMI

## (undated) DEVICE — PICO 7 10CM X 30CM: Brand: PICO™ 7

## (undated) DEVICE — GLOVE ORANGE PI 7 1/2   MSG9075

## (undated) DEVICE — SET ENDO INSTR LAPAROSCOPIC INCISIONAL

## (undated) DEVICE — VALVE SUCTION AIR H2O HYDR H2O JET CONN STRL ORCA POD + DISP

## (undated) DEVICE — DISSECTOR ULTRASONIC L48CM CRDLSS W/ TORQ WRNCH SONICISION

## (undated) DEVICE — PENCIL ES L3M BTTN SWCH HOLSTER W/ BLDE ELECTRD EDGE

## (undated) DEVICE — NEEDLE SPNL 22GA L3.5IN BLK HUB S STL REG WALL FIT STYL W/

## (undated) DEVICE — NON STERILE VISIONAIRE ADAPTIVE                                    GUIDE KIT JOURNEY II: Brand: VISIONAIRE

## (undated) DEVICE — 4-PORT MANIFOLD: Brand: NEPTUNE 2

## (undated) DEVICE — CHLORAPREP 26ML ORANGE

## (undated) DEVICE — 3M™ IOBAN™ 2 ANTIMICROBIAL INCISE DRAPE 6650EZ: Brand: IOBAN™ 2

## (undated) DEVICE — DRAPE 40INX24IN RADIOLOGY CASS EQUIP

## (undated) DEVICE — TROCAR: Brand: KII FIOS FIRST ENTRY

## (undated) DEVICE — 1010 S-DRAPE TOWEL DRAPE 10/BX: Brand: STERI-DRAPE™

## (undated) DEVICE — STRYKER PERFORMANCE SERIES SAGITTAL BLADE: Brand: STRYKER PERFORMANCE SERIES

## (undated) DEVICE — DRESSING ALG W1XL24IN SIL ROPE ACTICOAT FLX 7

## (undated) DEVICE — GOWN,SIRUS,NONRNF,SETINSLV,XL,20/CS: Brand: MEDLINE

## (undated) DEVICE — APPLICATOR SURG XL L38CM FOR ARISTA ABSRB HEMSTAT FLEXITIP

## (undated) DEVICE — STAPLER SKIN L440MM 32MM LNG 12 FIRING B FRM PWR + GRIPPING

## (undated) DEVICE — SUTURE SUTTAPE L40IN DIA1.3MM NONABSORBABLE WHT BLU L26.5MM AR7500

## (undated) DEVICE — [HIGH FLOW INSUFFLATOR,  DO NOT USE IF PACKAGE IS DAMAGED,  KEEP DRY,  KEEP AWAY FROM SUNLIGHT,  PROTECT FROM HEAT AND RADIOACTIVE SOURCES.]: Brand: PNEUMOSURE

## (undated) DEVICE — BANDAGE COMPR W6INXL5YD SELF ADH COHESIVE CO FLX

## (undated) DEVICE — SUTURE V-LOC 180 SZ 0 L9IN ABSRB GRN GS-21 L37MM 1/2 CIR VLOCL0346

## (undated) DEVICE — LENS CORD 0-DEG 10 MM

## (undated) DEVICE — SOLUTION IV 1000ML 0.9% SOD CHL PH 5 INJ USP VIAFLX PLAS

## (undated) DEVICE — SHEET DRAPE FULL 70X100

## (undated) DEVICE — TROCAR: Brand: KII® SLEEVE

## (undated) DEVICE — PUMP SUC IRR TBNG L10FT W/ HNDPC ASSEMB STRYKEFLOW 2

## (undated) DEVICE — SOLUTION IRRIG 250ML STRL H2O PLAS POUR BTL USP

## (undated) DEVICE — LAPAROSCOPIC SCISSORS: Brand: EPIX LAPAROSCOPIC SCISSORS

## (undated) DEVICE — EXTRA LONG 45 DEGREE LENS